# Patient Record
Sex: MALE | Race: WHITE | NOT HISPANIC OR LATINO | Employment: FULL TIME | URBAN - METROPOLITAN AREA
[De-identification: names, ages, dates, MRNs, and addresses within clinical notes are randomized per-mention and may not be internally consistent; named-entity substitution may affect disease eponyms.]

---

## 2017-01-21 DIAGNOSIS — E78.5 HYPERLIPEMIA: Chronic | ICD-10-CM

## 2017-01-22 RX ORDER — ATORVASTATIN CALCIUM 40 MG/1
TABLET, FILM COATED ORAL
Qty: 30 TABLET | Refills: 2 | Status: SHIPPED | OUTPATIENT
Start: 2017-01-22 | End: 2017-04-16 | Stop reason: SDUPTHER

## 2017-02-03 DIAGNOSIS — I25.10 CORONARY ARTERY DISEASE INVOLVING NATIVE CORONARY ARTERY WITHOUT ANGINA PECTORIS, UNSPECIFIED WHETHER NATIVE OR TRANSPLANTED HEART: ICD-10-CM

## 2017-02-03 DIAGNOSIS — I10 ESSENTIAL HYPERTENSION: ICD-10-CM

## 2017-02-03 RX ORDER — METOPROLOL TARTRATE 50 MG/1
TABLET ORAL
Qty: 60 TABLET | Refills: 6 | Status: SHIPPED | OUTPATIENT
Start: 2017-02-03 | End: 2017-08-20 | Stop reason: SDUPTHER

## 2017-03-02 RX ORDER — METFORMIN HYDROCHLORIDE 500 MG/1
TABLET ORAL
Qty: 60 TABLET | Refills: 1 | Status: SHIPPED | OUTPATIENT
Start: 2017-03-02 | End: 2017-04-26 | Stop reason: SDUPTHER

## 2017-03-04 RX ORDER — AMLODIPINE BESYLATE 5 MG/1
TABLET ORAL
Qty: 30 TABLET | Refills: 2 | Status: SHIPPED | OUTPATIENT
Start: 2017-03-04 | End: 2018-02-04 | Stop reason: SDUPTHER

## 2017-03-22 RX ORDER — ISOSORBIDE MONONITRATE 30 MG/1
TABLET, EXTENDED RELEASE ORAL
Qty: 90 TABLET | Refills: 4 | Status: SHIPPED | OUTPATIENT
Start: 2017-03-22 | End: 2017-08-01 | Stop reason: SDUPTHER

## 2017-03-29 RX ORDER — VALSARTAN 160 MG/1
TABLET ORAL
Qty: 30 TABLET | Refills: 4 | Status: SHIPPED | OUTPATIENT
Start: 2017-03-29 | End: 2017-08-20 | Stop reason: SDUPTHER

## 2017-04-16 DIAGNOSIS — E78.5 HYPERLIPEMIA: Chronic | ICD-10-CM

## 2017-04-16 RX ORDER — ATORVASTATIN CALCIUM 40 MG/1
TABLET, FILM COATED ORAL
Qty: 30 TABLET | Refills: 2 | Status: SHIPPED | OUTPATIENT
Start: 2017-04-16 | End: 2017-07-04 | Stop reason: SDUPTHER

## 2017-04-25 RX ORDER — METFORMIN HYDROCHLORIDE 500 MG/1
TABLET ORAL
Qty: 60 TABLET | Refills: 1 | OUTPATIENT
Start: 2017-04-25

## 2017-04-26 ENCOUNTER — TELEPHONE (OUTPATIENT)
Dept: DIABETES | Facility: CLINIC | Age: 62
End: 2017-04-26

## 2017-04-26 RX ORDER — METFORMIN HYDROCHLORIDE 500 MG/1
TABLET ORAL
Qty: 60 TABLET | Refills: 1 | Status: SHIPPED | OUTPATIENT
Start: 2017-04-26 | End: 2017-12-09 | Stop reason: SDUPTHER

## 2017-04-26 NOTE — TELEPHONE ENCOUNTER
Patients wife informed that he needs an appointment next month. Patients wife stated that she will have patient call back to scheduled an appointment to be seen next month.

## 2017-05-04 ENCOUNTER — LAB VISIT (OUTPATIENT)
Dept: LAB | Facility: HOSPITAL | Age: 62
End: 2017-05-04
Attending: INTERNAL MEDICINE
Payer: COMMERCIAL

## 2017-05-04 ENCOUNTER — OFFICE VISIT (OUTPATIENT)
Dept: INTERNAL MEDICINE | Facility: CLINIC | Age: 62
End: 2017-05-04
Payer: COMMERCIAL

## 2017-05-04 VITALS
WEIGHT: 289.69 LBS | TEMPERATURE: 96 F | SYSTOLIC BLOOD PRESSURE: 118 MMHG | DIASTOLIC BLOOD PRESSURE: 84 MMHG | BODY MASS INDEX: 37.18 KG/M2 | HEIGHT: 74 IN | HEART RATE: 69 BPM

## 2017-05-04 DIAGNOSIS — E78.5 HYPERLIPIDEMIA ASSOCIATED WITH TYPE 2 DIABETES MELLITUS: ICD-10-CM

## 2017-05-04 DIAGNOSIS — I25.10 CORONARY ARTERY DISEASE INVOLVING NATIVE CORONARY ARTERY OF NATIVE HEART WITHOUT ANGINA PECTORIS: ICD-10-CM

## 2017-05-04 DIAGNOSIS — E11.9 TYPE 2 DIABETES MELLITUS WITHOUT COMPLICATION, WITHOUT LONG-TERM CURRENT USE OF INSULIN: ICD-10-CM

## 2017-05-04 DIAGNOSIS — I10 ESSENTIAL HYPERTENSION: Primary | ICD-10-CM

## 2017-05-04 DIAGNOSIS — E11.9 CONTROLLED TYPE 2 DIABETES MELLITUS WITHOUT COMPLICATION, WITHOUT LONG-TERM CURRENT USE OF INSULIN: ICD-10-CM

## 2017-05-04 DIAGNOSIS — G47.33 OSA (OBSTRUCTIVE SLEEP APNEA): ICD-10-CM

## 2017-05-04 DIAGNOSIS — E11.69 HYPERLIPIDEMIA ASSOCIATED WITH TYPE 2 DIABETES MELLITUS: ICD-10-CM

## 2017-05-04 DIAGNOSIS — Z11.59 NEED FOR HEPATITIS C SCREENING TEST: ICD-10-CM

## 2017-05-04 LAB
ALBUMIN SERPL BCP-MCNC: 3.6 G/DL
ALP SERPL-CCNC: 69 U/L
ALT SERPL W/O P-5'-P-CCNC: 23 U/L
ANION GAP SERPL CALC-SCNC: 5 MMOL/L
AST SERPL-CCNC: 13 U/L
BILIRUB SERPL-MCNC: 0.6 MG/DL
BUN SERPL-MCNC: 25 MG/DL
CALCIUM SERPL-MCNC: 9.3 MG/DL
CHLORIDE SERPL-SCNC: 107 MMOL/L
CHOLEST/HDLC SERPL: 3.8 {RATIO}
CO2 SERPL-SCNC: 29 MMOL/L
CREAT SERPL-MCNC: 1.1 MG/DL
EST. GFR  (AFRICAN AMERICAN): >60 ML/MIN/1.73 M^2
EST. GFR  (NON AFRICAN AMERICAN): >60 ML/MIN/1.73 M^2
GLUCOSE SERPL-MCNC: 119 MG/DL
HDL/CHOLESTEROL RATIO: 26.4 %
HDLC SERPL-MCNC: 129 MG/DL
HDLC SERPL-MCNC: 34 MG/DL
LDLC SERPL CALC-MCNC: 83.8 MG/DL
NONHDLC SERPL-MCNC: 95 MG/DL
POTASSIUM SERPL-SCNC: 5.1 MMOL/L
PROT SERPL-MCNC: 6.8 G/DL
SODIUM SERPL-SCNC: 141 MMOL/L
TRIGL SERPL-MCNC: 56 MG/DL

## 2017-05-04 PROCEDURE — 99999 PR PBB SHADOW E&M-EST. PATIENT-LVL III: CPT | Mod: PBBFAC,,, | Performed by: FAMILY MEDICINE

## 2017-05-04 PROCEDURE — 86803 HEPATITIS C AB TEST: CPT

## 2017-05-04 PROCEDURE — 3044F HG A1C LEVEL LT 7.0%: CPT | Mod: S$GLB,,, | Performed by: FAMILY MEDICINE

## 2017-05-04 PROCEDURE — 1160F RVW MEDS BY RX/DR IN RCRD: CPT | Mod: S$GLB,,, | Performed by: FAMILY MEDICINE

## 2017-05-04 PROCEDURE — 4010F ACE/ARB THERAPY RXD/TAKEN: CPT | Mod: S$GLB,,, | Performed by: FAMILY MEDICINE

## 2017-05-04 PROCEDURE — 3079F DIAST BP 80-89 MM HG: CPT | Mod: S$GLB,,, | Performed by: FAMILY MEDICINE

## 2017-05-04 PROCEDURE — 99214 OFFICE O/P EST MOD 30 MIN: CPT | Mod: S$GLB,,, | Performed by: FAMILY MEDICINE

## 2017-05-04 PROCEDURE — 3074F SYST BP LT 130 MM HG: CPT | Mod: S$GLB,,, | Performed by: FAMILY MEDICINE

## 2017-05-04 PROCEDURE — 83036 HEMOGLOBIN GLYCOSYLATED A1C: CPT

## 2017-05-04 PROCEDURE — 80053 COMPREHEN METABOLIC PANEL: CPT

## 2017-05-04 PROCEDURE — 80061 LIPID PANEL: CPT

## 2017-05-04 PROCEDURE — 36415 COLL VENOUS BLD VENIPUNCTURE: CPT | Mod: PO

## 2017-05-04 NOTE — PROGRESS NOTES
Subjective:       Patient ID: Isaiah Harrison is a 62 y.o. male.    Chief Complaint: Follow-up    HPI Comments: F/U:       Pt is a 62 year old here for DM, HTN and cholesterol follow-up. Up-to this point his DM has been very well controlled and his BP has been well controlled. Pt is up-to-date on his male wellness    Review of Systems   Constitutional: Negative.    Respiratory: Negative.    Cardiovascular: Negative.    Genitourinary: Negative.    Neurological: Negative.    Hematological: Negative.    Psychiatric/Behavioral: Negative.        Objective:      Physical Exam   Constitutional: He is oriented to person, place, and time. He appears well-developed and well-nourished.   Cardiovascular: Normal rate and regular rhythm.    Pulmonary/Chest: Effort normal and breath sounds normal.   Abdominal: Soft. Bowel sounds are normal.   Feet:   Right Foot:   Protective Sensation: 10 sites tested. 9 sites sensed.   Skin Integrity: Positive for callus and dry skin.   Left Foot:   Protective Sensation: 10 sites tested. 9 sites sensed.   Skin Integrity: Positive for callus and dry skin.   Neurological: He is alert and oriented to person, place, and time.   Skin: Skin is warm and dry.   Psychiatric: He has a normal mood and affect. His behavior is normal.       Assessment:       1. Essential hypertension    2. Controlled type 2 diabetes mellitus without complication, without long-term current use of insulin    3. Coronary artery disease involving native coronary artery of native heart without angina pectoris    4. DANTE (obstructive sleep apnea)    5. Hyperlipidemia associated with type 2 diabetes mellitus        Plan:       Essential hypertension  Comments:  BP is very well controlled    Controlled type 2 diabetes mellitus without complication, without long-term current use of insulin  Comments:  Will get HgA1C and continue on the trulicity and metformin    Coronary artery disease involving native coronary artery of native  heart without angina pectoris  Comments:  Pt will continue to follow-up with cardiology    DANTE (obstructive sleep apnea)  Comments:  Pt uses consistently his CPAP    Hyperlipidemia associated with type 2 diabetes mellitus  Comments:  Will check his lipids today

## 2017-05-04 NOTE — MR AVS SNAPSHOT
[facility]  [address] [facility] [place] [address]  Phone: [phone]  Fax: [phone]                  Isaiah Harrison   2017 8:00 AM   Office Visit    Description:  Male : 1955   Provider:  Pj Del Cid MD   Department:  [facility] - Internal Medicine           Reason for Visit     Follow-up           Diagnoses this Visit        Comments    Essential hypertension    -  Primary BP is very well controlled    Controlled type 2 diabetes mellitus without complication, without long-term current use of insulin     Will get HgA1C and continue on the trulicity and metformin    Coronary artery disease involving native coronary artery of native heart without angina pectoris     Pt will continue to follow-up with cardiology    DANTE (obstructive sleep apnea)     Pt uses consistently his CPAP    Hyperlipidemia associated with type 2 diabetes mellitus     Will check his lipids today           To Do List           Future Appointments        Provider Department Dept Phone    2017 8:25 AM LABORATORY, [facility] [phone]    2017 4:15 PM Kirsty Lyles MD [facility] [phone]      Goals (5 Years of Data)     None      [facility] On Call     [facility] On Call Nurse Care Line -  Assistance  Unless otherwise directed by your provider, please contact [facility], our nurse care line that is available for  assistance.     Registered nurses in the [facility] provide: appointment scheduling, clinical advisement, health education, and other advisory services.  Call: [phone] (toll free)               Medications           Message regarding Medications     Verify the changes and/or additions to your medication regime listed below are the same as discussed with your clinician today.  If any of these changes or additions are incorrect, please notify your healthcare provider.        STOP taking these medications     pantoprazole  "(PROTONIX) 20 MG tablet Take 1 tablet (20 mg total) by mouth once daily.           Verify that the below list of medications is an accurate representation of the medications you are currently taking.  If none reported, the list may be blank. If incorrect, please contact your healthcare provider. Carry this list with you in case of emergency.           Current Medications     amlodipine (NORVASC) 5 MG tablet take 1/2 tablet by mouth every evening    aspirin (ECOTRIN) 81 MG EC tablet Take 81 mg by mouth once daily.    atorvastatin (LIPITOR) 40 MG tablet take 1 tablet by mouth once daily    blood sugar diagnostic Strp 1 strip by Misc.(Non-Drug; Combo Route) route 2 (two) times daily.    blood sugar diagnostic Strp 1 strip by Misc.(Non-Drug; Combo Route) route 2 (two) times daily.    dulaglutide (TRULICITY) 1.5 mg/0.5 mL PnIj Inject 1.5 mg into the skin every 7 days.    isosorbide mononitrate (IMDUR) 30 MG 24 hr tablet take 3 tablets by mouth once daily    metformin (GLUCOPHAGE) 500 MG tablet take 1 tablet by mouth twice a day with meals    metoprolol tartrate (LOPRESSOR) 50 MG tablet take 1 tablet by mouth twice a day    MICROLET LANCET Misc CHECK BLOOD SUGAR TWICE DAILY    niacin (NIASPAN EXTENDED-RELEASE) 500 mg tablet Take 1 tablet (500 mg total) by mouth nightly.    nitroGLYCERIN (NITROSTAT) 0.4 MG SL tablet Place 1 tablet (0.4 mg total) under the tongue every 5 (five) minutes as needed.    ranolazine (RANEXA) 1,000 mg Tb12 Take 1 tablet (1,000 mg total) by mouth 2 (two) times daily.    valsartan (DIOVAN) 160 MG tablet take 1 tablet by mouth once daily           Clinical Reference Information           Your Vitals Were     BP Pulse Temp    118/84 (BP Location: Right arm, Patient Position: Sitting, BP Method: Manual) 69 96.4 °F (35.8 °C) (Tympanic)    Height Weight BMI    6' 2" (1.88 m) 131.4 kg (289 lb 11 oz) 37.19 kg/m2      Blood Pressure          Most Recent Value    BP  118/84      Allergies as of 5/4/2017     " Pcn [Penicillins]      Immunizations Administered on Date of Encounter - 5/4/2017     None      Language Assistance Services     ATTENTION: Language assistance services are available, free of charge. Please call [phone].      ATENCIÓN: Si gabi nation, tiene a boucher disposición servicios gratuitos de asistencia lingüística. Llame al [phone].     [facility] Ý: N?u b?n nói Ti?ng Vi?t, có các d?ch v? h? tr? ngôn ng? mi?n phí dành cho b?n. G?i s? [phone].         [facility] complies with applicable Federal civil rights laws and does not discriminate on the basis of race, color, national origin, age, disability, or sex.

## 2017-05-05 LAB
ESTIMATED AVG GLUCOSE: 131 MG/DL
HBA1C MFR BLD HPLC: 6.2 %
HCV AB SERPL QL IA: NEGATIVE

## 2017-05-17 ENCOUNTER — PATIENT MESSAGE (OUTPATIENT)
Dept: INTERNAL MEDICINE | Facility: CLINIC | Age: 62
End: 2017-05-17

## 2017-05-18 ENCOUNTER — CLINICAL SUPPORT (OUTPATIENT)
Dept: CARDIOLOGY | Facility: CLINIC | Age: 62
End: 2017-05-18
Payer: COMMERCIAL

## 2017-05-18 ENCOUNTER — OFFICE VISIT (OUTPATIENT)
Dept: CARDIOLOGY | Facility: CLINIC | Age: 62
End: 2017-05-18
Payer: COMMERCIAL

## 2017-05-18 VITALS
HEART RATE: 79 BPM | SYSTOLIC BLOOD PRESSURE: 136 MMHG | WEIGHT: 288 LBS | DIASTOLIC BLOOD PRESSURE: 76 MMHG | HEIGHT: 74 IN | BODY MASS INDEX: 36.96 KG/M2

## 2017-05-18 DIAGNOSIS — I87.2 VENOUS INSUFFICIENCY: ICD-10-CM

## 2017-05-18 DIAGNOSIS — G47.33 OSA (OBSTRUCTIVE SLEEP APNEA): ICD-10-CM

## 2017-05-18 DIAGNOSIS — E11.9 CONTROLLED TYPE 2 DIABETES MELLITUS WITHOUT COMPLICATION, WITHOUT LONG-TERM CURRENT USE OF INSULIN: ICD-10-CM

## 2017-05-18 DIAGNOSIS — I10 ESSENTIAL HYPERTENSION: ICD-10-CM

## 2017-05-18 DIAGNOSIS — I25.10 CORONARY ARTERY DISEASE, ANGINA PRESENCE UNSPECIFIED, UNSPECIFIED VESSEL OR LESION TYPE, UNSPECIFIED WHETHER NATIVE OR TRANSPLANTED HEART: ICD-10-CM

## 2017-05-18 DIAGNOSIS — E11.69 HYPERLIPIDEMIA ASSOCIATED WITH TYPE 2 DIABETES MELLITUS: ICD-10-CM

## 2017-05-18 DIAGNOSIS — Z98.61 S/P PTCA (PERCUTANEOUS TRANSLUMINAL CORONARY ANGIOPLASTY): ICD-10-CM

## 2017-05-18 DIAGNOSIS — E11.59 HYPERTENSION ASSOCIATED WITH DIABETES: ICD-10-CM

## 2017-05-18 DIAGNOSIS — I15.2 HYPERTENSION ASSOCIATED WITH DIABETES: ICD-10-CM

## 2017-05-18 DIAGNOSIS — I25.10 CORONARY ARTERY DISEASE, ANGINA PRESENCE UNSPECIFIED, UNSPECIFIED VESSEL OR LESION TYPE, UNSPECIFIED WHETHER NATIVE OR TRANSPLANTED HEART: Primary | ICD-10-CM

## 2017-05-18 DIAGNOSIS — E88.819 INSULIN RESISTANCE: ICD-10-CM

## 2017-05-18 DIAGNOSIS — E78.5 HYPERLIPIDEMIA ASSOCIATED WITH TYPE 2 DIABETES MELLITUS: ICD-10-CM

## 2017-05-18 PROCEDURE — 4010F ACE/ARB THERAPY RXD/TAKEN: CPT | Mod: S$GLB,,, | Performed by: PHYSICIAN ASSISTANT

## 2017-05-18 PROCEDURE — 3044F HG A1C LEVEL LT 7.0%: CPT | Mod: S$GLB,,, | Performed by: PHYSICIAN ASSISTANT

## 2017-05-18 PROCEDURE — 3078F DIAST BP <80 MM HG: CPT | Mod: S$GLB,,, | Performed by: PHYSICIAN ASSISTANT

## 2017-05-18 PROCEDURE — 99999 PR PBB SHADOW E&M-EST. PATIENT-LVL III: CPT | Mod: PBBFAC,,, | Performed by: INTERNAL MEDICINE

## 2017-05-18 PROCEDURE — 3075F SYST BP GE 130 - 139MM HG: CPT | Mod: S$GLB,,, | Performed by: PHYSICIAN ASSISTANT

## 2017-05-18 PROCEDURE — 99214 OFFICE O/P EST MOD 30 MIN: CPT | Mod: S$GLB,,, | Performed by: PHYSICIAN ASSISTANT

## 2017-05-18 PROCEDURE — 1160F RVW MEDS BY RX/DR IN RCRD: CPT | Mod: S$GLB,,, | Performed by: PHYSICIAN ASSISTANT

## 2017-05-18 PROCEDURE — 93000 ELECTROCARDIOGRAM COMPLETE: CPT | Mod: S$GLB,,, | Performed by: INTERNAL MEDICINE

## 2017-05-18 NOTE — MR AVS SNAPSHOT
[facility]  [address] [place] [address]  Phone: [phone]  Fax: [phone]                  Isaiah Harrison   2017 4:15 PM   Office Visit    Description:  Male : 1955   Provider:  TRAMAINE Lisa   Department:  [facility]           Reason for Visit     Coronary Artery Disease     Hypertension           Diagnoses this Visit        Comments    Coronary artery disease, angina presence unspecified, unspecified vessel or lesion type, unspecified whether native or transplanted heart    -  Primary     DANTE (obstructive sleep apnea)         Essential hypertension         Hypertension associated with diabetes         Venous insufficiency         Controlled type 2 diabetes mellitus without complication, without long-term current use of insulin         Hyperlipidemia associated with type 2 diabetes mellitus         Obesity, Class II, BMI 35-39.9, with comorbidity         S/P PTCA (percutaneous transluminal coronary angioplasty)         Insulin resistance                To Do List           Goals (5 Years of Data)     None      [facility]     [facility] Nurse Care Line -  Assistance  Unless otherwise directed by your provider, please contact [facility], our nurse care line that is available for  assistance.     Registered nurses in the [facility] provide: appointment scheduling, clinical advisement, health education, and other advisory services.  Call: [phone] (toll free)               Medications           Message regarding Medications     Verify the changes and/or additions to your medication regime listed below are the same as discussed with your clinician today.  If any of these changes or additions are incorrect, please notify your healthcare provider.             Verify that the below list of medications is an accurate representation of the medications you are currently taking.  If none reported, the list  "may be blank. If incorrect, please contact your healthcare provider. Carry this list with you in case of emergency.           Current Medications     amlodipine (NORVASC) 5 MG tablet take 1/2 tablet by mouth every evening    aspirin (ECOTRIN) 81 MG EC tablet Take 81 mg by mouth once daily.    atorvastatin (LIPITOR) 40 MG tablet take 1 tablet by mouth once daily    dulaglutide (TRULICITY) 1.5 mg/0.5 mL PnIj Inject 1.5 mg into the skin every 7 days.    isosorbide mononitrate (IMDUR) 30 MG 24 hr tablet take 3 tablets by mouth once daily    metformin (GLUCOPHAGE) 500 MG tablet take 1 tablet by mouth twice a day with meals    metoprolol tartrate (LOPRESSOR) 50 MG tablet take 1 tablet by mouth twice a day    niacin (NIASPAN EXTENDED-RELEASE) 500 mg tablet Take 1 tablet (500 mg total) by mouth nightly.    ranolazine (RANEXA) 1,000 mg Tb12 Take 1 tablet (1,000 mg total) by mouth 2 (two) times daily.    valsartan (DIOVAN) 160 MG tablet take 1 tablet by mouth once daily    blood sugar diagnostic Strp 1 strip by Misc.(Non-Drug; Combo Route) route 2 (two) times daily.    blood sugar diagnostic Strp 1 strip by Misc.(Non-Drug; Combo Route) route 2 (two) times daily.    MICROLET LANCET Misc CHECK BLOOD SUGAR TWICE DAILY    nitroGLYCERIN (NITROSTAT) 0.4 MG SL tablet Place 1 tablet (0.4 mg total) under the tongue every 5 (five) minutes as needed.           Clinical Reference Information           Your Vitals Were     BP Pulse Height Weight BMI    136/76 (BP Location: Left arm, Patient Position: Sitting, BP Method: Manual) 79 6' 2" (1.88 m) 130.6 kg (288 lb) 36.98 kg/m2      Blood Pressure          Most Recent Value    Right Arm BP - Sitting  (P) 140/76    Left Arm BP - Sitting  (P) 136/76    BP  136/76      Allergies as of 5/18/2017     Pcn [Penicillins]      Immunizations Administered on Date of Encounter - 5/18/2017     None      Orders Placed During Today's Visit     Future Labs/Procedures Expected by Expires    Hemoglobin A1c  " 11/14/2017 7/17/2018    Comprehensive metabolic panel  11/17/2017 (Approximate) 5/18/2018    Lipid panel  11/17/2017 (Approximate) 5/18/2018    2D echo with color flow doppler  As directed 5/18/2018    SCHEDULED EKG 12-LEAD (to Muse)  As directed 5/16/2018      Language Assistance Services     ATTENTION: Language assistance services are available, free of charge. Please call [phone].      ATENCIÓN: Si habla español, tiene a boucher disposición servicios gratuitos de asistencia lingüística. Llame al [phone].     CHÚ Ý: N?u b?n nói Ti?ng Vi?t, có các d?ch v? h? tr? ngôn ng? mi?n phí dành cho b?n. G?i s? [phone].         [facility] complies with applicable Federal civil rights laws and does not discriminate on the basis of race, color, national origin, age, disability, or sex.

## 2017-05-18 NOTE — PROGRESS NOTES
Subjective:    Patient ID:  Isaiah Harrison is a 62 y.o. male who presents for follow-up of Coronary Artery Disease (followup) and Hypertension      HPI   Mr. Harrison is a 62 year old male patient with a PMHx of CAD s/p LAD stent, obesity, HTN, hyperlipidemia, and DANTE who presents today for his routine follow-up. He returns today and states he is doing well. CAD clinically stable. Patient denies any chest pain, SOB, or other anginal signs or symptoms. He also denies any lightheadedness, palpitations, dizziness, near syncope, or syncope. No signs/symptoms of TIA/CVA. He has some mild lower extremity edema which is chronic. No other signs/sympotms to suggest CHF. Patient reports compliance with his medications. Last labs reviewed. A1C ok. Lipid panel-LDL slightly elevated. Patient admits he needs to be more compliant with exercise and portion control. EKG today shows NSR, normal EKG.     Review of Systems   Constitution: Negative for chills, decreased appetite, fever, weakness and malaise/fatigue.   HENT: Negative for congestion, headaches, hoarse voice and sore throat.    Eyes: Negative for blurred vision and discharge.   Cardiovascular: Positive for leg swelling. Negative for chest pain, claudication, cyanosis, dyspnea on exertion, irregular heartbeat, near-syncope, orthopnea, palpitations and paroxysmal nocturnal dyspnea.   Respiratory: Negative for cough, hemoptysis, shortness of breath, snoring, sputum production and wheezing.    Endocrine: Negative for cold intolerance and heat intolerance.   Hematologic/Lymphatic: Negative for bleeding problem. Does not bruise/bleed easily.   Skin: Negative for rash.   Musculoskeletal: Negative for arthritis, back pain, joint pain, joint swelling, muscle cramps, muscle weakness and myalgias.   Gastrointestinal: Negative for abdominal pain, constipation, diarrhea, heartburn, melena and nausea.   Genitourinary: Negative for hematuria.   Neurological: Negative for dizziness,  "focal weakness, light-headedness, loss of balance, numbness, paresthesias and seizures.   Psychiatric/Behavioral: Negative for memory loss. The patient does not have insomnia.    Allergic/Immunologic: Negative for hives.       /76 (BP Location: Left arm, Patient Position: Sitting, BP Method: Manual)  Pulse 79  Ht 6' 2" (1.88 m)  Wt 130.6 kg (288 lb)  BMI 36.98 kg/m2    Objective:    Physical Exam   Constitutional: He is oriented to person, place, and time. He appears well-developed and well-nourished. No distress.   HENT:   Head: Normocephalic and atraumatic.   Eyes: Pupils are equal, round, and reactive to light. Right eye exhibits no discharge. Left eye exhibits no discharge.   Neck: Neck supple. No JVD present. No tracheal deviation present. No thyromegaly present.   Cardiovascular: Normal rate, regular rhythm, S1 normal, S2 normal, normal heart sounds and intact distal pulses.  PMI is not displaced.  Exam reveals no gallop, no S3, no S4 and no friction rub.    No murmur heard.  Pulses:       Radial pulses are 2+ on the right side, and 2+ on the left side.   Pulmonary/Chest: Effort normal and breath sounds normal. No respiratory distress. He has no wheezes. He has no rales.   Abdominal: Soft. He exhibits no distension. There is no tenderness. There is no rebound.   obese   Musculoskeletal: He exhibits no edema.   Neurological: He is alert and oriented to person, place, and time.   Skin: Skin is warm and dry. He is not diaphoretic. No erythema.   Varicose veins     Psychiatric: He has a normal mood and affect. His behavior is normal. Thought content normal.   Nursing note and vitals reviewed.        Lab Results   Component Value Date    CHOL 129 05/04/2017    CHOL 130 12/19/2016    CHOL 118 (L) 08/18/2016     Lab Results   Component Value Date    HDL 34 (L) 05/04/2017    HDL 37 (L) 12/19/2016    HDL 34 (L) 08/18/2016     Lab Results   Component Value Date    LDLCALC 83.8 05/04/2017    LDLCALC 80.0 " 12/19/2016    LDLCALC 73.0 08/18/2016     Lab Results   Component Value Date    TRIG 56 05/04/2017    TRIG 65 12/19/2016    TRIG 55 08/18/2016     Lab Results   Component Value Date    CHOLHDL 26.4 05/04/2017    CHOLHDL 28.5 12/19/2016    CHOLHDL 28.8 08/18/2016     Lab Results   Component Value Date    HGBA1C 6.2 05/04/2017       Chemistry        Component Value Date/Time     05/04/2017 0824    K 5.1 05/04/2017 0824     05/04/2017 0824    CO2 29 05/04/2017 0824    BUN 25 (H) 05/04/2017 0824    CREATININE 1.1 05/04/2017 0824     (H) 05/04/2017 0824        Component Value Date/Time    CALCIUM 9.3 05/04/2017 0824    ALKPHOS 69 05/04/2017 0824    AST 13 05/04/2017 0824    ALT 23 05/04/2017 0824    BILITOT 0.6 05/04/2017 0824          Assessment:       1. Coronary artery disease, angina presence unspecified, unspecified vessel or lesion type, unspecified whether native or transplanted heart    2. DANTE (obstructive sleep apnea)    3. Essential hypertension    4. Hypertension associated with diabetes    5. Venous insufficiency    6. Controlled type 2 diabetes mellitus without complication, without long-term current use of insulin    7. Hyperlipidemia associated with type 2 diabetes mellitus    8. Obesity, Class II, BMI 35-39.9, with comorbidity    9. S/P PTCA (percutaneous transluminal coronary angioplasty)       Doing clinically well. No angina or equivalent. No CHF symptoms. Counseled about dietary compliance and increased aerobic exercise.   Plan:     -Continue current medical management and risk factor modification  -Cardiac, low salt diet  -Increase activity level, weight loss  -RTC 6 months with lipid, cmp, A1C, 2D echo    Chart reviewed. Dr. Lyles agrees with plan as outlined above.

## 2017-06-30 DIAGNOSIS — E11.9 TYPE 2 DIABETES MELLITUS WITHOUT COMPLICATION: ICD-10-CM

## 2017-07-01 RX ORDER — DULAGLUTIDE 1.5 MG/.5ML
INJECTION, SOLUTION SUBCUTANEOUS
Qty: 2 ML | Refills: 4 | Status: SHIPPED | OUTPATIENT
Start: 2017-07-01 | End: 2017-12-09 | Stop reason: SDUPTHER

## 2017-07-04 DIAGNOSIS — E78.5 HYPERLIPEMIA: Chronic | ICD-10-CM

## 2017-07-04 RX ORDER — ATORVASTATIN CALCIUM 40 MG/1
TABLET, FILM COATED ORAL
Qty: 30 TABLET | Refills: 2 | Status: SHIPPED | OUTPATIENT
Start: 2017-07-04 | End: 2017-09-24 | Stop reason: SDUPTHER

## 2017-08-01 ENCOUNTER — NUTRITION (OUTPATIENT)
Dept: DIABETES | Facility: CLINIC | Age: 62
End: 2017-08-01
Payer: COMMERCIAL

## 2017-08-01 VITALS — HEIGHT: 74 IN | WEIGHT: 292.31 LBS | BODY MASS INDEX: 37.51 KG/M2

## 2017-08-01 DIAGNOSIS — E11.9 DIABETES MELLITUS WITHOUT COMPLICATION: Primary | ICD-10-CM

## 2017-08-01 PROCEDURE — 99999 PR PBB SHADOW E&M-EST. PATIENT-LVL III: CPT | Mod: PBBFAC,,, | Performed by: DIETITIAN, REGISTERED

## 2017-08-01 PROCEDURE — G0108 DIAB MANAGE TRN  PER INDIV: HCPCS | Mod: S$GLB,,, | Performed by: DIETITIAN, REGISTERED

## 2017-08-01 RX ORDER — ISOSORBIDE MONONITRATE 30 MG/1
TABLET, EXTENDED RELEASE ORAL
Qty: 90 TABLET | Refills: 4 | Status: SHIPPED | OUTPATIENT
Start: 2017-08-01 | End: 2017-12-21 | Stop reason: SDUPTHER

## 2017-08-01 NOTE — LETTER
August 1, 2017        Pj Del Cid MD  [address] [facility] [place] [address]             [facility] Diabetes Management  [address] [facility] [place] [address]  Phone: [phone]  Fax: [phone]   Patient: Isaiah Harrison   MR Number: [id number]   YOB: 1955   Date of Visit: 8/1/2017       Dear Dr. Del Cid:    Thank you for referring Isaiah Harrison to me for evaluation. Below are the relevant portions of my assessment and plan of care.     If you have questions, please do not hesitate to call me. I look forward to following Isaiah along with you.    Sincerely,      Angélica Patel, BISHNU, CDE           CC  No Recipients

## 2017-08-01 NOTE — PROGRESS NOTES
Diabetes Education  Author: Angélica Patel RD, CDE  Date: 8/1/2017    Diabetes Education Visit  Diabetes Education Record Assessment/Progress: Comprehensive/Group    Diabetes Type  Diabetes Type : Type II     Nutrition  Meal Planning:  (Intake ~3300 cals/d. Excess carb, fat and sodium from portions and cooking preparation. Inadequate volume non-starchy vegetables.)    Monitoring   Monitoring: Other  Self Monitoring : Needs meter  Blood Glucose Logs: No    Exercise   Exercise Type:  (Intermittent walking )    Current Diabetes Treatment   Current Treatment: Oral Medication, Diet, Injectable, Exercise (metformin 500mg twice daily, trulicity 1.5 mg weekly.)    Social History  Preferred Learning Method: Face to Face  Primary Support: Self, Spouse  Smoking Status: Never a Smoker  Alcohol Use: Rarely     Barriers to Change  Barriers to Change: None  Learning Challenges : None    Readiness to Learn   Readiness to Learn : Eager    Cultural Influences  Cultural Influences: No    Diabetes Education Assessment/Progress  Acute Complications (preventing, detecting, and treating acute complications): Discussion, Individual Session, Competent (verbalizes/demonstrates), Competent Family/SO  Chronic Complications (preventing, detecting, and treating chronic complications): Discussion, Individual Session, Competent (verbalizes/demonstrates), Competent Family/SO  Diabetes Disease Process (diabetes disease process and treatment options): Discussion, Individual Session, Competent (verbalizes/demonstrates), Competent Family/SO  Nutrition (Incorporating nutritional management into one's lifestyle): Discussion, Individual Session, Competent (verbalizes/demonstrates), Competent Family/SO, Written Materials Provided (Dicussed Ideal Protein program per pt quest regarding weight loss options, literature provided for him to consider further. )  Physical Activity (incorporating physical activity into one's lifestyle): Discussion,  Individual Session, Competent (verbalizes/demonstrates), Competent Family/SO  Medications (states correct name, dose, onset, peak, duration, side effects & timing of meds): Discussion, Individual Session, Competent (verbalizes/demonstrates), Competent Family/SO, Written Materials Provided  Monitoring (monitoring blood glucose/other parameters & using results): Discussion, Individual Session, Competent (verbalizes/demonstrates), Competent Family/SO, Written Materials Provided  Goal Setting and Problem Solving (verbalizes behavior change strategies & sets realistic goals): Discussion, Individual Session, Competent (verbalizes/demonstrates), Competent Family/SO  Behavior Change (developing personal strategies to health & behavior change): Discussion, Individual Session, Comnpetent (verbalizes/demonstrates), Competent Family/SO  Psychosocial Issues (developing personal srategies to address psychosocial concerns): Discussion, Individual Session, Competent (verbalizes/demonstrates), Competent Family/SO    Goals  Healthy Eating: Set (Use meal plan - add pm snack (list provided), carb targets)  Start Date: 08/01/17  Target Date: 11/01/17  Physical Activity: Set (150 min/wk)  Start Date: 08/01/17  Target Date: 11/01/17  Monitoring: Set (test bg once daily (fst, 2 hr pp))  Start Date: 08/01/17  Target Date: 11/01/17    Diabetes Self-Management Support Plan  Diabetes Learning: other  Other learning: RD/CDE via [employer] and [employer]. Pt will consider enrolling in ideal protein.   Review Status: Patient has selected and agrees to support plan.    Diabetes Care Plan/Intervention  Education Plan/Intervention: Individual Follow-Up DSMT    Diabetes Meal Plan  Restrictions: Low Fat, Low Sodium  Calories: 1600  Carbohydrate Per Meal: 30-45g  Carbohydrate Per Snack : 7-15g    Education Units of Time   Time Spent: 30 min      Health Maintenance Topics with due status: Not Due       Topic Last Completion Date    TETANUS VACCINE 08/18/2016     Foot Exam 05/04/2017    Lipid Panel 05/04/2017    Hemoglobin A1c 05/04/2017     Health Maintenance Due   Topic Date Due    Pneumococcal PPSV23 (Medium Risk) (1) 03/22/1975    Urine Microalbumin  10/14/2016    Colonoscopy  12/11/2016    Eye Exam  02/22/2017    Zoster Vaccine  03/22/2017    Influenza Vaccine  08/01/2017

## 2017-08-20 DIAGNOSIS — I10 ESSENTIAL HYPERTENSION: ICD-10-CM

## 2017-08-20 DIAGNOSIS — I25.10 CORONARY ARTERY DISEASE INVOLVING NATIVE CORONARY ARTERY WITHOUT ANGINA PECTORIS, UNSPECIFIED WHETHER NATIVE OR TRANSPLANTED HEART: ICD-10-CM

## 2017-08-20 RX ORDER — METOPROLOL TARTRATE 50 MG/1
TABLET ORAL
Qty: 60 TABLET | Refills: 6 | Status: SHIPPED | OUTPATIENT
Start: 2017-08-20 | End: 2018-03-04 | Stop reason: SDUPTHER

## 2017-08-21 RX ORDER — VALSARTAN 160 MG/1
TABLET ORAL
Qty: 30 TABLET | Refills: 4 | Status: SHIPPED | OUTPATIENT
Start: 2017-08-21 | End: 2018-01-07 | Stop reason: SDUPTHER

## 2017-09-24 DIAGNOSIS — E78.5 HYPERLIPEMIA: Chronic | ICD-10-CM

## 2017-09-25 RX ORDER — ATORVASTATIN CALCIUM 40 MG/1
TABLET, FILM COATED ORAL
Qty: 30 TABLET | Refills: 2 | Status: SHIPPED | OUTPATIENT
Start: 2017-09-25 | End: 2017-12-21 | Stop reason: SDUPTHER

## 2017-10-13 ENCOUNTER — TELEPHONE (OUTPATIENT)
Dept: OPHTHALMOLOGY | Facility: CLINIC | Age: 62
End: 2017-10-13

## 2017-10-13 NOTE — TELEPHONE ENCOUNTER
----- Message from Kendal Daniel sent at 10/13/2017  2:10 PM CDT -----  Would like to speak to nurse about scheduling an appt. Please call back at [phone]. thanks

## 2017-11-06 ENCOUNTER — OFFICE VISIT (OUTPATIENT)
Dept: OPHTHALMOLOGY | Facility: CLINIC | Age: 62
End: 2017-11-06
Payer: COMMERCIAL

## 2017-11-06 DIAGNOSIS — E11.9 TYPE 2 DIABETES MELLITUS WITHOUT RETINOPATHY: Primary | ICD-10-CM

## 2017-11-06 DIAGNOSIS — Z13.5 GLAUCOMA SCREENING: ICD-10-CM

## 2017-11-06 DIAGNOSIS — H52.4 MYOPIA WITH PRESBYOPIA OF BOTH EYES: ICD-10-CM

## 2017-11-06 DIAGNOSIS — H52.13 MYOPIA WITH PRESBYOPIA OF BOTH EYES: ICD-10-CM

## 2017-11-06 PROCEDURE — 92014 COMPRE OPH EXAM EST PT 1/>: CPT | Mod: S$GLB,,, | Performed by: OPTOMETRIST

## 2017-11-06 PROCEDURE — 99999 PR PBB SHADOW E&M-EST. PATIENT-LVL II: CPT | Mod: PBBFAC,,, | Performed by: OPTOMETRIST

## 2017-11-06 PROCEDURE — 92015 DETERMINE REFRACTIVE STATE: CPT | Mod: S$GLB,,, | Performed by: OPTOMETRIST

## 2017-11-06 NOTE — PROGRESS NOTES
HPI     Diabetic Eye Exam    Additional comments: Does not check BS. Pt says it's staying normal           Comments   Pt's last eye appt with [facility] was 8/17/15 for conjunctivitis. Pt was seeing   Dr. Smith at [facility]. Pt wants to go to all [facility] doctors so   that his records will be accessible for all his doctors. Pt states no pain   or discomfort since last appt. No VA change, either.     Does not wear glasses for distance or near.       Last edited by Diann Guthrie, OD on 11/6/2017  9:59 AM. (History)            Assessment /Plan     For exam results, see Encounter Report.    Type 2 diabetes mellitus without retinopathy    Glaucoma screening    Myopia with presbyopia of both eyes      No diabetic retinopathy both eyes.  Glaucoma screening negative both eyes.  Over the counter readers as needed..  Return to clinic 1 yr.

## 2017-11-15 ENCOUNTER — LAB VISIT (OUTPATIENT)
Dept: LAB | Facility: HOSPITAL | Age: 62
End: 2017-11-15
Attending: INTERNAL MEDICINE
Payer: COMMERCIAL

## 2017-11-15 ENCOUNTER — OFFICE VISIT (OUTPATIENT)
Dept: CARDIOLOGY | Facility: CLINIC | Age: 62
End: 2017-11-15
Payer: COMMERCIAL

## 2017-11-15 VITALS
DIASTOLIC BLOOD PRESSURE: 62 MMHG | BODY MASS INDEX: 36.7 KG/M2 | HEIGHT: 74 IN | WEIGHT: 286 LBS | SYSTOLIC BLOOD PRESSURE: 122 MMHG | HEART RATE: 80 BPM

## 2017-11-15 DIAGNOSIS — I25.10 CORONARY ARTERY DISEASE, ANGINA PRESENCE UNSPECIFIED, UNSPECIFIED VESSEL OR LESION TYPE, UNSPECIFIED WHETHER NATIVE OR TRANSPLANTED HEART: ICD-10-CM

## 2017-11-15 DIAGNOSIS — Z98.61 S/P PTCA (PERCUTANEOUS TRANSLUMINAL CORONARY ANGIOPLASTY): ICD-10-CM

## 2017-11-15 DIAGNOSIS — I10 ESSENTIAL HYPERTENSION: ICD-10-CM

## 2017-11-15 DIAGNOSIS — I21.4 NSTEMI (NON-ST ELEVATED MYOCARDIAL INFARCTION): ICD-10-CM

## 2017-11-15 DIAGNOSIS — I25.10 CORONARY ARTERY DISEASE INVOLVING NATIVE CORONARY ARTERY OF NATIVE HEART WITHOUT ANGINA PECTORIS: Primary | ICD-10-CM

## 2017-11-15 DIAGNOSIS — E11.59 HYPERTENSION ASSOCIATED WITH DIABETES: ICD-10-CM

## 2017-11-15 DIAGNOSIS — E11.9 CONTROLLED TYPE 2 DIABETES MELLITUS WITHOUT COMPLICATION, WITHOUT LONG-TERM CURRENT USE OF INSULIN: ICD-10-CM

## 2017-11-15 DIAGNOSIS — E88.819 INSULIN RESISTANCE: ICD-10-CM

## 2017-11-15 DIAGNOSIS — I87.2 VENOUS INSUFFICIENCY: ICD-10-CM

## 2017-11-15 DIAGNOSIS — E78.5 HYPERLIPIDEMIA ASSOCIATED WITH TYPE 2 DIABETES MELLITUS: ICD-10-CM

## 2017-11-15 DIAGNOSIS — E11.69 HYPERLIPIDEMIA ASSOCIATED WITH TYPE 2 DIABETES MELLITUS: ICD-10-CM

## 2017-11-15 DIAGNOSIS — G47.33 OSA (OBSTRUCTIVE SLEEP APNEA): ICD-10-CM

## 2017-11-15 DIAGNOSIS — I15.2 HYPERTENSION ASSOCIATED WITH DIABETES: ICD-10-CM

## 2017-11-15 LAB
ALBUMIN SERPL BCP-MCNC: 3.4 G/DL
ALP SERPL-CCNC: 68 U/L
ALT SERPL W/O P-5'-P-CCNC: 26 U/L
ANION GAP SERPL CALC-SCNC: 6 MMOL/L
AST SERPL-CCNC: 15 U/L
BILIRUB SERPL-MCNC: 0.9 MG/DL
BUN SERPL-MCNC: 24 MG/DL
CALCIUM SERPL-MCNC: 9.1 MG/DL
CHLORIDE SERPL-SCNC: 106 MMOL/L
CHOLEST SERPL-MCNC: 135 MG/DL
CHOLEST/HDLC SERPL: 3.9 {RATIO}
CO2 SERPL-SCNC: 27 MMOL/L
CREAT SERPL-MCNC: 1.1 MG/DL
EST. GFR  (AFRICAN AMERICAN): >60 ML/MIN/1.73 M^2
EST. GFR  (NON AFRICAN AMERICAN): >60 ML/MIN/1.73 M^2
ESTIMATED AVG GLUCOSE: 108 MG/DL
GLUCOSE SERPL-MCNC: 118 MG/DL
HBA1C MFR BLD HPLC: 5.4 %
HDLC SERPL-MCNC: 35 MG/DL
HDLC SERPL: 25.9 %
LDLC SERPL CALC-MCNC: 80.4 MG/DL
NONHDLC SERPL-MCNC: 100 MG/DL
POTASSIUM SERPL-SCNC: 4.5 MMOL/L
PROT SERPL-MCNC: 6.7 G/DL
SODIUM SERPL-SCNC: 139 MMOL/L
TRIGL SERPL-MCNC: 98 MG/DL

## 2017-11-15 PROCEDURE — 99214 OFFICE O/P EST MOD 30 MIN: CPT | Mod: S$GLB,,, | Performed by: NURSE PRACTITIONER

## 2017-11-15 PROCEDURE — 80061 LIPID PANEL: CPT

## 2017-11-15 PROCEDURE — 83036 HEMOGLOBIN GLYCOSYLATED A1C: CPT

## 2017-11-15 PROCEDURE — 99999 PR PBB SHADOW E&M-EST. PATIENT-LVL III: CPT | Mod: PBBFAC,,, | Performed by: NURSE PRACTITIONER

## 2017-11-15 PROCEDURE — 36415 COLL VENOUS BLD VENIPUNCTURE: CPT

## 2017-11-15 PROCEDURE — 80053 COMPREHEN METABOLIC PANEL: CPT

## 2017-11-15 NOTE — PROGRESS NOTES
Subjective:   Patient ID:  Isaiah Harrison is a 62 y.o. male who presents for follow up of Coronary Artery Disease      HPI  Patient presents to clinic for follow up and management of CAD. Patient has PMH of CAD , NSTEMI in Dec 2015 where he had patent LAD stent, HTN, HLP, obesity and DANTE. Patient states that he has been doing well since last visit. Denies any chest pain, sob, orthopnea, pnd, angina or equivalent. No dizziness, near syncope or syncope. Fasting labs pending. BP well controlled on current regimen. Is using CPAP, but not nightly.   Doing well with heart healthy diet. Has no abnormal bleeding.  Patient is active, but no dedicated exercise routine.       Past Medical History:   Diagnosis Date    Acute coronary syndrome     Anticoagulant long-term use     Back pain     CAD (coronary artery disease) 10/17/2013    Coronary artery disease     Diabetes mellitus, type 2     Gastric polyps     Hyperlipemia     Hypertension     Obesity 10/21/2014    Sleep apnea 1/7/2015       Past Surgical History:   Procedure Laterality Date    APPENDECTOMY      COLONOSCOPY      CORONARY ANGIOPLASTY WITH STENT PLACEMENT      ESOPHAGOGASTRODUODENOSCOPY      FRACTURE SURGERY      HERNIA REPAIR      SPINE SURGERY         Social History   Substance Use Topics    Smoking status: Never Smoker    Smokeless tobacco: Never Used    Alcohol use No       Family History   Problem Relation Age of Onset    Hypertension Mother     Hypertension Father     Heart disease Maternal Grandfather        Current Outpatient Prescriptions   Medication Sig    amlodipine (NORVASC) 5 MG tablet take 1/2 tablet by mouth every evening    aspirin (ECOTRIN) 81 MG EC tablet Take 81 mg by mouth once daily.    atorvastatin (LIPITOR) 40 MG tablet take 1 tablet by mouth once daily    blood sugar diagnostic Strp 1 strip by Misc.(Non-Drug; Combo Route) route 2 (two) times daily.    blood sugar diagnostic Strp 1 strip by Misc.(Non-Drug;  Combo Route) route 2 (two) times daily.    isosorbide mononitrate (IMDUR) 30 MG 24 hr tablet take 3 tablets by mouth once daily    metformin (GLUCOPHAGE) 500 MG tablet take 1 tablet by mouth twice a day with meals (Patient taking differently: take 1 tablet by mouth once a day with meals)    metoprolol tartrate (LOPRESSOR) 50 MG tablet take 1 tablet by mouth twice a day    MICROLET LANCET Mis CHECK BLOOD SUGAR TWICE DAILY    niacin (NIASPAN EXTENDED-RELEASE) 500 mg tablet Take 1 tablet (500 mg total) by mouth nightly.    nitroGLYCERIN (NITROSTAT) 0.4 MG SL tablet Place 1 tablet (0.4 mg total) under the tongue every 5 (five) minutes as needed.    ranolazine (RANEXA) 1,000 mg Tb12 Take 1 tablet (1,000 mg total) by mouth 2 (two) times daily.    TRULICITY 1.5 mg/0.5 mL PnIj INJECT 1.5 MILLIGRAMS INTO THE SKIN EVERY 7 DAYS    valsartan (DIOVAN) 160 MG tablet take 1 tablet by mouth once daily     No current facility-administered medications for this visit.      Current Outpatient Prescriptions on File Prior to Visit   Medication Sig    amlodipine (NORVASC) 5 MG tablet take 1/2 tablet by mouth every evening    aspirin (ECOTRIN) 81 MG EC tablet Take 81 mg by mouth once daily.    atorvastatin (LIPITOR) 40 MG tablet take 1 tablet by mouth once daily    blood sugar diagnostic Strp 1 strip by Misc.(Non-Drug; Combo Route) route 2 (two) times daily.    blood sugar diagnostic Strp 1 strip by Misc.(Non-Drug; Combo Route) route 2 (two) times daily.    isosorbide mononitrate (IMDUR) 30 MG 24 hr tablet take 3 tablets by mouth once daily    metformin (GLUCOPHAGE) 500 MG tablet take 1 tablet by mouth twice a day with meals (Patient taking differently: take 1 tablet by mouth once a day with meals)    metoprolol tartrate (LOPRESSOR) 50 MG tablet take 1 tablet by mouth twice a day    MICROLET LANCET Mis CHECK BLOOD SUGAR TWICE DAILY    niacin (NIASPAN EXTENDED-RELEASE) 500 mg tablet Take 1 tablet (500 mg total) by mouth  nightly.    nitroGLYCERIN (NITROSTAT) 0.4 MG SL tablet Place 1 tablet (0.4 mg total) under the tongue every 5 (five) minutes as needed.    ranolazine (RANEXA) 1,000 mg Tb12 Take 1 tablet (1,000 mg total) by mouth 2 (two) times daily.    TRULICITY 1.5 mg/0.5 mL PnIj INJECT 1.5 MILLIGRAMS INTO THE SKIN EVERY 7 DAYS    valsartan (DIOVAN) 160 MG tablet take 1 tablet by mouth once daily     No current facility-administered medications on file prior to visit.        Review of Systems   Constitution: Negative for decreased appetite, fever, weakness, malaise/fatigue, weight gain and weight loss.   HENT: Negative for congestion and nosebleeds.    Cardiovascular: Negative for chest pain, claudication, dyspnea on exertion, irregular heartbeat, leg swelling, near-syncope, orthopnea, palpitations, paroxysmal nocturnal dyspnea and syncope.   Respiratory: Negative for cough, shortness of breath, sleep disturbances due to breathing, snoring and wheezing.    Hematologic/Lymphatic: Negative for bleeding problem. Does not bruise/bleed easily.   Skin: Negative for rash.   Musculoskeletal: Negative for arthritis, back pain, falls, joint pain, joint swelling, muscle cramps and muscle weakness.   Gastrointestinal: Negative for bloating, abdominal pain, constipation, diarrhea, heartburn, nausea and vomiting.   Genitourinary: Negative for dysuria, frequency, hematuria and nocturia.   Neurological: Negative for excessive daytime sleepiness, dizziness, headaches, light-headedness, loss of balance, numbness and paresthesias.       Objective:   Physical Exam   Constitutional: He is oriented to person, place, and time. He appears well-developed and well-nourished.   Neck: Neck supple. No JVD present.   Cardiovascular: Normal rate, regular rhythm, normal heart sounds and normal pulses.  Exam reveals no friction rub.    No murmur heard.  Pulmonary/Chest: Effort normal and breath sounds normal. No respiratory distress. He has no wheezes. He has  no rales.   Abdominal: Soft. Bowel sounds are normal. He exhibits no distension.   Musculoskeletal: He exhibits no edema or tenderness.   Neurological: He is alert and oriented to person, place, and time.   Skin: Skin is warm and dry. No rash noted.   Psychiatric: He has a normal mood and affect. His behavior is normal.   Nursing note and vitals reviewed.    There were no vitals filed for this visit.  Lab Results   Component Value Date    CHOL 129 05/04/2017    CHOL 130 12/19/2016    CHOL 118 (L) 08/18/2016     Lab Results   Component Value Date    HDL 34 (L) 05/04/2017    HDL 37 (L) 12/19/2016    HDL 34 (L) 08/18/2016     Lab Results   Component Value Date    LDLCALC 83.8 05/04/2017    LDLCALC 80.0 12/19/2016    LDLCALC 73.0 08/18/2016     Lab Results   Component Value Date    TRIG 56 05/04/2017    TRIG 65 12/19/2016    TRIG 55 08/18/2016     Lab Results   Component Value Date    CHOLHDL 26.4 05/04/2017    CHOLHDL 28.5 12/19/2016    CHOLHDL 28.8 08/18/2016       Chemistry        Component Value Date/Time     05/04/2017 0824    K 5.1 05/04/2017 0824     05/04/2017 0824    CO2 29 05/04/2017 0824    BUN 25 (H) 05/04/2017 0824    CREATININE 1.1 05/04/2017 0824     (H) 05/04/2017 0824        Component Value Date/Time    CALCIUM 9.3 05/04/2017 0824    ALKPHOS 69 05/04/2017 0824    AST 13 05/04/2017 0824    ALT 23 05/04/2017 0824    BILITOT 0.6 05/04/2017 0824    ESTGFRAFRICA >60.0 05/04/2017 0824    EGFRNONAA >60.0 05/04/2017 0824          Lab Results   Component Value Date    TSH 1.188 06/30/2015     Lab Results   Component Value Date    INR 1.0 12/16/2015    INR 1.1 10/29/2014    INR 1.0 10/25/2014     Lab Results   Component Value Date    WBC 5.47 08/18/2016    HGB 13.5 (L) 08/18/2016    HCT 40.0 08/18/2016    MCV 95 08/18/2016     08/18/2016     BMP  Sodium   Date Value Ref Range Status   05/04/2017 141 136 - 145 mmol/L Final     Potassium   Date Value Ref Range Status   05/04/2017 5.1 3.5 -  5.1 mmol/L Final     Chloride   Date Value Ref Range Status   05/04/2017 107 95 - 110 mmol/L Final     CO2   Date Value Ref Range Status   05/04/2017 29 23 - 29 mmol/L Final     BUN, Bld   Date Value Ref Range Status   05/04/2017 25 (H) 6 - 20 mg/dL Final     Creatinine   Date Value Ref Range Status   05/04/2017 1.1 0.5 - 1.4 mg/dL Final     Calcium   Date Value Ref Range Status   05/04/2017 9.3 8.7 - 10.5 mg/dL Final     Anion Gap   Date Value Ref Range Status   05/04/2017 5 (L) 8 - 16 mmol/L Final     eGFR if    Date Value Ref Range Status   05/04/2017 >60.0 >60 mL/min/1.73 m^2 Final     eGFR if non    Date Value Ref Range Status   05/04/2017 >60.0 >60 mL/min/1.73 m^2 Final     Comment:     Calculation used to obtain the estimated glomerular filtration  rate (eGFR) is the CKD-EPI equation. Since race is unknown   in our information system, the eGFR values for   -American and Non--American patients are given   for each creatinine result.       CrCl cannot be calculated (Patient's most recent lab result is older than the maximum 7 days allowed.).    Assessment:     1. Coronary artery disease involving native coronary artery of native heart without angina pectoris    2. S/P PTCA (percutaneous transluminal coronary angioplasty)    3. Venous insufficiency    4. Essential hypertension    5. Controlled type 2 diabetes mellitus without complication, without long-term current use of insulin    6. DANTE (obstructive sleep apnea)    7. Hypertension associated with diabetes    8. Hyperlipidemia associated with type 2 diabetes mellitus    9. NSTEMI (non-ST elevated myocardial infarction)    Stable CAD-no angina or equivalent  BP stable  Tolerating meds well  Good activity tolerance   No abnormal bleeding  Not using CPAP nightly    Plan:   Continue current medical management for now  Heart healthy   Exercise program  Phone review of fasting labs   Scheduled for 2D echo   RTC in 6  months with lipids, CMP and A1C

## 2017-11-16 ENCOUNTER — TELEPHONE (OUTPATIENT)
Dept: PULMONOLOGY | Facility: CLINIC | Age: 62
End: 2017-11-16

## 2017-11-29 ENCOUNTER — CLINICAL SUPPORT (OUTPATIENT)
Dept: CARDIOLOGY | Facility: CLINIC | Age: 62
End: 2017-11-29
Attending: INTERNAL MEDICINE
Payer: COMMERCIAL

## 2017-11-29 ENCOUNTER — OFFICE VISIT (OUTPATIENT)
Dept: PULMONOLOGY | Facility: CLINIC | Age: 62
End: 2017-11-29
Payer: COMMERCIAL

## 2017-11-29 VITALS
DIASTOLIC BLOOD PRESSURE: 64 MMHG | BODY MASS INDEX: 37.68 KG/M2 | SYSTOLIC BLOOD PRESSURE: 110 MMHG | RESPIRATION RATE: 20 BRPM | HEIGHT: 74 IN | HEART RATE: 78 BPM | WEIGHT: 293.63 LBS | OXYGEN SATURATION: 98 %

## 2017-11-29 DIAGNOSIS — G47.33 OSA ON CPAP: Primary | ICD-10-CM

## 2017-11-29 DIAGNOSIS — I25.10 CORONARY ARTERY DISEASE, ANGINA PRESENCE UNSPECIFIED, UNSPECIFIED VESSEL OR LESION TYPE, UNSPECIFIED WHETHER NATIVE OR TRANSPLANTED HEART: ICD-10-CM

## 2017-11-29 LAB
DIASTOLIC DYSFUNCTION: NO
ESTIMATED PA SYSTOLIC PRESSURE: 15.96
RETIRED EF AND QEF - SEE NOTES: 55 (ref 55–65)

## 2017-11-29 PROCEDURE — 99999 PR PBB SHADOW E&M-EST. PATIENT-LVL IV: CPT | Mod: PBBFAC,,, | Performed by: NURSE PRACTITIONER

## 2017-11-29 PROCEDURE — 93306 TTE W/DOPPLER COMPLETE: CPT | Mod: S$GLB,,, | Performed by: INTERNAL MEDICINE

## 2017-11-29 PROCEDURE — 99213 OFFICE O/P EST LOW 20 MIN: CPT | Mod: S$GLB,,, | Performed by: NURSE PRACTITIONER

## 2017-11-29 RX ORDER — RANOLAZINE 500 MG/1
500 TABLET, EXTENDED RELEASE ORAL 2 TIMES DAILY
COMMUNITY
End: 2018-07-25 | Stop reason: ALTCHOICE
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
## 2017-11-29 NOTE — ASSESSMENT & PLAN NOTE
Not compliant with CPAP use.  Resume use, changed setting to auto CPAP 6-12 cm  Follow up in 6 weeks for compliance download, will need new supply order

## 2017-11-30 ENCOUNTER — TELEPHONE (OUTPATIENT)
Dept: CARDIOLOGY | Facility: CLINIC | Age: 62
End: 2017-11-30

## 2017-11-30 NOTE — TELEPHONE ENCOUNTER
----- Message from Colette Hastings PA-C sent at 11/30/2017  7:58 AM CST -----  Please phone patient. 2D echo showed normal EF    Thanks

## 2017-12-07 DIAGNOSIS — I25.10 CORONARY ARTERY DISEASE INVOLVING NATIVE CORONARY ARTERY WITHOUT ANGINA PECTORIS, UNSPECIFIED WHETHER NATIVE OR TRANSPLANTED HEART: ICD-10-CM

## 2017-12-07 RX ORDER — NIACIN 500 MG/1
TABLET, EXTENDED RELEASE ORAL
Qty: 30 TABLET | Refills: 11 | Status: SHIPPED | OUTPATIENT
Start: 2017-12-07 | End: 2018-10-31 | Stop reason: SDUPTHER

## 2017-12-09 DIAGNOSIS — I20.0 UNSTABLE ANGINA: ICD-10-CM

## 2017-12-09 DIAGNOSIS — E11.9 TYPE 2 DIABETES MELLITUS WITHOUT COMPLICATION: ICD-10-CM

## 2017-12-10 RX ORDER — RANOLAZINE 1000 MG/1
TABLET, FILM COATED, EXTENDED RELEASE ORAL
Qty: 60 TABLET | Refills: 11 | Status: SHIPPED | OUTPATIENT
Start: 2017-12-10 | End: 2018-12-23 | Stop reason: SDUPTHER

## 2017-12-11 RX ORDER — METFORMIN HYDROCHLORIDE 500 MG/1
TABLET ORAL
Qty: 180 TABLET | Refills: 1 | Status: SHIPPED | OUTPATIENT
Start: 2017-12-11 | End: 2018-05-25 | Stop reason: SDUPTHER

## 2017-12-11 RX ORDER — DULAGLUTIDE 1.5 MG/.5ML
INJECTION, SOLUTION SUBCUTANEOUS
Qty: 4 SYRINGE | Refills: 4 | Status: SHIPPED | OUTPATIENT
Start: 2017-12-11 | End: 2018-12-30 | Stop reason: SDUPTHER

## 2017-12-21 DIAGNOSIS — E78.5 HYPERLIPEMIA: Chronic | ICD-10-CM

## 2017-12-21 RX ORDER — ATORVASTATIN CALCIUM 40 MG/1
TABLET, FILM COATED ORAL
Qty: 30 TABLET | Refills: 2 | Status: SHIPPED | OUTPATIENT
Start: 2017-12-21 | End: 2018-03-14 | Stop reason: SDUPTHER

## 2017-12-22 RX ORDER — ISOSORBIDE MONONITRATE 30 MG/1
TABLET, EXTENDED RELEASE ORAL
Qty: 90 TABLET | Refills: 4 | Status: SHIPPED | OUTPATIENT
Start: 2017-12-22 | End: 2018-05-05 | Stop reason: SDUPTHER

## 2018-01-07 RX ORDER — VALSARTAN 160 MG/1
TABLET ORAL
Qty: 30 TABLET | Refills: 4 | Status: SHIPPED | OUTPATIENT
Start: 2018-01-07 | End: 2018-06-25 | Stop reason: SDUPTHER

## 2018-02-05 RX ORDER — AMLODIPINE BESYLATE 5 MG/1
TABLET ORAL
Qty: 30 TABLET | Refills: 2 | Status: SHIPPED | OUTPATIENT
Start: 2018-02-05 | End: 2018-11-20 | Stop reason: SDUPTHER

## 2018-02-07 ENCOUNTER — OFFICE VISIT (OUTPATIENT)
Dept: PULMONOLOGY | Facility: CLINIC | Age: 63
End: 2018-02-07
Payer: COMMERCIAL

## 2018-02-07 VITALS
HEART RATE: 80 BPM | RESPIRATION RATE: 20 BRPM | OXYGEN SATURATION: 96 % | WEIGHT: 292.13 LBS | HEIGHT: 74 IN | SYSTOLIC BLOOD PRESSURE: 132 MMHG | BODY MASS INDEX: 37.49 KG/M2 | DIASTOLIC BLOOD PRESSURE: 78 MMHG

## 2018-02-07 DIAGNOSIS — G47.33 OSA ON CPAP: Primary | ICD-10-CM

## 2018-02-07 DIAGNOSIS — G47.61 PLMD (PERIODIC LIMB MOVEMENT DISORDER): ICD-10-CM

## 2018-02-07 PROCEDURE — 99999 PR PBB SHADOW E&M-EST. PATIENT-LVL IV: CPT | Mod: PBBFAC,,, | Performed by: NURSE PRACTITIONER

## 2018-02-07 PROCEDURE — 99213 OFFICE O/P EST LOW 20 MIN: CPT | Mod: S$GLB,,, | Performed by: NURSE PRACTITIONER

## 2018-02-07 PROCEDURE — 3008F BODY MASS INDEX DOCD: CPT | Mod: S$GLB,,, | Performed by: NURSE PRACTITIONER

## 2018-02-07 NOTE — PROGRESS NOTES
Subjective:      Patient ID: Isaiah Harrison is a 62 y.o. male.    Chief Complaint: Sleep Apnea    HPI: Isaiah Harrison is here for follow up for DANTE and CPAP complaince assessment.   He is on Auto CPAP of 4-15 cmH2O pressure.  He is compliant with CPAP use. Complaince download today reveals 80.0% of days with greater than 4 hours of device use.   Patient reports benefit from CPAP use and denies snoring and excessive daytime sleepiness.  Patient reports complaint of pressure surges at times when trying to exhale. over all tolerating cpap well and pleased with feeling more refreshed when awakening  change of pressure today to 9.5 cm what machine provides 90% of time. Needs new supply order, provided.       Previous Report Reviewed: lab reports and office notes     Past Medical History: The following portions of the patient's history were reviewed and updated as appropriate:   He  has a past surgical history that includes Appendectomy; Hernia repair; Coronary angioplasty with stent; Fracture surgery; Colonoscopy; Esophagogastroduodenoscopy; and Spine surgery.  His family history includes Heart disease in his maternal grandfather; Hypertension in his father and mother.  He  reports that he has never smoked. He has never used smokeless tobacco. He reports that he drinks alcohol. He reports that he does not use drugs.  He has a current medication list which includes the following prescription(s): amlodipine, aspirin, atorvastatin, blood sugar diagnostic, blood sugar diagnostic, isosorbide mononitrate, metformin, metoprolol tartrate, microlet lancet, niacin, nitroglycerin, ranexa, trulicity, valsartan, flucelvax quad [id number] (pf), and ranolazine.  He is allergic to pcn [penicillins].    The following portions of the patient's history were reviewed and updated as appropriate: allergies, current medications, past family history, past medical history, past social history, past surgical history and problem  "list.    Review of Systems   Constitutional: Negative for fever, chills, weight loss, weight gain, activity change, appetite change, fatigue and night sweats.   HENT: Negative for postnasal drip, rhinorrhea, sinus pressure, voice change and congestion.    Eyes: Negative for redness and itching.   Respiratory: Negative for snoring, cough, sputum production, chest tightness, shortness of breath, wheezing, orthopnea, asthma nighttime symptoms, dyspnea on extertion, use of rescue inhaler and somnolence.    Cardiovascular: Negative.  Negative for chest pain, palpitations and leg swelling.   Genitourinary: Negative for difficulty urinating and hematuria.   Endocrine: Negative for cold intolerance and heat intolerance.    Musculoskeletal: Negative for arthralgias, gait problem, joint swelling and myalgias.   Skin: Negative.    Gastrointestinal: Negative for nausea, vomiting, abdominal pain and acid reflux.   Neurological: Negative for dizziness, weakness, light-headedness and headaches.   Hematological: Negative for adenopathy. No excessive bruising.   All other systems reviewed and are negative.     Objective:   /78 (BP Location: Right arm, Patient Position: Sitting)   Pulse 80   Resp 20   Ht 6' 2.4" (1.89 m)   Wt 132.5 kg (292 lb 1.8 oz)   SpO2 96%   BMI 37.10 kg/m²   Physical Exam   Constitutional: He is oriented to person, place, and time. He appears well-developed and well-nourished. He is active and cooperative.  Non-toxic appearance. He does not appear ill. No distress.   HENT:   Head: Normocephalic and atraumatic.   Right Ear: External ear normal.   Left Ear: External ear normal.   Nose: Nose normal.   Mouth/Throat: Oropharynx is clear and moist. No oropharyngeal exudate.   Eyes: Conjunctivae are normal.   Neck: Normal range of motion. Neck supple.   Cardiovascular: Normal rate, regular rhythm, normal heart sounds and intact distal pulses.    Pulmonary/Chest: Effort normal and breath sounds normal. "   Abdominal: Soft.   Musculoskeletal: He exhibits no edema.   Neurological: He is alert and oriented to person, place, and time. He has normal reflexes.   Skin: Skin is warm and dry.   Psychiatric: He has a normal mood and affect. His behavior is normal. Judgment and thought content normal.   Vitals reviewed.    Personal Diagnostic Review    CPAP download  APAP 4-15 cm  Compliance Summary  1/8/2018 - 2/6/2018 (30 days)  Days with Device Usage 29 days  Days without Device Usage 1 day  Percent Days with Device Usage 96.7%  Cumulative Usage 6 days 21 hrs. 16 mins. 9 secs.  Maximum Usage (1 Day) 11 hrs. 30 mins. 43 secs.  Average Usage (All Days) 5 hrs. 30 mins. 32 secs.  Average Usage (Days Used) 5 hrs. 41 mins. 56 secs.  Minimum Usage (1 Day) 41 mins. 39 secs.  Percent of Days with Usage >= 4 Hours 80.0%  Percent of Days with Usage < 4 Hours 20.0%  Date Range  Total Blower Time 6 days 21 hrs. 16 mins. 9 secs.  Average AHI 3.9  Auto CPAP Summary ([facility])  Auto CPAP Mean Pressure 6.6 cmH2O  Auto CPAP Peak Average Pressure 9.5 cmH2O  Average Device Pressure <= 90% of Time 9.4 cmH2O  Average Time in Large Leak Per Day 2 mins. 17 secs.    Assessment:     1. DANTE on CPAP    2. Obesity, Class II, BMI 35-39.9, with comorbidity    3. PLMD (periodic limb movement disorder)      Orders Placed This Encounter   Procedures    CPAP/BIPAP SUPPLIES     Benefits and compliant. Needs new supplies.   Yearly supply order. 90 day supply. 4 refills.    HME: [facility]     Order Specific Question:   Type of mask:     Answer:   FFM     Order Specific Question:   Headgear?     Answer:   Yes     Order Specific Question:   Tubing?     Answer:   Yes     Order Specific Question:   Humidifier chamber?     Answer:   Yes     Order Specific Question:   Chin strap?     Answer:   Yes     Order Specific Question:   Filters?     Answer:   Yes     Order Specific Question:   Length of need (1-99 months):     Answer:   99    HME - OTHER     Change  "to CPAP 9.5 cm    HME [facility]  Resp: niranjan. Will see niranjan today 2/7 to make change.     Order Specific Question:   Type of Equipment:     Answer:   CPAP     Order Specific Question:   Height:     Answer:   6' 2.4" (1.89 m)     Order Specific Question:   Weight:     Answer:   132.5 kg (292 lb 1.8 oz)     Order Specific Question:   Does patient have medical equipment at home?     Answer:   CPAP     Plan:     Problem List Items Addressed This Visit     Obesity, Class II, BMI 35-39.9, with comorbidity (Chronic)     Encouraged calorie reduction and 30 minutes of exercise daily. Discussed impact of obesity on general health.             DANTE on CPAP - Primary     Benefits and compliant with Auto CPAP 4-15 cm  Average AHI 3.9  Auto CPAP Summary ([facility])  Auto CPAP Mean Pressure 6.6 cmH2O  Auto CPAP Peak Average Pressure 9.5 cmH2O  Average Device Pressure <= 90% of Time 9.4 cmH2O    Changed pressure to CPAP 9.5 cm, r/t patient complaint of at times feels surges pressure.   Yearly supply order provided  HME: [facility]           Relevant Orders    CPAP/BIPAP SUPPLIES    HME - OTHER    PLMD (periodic limb movement disorder)     Mild PLMD seen on PSG 2015. Not bothersome to patient.                 (DME - [facility]  Reviewed therapeutic goals for positive airway pressure therapy Auto CPAP  Ideal is usage 100% of nights for 6 - 8 hours per night. Minimum usage is 70% of night for at least 4 hours per night used. Pateint expressed understanding.     Follow-up in about 1 year (around 2/7/2019) for CPAP yearly compliance follow up.    "

## 2018-02-07 NOTE — ASSESSMENT & PLAN NOTE
Benefits and compliant with Auto CPAP 4-15 cm  Average AHI 3.9  Auto CPAP Summary ([facility])  Auto CPAP Mean Pressure 6.6 cmH2O  Auto CPAP Peak Average Pressure 9.5 cmH2O  Average Device Pressure <= 90% of Time 9.4 cmH2O    Changed pressure to CPAP 9.5 cm, r/t patient complaint of at times feels surges pressure.   Yearly supply order provided  HME: [facility]

## 2018-02-23 DIAGNOSIS — E11.9 TYPE 2 DIABETES MELLITUS WITHOUT COMPLICATION: ICD-10-CM

## 2018-03-04 DIAGNOSIS — I10 ESSENTIAL HYPERTENSION: ICD-10-CM

## 2018-03-04 DIAGNOSIS — I25.10 CORONARY ARTERY DISEASE INVOLVING NATIVE CORONARY ARTERY WITHOUT ANGINA PECTORIS, UNSPECIFIED WHETHER NATIVE OR TRANSPLANTED HEART: ICD-10-CM

## 2018-03-05 RX ORDER — METOPROLOL TARTRATE 50 MG/1
TABLET ORAL
Qty: 60 TABLET | Refills: 6 | Status: SHIPPED | OUTPATIENT
Start: 2018-03-05 | End: 2018-10-23 | Stop reason: SDUPTHER

## 2018-03-14 DIAGNOSIS — E78.5 HYPERLIPEMIA: Chronic | ICD-10-CM

## 2018-03-14 RX ORDER — ATORVASTATIN CALCIUM 40 MG/1
TABLET, FILM COATED ORAL
Qty: 30 TABLET | Refills: 2 | Status: SHIPPED | OUTPATIENT
Start: 2018-03-14 | End: 2018-06-01 | Stop reason: SDUPTHER

## 2018-04-23 ENCOUNTER — OFFICE VISIT (OUTPATIENT)
Dept: INTERNAL MEDICINE | Facility: CLINIC | Age: 63
End: 2018-04-23
Payer: COMMERCIAL

## 2018-04-23 VITALS
BODY MASS INDEX: 37.63 KG/M2 | HEIGHT: 74 IN | HEART RATE: 71 BPM | SYSTOLIC BLOOD PRESSURE: 120 MMHG | TEMPERATURE: 98 F | DIASTOLIC BLOOD PRESSURE: 64 MMHG | WEIGHT: 293.19 LBS

## 2018-04-23 DIAGNOSIS — S29.012A RHOMBOID MUSCLE STRAIN, INITIAL ENCOUNTER: Primary | ICD-10-CM

## 2018-04-23 PROCEDURE — 99214 OFFICE O/P EST MOD 30 MIN: CPT | Mod: S$GLB,,, | Performed by: FAMILY MEDICINE

## 2018-04-23 PROCEDURE — 3074F SYST BP LT 130 MM HG: CPT | Mod: CPTII,S$GLB,, | Performed by: FAMILY MEDICINE

## 2018-04-23 PROCEDURE — 99999 PR PBB SHADOW E&M-EST. PATIENT-LVL III: CPT | Mod: PBBFAC,,, | Performed by: FAMILY MEDICINE

## 2018-04-23 PROCEDURE — 3078F DIAST BP <80 MM HG: CPT | Mod: CPTII,S$GLB,, | Performed by: FAMILY MEDICINE

## 2018-04-23 RX ORDER — CYCLOBENZAPRINE HCL 10 MG
10 TABLET ORAL NIGHTLY
Qty: 15 TABLET | Refills: 0 | Status: SHIPPED | OUTPATIENT
Start: 2018-04-23 | End: 2018-05-03

## 2018-04-23 RX ORDER — DIAZEPAM 2 MG/1
2 TABLET ORAL NIGHTLY
Qty: 5 TABLET | Refills: 0 | Status: SHIPPED | OUTPATIENT
Start: 2018-04-23 | End: 2018-07-25 | Stop reason: ALTCHOICE

## 2018-04-23 NOTE — PROGRESS NOTES
Subjective:       Patient ID: Isaiah Harrison is a 63 y.o. male.    Chief Complaint: Back Pain    Back Pain   This is a new problem. The current episode started in the past 7 days. The problem occurs constantly. The pain is present in the thoracic spine. The quality of the pain is described as cramping. The pain does not radiate. The pain is at a severity of 6/10. The symptoms are aggravated by position and lying down. Stiffness is present all day. Pertinent negatives include no abdominal pain, leg pain or paresis. He has tried nothing for the symptoms. The treatment provided mild relief.     Review of Systems   Gastrointestinal: Negative for abdominal pain.   Musculoskeletal: Positive for back pain.       Objective:      Physical Exam   Constitutional: He appears well-developed and well-nourished.   Cardiovascular: Normal rate and regular rhythm.    Pulmonary/Chest: Effort normal and breath sounds normal.   Abdominal: Soft. Bowel sounds are normal. He exhibits no mass. There is no guarding.   Musculoskeletal:        Thoracic back: He exhibits decreased range of motion and spasm.   Skin: Skin is warm and dry.       Assessment:       1. Rhomboid muscle strain, initial encounter        Plan:       Rhomboid muscle strain, initial encounter  Comments:  1) flexeril 10 mg at night  2) Valium 2 mg if flexeril does not work

## 2018-04-24 ENCOUNTER — PATIENT MESSAGE (OUTPATIENT)
Dept: INTERNAL MEDICINE | Facility: CLINIC | Age: 63
End: 2018-04-24

## 2018-04-24 RX ORDER — METHOCARBAMOL 750 MG/1
750 TABLET, FILM COATED ORAL 3 TIMES DAILY
Qty: 45 TABLET | Refills: 0 | Status: SHIPPED | OUTPATIENT
Start: 2018-04-24 | End: 2018-05-04

## 2018-05-06 RX ORDER — ISOSORBIDE MONONITRATE 30 MG/1
TABLET, EXTENDED RELEASE ORAL
Qty: 90 TABLET | Refills: 4 | Status: SHIPPED | OUTPATIENT
Start: 2018-05-06 | End: 2018-10-31 | Stop reason: SDUPTHER

## 2018-05-17 ENCOUNTER — OFFICE VISIT (OUTPATIENT)
Dept: CARDIOLOGY | Facility: CLINIC | Age: 63
End: 2018-05-17
Payer: COMMERCIAL

## 2018-05-17 VITALS
OXYGEN SATURATION: 96 % | DIASTOLIC BLOOD PRESSURE: 68 MMHG | BODY MASS INDEX: 38.18 KG/M2 | HEART RATE: 88 BPM | WEIGHT: 297.38 LBS | SYSTOLIC BLOOD PRESSURE: 126 MMHG

## 2018-05-17 DIAGNOSIS — I21.4 NSTEMI (NON-ST ELEVATED MYOCARDIAL INFARCTION): ICD-10-CM

## 2018-05-17 DIAGNOSIS — E11.9 CONTROLLED TYPE 2 DIABETES MELLITUS WITHOUT COMPLICATION, WITHOUT LONG-TERM CURRENT USE OF INSULIN: ICD-10-CM

## 2018-05-17 DIAGNOSIS — E78.5 HYPERLIPIDEMIA ASSOCIATED WITH TYPE 2 DIABETES MELLITUS: ICD-10-CM

## 2018-05-17 DIAGNOSIS — E88.819 INSULIN RESISTANCE: ICD-10-CM

## 2018-05-17 DIAGNOSIS — I25.10 CORONARY ARTERY DISEASE INVOLVING NATIVE CORONARY ARTERY OF NATIVE HEART WITHOUT ANGINA PECTORIS: Primary | ICD-10-CM

## 2018-05-17 DIAGNOSIS — G47.33 OSA ON CPAP: ICD-10-CM

## 2018-05-17 DIAGNOSIS — Z98.61 S/P PTCA (PERCUTANEOUS TRANSLUMINAL CORONARY ANGIOPLASTY): ICD-10-CM

## 2018-05-17 DIAGNOSIS — I87.2 VENOUS INSUFFICIENCY: ICD-10-CM

## 2018-05-17 DIAGNOSIS — I10 ESSENTIAL HYPERTENSION: ICD-10-CM

## 2018-05-17 DIAGNOSIS — E11.69 HYPERLIPIDEMIA ASSOCIATED WITH TYPE 2 DIABETES MELLITUS: ICD-10-CM

## 2018-05-17 DIAGNOSIS — E11.9 TYPE 2 DIABETES MELLITUS WITHOUT COMPLICATION, WITHOUT LONG-TERM CURRENT USE OF INSULIN: ICD-10-CM

## 2018-05-17 PROCEDURE — 3008F BODY MASS INDEX DOCD: CPT | Mod: CPTII,S$GLB,, | Performed by: NURSE PRACTITIONER

## 2018-05-17 PROCEDURE — 93000 ELECTROCARDIOGRAM COMPLETE: CPT | Mod: S$GLB,,, | Performed by: NUCLEAR MEDICINE

## 2018-05-17 PROCEDURE — 3078F DIAST BP <80 MM HG: CPT | Mod: CPTII,S$GLB,, | Performed by: NURSE PRACTITIONER

## 2018-05-17 PROCEDURE — 3074F SYST BP LT 130 MM HG: CPT | Mod: CPTII,S$GLB,, | Performed by: NURSE PRACTITIONER

## 2018-05-17 PROCEDURE — 99214 OFFICE O/P EST MOD 30 MIN: CPT | Mod: S$GLB,,, | Performed by: NURSE PRACTITIONER

## 2018-05-17 PROCEDURE — 99999 PR PBB SHADOW E&M-EST. PATIENT-LVL IV: CPT | Mod: PBBFAC,,, | Performed by: NURSE PRACTITIONER

## 2018-05-17 PROCEDURE — 3044F HG A1C LEVEL LT 7.0%: CPT | Mod: CPTII,S$GLB,, | Performed by: NURSE PRACTITIONER

## 2018-05-17 NOTE — PROGRESS NOTES
Subjective:   Patient ID:  Isaiah Harrison is a 63 y.o. male who presents for follow up of Coronary Artery Disease      HPI    Patient presents to clinic for follow up and management of CAD. Patient has PMH of CAD , NSTEMI in Dec 2015 where he had patent LAD stent, HTN, HLP, obesity and DANTE. Patient states that he has been doing well since last visit. Denies any chest pain, sob, orthopnea, pnd, angina or equivalent. No dizziness, near syncope or syncope.  BP well controlled on current regimen. Doing well with heart healthy diet. Trying to lose weight. Not exercising.  Has no abnormal bleeding on dual antiplatelets Needs fasting labs. Using CPAP nightly     Past Medical History:   Diagnosis Date    Acute coronary syndrome     Anticoagulant long-term use     Back pain     CAD (coronary artery disease) 10/17/2013    Coronary artery disease     Diabetes mellitus, type 2     Gastric polyps     Hyperlipemia     Hypertension     Obesity 10/21/2014    Sleep apnea 1/7/2015       Past Surgical History:   Procedure Laterality Date    APPENDECTOMY      COLONOSCOPY      CORONARY ANGIOPLASTY WITH STENT PLACEMENT      ESOPHAGOGASTRODUODENOSCOPY      FRACTURE SURGERY      HERNIA REPAIR      SPINE SURGERY         Social History   Substance Use Topics    Smoking status: Never Smoker    Smokeless tobacco: Never Used    Alcohol use 0.0 oz/week      Comment: socially       Family History   Problem Relation Age of Onset    Hypertension Mother     Hypertension Father     Heart disease Maternal Grandfather        Current Outpatient Prescriptions   Medication Sig    amLODIPine (NORVASC) 5 MG tablet take 1/2 tablet by mouth every evening    aspirin (ECOTRIN) 81 MG EC tablet Take 81 mg by mouth once daily.    atorvastatin (LIPITOR) 40 MG tablet take 1 tablet by mouth once daily    blood sugar diagnostic Strp 1 strip by Misc.(Non-Drug; Combo Route) route 2 (two) times daily.    blood sugar diagnostic Strp 1  strip by Misc.(Non-Drug; Combo Route) route 2 (two) times daily.    isosorbide mononitrate (IMDUR) 30 MG 24 hr tablet take 3 tablets by mouth once daily    metFORMIN (GLUCOPHAGE) 500 MG tablet take 1 tablet by mouth twice a day with meals    metoprolol tartrate (LOPRESSOR) 50 MG tablet take 1 tablet by mouth twice a day    MICROLET LANCET [facility] CHECK BLOOD SUGAR TWICE DAILY    niacin (SLO-NIACIN) 500 mg tablet take 1 tablet by mouth NIGHTLY    nitroGLYCERIN (NITROSTAT) 0.4 MG SL tablet Place 1 tablet (0.4 mg total) under the tongue every 5 (five) minutes as needed.    RANEXA 1,000 mg Tb12 take 1 tablet by mouth twice a day    TRULICITY 1.5 mg/0.5 mL PnIj INJECT 1.5 MILLIGRAMS INTO THE SKIN EVERY 7 DAYS    valsartan (DIOVAN) 160 MG tablet take 1 tablet by mouth once daily    diazePAM (VALIUM) 2 MG tablet Take 1 tablet (2 mg total) by mouth every evening.    FLUCELVAX QUAD 1384-3479, PF, 60 mcg (15 mcg x 4)/0.5 mL Syrg vaccine inject 0.5 milliliter intramuscularly    ranolazine (RANEXA) 500 MG Tb12 Take 500 mg by mouth 2 (two) times daily.     No current facility-administered medications for this visit.      Current Outpatient Prescriptions on File Prior to Visit   Medication Sig    amLODIPine (NORVASC) 5 MG tablet take 1/2 tablet by mouth every evening    aspirin (ECOTRIN) 81 MG EC tablet Take 81 mg by mouth once daily.    atorvastatin (LIPITOR) 40 MG tablet take 1 tablet by mouth once daily    blood sugar diagnostic Strp 1 strip by Misc.(Non-Drug; Combo Route) route 2 (two) times daily.    blood sugar diagnostic Strp 1 strip by Misc.(Non-Drug; Combo Route) route 2 (two) times daily.    isosorbide mononitrate (IMDUR) 30 MG 24 hr tablet take 3 tablets by mouth once daily    metFORMIN (GLUCOPHAGE) 500 MG tablet take 1 tablet by mouth twice a day with meals    metoprolol tartrate (LOPRESSOR) 50 MG tablet take 1 tablet by mouth twice a day    MICROLET LANCET [facility] CHECK BLOOD SUGAR TWICE DAILY     niacin (SLO-NIACIN) 500 mg tablet take 1 tablet by mouth NIGHTLY    nitroGLYCERIN (NITROSTAT) 0.4 MG SL tablet Place 1 tablet (0.4 mg total) under the tongue every 5 (five) minutes as needed.    RANEXA 1,000 mg Tb12 take 1 tablet by mouth twice a day    TRULICITY 1.5 mg/0.5 mL PnIj INJECT 1.5 MILLIGRAMS INTO THE SKIN EVERY 7 DAYS    valsartan (DIOVAN) 160 MG tablet take 1 tablet by mouth once daily    diazePAM (VALIUM) 2 MG tablet Take 1 tablet (2 mg total) by mouth every evening.    FLUCELVAX QUAD 9660-5584, PF, 60 mcg (15 mcg x 4)/0.5 mL Syrg vaccine inject 0.5 milliliter intramuscularly    ranolazine (RANEXA) 500 MG Tb12 Take 500 mg by mouth 2 (two) times daily.     No current facility-administered medications on file prior to visit.        Review of Systems   Constitution: Negative for diaphoresis, weakness, malaise/fatigue, weight gain and weight loss.   HENT: Negative for congestion and nosebleeds.    Cardiovascular: Negative for chest pain, claudication, cyanosis, dyspnea on exertion, irregular heartbeat, leg swelling, near-syncope, orthopnea, palpitations, paroxysmal nocturnal dyspnea and syncope.   Respiratory: Negative for cough, hemoptysis, shortness of breath, sleep disturbances due to breathing, snoring, sputum production and wheezing.    Hematologic/Lymphatic: Negative for bleeding problem. Does not bruise/bleed easily.   Skin: Negative for rash.   Musculoskeletal: Negative for arthritis, back pain, falls, joint pain, muscle cramps and muscle weakness.   Gastrointestinal: Negative for abdominal pain, constipation, diarrhea, heartburn, hematemesis, hematochezia, melena and nausea.   Genitourinary: Negative for dysuria, hematuria and nocturia.   Neurological: Negative for excessive daytime sleepiness, dizziness, headaches, light-headedness, loss of balance, numbness and vertigo.       Objective:   Physical Exam   Constitutional: He is oriented to person, place, and time. He appears well-developed  and well-nourished.   Neck: Neck supple. No JVD present.   Cardiovascular: Normal rate, regular rhythm, normal heart sounds and normal pulses.  Exam reveals no friction rub.    No murmur heard.  Pulmonary/Chest: Effort normal and breath sounds normal. No respiratory distress. He has no wheezes. He has no rales.   Abdominal: Soft. Bowel sounds are normal. He exhibits no distension.   Musculoskeletal: He exhibits no edema or tenderness.   Neurological: He is alert and oriented to person, place, and time.   Skin: Skin is warm and dry. No rash noted.   Psychiatric: He has a normal mood and affect. His behavior is normal.   Nursing note and vitals reviewed.    Vitals:    05/17/18 1325 05/17/18 1333   BP: 132/74 126/68   BP Location: Left arm Right arm   Pulse: 88    SpO2: 96%    Weight: 134.9 kg (297 lb 6.4 oz)      Lab Results   Component Value Date    CHOL 135 11/15/2017    CHOL 129 05/04/2017    CHOL 130 12/19/2016     Lab Results   Component Value Date    HDL 35 (L) 11/15/2017    HDL 34 (L) 05/04/2017    HDL 37 (L) 12/19/2016     Lab Results   Component Value Date    LDLCALC 80.4 11/15/2017    LDLCALC 83.8 05/04/2017    LDLCALC 80.0 12/19/2016     Lab Results   Component Value Date    TRIG 98 11/15/2017    TRIG 56 05/04/2017    TRIG 65 12/19/2016     Lab Results   Component Value Date    CHOLHDL 25.9 11/15/2017    CHOLHDL 26.4 05/04/2017    CHOLHDL 28.5 12/19/2016       Chemistry        Component Value Date/Time     11/15/2017 0736    K 4.5 11/15/2017 0736     11/15/2017 0736    CO2 27 11/15/2017 0736    BUN 24 (H) 11/15/2017 0736    CREATININE 1.1 11/15/2017 0736     (H) 11/15/2017 0736        Component Value Date/Time    CALCIUM 9.1 11/15/2017 0736    ALKPHOS 68 11/15/2017 0736    AST 15 11/15/2017 0736    ALT 26 11/15/2017 0736    BILITOT 0.9 11/15/2017 0736    ESTGFRAFRICA >60.0 11/15/2017 0736    EGFRNONAA >60.0 11/15/2017 0736          Lab Results   Component Value Date    TSH 1.188  06/30/2015     Lab Results   Component Value Date    INR 1.0 12/16/2015    INR 1.1 10/29/2014    INR 1.0 10/25/2014     Lab Results   Component Value Date    WBC 5.47 08/18/2016    HGB 13.5 (L) 08/18/2016    HCT 40.0 08/18/2016    MCV 95 08/18/2016     08/18/2016     BMP  Sodium   Date Value Ref Range Status   11/15/2017 139 136 - 145 mmol/L Final     Potassium   Date Value Ref Range Status   11/15/2017 4.5 3.5 - 5.1 mmol/L Final     Chloride   Date Value Ref Range Status   11/15/2017 106 95 - 110 mmol/L Final     CO2   Date Value Ref Range Status   11/15/2017 27 23 - 29 mmol/L Final     BUN, Bld   Date Value Ref Range Status   11/15/2017 24 (H) 6 - 20 mg/dL Final     Creatinine   Date Value Ref Range Status   11/15/2017 1.1 0.5 - 1.4 mg/dL Final     Calcium   Date Value Ref Range Status   11/15/2017 9.1 8.7 - 10.5 mg/dL Final     Anion Gap   Date Value Ref Range Status   11/15/2017 6 (L) 8 - 16 mmol/L Final     eGFR if    Date Value Ref Range Status   11/15/2017 >60.0 >60 mL/min/1.73 m^2 Final     eGFR if non    Date Value Ref Range Status   11/15/2017 >60.0 >60 mL/min/1.73 m^2 Final     Comment:     Calculation used to obtain the estimated glomerular filtration  rate (eGFR) is the CKD-EPI equation.        CrCl cannot be calculated (Patient's most recent lab result is older than the maximum 7 days allowed.).    Assessment:     1. Coronary artery disease involving native coronary artery of native heart without angina pectoris    2. Essential hypertension    3. Hyperlipidemia associated with type 2 diabetes mellitus    4. NSTEMI (non-ST elevated myocardial infarction)    5. S/P PTCA (percutaneous transluminal coronary angioplasty)    6. Venous insufficiency    7. Controlled type 2 diabetes mellitus without complication, without long-term current use of insulin    8. DANTE on CPAP    9. Insulin resistance    10. Type 2 diabetes mellitus without complication, without long-term  current use of insulin      Fasting labs need to be collected   Stable CAD-no angina or equivalent  BP stable  Tolerating meds well  Good activity tolerance   No abnormal bleeding on dual antiplatelet  Using CPAP nightly    Plan:   Continue current medical management for now  Fasting labs with phone review  Heart healthy   Exercise program  Phone review of fasting labs   RTC in 6 months with lipids, CMP and A1C

## 2018-05-21 ENCOUNTER — LAB VISIT (OUTPATIENT)
Dept: LAB | Facility: HOSPITAL | Age: 63
End: 2018-05-21
Payer: COMMERCIAL

## 2018-05-21 DIAGNOSIS — E11.9 CONTROLLED TYPE 2 DIABETES MELLITUS WITHOUT COMPLICATION, WITHOUT LONG-TERM CURRENT USE OF INSULIN: ICD-10-CM

## 2018-05-21 DIAGNOSIS — E11.69 HYPERLIPIDEMIA ASSOCIATED WITH TYPE 2 DIABETES MELLITUS: ICD-10-CM

## 2018-05-21 DIAGNOSIS — E78.5 HYPERLIPIDEMIA ASSOCIATED WITH TYPE 2 DIABETES MELLITUS: ICD-10-CM

## 2018-05-21 DIAGNOSIS — I25.10 CORONARY ARTERY DISEASE INVOLVING NATIVE CORONARY ARTERY OF NATIVE HEART WITHOUT ANGINA PECTORIS: ICD-10-CM

## 2018-05-21 DIAGNOSIS — Z98.61 S/P PTCA (PERCUTANEOUS TRANSLUMINAL CORONARY ANGIOPLASTY): ICD-10-CM

## 2018-05-21 DIAGNOSIS — I10 ESSENTIAL HYPERTENSION: ICD-10-CM

## 2018-05-21 DIAGNOSIS — I21.4 NSTEMI (NON-ST ELEVATED MYOCARDIAL INFARCTION): ICD-10-CM

## 2018-05-21 LAB
ALBUMIN SERPL BCP-MCNC: 3.5 G/DL
ALP SERPL-CCNC: 84 U/L
ALT SERPL W/O P-5'-P-CCNC: 24 U/L
ANION GAP SERPL CALC-SCNC: 10 MMOL/L
AST SERPL-CCNC: 11 U/L
BILIRUB SERPL-MCNC: 0.9 MG/DL
BUN SERPL-MCNC: 21 MG/DL
CALCIUM SERPL-MCNC: 9.2 MG/DL
CHLORIDE SERPL-SCNC: 106 MMOL/L
CHOLEST SERPL-MCNC: 130 MG/DL
CHOLEST/HDLC SERPL: 3.8 {RATIO}
CO2 SERPL-SCNC: 23 MMOL/L
CREAT SERPL-MCNC: 1.1 MG/DL
EST. GFR  (AFRICAN AMERICAN): >60 ML/MIN/1.73 M^2
EST. GFR  (NON AFRICAN AMERICAN): >60 ML/MIN/1.73 M^2
ESTIMATED AVG GLUCOSE: 120 MG/DL
GLUCOSE SERPL-MCNC: 161 MG/DL
HBA1C MFR BLD HPLC: 5.8 %
HDLC SERPL-MCNC: 34 MG/DL
HDLC SERPL: 26.2 %
LDLC SERPL CALC-MCNC: 82.4 MG/DL
NONHDLC SERPL-MCNC: 96 MG/DL
POTASSIUM SERPL-SCNC: 4.7 MMOL/L
PROT SERPL-MCNC: 6.9 G/DL
SODIUM SERPL-SCNC: 139 MMOL/L
TRIGL SERPL-MCNC: 68 MG/DL

## 2018-05-21 PROCEDURE — 80061 LIPID PANEL: CPT

## 2018-05-21 PROCEDURE — 80053 COMPREHEN METABOLIC PANEL: CPT

## 2018-05-21 PROCEDURE — 36415 COLL VENOUS BLD VENIPUNCTURE: CPT

## 2018-05-21 PROCEDURE — 83036 HEMOGLOBIN GLYCOSYLATED A1C: CPT

## 2018-05-25 RX ORDER — METFORMIN HYDROCHLORIDE 500 MG/1
TABLET ORAL
Qty: 180 TABLET | Refills: 1 | Status: SHIPPED | OUTPATIENT
Start: 2018-05-25 | End: 2019-08-01 | Stop reason: SDUPTHER

## 2018-06-01 DIAGNOSIS — E78.5 HYPERLIPEMIA: Chronic | ICD-10-CM

## 2018-06-01 RX ORDER — ATORVASTATIN CALCIUM 40 MG/1
TABLET, FILM COATED ORAL
Qty: 30 TABLET | Refills: 2 | Status: SHIPPED | OUTPATIENT
Start: 2018-06-01 | End: 2018-11-20 | Stop reason: SDUPTHER

## 2018-06-25 RX ORDER — VALSARTAN 160 MG/1
160 TABLET ORAL DAILY
Qty: 90 TABLET | Refills: 1 | Status: SHIPPED | OUTPATIENT
Start: 2018-06-25 | End: 2018-12-13 | Stop reason: SDUPTHER

## 2018-07-10 ENCOUNTER — PATIENT OUTREACH (OUTPATIENT)
Dept: ADMINISTRATIVE | Facility: HOSPITAL | Age: 63
End: 2018-07-10

## 2018-07-12 DIAGNOSIS — I21.4 NSTEMI (NON-ST ELEVATED MYOCARDIAL INFARCTION): ICD-10-CM

## 2018-07-12 DIAGNOSIS — I25.10 CORONARY ARTERY DISEASE INVOLVING NATIVE CORONARY ARTERY OF NATIVE HEART, ANGINA PRESENCE UNSPECIFIED: Primary | ICD-10-CM

## 2018-07-12 RX ORDER — NITROGLYCERIN 0.4 MG/1
0.4 TABLET SUBLINGUAL EVERY 5 MIN PRN
Qty: 25 TABLET | Refills: 4 | Status: SHIPPED | OUTPATIENT
Start: 2018-07-12 | End: 2020-03-13 | Stop reason: SDUPTHER

## 2018-07-12 NOTE — TELEPHONE ENCOUNTER
----- Message from Tesfaye Molina sent at 7/12/2018  1:58 PM CDT -----  Contact: pt  1. What is the name of the medication you are requesting? Nitrostat  2. What is the dose? 0.4 mg  3. How do you take the medication? Orally, topically, etc? Orally  4. How often do you take this medication? PRN  5. Do you need a 30 day or 90 day supply? 90  6. How many refills are you requesting? 4  7. What is your preferred pharmacy and location of the pharmacy? [facility] on [address]  8. Who can we contact with further questions? [phone] ([phone])

## 2018-07-25 ENCOUNTER — OFFICE VISIT (OUTPATIENT)
Dept: INTERNAL MEDICINE | Facility: CLINIC | Age: 63
End: 2018-07-25
Payer: COMMERCIAL

## 2018-07-25 VITALS
HEIGHT: 74 IN | DIASTOLIC BLOOD PRESSURE: 78 MMHG | HEART RATE: 85 BPM | SYSTOLIC BLOOD PRESSURE: 118 MMHG | TEMPERATURE: 98 F | WEIGHT: 296.94 LBS | BODY MASS INDEX: 38.11 KG/M2

## 2018-07-25 DIAGNOSIS — E11.9 TYPE 2 DIABETES MELLITUS WITHOUT COMPLICATION, WITHOUT LONG-TERM CURRENT USE OF INSULIN: Primary | ICD-10-CM

## 2018-07-25 DIAGNOSIS — E11.59 HYPERTENSION ASSOCIATED WITH DIABETES: ICD-10-CM

## 2018-07-25 DIAGNOSIS — I15.2 HYPERTENSION ASSOCIATED WITH DIABETES: ICD-10-CM

## 2018-07-25 DIAGNOSIS — I25.10 CORONARY ARTERY DISEASE INVOLVING NATIVE CORONARY ARTERY OF NATIVE HEART WITHOUT ANGINA PECTORIS: ICD-10-CM

## 2018-07-25 PROCEDURE — 99213 OFFICE O/P EST LOW 20 MIN: CPT | Mod: S$GLB,,, | Performed by: FAMILY MEDICINE

## 2018-07-25 PROCEDURE — 3044F HG A1C LEVEL LT 7.0%: CPT | Mod: CPTII,S$GLB,, | Performed by: FAMILY MEDICINE

## 2018-07-25 PROCEDURE — 99999 PR PBB SHADOW E&M-EST. PATIENT-LVL III: CPT | Mod: PBBFAC,,, | Performed by: FAMILY MEDICINE

## 2018-07-25 PROCEDURE — 3008F BODY MASS INDEX DOCD: CPT | Mod: CPTII,S$GLB,, | Performed by: FAMILY MEDICINE

## 2018-07-25 PROCEDURE — 3074F SYST BP LT 130 MM HG: CPT | Mod: CPTII,S$GLB,, | Performed by: FAMILY MEDICINE

## 2018-07-25 PROCEDURE — 3078F DIAST BP <80 MM HG: CPT | Mod: CPTII,S$GLB,, | Performed by: FAMILY MEDICINE

## 2018-07-25 NOTE — PROGRESS NOTES
Subjective:       Patient ID: Isaiah Harrison is a 63 y.o. male.    Chief Complaint: Follow-up    F/U:       Pt is a 63       Review of Systems   Constitutional: Negative.    Respiratory: Negative.    Cardiovascular: Negative.    Genitourinary: Negative.    Musculoskeletal: Negative.    Neurological: Negative.    Hematological: Negative.    Psychiatric/Behavioral: Negative.        Objective:      Physical Exam   Constitutional: He is oriented to person, place, and time. He appears well-developed and well-nourished.   Cardiovascular: Normal rate and regular rhythm.    Pulmonary/Chest: Effort normal. No respiratory distress. He has no wheezes.   Abdominal: Soft. Bowel sounds are normal. He exhibits no mass. There is no guarding.   Feet:   Right Foot:   Protective Sensation: 10 sites tested. 10 sites sensed.   Skin Integrity: Positive for callus and dry skin.   Left Foot:   Protective Sensation: 10 sites tested. 10 sites sensed.   Skin Integrity: Positive for callus and dry skin.   Neurological: He is alert and oriented to person, place, and time.   Skin: Skin is warm and dry.   Psychiatric: He has a normal mood and affect. His behavior is normal.       Assessment:       1. Type 2 diabetes mellitus without complication, without long-term current use of insulin    2. Coronary artery disease involving native coronary artery of native heart without angina pectoris    3. Hypertension associated with diabetes        Plan:       Type 2 diabetes mellitus without complication, without long-term current use of insulin  Comments:  Will do microalbumin    Coronary artery disease involving native coronary artery of native heart without angina pectoris  Comments:  Pt controlled    Hypertension associated with diabetes

## 2018-07-29 ENCOUNTER — HOSPITAL ENCOUNTER (EMERGENCY)
Facility: HOSPITAL | Age: 63
Discharge: HOME | End: 2018-07-29
Attending: EMERGENCY MEDICINE
Payer: COMMERCIAL

## 2018-07-29 VITALS
BODY MASS INDEX: 37.39 KG/M2 | TEMPERATURE: 98 F | DIASTOLIC BLOOD PRESSURE: 95 MMHG | WEIGHT: 291.31 LBS | OXYGEN SATURATION: 98 % | HEART RATE: 97 BPM | SYSTOLIC BLOOD PRESSURE: 158 MMHG | HEIGHT: 74 IN | RESPIRATION RATE: 20 BRPM

## 2018-07-29 DIAGNOSIS — I10 HTN (HYPERTENSION): ICD-10-CM
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
## 2018-07-29 PROCEDURE — 93010 ELECTROCARDIOGRAM REPORT: CPT | Mod: ,,, | Performed by: INTERNAL MEDICINE

## 2018-07-29 PROCEDURE — 99283 EMERGENCY DEPT VISIT LOW MDM: CPT
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
## 2018-09-26 ENCOUNTER — TELEPHONE (OUTPATIENT)
Dept: PULMONOLOGY | Facility: CLINIC | Age: 63
End: 2018-09-26

## 2018-09-26 NOTE — TELEPHONE ENCOUNTER
----- Message from Sandrine Santillan sent at 9/26/2018  9:10 AM CDT -----  Contact: pt  please call [phone] regarding an order for new CPak mask.

## 2018-10-23 DIAGNOSIS — I10 ESSENTIAL HYPERTENSION: ICD-10-CM

## 2018-10-23 DIAGNOSIS — I25.10 CORONARY ARTERY DISEASE INVOLVING NATIVE CORONARY ARTERY WITHOUT ANGINA PECTORIS, UNSPECIFIED WHETHER NATIVE OR TRANSPLANTED HEART: ICD-10-CM

## 2018-10-23 RX ORDER — METOPROLOL TARTRATE 50 MG/1
TABLET ORAL
Qty: 60 TABLET | Refills: 6 | Status: SHIPPED | OUTPATIENT
Start: 2018-10-23 | End: 2019-08-01 | Stop reason: SDUPTHER

## 2018-10-31 DIAGNOSIS — I25.10 CORONARY ARTERY DISEASE INVOLVING NATIVE CORONARY ARTERY WITHOUT ANGINA PECTORIS, UNSPECIFIED WHETHER NATIVE OR TRANSPLANTED HEART: ICD-10-CM

## 2018-10-31 RX ORDER — NIACIN 500 MG/1
TABLET, EXTENDED RELEASE ORAL
Qty: 30 TABLET | Refills: 6 | Status: SHIPPED | OUTPATIENT
Start: 2018-10-31 | End: 2020-02-19 | Stop reason: SDUPTHER

## 2018-11-01 RX ORDER — ISOSORBIDE MONONITRATE 30 MG/1
TABLET, EXTENDED RELEASE ORAL
Qty: 90 TABLET | Refills: 0 | Status: SHIPPED | OUTPATIENT
Start: 2018-11-01 | End: 2018-11-26 | Stop reason: SDUPTHER

## 2018-11-06 ENCOUNTER — OFFICE VISIT (OUTPATIENT)
Dept: OPHTHALMOLOGY | Facility: CLINIC | Age: 63
End: 2018-11-06
Payer: COMMERCIAL

## 2018-11-06 DIAGNOSIS — H52.13 MYOPIA WITH PRESBYOPIA OF BOTH EYES: ICD-10-CM

## 2018-11-06 DIAGNOSIS — E11.9 TYPE 2 DIABETES MELLITUS WITHOUT RETINOPATHY: Primary | ICD-10-CM

## 2018-11-06 DIAGNOSIS — H52.4 MYOPIA WITH PRESBYOPIA OF BOTH EYES: ICD-10-CM

## 2018-11-06 DIAGNOSIS — Z13.5 GLAUCOMA SCREENING: ICD-10-CM

## 2018-11-06 PROCEDURE — 99999 PR PBB SHADOW E&M-EST. PATIENT-LVL II: CPT | Mod: PBBFAC,,, | Performed by: OPTOMETRIST

## 2018-11-06 PROCEDURE — 92014 COMPRE OPH EXAM EST PT 1/>: CPT | Mod: S$GLB,,, | Performed by: OPTOMETRIST

## 2018-11-16 ENCOUNTER — LAB VISIT (OUTPATIENT)
Dept: LAB | Facility: HOSPITAL | Age: 63
End: 2018-11-16
Attending: NURSE PRACTITIONER
Payer: COMMERCIAL

## 2018-11-16 DIAGNOSIS — I25.10 CORONARY ARTERY DISEASE INVOLVING NATIVE CORONARY ARTERY OF NATIVE HEART WITHOUT ANGINA PECTORIS: ICD-10-CM

## 2018-11-16 LAB
ALBUMIN SERPL BCP-MCNC: 3.4 G/DL
ALP SERPL-CCNC: 64 U/L
ALT SERPL W/O P-5'-P-CCNC: 24 U/L
ANION GAP SERPL CALC-SCNC: 7 MMOL/L
AST SERPL-CCNC: 16 U/L
BILIRUB SERPL-MCNC: 0.8 MG/DL
BUN SERPL-MCNC: 22 MG/DL
CALCIUM SERPL-MCNC: 9.1 MG/DL
CHLORIDE SERPL-SCNC: 106 MMOL/L
CHOLEST SERPL-MCNC: 214 MG/DL
CHOLEST/HDLC SERPL: 5.6 {RATIO}
CO2 SERPL-SCNC: 27 MMOL/L
CREAT SERPL-MCNC: 1.1 MG/DL
EST. GFR  (AFRICAN AMERICAN): >60 ML/MIN/1.73 M^2
EST. GFR  (NON AFRICAN AMERICAN): >60 ML/MIN/1.73 M^2
ESTIMATED AVG GLUCOSE: 117 MG/DL
GLUCOSE SERPL-MCNC: 146 MG/DL
HBA1C MFR BLD HPLC: 5.7 %
HDLC SERPL-MCNC: 38 MG/DL
HDLC SERPL: 17.8 %
LDLC SERPL CALC-MCNC: 154 MG/DL
NONHDLC SERPL-MCNC: 176 MG/DL
POTASSIUM SERPL-SCNC: 4.9 MMOL/L
PROT SERPL-MCNC: 6.7 G/DL
SODIUM SERPL-SCNC: 140 MMOL/L
TRIGL SERPL-MCNC: 110 MG/DL

## 2018-11-16 PROCEDURE — 36415 COLL VENOUS BLD VENIPUNCTURE: CPT

## 2018-11-16 PROCEDURE — 80061 LIPID PANEL: CPT

## 2018-11-16 PROCEDURE — 83036 HEMOGLOBIN GLYCOSYLATED A1C: CPT

## 2018-11-16 PROCEDURE — 80053 COMPREHEN METABOLIC PANEL: CPT

## 2018-11-20 ENCOUNTER — OFFICE VISIT (OUTPATIENT)
Dept: CARDIOLOGY | Facility: CLINIC | Age: 63
End: 2018-11-20
Payer: COMMERCIAL

## 2018-11-20 VITALS
WEIGHT: 300.25 LBS | BODY MASS INDEX: 38.53 KG/M2 | HEIGHT: 74 IN | SYSTOLIC BLOOD PRESSURE: 122 MMHG | HEART RATE: 70 BPM | DIASTOLIC BLOOD PRESSURE: 80 MMHG

## 2018-11-20 DIAGNOSIS — E11.9 CONTROLLED TYPE 2 DIABETES MELLITUS WITHOUT COMPLICATION, WITHOUT LONG-TERM CURRENT USE OF INSULIN: ICD-10-CM

## 2018-11-20 DIAGNOSIS — I15.2 HYPERTENSION ASSOCIATED WITH DIABETES: ICD-10-CM

## 2018-11-20 DIAGNOSIS — I10 ESSENTIAL HYPERTENSION: ICD-10-CM

## 2018-11-20 DIAGNOSIS — G47.33 OSA ON CPAP: ICD-10-CM

## 2018-11-20 DIAGNOSIS — E78.5 HYPERLIPEMIA: Chronic | ICD-10-CM

## 2018-11-20 DIAGNOSIS — E11.9 TYPE 2 DIABETES MELLITUS WITHOUT COMPLICATION, WITHOUT LONG-TERM CURRENT USE OF INSULIN: ICD-10-CM

## 2018-11-20 DIAGNOSIS — E11.69 HYPERLIPIDEMIA ASSOCIATED WITH TYPE 2 DIABETES MELLITUS: ICD-10-CM

## 2018-11-20 DIAGNOSIS — E11.59 HYPERTENSION ASSOCIATED WITH DIABETES: ICD-10-CM

## 2018-11-20 DIAGNOSIS — E78.5 HYPERLIPIDEMIA ASSOCIATED WITH TYPE 2 DIABETES MELLITUS: ICD-10-CM

## 2018-11-20 DIAGNOSIS — Z98.61 S/P PTCA (PERCUTANEOUS TRANSLUMINAL CORONARY ANGIOPLASTY): ICD-10-CM

## 2018-11-20 DIAGNOSIS — I25.10 CORONARY ARTERY DISEASE INVOLVING NATIVE CORONARY ARTERY OF NATIVE HEART WITHOUT ANGINA PECTORIS: Primary | ICD-10-CM

## 2018-11-20 PROCEDURE — 3079F DIAST BP 80-89 MM HG: CPT | Mod: CPTII,S$GLB,, | Performed by: NURSE PRACTITIONER

## 2018-11-20 PROCEDURE — 99999 PR PBB SHADOW E&M-EST. PATIENT-LVL III: CPT | Mod: PBBFAC,,, | Performed by: NURSE PRACTITIONER

## 2018-11-20 PROCEDURE — 3044F HG A1C LEVEL LT 7.0%: CPT | Mod: CPTII,S$GLB,, | Performed by: NURSE PRACTITIONER

## 2018-11-20 PROCEDURE — 3074F SYST BP LT 130 MM HG: CPT | Mod: CPTII,S$GLB,, | Performed by: NURSE PRACTITIONER

## 2018-11-20 PROCEDURE — 3008F BODY MASS INDEX DOCD: CPT | Mod: CPTII,S$GLB,, | Performed by: NURSE PRACTITIONER

## 2018-11-20 PROCEDURE — 99214 OFFICE O/P EST MOD 30 MIN: CPT | Mod: S$GLB,,, | Performed by: NURSE PRACTITIONER

## 2018-11-20 RX ORDER — AMLODIPINE BESYLATE 2.5 MG/1
2.5 TABLET ORAL NIGHTLY
Qty: 30 TABLET | Refills: 6 | Status: SHIPPED | OUTPATIENT
Start: 2018-11-20 | End: 2019-08-01 | Stop reason: SDUPTHER

## 2018-11-20 RX ORDER — ATORVASTATIN CALCIUM 40 MG/1
40 TABLET, FILM COATED ORAL DAILY
Qty: 30 TABLET | Refills: 6 | Status: SHIPPED | OUTPATIENT
Start: 2018-11-20 | End: 2019-08-01 | Stop reason: SDUPTHER

## 2018-11-20 NOTE — PROGRESS NOTES
Subjective:   Patient ID:  Isaiah Harrison is a 63 y.o. male who presents for follow up of Coronary Artery Disease      HPI    Patient has PMH of CAD , NSTEMI in Dec 2015 where he had patent LAD stent, HTN, HLP, obesity and DANTE, Using CPAP nightly .. Patient states that he has been doing well since last visit. Denies any chest pain, sob, orthopnea, pnd, angina or equivalent. No dizziness, near syncope or syncope.  BP well controlled on current regimen. Doing well with heart healthy diet. Has no abnormal bleeding on dual antiplatelets. Has been having issues with lower back pain. Is seeing pain management.   Last A1C 5.7, Lipids not at goal, but hasn't been taking his statin. He switched to a new pharmacy and prescription lost in transfer.     Past Medical History:   Diagnosis Date    Acute coronary syndrome     Anticoagulant long-term use     Back pain     CAD (coronary artery disease) 10/17/2013    Coronary artery disease     Diabetes mellitus, type 2     Gastric polyps     Hyperlipemia     Hypertension     NSTEMI (non-ST elevated myocardial infarction) 10/23/2014    Obesity 10/21/2014    DANTE on CPAP 2/19/2015    S/P PTCA (percutaneous transluminal coronary angioplasty) 10/17/2013    Sleep apnea 1/7/2015       Past Surgical History:   Procedure Laterality Date    APPENDECTOMY      COLONOSCOPY      CORONARY ANGIOPLASTY WITH STENT PLACEMENT      EGD (ESOPHAGOGASTRODUODENOSCOPY) N/A 6/19/2013    Performed by Bakari Richards III, MD at [facility] ENDO    ESOPHAGOGASTRODUODENOSCOPY      ESOPHAGOGASTRODUODENOSCOPY (EGD) N/A 11/18/2014    Performed by Bakari Richards III, MD at [facility] ENDO    FRACTURE SURGERY      HEART CATH-LEFT Left 10/21/2014    Performed by Kirsty Lyles MD at [facility] CATH LAB    HERNIA REPAIR      PH MONITORING, ESOPHAGUS, WIRELESS, (OFF REFLUX MEDS) N/A 6/19/2013    Performed by Bakari Richards III, MD at [facility] ENDO    SPINE SURGERY         Social History     Tobacco Use     Smoking status: Never Smoker    Smokeless tobacco: Never Used   Substance Use Topics    Alcohol use: Yes     Alcohol/week: 0.0 oz     Comment: socially    Drug use: No       Family History   Problem Relation Age of Onset    Hypertension Mother     Hypertension Father     Heart disease Maternal Grandfather        Current Outpatient Medications   Medication Sig    aspirin (ECOTRIN) 81 MG EC tablet Take 81 mg by mouth once daily.    blood sugar diagnostic Strp 1 strip by Misc.(Non-Drug; Combo Route) route 2 (two) times daily.    blood sugar diagnostic Strp 1 strip by Misc.(Non-Drug; Combo Route) route 2 (two) times daily.    isosorbide mononitrate (IMDUR) 30 MG 24 hr tablet TAKE 3 TABLETS BY MOUTH ONCE DAILY    metFORMIN (GLUCOPHAGE) 500 MG tablet take 1 tablet by mouth twice a day with food    metoprolol tartrate (LOPRESSOR) 50 MG tablet TAKE 1 TABLET BY MOUTH TWICE DAILY    MICROLET LANCET Misc CHECK BLOOD SUGAR TWICE DAILY    niacin (SLO-NIACIN) 500 mg tablet TAKE 1 TABLET BY MOUTH AT BEDTIME    RANEXA 1,000 mg Tb12 take 1 tablet by mouth twice a day    TRULICITY 1.5 mg/0.5 mL PnIj INJECT 1.5 MILLIGRAMS INTO THE SKIN EVERY 7 DAYS    valsartan (DIOVAN) 160 MG tablet Take 1 tablet (160 mg total) by mouth once daily.    amLODIPine (NORVASC) 5 MG tablet take 1/2 tablet by mouth every evening    atorvastatin (LIPITOR) 40 MG tablet take 1 tablet by mouth once daily    nitroGLYCERIN (NITROSTAT) 0.4 MG SL tablet Place 1 tablet (0.4 mg total) under the tongue every 5 (five) minutes as needed.     No current facility-administered medications for this visit.      Current Outpatient Medications on File Prior to Visit   Medication Sig    aspirin (ECOTRIN) 81 MG EC tablet Take 81 mg by mouth once daily.    blood sugar diagnostic Strp 1 strip by Misc.(Non-Drug; Combo Route) route 2 (two) times daily.    blood sugar diagnostic Strp 1 strip by Misc.(Non-Drug; Combo Route) route 2 (two) times daily.     isosorbide mononitrate (IMDUR) 30 MG 24 hr tablet TAKE 3 TABLETS BY MOUTH ONCE DAILY    metFORMIN (GLUCOPHAGE) 500 MG tablet take 1 tablet by mouth twice a day with food    metoprolol tartrate (LOPRESSOR) 50 MG tablet TAKE 1 TABLET BY MOUTH TWICE DAILY    MICROLET LANCET [facility] CHECK BLOOD SUGAR TWICE DAILY    niacin (SLO-NIACIN) 500 mg tablet TAKE 1 TABLET BY MOUTH AT BEDTIME    RANEXA 1,000 mg Tb12 take 1 tablet by mouth twice a day    TRULICITY 1.5 mg/0.5 mL PnIj INJECT 1.5 MILLIGRAMS INTO THE SKIN EVERY 7 DAYS    valsartan (DIOVAN) 160 MG tablet Take 1 tablet (160 mg total) by mouth once daily.    amLODIPine (NORVASC) 5 MG tablet take 1/2 tablet by mouth every evening    atorvastatin (LIPITOR) 40 MG tablet take 1 tablet by mouth once daily    nitroGLYCERIN (NITROSTAT) 0.4 MG SL tablet Place 1 tablet (0.4 mg total) under the tongue every 5 (five) minutes as needed.     No current facility-administered medications on file prior to visit.        Review of Systems   Constitution: Negative for diaphoresis, weakness, malaise/fatigue, weight gain and weight loss.   HENT: Negative for congestion and nosebleeds.    Cardiovascular: Negative for chest pain, claudication, cyanosis, dyspnea on exertion, irregular heartbeat, leg swelling, near-syncope, orthopnea, palpitations, paroxysmal nocturnal dyspnea and syncope.   Respiratory: Negative for cough, hemoptysis, shortness of breath, sleep disturbances due to breathing, snoring, sputum production and wheezing.    Hematologic/Lymphatic: Negative for bleeding problem. Does not bruise/bleed easily.   Skin: Negative for rash.   Musculoskeletal: Positive for back pain (lower back pain ). Negative for arthritis, falls, joint pain, muscle cramps and muscle weakness.   Gastrointestinal: Negative for abdominal pain, constipation, diarrhea, heartburn, hematemesis, hematochezia, melena and nausea.   Genitourinary: Negative for dysuria, hematuria and nocturia.   Neurological:  "Negative for excessive daytime sleepiness, dizziness, headaches, light-headedness, loss of balance, numbness and vertigo.       Objective:   Physical Exam   Constitutional: He is oriented to person, place, and time. He appears well-developed and well-nourished.   Neck: Neck supple. No JVD present.   Cardiovascular: Normal rate, regular rhythm, normal heart sounds and normal pulses. Exam reveals no friction rub.   No murmur heard.  Pulmonary/Chest: Effort normal and breath sounds normal. No respiratory distress. He has no wheezes. He has no rales.   Abdominal: Soft. Bowel sounds are normal. He exhibits no distension.   Musculoskeletal: He exhibits no edema or tenderness.   Neurological: He is alert and oriented to person, place, and time.   Skin: Skin is warm and dry. No rash noted.   Psychiatric: He has a normal mood and affect. His behavior is normal.   Nursing note and vitals reviewed.    Vitals:    11/20/18 1314 11/20/18 1318   BP: 136/74 122/80   BP Location: Right arm Left arm   Patient Position: Sitting Sitting   Pulse: 70    Weight: (!) 136.2 kg (300 lb 4.3 oz)    Height: 6' 2" (1.88 m)      Lab Results   Component Value Date    CHOL 214 (H) 11/16/2018    CHOL 130 05/21/2018    CHOL 135 11/15/2017     Lab Results   Component Value Date    HDL 38 (L) 11/16/2018    HDL 34 (L) 05/21/2018    HDL 35 (L) 11/15/2017     Lab Results   Component Value Date    LDLCALC 154.0 11/16/2018    LDLCALC 82.4 05/21/2018    LDLCALC 80.4 11/15/2017     Lab Results   Component Value Date    TRIG 110 11/16/2018    TRIG 68 05/21/2018    TRIG 98 11/15/2017     Lab Results   Component Value Date    CHOLHDL 17.8 (L) 11/16/2018    CHOLHDL 26.2 05/21/2018    CHOLHDL 25.9 11/15/2017       Chemistry        Component Value Date/Time     11/16/2018 0755    K 4.9 11/16/2018 0755     11/16/2018 0755    CO2 27 11/16/2018 0755    BUN 22 11/16/2018 0755    CREATININE 1.1 11/16/2018 0755     (H) 11/16/2018 0755        Component " Value Date/Time    CALCIUM 9.1 11/16/2018 0755    ALKPHOS 64 11/16/2018 0755    AST 16 11/16/2018 0755    ALT 24 11/16/2018 0755    BILITOT 0.8 11/16/2018 0755    ESTGFRAFRICA >60.0 11/16/2018 0755    EGFRNONAA >60.0 11/16/2018 0755          Lab Results   Component Value Date    TSH 1.188 06/30/2015     Lab Results   Component Value Date    INR 1.0 12/16/2015    INR 1.1 10/29/2014    INR 1.0 10/25/2014     Lab Results   Component Value Date    WBC 5.47 08/18/2016    HGB 13.5 (L) 08/18/2016    HCT 40.0 08/18/2016    MCV 95 08/18/2016     08/18/2016     BMP  Sodium   Date Value Ref Range Status   11/16/2018 140 136 - 145 mmol/L Final     Potassium   Date Value Ref Range Status   11/16/2018 4.9 3.5 - 5.1 mmol/L Final     Chloride   Date Value Ref Range Status   11/16/2018 106 95 - 110 mmol/L Final     CO2   Date Value Ref Range Status   11/16/2018 27 23 - 29 mmol/L Final     BUN, Bld   Date Value Ref Range Status   11/16/2018 22 8 - 23 mg/dL Final     Creatinine   Date Value Ref Range Status   11/16/2018 1.1 0.5 - 1.4 mg/dL Final     Calcium   Date Value Ref Range Status   11/16/2018 9.1 8.7 - 10.5 mg/dL Final     Anion Gap   Date Value Ref Range Status   11/16/2018 7 (L) 8 - 16 mmol/L Final     eGFR if    Date Value Ref Range Status   11/16/2018 >60.0 >60 mL/min/1.73 m^2 Final     eGFR if non    Date Value Ref Range Status   11/16/2018 >60.0 >60 mL/min/1.73 m^2 Final     Comment:     Calculation used to obtain the estimated glomerular filtration  rate (eGFR) is the CKD-EPI equation.        Estimated Creatinine Clearance: 103.5 mL/min (based on SCr of 1.1 mg/dL).    Assessment:     1. Coronary artery disease involving native coronary artery of native heart without angina pectoris    2. DANTE on CPAP    3. Type 2 diabetes mellitus without complication, without long-term current use of insulin    4. Controlled type 2 diabetes mellitus without complication, without long-term current  use of insulin    5. S/P PTCA (percutaneous transluminal coronary angioplasty)    6. Essential hypertension    7. Hyperlipidemia associated with type 2 diabetes mellitus    8. Hypertension associated with diabetes    Stable CAD-no angina or equivalent  BP stable  Tolerating meds well  Good activity tolerance   No abnormal bleeding on dual antiplatelet  Using CPAP nightly  .  Plan:   Restart Statin. Repeat Lipid panel in 3 months   Continue current medical management for now  Fasting labs with phone review  Heart healthy   Exercise program  Phone review of fasting labs   RTC in 6 months with lipids, CMP and A1C

## 2018-11-27 RX ORDER — ISOSORBIDE MONONITRATE 30 MG/1
TABLET, EXTENDED RELEASE ORAL
Qty: 270 TABLET | Refills: 1 | Status: SHIPPED | OUTPATIENT
Start: 2018-11-27 | End: 2019-02-25 | Stop reason: SDUPTHER

## 2018-12-14 RX ORDER — VALSARTAN 160 MG/1
TABLET ORAL
Qty: 90 TABLET | Refills: 0 | Status: SHIPPED | OUTPATIENT
Start: 2018-12-14 | End: 2019-02-25 | Stop reason: SDUPTHER

## 2018-12-23 DIAGNOSIS — I20.0 UNSTABLE ANGINA: ICD-10-CM

## 2018-12-23 RX ORDER — RANOLAZINE 1000 MG/1
TABLET, FILM COATED, EXTENDED RELEASE ORAL
Qty: 60 TABLET | Refills: 6 | Status: SHIPPED | OUTPATIENT
Start: 2018-12-23 | End: 2019-08-01 | Stop reason: SDUPTHER

## 2018-12-30 DIAGNOSIS — E11.9 TYPE 2 DIABETES MELLITUS WITHOUT COMPLICATION: ICD-10-CM

## 2018-12-31 RX ORDER — DULAGLUTIDE 1.5 MG/.5ML
INJECTION, SOLUTION SUBCUTANEOUS
Qty: 2 ML | Refills: 0 | Status: SHIPPED | OUTPATIENT
Start: 2018-12-31 | End: 2019-01-23 | Stop reason: SDUPTHER

## 2019-01-23 DIAGNOSIS — E11.9 TYPE 2 DIABETES MELLITUS WITHOUT COMPLICATION: ICD-10-CM

## 2019-01-23 RX ORDER — DULAGLUTIDE 1.5 MG/.5ML
INJECTION, SOLUTION SUBCUTANEOUS
Qty: 2 ML | Refills: 0 | Status: SHIPPED | OUTPATIENT
Start: 2019-01-23 | End: 2019-02-14 | Stop reason: SDUPTHER

## 2019-02-14 DIAGNOSIS — E11.9 TYPE 2 DIABETES MELLITUS WITHOUT COMPLICATION: ICD-10-CM

## 2019-02-15 RX ORDER — DULAGLUTIDE 1.5 MG/.5ML
INJECTION, SOLUTION SUBCUTANEOUS
Qty: 4 SYRINGE | Refills: 6 | Status: SHIPPED | OUTPATIENT
Start: 2019-02-15 | End: 2020-09-30 | Stop reason: SDUPTHER

## 2019-02-22 ENCOUNTER — OFFICE VISIT (OUTPATIENT)
Dept: PULMONOLOGY | Facility: CLINIC | Age: 64
End: 2019-02-22
Payer: COMMERCIAL

## 2019-02-22 VITALS
SYSTOLIC BLOOD PRESSURE: 123 MMHG | WEIGHT: 296.63 LBS | RESPIRATION RATE: 18 BRPM | BODY MASS INDEX: 38.07 KG/M2 | DIASTOLIC BLOOD PRESSURE: 60 MMHG | OXYGEN SATURATION: 98 % | HEART RATE: 96 BPM | HEIGHT: 74 IN

## 2019-02-22 DIAGNOSIS — G47.33 OSA ON CPAP: Primary | ICD-10-CM

## 2019-02-22 DIAGNOSIS — E66.9 OBESITY, CLASS II, BMI 35-39.9, ISOLATED (SEE ACTUAL BMI): Chronic | ICD-10-CM

## 2019-02-22 PROCEDURE — 3078F DIAST BP <80 MM HG: CPT | Mod: CPTII,S$GLB,, | Performed by: NURSE PRACTITIONER

## 2019-02-22 PROCEDURE — 99999 PR PBB SHADOW E&M-EST. PATIENT-LVL IV: CPT | Mod: PBBFAC,,, | Performed by: NURSE PRACTITIONER

## 2019-02-22 PROCEDURE — 3078F PR MOST RECENT DIASTOLIC BLOOD PRESSURE < 80 MM HG: ICD-10-PCS | Mod: CPTII,S$GLB,, | Performed by: NURSE PRACTITIONER

## 2019-02-22 PROCEDURE — 99213 OFFICE O/P EST LOW 20 MIN: CPT | Mod: S$GLB,,, | Performed by: NURSE PRACTITIONER

## 2019-02-22 PROCEDURE — 99999 PR PBB SHADOW E&M-EST. PATIENT-LVL IV: ICD-10-PCS | Mod: PBBFAC,,, | Performed by: NURSE PRACTITIONER

## 2019-02-22 PROCEDURE — 3074F SYST BP LT 130 MM HG: CPT | Mod: CPTII,S$GLB,, | Performed by: NURSE PRACTITIONER

## 2019-02-22 PROCEDURE — 3008F BODY MASS INDEX DOCD: CPT | Mod: CPTII,S$GLB,, | Performed by: NURSE PRACTITIONER

## 2019-02-22 PROCEDURE — 99213 PR OFFICE/OUTPT VISIT, EST, LEVL III, 20-29 MIN: ICD-10-PCS | Mod: S$GLB,,, | Performed by: NURSE PRACTITIONER

## 2019-02-22 PROCEDURE — 3008F PR BODY MASS INDEX (BMI) DOCUMENTED: ICD-10-PCS | Mod: CPTII,S$GLB,, | Performed by: NURSE PRACTITIONER

## 2019-02-22 PROCEDURE — 3074F PR MOST RECENT SYSTOLIC BLOOD PRESSURE < 130 MM HG: ICD-10-PCS | Mod: CPTII,S$GLB,, | Performed by: NURSE PRACTITIONER

## 2019-02-22 RX ORDER — GABAPENTIN 300 MG/1
CAPSULE ORAL
Refills: 0 | Status: ON HOLD | COMMUNITY
Start: 2019-02-12 | End: 2023-03-09 | Stop reason: HOSPADM

## 2019-02-22 RX ORDER — METHOCARBAMOL 750 MG/1
TABLET, FILM COATED ORAL
Refills: 4 | COMMUNITY
Start: 2018-12-19 | End: 2020-12-07

## 2019-02-22 RX ORDER — HYDROCHLOROTHIAZIDE 50 MG/1
TABLET ORAL
Refills: 0 | COMMUNITY
Start: 2018-11-16 | End: 2020-12-21 | Stop reason: DRUGHIGH

## 2019-02-22 NOTE — PROGRESS NOTES
Subjective:      Patient ID: Isaiah Harrison is a 63 y.o. male.    Chief Complaint: Sleep Apnea    HPI: Isaiah Harrison is here for follow up for DANTE and CPAP yearly complaince assessment.  He is on CPAP of 9.5 cmH2O pressure which is optimally controlling sleep apnea with apneic index (AHI) 0.5 events an hour.   He is compliant with CPAP use. Complaince download today reveals 96.7% of days with greater than 4 hours of device use.   Patient reports benefit from CPAP use and denies snoring and excessive daytime sleepiness. Remains pleased with current settings.   Patient reports no complaints. Full face mask  is used.   [place] 6    Previous Report Reviewed: lab reports and office notes     Past Medical History: The following portions of the patient's history were reviewed and updated as appropriate:   He  has a past surgical history that includes Appendectomy; Hernia repair; Coronary angioplasty with stent; Fracture surgery; Colonoscopy; Esophagogastroduodenoscopy; and Spine surgery.  His family history includes Heart disease in his maternal grandfather; Hypertension in his father and mother.  He  reports that  has never smoked. he has never used smokeless tobacco. He reports that he drinks alcohol. He reports that he does not use drugs.  He has a current medication list which includes the following prescription(s): amlodipine, aspirin, atorvastatin, blood sugar diagnostic, blood sugar diagnostic, gabapentin, hydrochlorothiazide, isosorbide mononitrate, metformin, methocarbamol, metoprolol tartrate, microlet lancet, niacin, nitroglycerin, ranexa, trulicity, and valsartan.  He is allergic to pcn [penicillins].    The following portions of the patient's history were reviewed and updated as appropriate: allergies, current medications, past family history, past medical history, past social history, past surgical history and problem list.    Review of Systems   Constitutional: Negative for fever, chills, weight  "loss, weight gain, activity change, appetite change, fatigue and night sweats.   HENT: Negative for postnasal drip, rhinorrhea, sinus pressure, voice change and congestion.    Eyes: Negative for redness and itching.   Respiratory: Negative for snoring, cough, sputum production, chest tightness, shortness of breath, wheezing, orthopnea, asthma nighttime symptoms, dyspnea on extertion, use of rescue inhaler and somnolence.    Cardiovascular: Negative.  Negative for chest pain, palpitations and leg swelling.   Genitourinary: Negative for difficulty urinating and hematuria.   Endocrine: Negative for cold intolerance and heat intolerance.    Musculoskeletal: Negative for arthralgias, gait problem, joint swelling and myalgias.   Skin: Negative.    Gastrointestinal: Negative for nausea, vomiting, abdominal pain and acid reflux.   Neurological: Negative for dizziness, weakness, light-headedness and headaches.   Hematological: Negative for adenopathy. No excessive bruising.   All other systems reviewed and are negative.     Objective:   /60   Pulse 96   Resp 18   Ht 6' 2" (1.88 m)   Wt 134.5 kg (296 lb 10.1 oz)   SpO2 98%   BMI 38.08 kg/m²   Physical Exam   Constitutional: He is oriented to person, place, and time. He appears well-developed and well-nourished. He is active and cooperative.  Non-toxic appearance. He does not appear ill. No distress.   HENT:   Head: Normocephalic and atraumatic.   Right Ear: External ear normal.   Left Ear: External ear normal.   Nose: Nose normal.   Mouth/Throat: Oropharynx is clear and moist. No oropharyngeal exudate.   Eyes: Conjunctivae are normal.   Neck: Normal range of motion. Neck supple.   Cardiovascular: Normal rate, regular rhythm, normal heart sounds and intact distal pulses.   Pulmonary/Chest: Effort normal and breath sounds normal.   Abdominal: Soft.   Musculoskeletal: He exhibits no edema.   Neurological: He is alert and oriented to person, place, and time.   Skin: " Skin is warm and dry.   Psychiatric: He has a normal mood and affect. His behavior is normal. Judgment and thought content normal.   Vitals reviewed.    Personal Diagnostic Review  CPAP download  CPAP 9.5 cm  Compliance Summary  12/31/2018 - 1/29/2019 (30 days)  Days with Device Usage 29 days  Days without Device Usage 1 day  Percent Days with Device Usage 96.7%  Cumulative Usage 8 days 11 hrs. 14 mins. 22 secs.  Maximum Usage (1 Day) 9 hrs. 45 mins. 52 secs.  Average Usage (All Days) 6 hrs. 46 mins. 28 secs.  Average Usage (Days Used) 7 hrs. 29 secs.  Minimum Usage (1 Day) 4 hrs. 26 mins. 5 secs.  Percent of Days with Usage >= 4 Hours 96.7%  Percent of Days with Usage < 4 Hours 3.3%  Date Range  Total Blower Time 8 days 11 hrs. 16 mins. 22 secs.  CPAP Summary ([facility])  Average Time in Large Leak Per Day 56 secs.  Average AHI 0.5  CPAP 9.5 cmH2O    Assessment:     1. DANTE on CPAP    2. Obesity, Class II, BMI 35-39.9, isolated (see actual BMI)      Orders Placed This Encounter   Procedures    CPAP/BIPAP SUPPLIES     Benefits and compliant   Yearly supply order   90 days. 4 refills.  Full face mask   HME:[facility]     Order Specific Question:   Type of mask:     Answer:   FFM     Order Specific Question:   Headgear?     Answer:   Yes     Order Specific Question:   Tubing?     Answer:   Yes     Order Specific Question:   Humidifier chamber?     Answer:   Yes     Order Specific Question:   Chin strap?     Answer:   Yes     Order Specific Question:   Filters?     Answer:   Yes     Order Specific Question:   Length of need (1-99 months):     Answer:   99     Plan:     Problem List Items Addressed This Visit     Obesity, Class II, BMI 35-39.9, isolated (see actual BMI) (Chronic)     Encouraged calorie reduction and 30 minutes of exercise daily. Discussed impact of obesity on general health.             DANTE on CPAP - Primary     Benefits and compliant with CPAP 9.5 cm  [place] 6  AHI 0.5   Full face mask   Yearly  supply order   HME: [facility]   Continue CPAP 9.5 cm  Follow up Feb 2020 yearly compliance assessment           Relevant Orders    CPAP/BIPAP SUPPLIES         (DME) - [facility]  Reviewed therapeutic goals for positive airway pressure therapy CPAP  Ideal is usage 100% of nights for 6 - 8 hours per night. Minimum usage is 70% of night for at least 4 hours per night used.     Follow-up in about 1 year (around 2/22/2020) for CPAP 1 year compliance download.

## 2019-02-22 NOTE — ASSESSMENT & PLAN NOTE
Benefits and compliant with CPAP 9.5 cm  [place] 6  AHI 0.5   Full face mask   Yearly supply order   HME: [facility]   Continue CPAP 9.5 cm  Follow up Feb 2020 yearly compliance assessment

## 2019-02-26 RX ORDER — VALSARTAN 160 MG/1
TABLET ORAL
Qty: 90 TABLET | Refills: 1 | Status: SHIPPED | OUTPATIENT
Start: 2019-02-26 | End: 2019-08-14 | Stop reason: SDUPTHER

## 2019-02-26 RX ORDER — ISOSORBIDE MONONITRATE 30 MG/1
TABLET, EXTENDED RELEASE ORAL
Qty: 270 TABLET | Refills: 1 | Status: SHIPPED | OUTPATIENT
Start: 2019-02-26 | End: 2019-09-13 | Stop reason: SDUPTHER

## 2019-03-18 ENCOUNTER — LAB VISIT (OUTPATIENT)
Dept: LAB | Facility: HOSPITAL | Age: 64
End: 2019-03-18
Attending: ORTHOPAEDIC SURGERY
Payer: COMMERCIAL

## 2019-03-18 DIAGNOSIS — M54.50 LOW BACK PAIN: ICD-10-CM

## 2019-03-18 DIAGNOSIS — M54.50 LOW BACK PAIN: Primary | ICD-10-CM

## 2019-03-18 LAB
BUN SERPL-MCNC: 20 MG/DL
CREAT SERPL-MCNC: 1.1 MG/DL
EST. GFR  (AFRICAN AMERICAN): >60 ML/MIN/1.73 M^2
EST. GFR  (NON AFRICAN AMERICAN): >60 ML/MIN/1.73 M^2

## 2019-03-18 PROCEDURE — 36415 COLL VENOUS BLD VENIPUNCTURE: CPT

## 2019-03-18 PROCEDURE — 84520 ASSAY OF UREA NITROGEN: CPT

## 2019-03-18 PROCEDURE — 82565 ASSAY OF CREATININE: CPT

## 2019-04-01 ENCOUNTER — OFFICE VISIT (OUTPATIENT)
Dept: INTERNAL MEDICINE | Facility: CLINIC | Age: 64
End: 2019-04-01
Payer: COMMERCIAL

## 2019-04-01 ENCOUNTER — TELEPHONE (OUTPATIENT)
Dept: INTERNAL MEDICINE | Facility: CLINIC | Age: 64
End: 2019-04-01

## 2019-04-01 ENCOUNTER — HOSPITAL ENCOUNTER (OUTPATIENT)
Dept: RADIOLOGY | Facility: HOSPITAL | Age: 64
Discharge: HOME | End: 2019-04-01
Attending: FAMILY MEDICINE
Payer: COMMERCIAL

## 2019-04-01 ENCOUNTER — CLINICAL SUPPORT (OUTPATIENT)
Dept: CARDIOLOGY | Facility: CLINIC | Age: 64
End: 2019-04-01
Payer: COMMERCIAL

## 2019-04-01 ENCOUNTER — PATIENT OUTREACH (OUTPATIENT)
Dept: ADMINISTRATIVE | Facility: HOSPITAL | Age: 64
End: 2019-04-01

## 2019-04-01 VITALS
HEIGHT: 74 IN | DIASTOLIC BLOOD PRESSURE: 84 MMHG | WEIGHT: 306 LBS | SYSTOLIC BLOOD PRESSURE: 140 MMHG | BODY MASS INDEX: 39.27 KG/M2 | TEMPERATURE: 98 F | OXYGEN SATURATION: 95 % | HEART RATE: 94 BPM

## 2019-04-01 DIAGNOSIS — Z01.818 PREOP EXAMINATION: ICD-10-CM

## 2019-04-01 DIAGNOSIS — I21.4 NSTEMI (NON-ST ELEVATED MYOCARDIAL INFARCTION): ICD-10-CM

## 2019-04-01 DIAGNOSIS — E11.69 HYPERLIPIDEMIA ASSOCIATED WITH TYPE 2 DIABETES MELLITUS: ICD-10-CM

## 2019-04-01 DIAGNOSIS — E11.9 TYPE 2 DIABETES MELLITUS WITHOUT RETINOPATHY: ICD-10-CM

## 2019-04-01 DIAGNOSIS — E11.9 TYPE 2 DIABETES MELLITUS WITHOUT COMPLICATION, WITHOUT LONG-TERM CURRENT USE OF INSULIN: ICD-10-CM

## 2019-04-01 DIAGNOSIS — E78.5 HYPERLIPIDEMIA ASSOCIATED WITH TYPE 2 DIABETES MELLITUS: ICD-10-CM

## 2019-04-01 DIAGNOSIS — Z01.818 PREOP EXAMINATION: Primary | ICD-10-CM

## 2019-04-01 PROCEDURE — 3008F PR BODY MASS INDEX (BMI) DOCUMENTED: ICD-10-PCS | Mod: CPTII,S$GLB,, | Performed by: FAMILY MEDICINE

## 2019-04-01 PROCEDURE — 71046 XR CHEST PA AND LATERAL: ICD-10-PCS | Mod: 26,,, | Performed by: RADIOLOGY

## 2019-04-01 PROCEDURE — 3008F BODY MASS INDEX DOCD: CPT | Mod: CPTII,S$GLB,, | Performed by: FAMILY MEDICINE

## 2019-04-01 PROCEDURE — 99214 OFFICE O/P EST MOD 30 MIN: CPT | Mod: S$GLB,,, | Performed by: FAMILY MEDICINE

## 2019-04-01 PROCEDURE — 3077F SYST BP >= 140 MM HG: CPT | Mod: CPTII,S$GLB,, | Performed by: FAMILY MEDICINE

## 2019-04-01 PROCEDURE — 71046 X-RAY EXAM CHEST 2 VIEWS: CPT | Mod: TC

## 2019-04-01 PROCEDURE — 3079F DIAST BP 80-89 MM HG: CPT | Mod: CPTII,S$GLB,, | Performed by: FAMILY MEDICINE

## 2019-04-01 PROCEDURE — 99999 PR PBB SHADOW E&M-EST. PATIENT-LVL III: ICD-10-PCS | Mod: PBBFAC,,, | Performed by: FAMILY MEDICINE

## 2019-04-01 PROCEDURE — 93000 ELECTROCARDIOGRAM COMPLETE: CPT | Mod: S$GLB,,, | Performed by: NUCLEAR MEDICINE

## 2019-04-01 PROCEDURE — 99214 PR OFFICE/OUTPT VISIT, EST, LEVL IV, 30-39 MIN: ICD-10-PCS | Mod: S$GLB,,, | Performed by: FAMILY MEDICINE

## 2019-04-01 PROCEDURE — 99999 PR PBB SHADOW E&M-EST. PATIENT-LVL III: CPT | Mod: PBBFAC,,, | Performed by: FAMILY MEDICINE

## 2019-04-01 PROCEDURE — 87081 CULTURE SCREEN ONLY: CPT

## 2019-04-01 PROCEDURE — 71046 X-RAY EXAM CHEST 2 VIEWS: CPT | Mod: 26,,, | Performed by: RADIOLOGY

## 2019-04-01 PROCEDURE — 3077F PR MOST RECENT SYSTOLIC BLOOD PRESSURE >= 140 MM HG: ICD-10-PCS | Mod: CPTII,S$GLB,, | Performed by: FAMILY MEDICINE

## 2019-04-01 PROCEDURE — 93000 EKG 12-LEAD: ICD-10-PCS | Mod: S$GLB,,, | Performed by: NUCLEAR MEDICINE

## 2019-04-01 PROCEDURE — 3044F HG A1C LEVEL LT 7.0%: CPT | Mod: CPTII,S$GLB,, | Performed by: FAMILY MEDICINE

## 2019-04-01 PROCEDURE — 3079F PR MOST RECENT DIASTOLIC BLOOD PRESSURE 80-89 MM HG: ICD-10-PCS | Mod: CPTII,S$GLB,, | Performed by: FAMILY MEDICINE

## 2019-04-01 PROCEDURE — 3044F PR MOST RECENT HEMOGLOBIN A1C LEVEL <7.0%: ICD-10-PCS | Mod: CPTII,S$GLB,, | Performed by: FAMILY MEDICINE

## 2019-04-01 NOTE — PROGRESS NOTES
Subjective:       Patient ID: Isaiah Harrison is a 64 y.o. male.    Chief Complaint: Pre-op Exam    Preop Exam:     Pt is a 64 year old who is here for preop. Pt has had general anesthesia in the past and did well. Pt is to have lower back proceedure    Review of Systems   Constitutional: Negative.    HENT: Negative.    Respiratory: Negative.    Cardiovascular: Negative.    Gastrointestinal: Negative.    Skin: Negative.    Neurological: Negative.    Psychiatric/Behavioral: Negative.        Objective:      Physical Exam   Constitutional: He is oriented to person, place, and time. He appears well-developed and well-nourished.   HENT:   Head: Normocephalic.   Eyes: Pupils are equal, round, and reactive to light. EOM are normal.   Neck: Normal range of motion. Neck supple. No JVD present. No thyromegaly present.   Cardiovascular: Normal rate and regular rhythm.   Pulmonary/Chest: Effort normal and breath sounds normal.   Abdominal: Soft. Bowel sounds are normal.   Musculoskeletal: Normal range of motion.   Lymphadenopathy:     He has no cervical adenopathy.   Neurological: He is alert and oriented to person, place, and time. He has normal reflexes.   Skin: Skin is warm and dry.   Psychiatric: He has a normal mood and affect. His behavior is normal.       Assessment:       1. Preop examination    2. NSTEMI (non-ST elevated myocardial infarction)    3. Hyperlipidemia associated with type 2 diabetes mellitus    4. Type 2 diabetes mellitus without complication, without long-term current use of insulin    5. Type 2 diabetes mellitus without retinopathy        Plan:       Preop examination  -     CBC auto differential; Future; Expected date: 04/01/2019  -     Comprehensive metabolic panel; Future; Expected date: 04/01/2019  -     Urinalysis; Future; Expected date: 04/01/2019  -     Hemoglobin A1c; Future; Expected date: 04/01/2019  -     POCT INR  -     APTT; Future; Expected date: 04/01/2019  -     Culture, MRSA; Future;  Expected date: 04/01/2019  -     SCHEDULED EKG 12-LEAD (to Muse); Future  -     X-Ray Chest PA And Lateral; Future; Expected date: 04/01/2019    NSTEMI (non-ST elevated myocardial infarction)  Comments:  Will see if cardiology can approve    Hyperlipidemia associated with type 2 diabetes mellitus  Comments:  Pt is stable    Type 2 diabetes mellitus without complication, without long-term current use of insulin  Comments:  Last hgA1C was 5.8    Type 2 diabetes mellitus without retinopathy  Comments:  Stable

## 2019-04-03 LAB — MRSA SPEC QL CULT: NORMAL

## 2019-04-12 ENCOUNTER — OFFICE VISIT (OUTPATIENT)
Dept: CARDIOLOGY | Facility: CLINIC | Age: 64
End: 2019-04-12
Payer: COMMERCIAL

## 2019-04-12 ENCOUNTER — TELEPHONE (OUTPATIENT)
Dept: CARDIOLOGY | Facility: CLINIC | Age: 64
End: 2019-04-12

## 2019-04-12 VITALS
DIASTOLIC BLOOD PRESSURE: 90 MMHG | BODY MASS INDEX: 38.93 KG/M2 | SYSTOLIC BLOOD PRESSURE: 138 MMHG | HEIGHT: 74 IN | WEIGHT: 303.38 LBS | HEART RATE: 72 BPM

## 2019-04-12 DIAGNOSIS — E11.9 TYPE 2 DIABETES MELLITUS WITHOUT COMPLICATION, WITHOUT LONG-TERM CURRENT USE OF INSULIN: ICD-10-CM

## 2019-04-12 DIAGNOSIS — I25.10 CORONARY ARTERY DISEASE INVOLVING NATIVE CORONARY ARTERY OF NATIVE HEART WITHOUT ANGINA PECTORIS: ICD-10-CM

## 2019-04-12 DIAGNOSIS — Z01.818 PREOP EXAMINATION: Primary | ICD-10-CM

## 2019-04-12 DIAGNOSIS — I15.2 HYPERTENSION ASSOCIATED WITH DIABETES: ICD-10-CM

## 2019-04-12 DIAGNOSIS — G47.33 OSA ON CPAP: ICD-10-CM

## 2019-04-12 DIAGNOSIS — E78.5 HYPERLIPIDEMIA ASSOCIATED WITH TYPE 2 DIABETES MELLITUS: ICD-10-CM

## 2019-04-12 DIAGNOSIS — E11.9 TYPE 2 DIABETES MELLITUS WITHOUT RETINOPATHY: ICD-10-CM

## 2019-04-12 DIAGNOSIS — I87.2 VENOUS INSUFFICIENCY: ICD-10-CM

## 2019-04-12 DIAGNOSIS — E11.59 HYPERTENSION ASSOCIATED WITH DIABETES: ICD-10-CM

## 2019-04-12 DIAGNOSIS — Z98.61 S/P PTCA (PERCUTANEOUS TRANSLUMINAL CORONARY ANGIOPLASTY): ICD-10-CM

## 2019-04-12 DIAGNOSIS — E11.69 HYPERLIPIDEMIA ASSOCIATED WITH TYPE 2 DIABETES MELLITUS: ICD-10-CM

## 2019-04-12 DIAGNOSIS — E66.9 OBESITY, CLASS II, BMI 35-39.9, ISOLATED (SEE ACTUAL BMI): Chronic | ICD-10-CM

## 2019-04-12 PROCEDURE — 3044F HG A1C LEVEL LT 7.0%: CPT | Mod: CPTII,S$GLB,, | Performed by: INTERNAL MEDICINE

## 2019-04-12 PROCEDURE — 3044F PR MOST RECENT HEMOGLOBIN A1C LEVEL <7.0%: ICD-10-PCS | Mod: CPTII,S$GLB,, | Performed by: INTERNAL MEDICINE

## 2019-04-12 PROCEDURE — 99999 PR PBB SHADOW E&M-EST. PATIENT-LVL III: ICD-10-PCS | Mod: PBBFAC,,, | Performed by: INTERNAL MEDICINE

## 2019-04-12 PROCEDURE — 99214 PR OFFICE/OUTPT VISIT, EST, LEVL IV, 30-39 MIN: ICD-10-PCS | Mod: S$GLB,,, | Performed by: INTERNAL MEDICINE

## 2019-04-12 PROCEDURE — 3080F PR MOST RECENT DIASTOLIC BLOOD PRESSURE >= 90 MM HG: ICD-10-PCS | Mod: CPTII,S$GLB,, | Performed by: INTERNAL MEDICINE

## 2019-04-12 PROCEDURE — 99999 PR PBB SHADOW E&M-EST. PATIENT-LVL III: CPT | Mod: PBBFAC,,, | Performed by: INTERNAL MEDICINE

## 2019-04-12 PROCEDURE — 3080F DIAST BP >= 90 MM HG: CPT | Mod: CPTII,S$GLB,, | Performed by: INTERNAL MEDICINE

## 2019-04-12 PROCEDURE — 3075F SYST BP GE 130 - 139MM HG: CPT | Mod: CPTII,S$GLB,, | Performed by: INTERNAL MEDICINE

## 2019-04-12 PROCEDURE — 99214 OFFICE O/P EST MOD 30 MIN: CPT | Mod: S$GLB,,, | Performed by: INTERNAL MEDICINE

## 2019-04-12 PROCEDURE — 3008F PR BODY MASS INDEX (BMI) DOCUMENTED: ICD-10-PCS | Mod: CPTII,S$GLB,, | Performed by: INTERNAL MEDICINE

## 2019-04-12 PROCEDURE — 3075F PR MOST RECENT SYSTOLIC BLOOD PRESS GE 130-139MM HG: ICD-10-PCS | Mod: CPTII,S$GLB,, | Performed by: INTERNAL MEDICINE

## 2019-04-12 PROCEDURE — 3008F BODY MASS INDEX DOCD: CPT | Mod: CPTII,S$GLB,, | Performed by: INTERNAL MEDICINE

## 2019-04-12 NOTE — TELEPHONE ENCOUNTER
Faxed to Marcia with Dr. Guidry's office at [phone] patient's last office note with Dr. Lyles and last EKG for pre-op clearance. [phone] phone

## 2019-04-12 NOTE — PROGRESS NOTES
Subjective:   Patient ID:  Isaiah Harrison is a 64 y.o. male who presents for follow up of Hypertension (pre-op clr); Hyperlipidemia; and Coronary Artery Disease      HPI  A 65 yo male with cad s/p lad stent old mi sleep apnea obesity htn hlp diabetes is here for preop eval. He needs back surgery . He has been limited with his mobility he can walk 0.25 mile has back pain that is limiting and leg weakness. Has no shortness of breath has gained weight he is taking his meds his ekg is normal . He had surgery anesthesia w/o difficulty he has no angina. No chf tia claudication syncope near syncope.   Past Medical History:   Diagnosis Date    Acute coronary syndrome     Anticoagulant long-term use     Back pain     CAD (coronary artery disease) 10/17/2013    Coronary artery disease     Diabetes mellitus, type 2     Gastric polyps     Hyperlipemia     Hypertension     NSTEMI (non-ST elevated myocardial infarction) 10/23/2014    Obesity 10/21/2014    DANTE on CPAP 2/19/2015    S/P PTCA (percutaneous transluminal coronary angioplasty) 10/17/2013    Sleep apnea 1/7/2015       Past Surgical History:   Procedure Laterality Date    APPENDECTOMY      COLONOSCOPY      CORONARY ANGIOPLASTY WITH STENT PLACEMENT      EGD (ESOPHAGOGASTRODUODENOSCOPY) N/A 6/19/2013    Performed by Bakari Richards III, [facility] at [facility] ENDO    ESOPHAGOGASTRODUODENOSCOPY      ESOPHAGOGASTRODUODENOSCOPY (EGD) N/A 11/18/2014    Performed by Bakari Richards III, [facility] at [facility] ENDO    FRACTURE SURGERY      HEART CATH-LEFT Left 10/21/2014    Performed by Kirsty Lyles MD at [facility] CATH LAB    HERNIA REPAIR      PH MONITORING, ESOPHAGUS, WIRELESS, (OFF REFLUX MEDS) N/A 6/19/2013    Performed by Bakari Richards III, [facility] ENDO    SPINE SURGERY         Social History     Tobacco Use    Smoking status: Never Smoker    Smokeless tobacco: Never Used   Substance Use Topics    Alcohol use: Yes     Alcohol/week: 0.0 oz     Comment:  socially    Drug use: No       Family History   Problem Relation Age of Onset    Hypertension Mother     Hypertension Father     Heart disease Maternal Grandfather        Current Outpatient Medications   Medication Sig    amLODIPine (NORVASC) 2.5 MG tablet Take 1 tablet (2.5 mg total) by mouth every evening.    aspirin (ECOTRIN) 81 MG EC tablet Take 81 mg by mouth once daily.    atorvastatin (LIPITOR) 40 MG tablet Take 1 tablet (40 mg total) by mouth once daily.    gabapentin (NEURONTIN) 300 MG capsule TK 1 C PO TID    hydroCHLOROthiazide (HYDRODIURIL) 50 MG tablet TK 1 T PO QD    isosorbide mononitrate (IMDUR) 30 MG 24 hr tablet TAKE 3 TABLETS BY MOUTH EVERY DAY    metFORMIN (GLUCOPHAGE) 500 MG tablet take 1 tablet by mouth twice a day with food (Patient taking differently: take 1 tablet by mouth once a day with food)    methocarbamol (ROBAXIN) 750 MG Tab TK 1 T PO BID PRN    metoprolol tartrate (LOPRESSOR) 50 MG tablet TAKE 1 TABLET BY MOUTH TWICE DAILY    niacin (SLO-NIACIN) 500 mg tablet TAKE 1 TABLET BY MOUTH AT BEDTIME    RANEXA 1,000 mg Tb12 TAKE 1 TABLET BY MOUTH TWICE DAILY    TRULICITY 1.5 mg/0.5 mL PnIj INJECT 1 SYRINGE INTO THE SKIN EVERY 7 DAYS    valsartan (DIOVAN) 160 MG tablet TAKE 1 TABLET(160 MG) BY MOUTH EVERY DAY    blood sugar diagnostic Strp 1 strip by Misc.(Non-Drug; Combo Route) route 2 (two) times daily.    blood sugar diagnostic Strp 1 strip by Misc.(Non-Drug; Combo Route) route 2 (two) times daily.    MICROLET LANCET Misc CHECK BLOOD SUGAR TWICE DAILY    nitroGLYCERIN (NITROSTAT) 0.4 MG SL tablet Place 1 tablet (0.4 mg total) under the tongue every 5 (five) minutes as needed.     No current facility-administered medications for this visit.      Current Outpatient Medications on File Prior to Visit   Medication Sig    amLODIPine (NORVASC) 2.5 MG tablet Take 1 tablet (2.5 mg total) by mouth every evening.    aspirin (ECOTRIN) 81 MG EC tablet Take 81 mg by mouth once  daily.    atorvastatin (LIPITOR) 40 MG tablet Take 1 tablet (40 mg total) by mouth once daily.    gabapentin (NEURONTIN) 300 MG capsule TK 1 C PO TID    hydroCHLOROthiazide (HYDRODIURIL) 50 MG tablet TK 1 T PO QD    isosorbide mononitrate (IMDUR) 30 MG 24 hr tablet TAKE 3 TABLETS BY MOUTH EVERY DAY    metFORMIN (GLUCOPHAGE) 500 MG tablet take 1 tablet by mouth twice a day with food (Patient taking differently: take 1 tablet by mouth once a day with food)    methocarbamol (ROBAXIN) 750 MG Tab TK 1 T PO BID PRN    metoprolol tartrate (LOPRESSOR) 50 MG tablet TAKE 1 TABLET BY MOUTH TWICE DAILY    niacin (SLO-NIACIN) 500 mg tablet TAKE 1 TABLET BY MOUTH AT BEDTIME    RANEXA 1,000 mg Tb12 TAKE 1 TABLET BY MOUTH TWICE DAILY    TRULICITY 1.5 mg/0.5 mL PnIj INJECT 1 SYRINGE INTO THE SKIN EVERY 7 DAYS    valsartan (DIOVAN) 160 MG tablet TAKE 1 TABLET(160 MG) BY MOUTH EVERY DAY    blood sugar diagnostic Strp 1 strip by Misc.(Non-Drug; Combo Route) route 2 (two) times daily.    blood sugar diagnostic Strp 1 strip by Misc.(Non-Drug; Combo Route) route 2 (two) times daily.    MICROLET LANCET Misc CHECK BLOOD SUGAR TWICE DAILY    nitroGLYCERIN (NITROSTAT) 0.4 MG SL tablet Place 1 tablet (0.4 mg total) under the tongue every 5 (five) minutes as needed.     No current facility-administered medications on file prior to visit.        Review of Systems   Constitution: Positive for weight gain. Negative for malaise/fatigue.   Eyes: Negative for blurred vision.   Cardiovascular: Negative for chest pain, claudication, cyanosis, dyspnea on exertion, irregular heartbeat, leg swelling, near-syncope, orthopnea, palpitations and paroxysmal nocturnal dyspnea.   Respiratory: Negative for cough, hemoptysis and shortness of breath.    Hematologic/Lymphatic: Negative for bleeding problem. Does not bruise/bleed easily.   Skin: Negative for dry skin and itching.   Musculoskeletal: Positive for arthritis, joint pain and muscle  weakness. Negative for falls and myalgias.   Gastrointestinal: Negative for abdominal pain, diarrhea, heartburn, hematemesis, hematochezia and melena.   Genitourinary: Negative for flank pain and hematuria.   Neurological: Negative for dizziness, focal weakness, headaches, light-headedness, numbness, paresthesias, seizures and weakness.   Psychiatric/Behavioral: Negative for altered mental status and memory loss. The patient is not nervous/anxious.    Allergic/Immunologic: Negative for hives.       Objective:   Physical Exam   Constitutional: He is oriented to person, place, and time. He appears well-developed and well-nourished. No distress.   HENT:   Head: Normocephalic and atraumatic.   Eyes: Pupils are equal, round, and reactive to light. EOM are normal. Right eye exhibits no discharge. Left eye exhibits no discharge.   Neck: Neck supple. No JVD present. No thyromegaly present.   Cardiovascular: Normal rate, regular rhythm, normal heart sounds and intact distal pulses. Exam reveals no gallop and no friction rub.   No murmur heard.  Pulses:       Carotid pulses are 2+ on the right side, and 2+ on the left side.       Radial pulses are 2+ on the right side, and 2+ on the left side.        Femoral pulses are 2+ on the right side, and 2+ on the left side.       Popliteal pulses are 2+ on the right side, and 2+ on the left side.        Dorsalis pedis pulses are 2+ on the right side, and 2+ on the left side.        Posterior tibial pulses are 2+ on the right side, and 2+ on the left side.   Pulmonary/Chest: Effort normal and breath sounds normal. No respiratory distress. He has no wheezes. He has no rales. He exhibits no tenderness.   Abdominal: Soft. Bowel sounds are normal. He exhibits no distension. There is no tenderness.   Obese    Musculoskeletal: Normal range of motion. He exhibits no edema.   Neurological: He is alert and oriented to person, place, and time. No cranial nerve deficit.   Skin: Skin is warm and  "dry. No rash noted. He is not diaphoretic. No erythema.   Spider veins noted    Psychiatric: He has a normal mood and affect. His behavior is normal.   Nursing note and vitals reviewed.    Vitals:    04/12/19 1356   BP: (!) 138/90   BP Method: Large (Manual)   Pulse: 72   Weight: (!) 137.6 kg (303 lb 5.7 oz)   Height: 6' 2" (1.88 m)     Lab Results   Component Value Date    CHOL 214 (H) 11/16/2018    CHOL 130 05/21/2018    CHOL 135 11/15/2017     Lab Results   Component Value Date    HDL 38 (L) 11/16/2018    HDL 34 (L) 05/21/2018    HDL 35 (L) 11/15/2017     Lab Results   Component Value Date    LDLCALC 154.0 11/16/2018    LDLCALC 82.4 05/21/2018    LDLCALC 80.4 11/15/2017     Lab Results   Component Value Date    TRIG 110 11/16/2018    TRIG 68 05/21/2018    TRIG 98 11/15/2017     Lab Results   Component Value Date    CHOLHDL 17.8 (L) 11/16/2018    CHOLHDL 26.2 05/21/2018    CHOLHDL 25.9 11/15/2017       Chemistry        Component Value Date/Time     04/01/2019 1427    K 4.6 04/01/2019 1427     04/01/2019 1427    CO2 31 (H) 04/01/2019 1427    BUN 23 04/01/2019 1427    CREATININE 1.2 04/01/2019 1427     04/01/2019 1427        Component Value Date/Time    CALCIUM 10.0 04/01/2019 1427    ALKPHOS 78 04/01/2019 1427    AST 13 04/01/2019 1427    ALT 26 04/01/2019 1427    BILITOT 0.6 04/01/2019 1427    ESTGFRAFRICA >60.0 04/01/2019 1427    EGFRNONAA >60.0 04/01/2019 1427          Lab Results   Component Value Date    TSH 1.188 06/30/2015     Lab Results   Component Value Date    INR 1.0 12/16/2015    INR 1.1 10/29/2014    INR 1.0 10/25/2014     Lab Results   Component Value Date    WBC 8.41 04/01/2019    HGB 14.1 04/01/2019    HCT 43.1 04/01/2019    MCV 96 04/01/2019     04/01/2019     BMP  Sodium   Date Value Ref Range Status   04/01/2019 140 136 - 145 mmol/L Final     Potassium   Date Value Ref Range Status   04/01/2019 4.6 3.5 - 5.1 mmol/L Final     Chloride   Date Value Ref Range Status "   04/01/2019 104 95 - 110 mmol/L Final     CO2   Date Value Ref Range Status   04/01/2019 31 (H) 23 - 29 mmol/L Final     BUN, Bld   Date Value Ref Range Status   04/01/2019 23 8 - 23 mg/dL Final     Creatinine   Date Value Ref Range Status   04/01/2019 1.2 0.5 - 1.4 mg/dL Final     Calcium   Date Value Ref Range Status   04/01/2019 10.0 8.7 - 10.5 mg/dL Final     Anion Gap   Date Value Ref Range Status   04/01/2019 5 (L) 8 - 16 mmol/L Final     eGFR if    Date Value Ref Range Status   04/01/2019 >60.0 >60 mL/min/1.73 m^2 Final     eGFR if non    Date Value Ref Range Status   04/01/2019 >60.0 >60 mL/min/1.73 m^2 Final     Comment:     Calculation used to obtain the estimated glomerular filtration  rate (eGFR) is the CKD-EPI equation.        CrCl cannot be calculated (Patient's most recent lab result is older than the maximum 7 days allowed.).    Assessment:     1. Preop examination    2. Obesity, Class II, BMI 35-39.9, isolated (see actual BMI)    3. Hypertension associated with diabetes    4. Hyperlipidemia associated with type 2 diabetes mellitus    5. Coronary artery disease involving native coronary artery of native heart without angina pectoris    6. S/P PTCA (percutaneous transluminal coronary angioplasty)    7. DANTE on CPAP    8. Venous insufficiency    9. Type 2 diabetes mellitus without complication, without long-term current use of insulin    10. Type 2 diabetes mellitus without retinopathy      Stable clinically cardiac wise. No contraindication for surgery . He is moderate risk for complications.  Plan:   Continue current therapy  Cardiac low salt diet.  F/u in 6 months with lipid cmp a1c

## 2019-08-01 DIAGNOSIS — I10 ESSENTIAL HYPERTENSION: ICD-10-CM

## 2019-08-01 DIAGNOSIS — E78.5 HYPERLIPEMIA: Chronic | ICD-10-CM

## 2019-08-01 DIAGNOSIS — I25.10 CORONARY ARTERY DISEASE INVOLVING NATIVE CORONARY ARTERY WITHOUT ANGINA PECTORIS, UNSPECIFIED WHETHER NATIVE OR TRANSPLANTED HEART: ICD-10-CM

## 2019-08-01 DIAGNOSIS — I20.0 UNSTABLE ANGINA: ICD-10-CM

## 2019-08-01 RX ORDER — RANOLAZINE 1000 MG/1
1000 TABLET, EXTENDED RELEASE ORAL 2 TIMES DAILY
Qty: 60 TABLET | Refills: 6 | Status: SHIPPED | OUTPATIENT
Start: 2019-08-01 | End: 2020-03-12 | Stop reason: SDUPTHER

## 2019-08-01 RX ORDER — METOPROLOL TARTRATE 50 MG/1
50 TABLET ORAL 2 TIMES DAILY
Qty: 60 TABLET | Refills: 6 | Status: SHIPPED | OUTPATIENT
Start: 2019-08-01 | End: 2020-03-12 | Stop reason: SDUPTHER

## 2019-08-01 RX ORDER — AMLODIPINE BESYLATE 2.5 MG/1
2.5 TABLET ORAL NIGHTLY
Qty: 30 TABLET | Refills: 6 | Status: SHIPPED | OUTPATIENT
Start: 2019-08-01 | End: 2020-03-12 | Stop reason: SDUPTHER

## 2019-08-01 RX ORDER — GABAPENTIN 300 MG/1
CAPSULE ORAL
Refills: 0 | OUTPATIENT
Start: 2019-08-01

## 2019-08-01 RX ORDER — ATORVASTATIN CALCIUM 40 MG/1
40 TABLET, FILM COATED ORAL DAILY
Qty: 30 TABLET | Refills: 6 | Status: SHIPPED | OUTPATIENT
Start: 2019-08-01 | End: 2020-03-12 | Stop reason: SDUPTHER

## 2019-08-01 RX ORDER — METFORMIN HYDROCHLORIDE 500 MG/1
500 TABLET ORAL 2 TIMES DAILY WITH MEALS
Qty: 180 TABLET | Refills: 1 | Status: SHIPPED | OUTPATIENT
Start: 2019-08-01 | End: 2020-09-14 | Stop reason: SDUPTHER

## 2019-08-01 NOTE — TELEPHONE ENCOUNTER
----- Message from Tiffanie Ashley sent at 8/1/2019 12:55 PM CDT -----  Contact: Pt  Pt called in regards to four medication that have not been filled. Pt can be reached at [phone] (dmoe)

## 2019-08-01 NOTE — TELEPHONE ENCOUNTER
Spoke with patient I reached out to him to find out what mediations needs refills. Refills sent to .

## 2019-08-14 RX ORDER — VALSARTAN 160 MG/1
TABLET ORAL
Qty: 90 TABLET | Refills: 1 | Status: SHIPPED | OUTPATIENT
Start: 2019-08-14 | End: 2020-03-02

## 2019-08-22 ENCOUNTER — TELEPHONE (OUTPATIENT)
Dept: INTERNAL MEDICINE | Facility: CLINIC | Age: 64
End: 2019-08-22

## 2019-08-22 NOTE — TELEPHONE ENCOUNTER
----- Message from Lisette Mixon sent at 8/22/2019 11:50 AM CDT -----  Type:  Patient Requesting Referral    Who Called: Juan Colon  Does the patient already have the specialty appointment scheduled?: no  If yes, what is the date of that appointment?:   Referral to What Specialty:  Audio///ENT  Reason for Referral:   Ringing in his ears//Hearing test  Does the patient want the referral with a specific physician?: No  [facility]  Patient Requesting a Response?:  yes  Would the patient rather a call back or a response via [facility]?   Call back  Best Call Back Number:   [phone]  Additional Information:   Please call to [email or web address]

## 2019-08-22 NOTE — TELEPHONE ENCOUNTER
Left vm informing pt that we received his message and will forward it over to Dr. Del Cid. //KAVIN

## 2019-08-23 ENCOUNTER — TELEPHONE (OUTPATIENT)
Dept: INTERNAL MEDICINE | Facility: CLINIC | Age: 64
End: 2019-08-23

## 2019-08-23 DIAGNOSIS — H93.13 TINNITUS OF BOTH EARS: Primary | ICD-10-CM

## 2019-08-23 NOTE — TELEPHONE ENCOUNTER
----- Message from Jorge L Morin sent at 8/23/2019  1:43 PM CDT -----  Contact: pt   .Type:  Patient Requesting Referral    Who Called: pt   Does the patient already have the specialty appointment scheduled?: no   If yes, what is the date of that appointment?: na/   Referral to What Specialty: ent   Reason for Referral: ringing in ear for last month   Does the patient want the referral with a specific physician?: no   [facility] the [facility]? [facility]   Patient Requesting a Response?:yes.   Would the patient rather a call back or a response via [facility]?   Callback   Best Call Back Number: ..[phone]  Additional Information:

## 2019-08-23 NOTE — TELEPHONE ENCOUNTER
Left vm informing pt that Dr. Del Cid did place referral for ENT and he can give us a call back to schedule appointment. //BJ

## 2019-08-23 NOTE — TELEPHONE ENCOUNTER
I s/w pt in regards to scheduling appointment with ENT, I was able to assist pt with scheduling appointment, pt thanked me and ended call. //BJ

## 2019-08-27 ENCOUNTER — CLINICAL SUPPORT (OUTPATIENT)
Dept: AUDIOLOGY | Facility: CLINIC | Age: 64
End: 2019-08-27
Payer: COMMERCIAL

## 2019-08-27 DIAGNOSIS — H90.5 HEARING LOSS, SENSORINEURAL, COMBINED TYPES: Primary | ICD-10-CM

## 2019-08-27 PROCEDURE — 92567 PR TYMPA2METRY: ICD-10-PCS | Mod: S$GLB,,, | Performed by: AUDIOLOGIST

## 2019-08-27 PROCEDURE — 92557 COMPREHENSIVE HEARING TEST: CPT | Mod: S$GLB,,, | Performed by: AUDIOLOGIST

## 2019-08-27 PROCEDURE — 92557 PR COMPREHENSIVE HEARING TEST: ICD-10-PCS | Mod: S$GLB,,, | Performed by: AUDIOLOGIST

## 2019-08-27 PROCEDURE — 92567 TYMPANOMETRY: CPT | Mod: S$GLB,,, | Performed by: AUDIOLOGIST

## 2019-08-27 NOTE — PROGRESS NOTES
Isaiah Harrison was seen 08/27/2019 for an audiological evaluation.  Patient complains of constant bilateral tinnitus and hearing loss. His tinnitus was intermittent for years up until about a month ago. He reports extensive noise exposure with his time in the  and also with his current work.     Results reveal a mild-to-moderately severe sensorineural hearing loss 250-8000 Hz for the right ear, and  mild-to-severe sensorineural hearing loss 5456-8464 Hz for the left ear.   Speech Reception Thresholds were  30 dBHL for the right ear and 25 dBHL for the left ear.   Word recognition scores were excellent for the right ear and excellent for the left ear.   Tympanograms were Type A, normal for the right ear and Type A, normal for the left ear.    Patient was counseled on the above findings.    Recommendations include:    1.  ENT followup  2.  Hearing aid consult (information was given to patient at today's visit), [facility]  3.  Wear hearing protective devices around loud noise  4.  Annual audiograms

## 2019-09-13 RX ORDER — ISOSORBIDE MONONITRATE 30 MG/1
TABLET, EXTENDED RELEASE ORAL
Qty: 270 TABLET | Refills: 1 | Status: SHIPPED | OUTPATIENT
Start: 2019-09-13 | End: 2020-03-09

## 2019-09-19 ENCOUNTER — PATIENT OUTREACH (OUTPATIENT)
Dept: ADMINISTRATIVE | Facility: HOSPITAL | Age: 64
End: 2019-09-19

## 2019-09-25 ENCOUNTER — OFFICE VISIT (OUTPATIENT)
Dept: INTERNAL MEDICINE | Facility: CLINIC | Age: 64
End: 2019-09-25
Payer: COMMERCIAL

## 2019-09-25 VITALS
DIASTOLIC BLOOD PRESSURE: 88 MMHG | OXYGEN SATURATION: 98 % | HEART RATE: 73 BPM | HEIGHT: 74 IN | SYSTOLIC BLOOD PRESSURE: 134 MMHG | TEMPERATURE: 98 F | BODY MASS INDEX: 39.55 KG/M2 | WEIGHT: 308.19 LBS

## 2019-09-25 DIAGNOSIS — I21.4 NSTEMI (NON-ST ELEVATED MYOCARDIAL INFARCTION): ICD-10-CM

## 2019-09-25 DIAGNOSIS — E11.9 TYPE 2 DIABETES MELLITUS WITHOUT COMPLICATION, WITHOUT LONG-TERM CURRENT USE OF INSULIN: Primary | ICD-10-CM

## 2019-09-25 DIAGNOSIS — E66.9 OBESITY, CLASS II, BMI 35-39.9, ISOLATED (SEE ACTUAL BMI): Chronic | ICD-10-CM

## 2019-09-25 DIAGNOSIS — I10 ESSENTIAL HYPERTENSION: ICD-10-CM

## 2019-09-25 PROCEDURE — 99214 OFFICE O/P EST MOD 30 MIN: CPT | Mod: 25,S$GLB,, | Performed by: FAMILY MEDICINE

## 2019-09-25 PROCEDURE — 3008F BODY MASS INDEX DOCD: CPT | Mod: CPTII,S$GLB,, | Performed by: FAMILY MEDICINE

## 2019-09-25 PROCEDURE — 3075F SYST BP GE 130 - 139MM HG: CPT | Mod: CPTII,S$GLB,, | Performed by: FAMILY MEDICINE

## 2019-09-25 PROCEDURE — 3044F HG A1C LEVEL LT 7.0%: CPT | Mod: CPTII,S$GLB,, | Performed by: FAMILY MEDICINE

## 2019-09-25 PROCEDURE — 90471 IMMUNIZATION ADMIN: CPT | Mod: S$GLB,,, | Performed by: FAMILY MEDICINE

## 2019-09-25 PROCEDURE — 99999 PR PBB SHADOW E&M-EST. PATIENT-LVL III: CPT | Mod: PBBFAC,,, | Performed by: FAMILY MEDICINE

## 2019-09-25 PROCEDURE — 3075F PR MOST RECENT SYSTOLIC BLOOD PRESS GE 130-139MM HG: ICD-10-PCS | Mod: CPTII,S$GLB,, | Performed by: FAMILY MEDICINE

## 2019-09-25 PROCEDURE — 90686 IIV4 VACC NO PRSV 0.5 ML IM: CPT | Mod: S$GLB,,, | Performed by: FAMILY MEDICINE

## 2019-09-25 PROCEDURE — 3079F DIAST BP 80-89 MM HG: CPT | Mod: CPTII,S$GLB,, | Performed by: FAMILY MEDICINE

## 2019-09-25 PROCEDURE — 3008F PR BODY MASS INDEX (BMI) DOCUMENTED: ICD-10-PCS | Mod: CPTII,S$GLB,, | Performed by: FAMILY MEDICINE

## 2019-09-25 PROCEDURE — 99999 PR PBB SHADOW E&M-EST. PATIENT-LVL III: ICD-10-PCS | Mod: PBBFAC,,, | Performed by: FAMILY MEDICINE

## 2019-09-25 PROCEDURE — 3079F PR MOST RECENT DIASTOLIC BLOOD PRESSURE 80-89 MM HG: ICD-10-PCS | Mod: CPTII,S$GLB,, | Performed by: FAMILY MEDICINE

## 2019-09-25 PROCEDURE — 3044F PR MOST RECENT HEMOGLOBIN A1C LEVEL <7.0%: ICD-10-PCS | Mod: CPTII,S$GLB,, | Performed by: FAMILY MEDICINE

## 2019-09-25 PROCEDURE — 99214 PR OFFICE/OUTPT VISIT, EST, LEVL IV, 30-39 MIN: ICD-10-PCS | Mod: 25,S$GLB,, | Performed by: FAMILY MEDICINE

## 2019-09-25 PROCEDURE — 90686 FLU VACCINE (QUAD) GREATER THAN OR EQUAL TO 3YO PRESERVATIVE FREE IM: ICD-10-PCS | Mod: S$GLB,,, | Performed by: FAMILY MEDICINE

## 2019-09-25 PROCEDURE — 90471 FLU VACCINE (QUAD) GREATER THAN OR EQUAL TO 3YO PRESERVATIVE FREE IM: ICD-10-PCS | Mod: S$GLB,,, | Performed by: FAMILY MEDICINE

## 2019-09-25 NOTE — PROGRESS NOTES
Subjective:       Patient ID: Isaiah Harrison is a 64 y.o. male.    Chief Complaint: Follow-up    F/U:      Pt is a 64 year old who is her for 6 months follow-up. Pt DM is well controlled. His cholesterol is still high at this point but is on Lipitor    Review of Systems   Constitutional: Negative.    HENT: Negative.    Respiratory: Negative.    Cardiovascular: Negative.    Gastrointestinal: Negative.    Skin: Negative.    Neurological: Negative.    Psychiatric/Behavioral: Negative.        Objective:      Physical Exam   Constitutional: He is oriented to person, place, and time. He appears well-developed and well-nourished.   Cardiovascular: Normal rate and regular rhythm. Exam reveals no friction rub.   No murmur heard.  Pulmonary/Chest: Effort normal and breath sounds normal. No stridor. He has no wheezes.   Abdominal: Soft. Bowel sounds are normal.   Feet:   Right Foot:   Protective Sensation: 10 sites tested. 7 sites sensed.   Skin Integrity: Positive for callus and dry skin.   Left Foot:   Protective Sensation: 10 sites tested. 10 sites sensed.   Skin Integrity: Positive for callus and dry skin.   Neurological: He is alert and oriented to person, place, and time.   Skin: Skin is warm and dry.   Psychiatric: He has a normal mood and affect. His behavior is normal.       Assessment:       1. Type 2 diabetes mellitus without complication, without long-term current use of insulin    2. Essential hypertension    3. NSTEMI (non-ST elevated myocardial infarction)    4. Obesity, Class II, BMI 35-39.9, isolated (see actual BMI)        Plan:       Type 2 diabetes mellitus without complication, without long-term current use of insulin  Comments:  Will do HgA1c and microalbumin  Orders:  -     Microalbumin/creatinine urine ratio; Future; Expected date: 09/25/2019  -     Lipid panel; Future; Expected date: 09/25/2019  -     Hemoglobin A1c; Future; Expected date: 09/25/2019    Essential hypertension  Comments:  BP is  well controlled    NSTEMI (non-ST elevated myocardial infarction)  Comments:  Pt continue to see Cardiology    Obesity, Class II, BMI 35-39.9, isolated (see actual BMI)  Comments:  Discussed with pt exericse and diet

## 2019-10-05 ENCOUNTER — LAB VISIT (OUTPATIENT)
Dept: LAB | Facility: HOSPITAL | Age: 64
End: 2019-10-05
Attending: FAMILY MEDICINE
Payer: COMMERCIAL

## 2019-10-05 DIAGNOSIS — E11.9 TYPE 2 DIABETES MELLITUS WITHOUT COMPLICATION, WITHOUT LONG-TERM CURRENT USE OF INSULIN: ICD-10-CM

## 2019-10-05 LAB
ALBUMIN/CREAT UR: 42.4 UG/MG (ref 0–30)
CHOLEST SERPL-MCNC: 131 MG/DL (ref 120–199)
CHOLEST/HDLC SERPL: 3.7 {RATIO} (ref 2–5)
CREAT UR-MCNC: 85 MG/DL (ref 23–375)
ESTIMATED AVG GLUCOSE: 148 MG/DL (ref 68–131)
HBA1C MFR BLD HPLC: 6.8 % (ref 4–5.6)
HDLC SERPL-MCNC: 35 MG/DL (ref 40–75)
HDLC SERPL: 26.7 % (ref 20–50)
LDLC SERPL CALC-MCNC: 81.8 MG/DL (ref 63–159)
MICROALBUMIN UR DL<=1MG/L-MCNC: 36 UG/ML
NONHDLC SERPL-MCNC: 96 MG/DL
TRIGL SERPL-MCNC: 71 MG/DL (ref 30–150)

## 2019-10-05 PROCEDURE — 82043 UR ALBUMIN QUANTITATIVE: CPT

## 2019-10-05 PROCEDURE — 83036 HEMOGLOBIN GLYCOSYLATED A1C: CPT

## 2019-10-05 PROCEDURE — 80061 LIPID PANEL: CPT

## 2019-10-05 PROCEDURE — 36415 COLL VENOUS BLD VENIPUNCTURE: CPT

## 2019-10-22 DIAGNOSIS — I25.10 CORONARY ARTERY DISEASE WITHOUT ANGINA PECTORIS, UNSPECIFIED VESSEL OR LESION TYPE, UNSPECIFIED WHETHER NATIVE OR TRANSPLANTED HEART: Primary | ICD-10-CM

## 2019-10-23 ENCOUNTER — OFFICE VISIT (OUTPATIENT)
Dept: CARDIOLOGY | Facility: CLINIC | Age: 64
End: 2019-10-23
Payer: COMMERCIAL

## 2019-10-23 ENCOUNTER — CLINICAL SUPPORT (OUTPATIENT)
Dept: CARDIOLOGY | Facility: CLINIC | Age: 64
End: 2019-10-23
Payer: COMMERCIAL

## 2019-10-23 VITALS
DIASTOLIC BLOOD PRESSURE: 60 MMHG | BODY MASS INDEX: 39.03 KG/M2 | SYSTOLIC BLOOD PRESSURE: 112 MMHG | WEIGHT: 304 LBS | HEART RATE: 78 BPM

## 2019-10-23 DIAGNOSIS — E11.9 TYPE 2 DIABETES MELLITUS WITHOUT COMPLICATION, WITHOUT LONG-TERM CURRENT USE OF INSULIN: ICD-10-CM

## 2019-10-23 DIAGNOSIS — G47.30 SLEEP APNEA, UNSPECIFIED TYPE: ICD-10-CM

## 2019-10-23 DIAGNOSIS — E88.819 INSULIN RESISTANCE: ICD-10-CM

## 2019-10-23 DIAGNOSIS — E11.9 CONTROLLED TYPE 2 DIABETES MELLITUS WITHOUT COMPLICATION, WITHOUT LONG-TERM CURRENT USE OF INSULIN: ICD-10-CM

## 2019-10-23 DIAGNOSIS — R07.9 CHEST PAIN, UNSPECIFIED TYPE: ICD-10-CM

## 2019-10-23 DIAGNOSIS — I87.2 VENOUS INSUFFICIENCY: ICD-10-CM

## 2019-10-23 DIAGNOSIS — E78.5 HYPERLIPIDEMIA ASSOCIATED WITH TYPE 2 DIABETES MELLITUS: ICD-10-CM

## 2019-10-23 DIAGNOSIS — E11.69 HYPERLIPIDEMIA ASSOCIATED WITH TYPE 2 DIABETES MELLITUS: ICD-10-CM

## 2019-10-23 DIAGNOSIS — I10 ESSENTIAL HYPERTENSION: ICD-10-CM

## 2019-10-23 DIAGNOSIS — Z98.61 S/P PTCA (PERCUTANEOUS TRANSLUMINAL CORONARY ANGIOPLASTY): ICD-10-CM

## 2019-10-23 DIAGNOSIS — I25.10 CORONARY ARTERY DISEASE WITHOUT ANGINA PECTORIS, UNSPECIFIED VESSEL OR LESION TYPE, UNSPECIFIED WHETHER NATIVE OR TRANSPLANTED HEART: ICD-10-CM

## 2019-10-23 DIAGNOSIS — R07.89 CHEST PAIN, ATYPICAL: Chronic | ICD-10-CM

## 2019-10-23 DIAGNOSIS — G47.33 OSA ON CPAP: ICD-10-CM

## 2019-10-23 DIAGNOSIS — E11.59 HYPERTENSION ASSOCIATED WITH DIABETES: ICD-10-CM

## 2019-10-23 DIAGNOSIS — I25.10 CORONARY ARTERY DISEASE INVOLVING NATIVE CORONARY ARTERY OF NATIVE HEART WITHOUT ANGINA PECTORIS: Primary | ICD-10-CM

## 2019-10-23 DIAGNOSIS — E66.9 OBESITY, CLASS II, BMI 35-39.9, ISOLATED (SEE ACTUAL BMI): Chronic | ICD-10-CM

## 2019-10-23 DIAGNOSIS — I15.2 HYPERTENSION ASSOCIATED WITH DIABETES: ICD-10-CM

## 2019-10-23 DIAGNOSIS — E11.9 TYPE 2 DIABETES MELLITUS WITHOUT RETINOPATHY: ICD-10-CM

## 2019-10-23 PROCEDURE — 3074F SYST BP LT 130 MM HG: CPT | Mod: CPTII,S$GLB,, | Performed by: INTERNAL MEDICINE

## 2019-10-23 PROCEDURE — 93000 ELECTROCARDIOGRAM COMPLETE: CPT | Mod: S$GLB,,, | Performed by: INTERNAL MEDICINE

## 2019-10-23 PROCEDURE — 3074F PR MOST RECENT SYSTOLIC BLOOD PRESSURE < 130 MM HG: ICD-10-PCS | Mod: CPTII,S$GLB,, | Performed by: INTERNAL MEDICINE

## 2019-10-23 PROCEDURE — 99999 PR PBB SHADOW E&M-EST. PATIENT-LVL III: CPT | Mod: PBBFAC,,, | Performed by: INTERNAL MEDICINE

## 2019-10-23 PROCEDURE — 99214 PR OFFICE/OUTPT VISIT, EST, LEVL IV, 30-39 MIN: ICD-10-PCS | Mod: 25,S$GLB,, | Performed by: INTERNAL MEDICINE

## 2019-10-23 PROCEDURE — 99214 OFFICE O/P EST MOD 30 MIN: CPT | Mod: 25,S$GLB,, | Performed by: INTERNAL MEDICINE

## 2019-10-23 PROCEDURE — 93000 EKG 12-LEAD: ICD-10-PCS | Mod: S$GLB,,, | Performed by: INTERNAL MEDICINE

## 2019-10-23 PROCEDURE — 99999 PR PBB SHADOW E&M-EST. PATIENT-LVL III: ICD-10-PCS | Mod: PBBFAC,,, | Performed by: INTERNAL MEDICINE

## 2019-10-23 PROCEDURE — 3078F PR MOST RECENT DIASTOLIC BLOOD PRESSURE < 80 MM HG: ICD-10-PCS | Mod: CPTII,S$GLB,, | Performed by: INTERNAL MEDICINE

## 2019-10-23 PROCEDURE — 3044F PR MOST RECENT HEMOGLOBIN A1C LEVEL <7.0%: ICD-10-PCS | Mod: CPTII,S$GLB,, | Performed by: INTERNAL MEDICINE

## 2019-10-23 PROCEDURE — 3008F BODY MASS INDEX DOCD: CPT | Mod: CPTII,S$GLB,, | Performed by: INTERNAL MEDICINE

## 2019-10-23 PROCEDURE — 3044F HG A1C LEVEL LT 7.0%: CPT | Mod: CPTII,S$GLB,, | Performed by: INTERNAL MEDICINE

## 2019-10-23 PROCEDURE — 3008F PR BODY MASS INDEX (BMI) DOCUMENTED: ICD-10-PCS | Mod: CPTII,S$GLB,, | Performed by: INTERNAL MEDICINE

## 2019-10-23 PROCEDURE — 3078F DIAST BP <80 MM HG: CPT | Mod: CPTII,S$GLB,, | Performed by: INTERNAL MEDICINE

## 2019-10-23 NOTE — PROGRESS NOTES
Subjective:   Patient ID:  Isaiah Harrison is a 64 y.o. male who presents for follow up of No chief complaint on file.      HPI  A 65 yo male with obesity htn hlp cad s/p lad stent atypical chest pain is here for f/u had back surgery feels much better has been trying to lose weight. He is using his cpap machine. Has no new issues clinically with chf tia claudication syncope near syncope.lipid a1c improved.   Past Medical History:   Diagnosis Date    Acute coronary syndrome     Anticoagulant long-term use     Back pain     CAD (coronary artery disease) 10/17/2013    Coronary artery disease     Diabetes mellitus, type 2     Gastric polyps     Hyperlipemia     Hypertension     NSTEMI (non-ST elevated myocardial infarction) 10/23/2014    Obesity 10/21/2014    DANTE on CPAP 2/19/2015    S/P PTCA (percutaneous transluminal coronary angioplasty) 10/17/2013    Sleep apnea 1/7/2015       Past Surgical History:   Procedure Laterality Date    APPENDECTOMY      COLONOSCOPY      CORONARY ANGIOPLASTY WITH STENT PLACEMENT      ESOPHAGOGASTRODUODENOSCOPY      FRACTURE SURGERY      HERNIA REPAIR      SPINE SURGERY         Social History     Tobacco Use    Smoking status: Never Smoker    Smokeless tobacco: Never Used   Substance Use Topics    Alcohol use: Yes     Alcohol/week: 0.0 standard drinks     Comment: socially    Drug use: No       Family History   Problem Relation Age of Onset    Hypertension Mother     Hypertension Father     Heart disease Maternal Grandfather        Current Outpatient Medications   Medication Sig    amLODIPine (NORVASC) 2.5 MG tablet Take 1 tablet (2.5 mg total) by mouth every evening.    aspirin (ECOTRIN) 81 MG EC tablet Take 81 mg by mouth once daily.    atorvastatin (LIPITOR) 40 MG tablet Take 1 tablet (40 mg total) by mouth once daily.    gabapentin (NEURONTIN) 300 MG capsule TK 1 C PO TID    hydroCHLOROthiazide (HYDRODIURIL) 50 MG tablet TK 1 T PO QD    isosorbide  mononitrate (IMDUR) 30 MG 24 hr tablet TAKE 3 TABLETS BY MOUTH EVERY DAY    metFORMIN (GLUCOPHAGE) 500 MG tablet Take 1 tablet (500 mg total) by mouth 2 (two) times daily with meals.    methocarbamol (ROBAXIN) 750 MG Tab TK 1 T PO BID PRN    metoprolol tartrate (LOPRESSOR) 50 MG tablet Take 1 tablet (50 mg total) by mouth 2 (two) times daily.    ranolazine (RANEXA) 1,000 mg Tb12 Take 1 tablet (1,000 mg total) by mouth 2 (two) times daily.    TRULICITY 1.5 mg/0.5 mL PnIj INJECT 1 SYRINGE INTO THE SKIN EVERY 7 DAYS    valsartan (DIOVAN) 160 MG tablet TAKE 1 TABLET(160 MG) BY MOUTH EVERY DAY    blood sugar diagnostic Strp 1 strip by Misc.(Non-Drug; Combo Route) route 2 (two) times daily.    blood sugar diagnostic Strp 1 strip by Misc.(Non-Drug; Combo Route) route 2 (two) times daily.    MICROLET LANCET Misc CHECK BLOOD SUGAR TWICE DAILY    niacin (SLO-NIACIN) 500 mg tablet TAKE 1 TABLET BY MOUTH AT BEDTIME    nitroGLYCERIN (NITROSTAT) 0.4 MG SL tablet Place 1 tablet (0.4 mg total) under the tongue every 5 (five) minutes as needed.     No current facility-administered medications for this visit.      Current Outpatient Medications on File Prior to Visit   Medication Sig    amLODIPine (NORVASC) 2.5 MG tablet Take 1 tablet (2.5 mg total) by mouth every evening.    aspirin (ECOTRIN) 81 MG EC tablet Take 81 mg by mouth once daily.    atorvastatin (LIPITOR) 40 MG tablet Take 1 tablet (40 mg total) by mouth once daily.    gabapentin (NEURONTIN) 300 MG capsule TK 1 C PO TID    hydroCHLOROthiazide (HYDRODIURIL) 50 MG tablet TK 1 T PO QD    isosorbide mononitrate (IMDUR) 30 MG 24 hr tablet TAKE 3 TABLETS BY MOUTH EVERY DAY    metFORMIN (GLUCOPHAGE) 500 MG tablet Take 1 tablet (500 mg total) by mouth 2 (two) times daily with meals.    methocarbamol (ROBAXIN) 750 MG Tab TK 1 T PO BID PRN    metoprolol tartrate (LOPRESSOR) 50 MG tablet Take 1 tablet (50 mg total) by mouth 2 (two) times daily.    ranolazine  (RANEXA) 1,000 mg Tb12 Take 1 tablet (1,000 mg total) by mouth 2 (two) times daily.    TRULICITY 1.5 mg/0.5 mL PnIj INJECT 1 SYRINGE INTO THE SKIN EVERY 7 DAYS    valsartan (DIOVAN) 160 MG tablet TAKE 1 TABLET(160 MG) BY MOUTH EVERY DAY    blood sugar diagnostic Strp 1 strip by Misc.(Non-Drug; Combo Route) route 2 (two) times daily.    blood sugar diagnostic Strp 1 strip by Misc.(Non-Drug; Combo Route) route 2 (two) times daily.    [facility]LO[facility] TW[facility] DAILY    niacin (SLO-NIACIN) 500 mg tablet TAKE 1 TABLET BY MOUTH AT BEDTIME    nitroGLYCERIN (NITROSTAT) 0.4 MG SL tablet Place 1 tablet (0.4 mg total) under the tongue every 5 (five) minutes as needed.     No current facility-administered medications on file prior to visit.      Review of patient's allergies indicates:   Allergen Reactions    Pcn [penicillins] Hives     Review of Systems   Constitution: Negative for malaise/fatigue.   Eyes: Negative for blurred vision.   Cardiovascular: Negative for chest pain, claudication, cyanosis, dyspnea on exertion, irregular heartbeat, leg swelling, near-syncope, orthopnea, palpitations and paroxysmal nocturnal dyspnea.   Respiratory: Negative for cough, hemoptysis and shortness of breath.    Hematologic/Lymphatic: Negative for bleeding problem. Does not bruise/bleed easily.   Skin: Negative for dry skin and itching.   Musculoskeletal: Negative for falls, muscle weakness and myalgias.   Gastrointestinal: Negative for abdominal pain, diarrhea, heartburn, hematemesis, hematochezia and melena.   Genitourinary: Negative for flank pain and hematuria.   Neurological: Negative for dizziness, focal weakness, headaches, light-headedness, numbness, paresthesias, seizures and weakness.   Psychiatric/Behavioral: Negative for altered mental status and memory loss. The patient is not nervous/anxious.    Allergic/Immunologic: Negative for hives.       Objective:   Physical Exam   Constitutional: He is  oriented to person, place, and time. He appears well-developed and well-nourished. No distress.   HENT:   Head: Normocephalic and atraumatic.   Eyes: Pupils are equal, round, and reactive to light. EOM are normal. Right eye exhibits no discharge. Left eye exhibits no discharge.   Neck: Neck supple. No JVD present. No thyromegaly present.   Cardiovascular: Normal rate, regular rhythm, normal heart sounds and intact distal pulses. Exam reveals no gallop and no friction rub.   No murmur heard.  Pulses:       Carotid pulses are 2+ on the right side, and 2+ on the left side.       Radial pulses are 2+ on the right side, and 2+ on the left side.        Femoral pulses are 2+ on the right side, and 2+ on the left side.       Popliteal pulses are 2+ on the right side, and 2+ on the left side.        Dorsalis pedis pulses are 2+ on the right side, and 2+ on the left side.        Posterior tibial pulses are 2+ on the right side, and 2+ on the left side.   Pulmonary/Chest: Effort normal and breath sounds normal. No respiratory distress. He has no wheezes. He has no rales. He exhibits no tenderness.   Abdominal: Soft. Bowel sounds are normal. He exhibits no distension. There is no tenderness.   Obesity.   Musculoskeletal: Normal range of motion. He exhibits no edema.   Neurological: He is alert and oriented to person, place, and time. No cranial nerve deficit.   Skin: Skin is warm and dry. No rash noted. He is not diaphoretic. No erythema.   Psychiatric: He has a normal mood and affect. His behavior is normal.   Nursing note and vitals reviewed.    Vitals:    10/23/19 1534 10/23/19 1536   BP: 110/60 112/60   BP Location: Right arm Left arm   Patient Position: Sitting    BP Method: Medium (Manual)    Pulse: 78    Weight: (!) 137.9 kg (304 lb)      Lab Results   Component Value Date    CHOL 131 10/05/2019    CHOL 134 10/05/2019    CHOL 214 (H) 11/16/2018     Lab Results   Component Value Date    HDL 35 (L) 10/05/2019    HDL 34 (L)  10/05/2019    HDL 38 (L) 11/16/2018     Lab Results   Component Value Date    LDLCALC 81.8 10/05/2019    LDLCALC 85.8 10/05/2019    LDLCALC 154.0 11/16/2018     Lab Results   Component Value Date    TRIG 71 10/05/2019    TRIG 71 10/05/2019    TRIG 110 11/16/2018     Lab Results   Component Value Date    CHOLHDL 26.7 10/05/2019    CHOLHDL 25.4 10/05/2019    CHOLHDL 17.8 (L) 11/16/2018       Chemistry        Component Value Date/Time     10/05/2019 0822    K 4.5 10/05/2019 0822     10/05/2019 0822    CO2 26 10/05/2019 0822    BUN 19 10/05/2019 0822    CREATININE 1.0 10/05/2019 0822     (H) 10/05/2019 0822        Component Value Date/Time    CALCIUM 9.7 10/05/2019 0822    ALKPHOS 81 10/05/2019 0822    AST 13 10/05/2019 0822    ALT 19 10/05/2019 0822    BILITOT 0.8 10/05/2019 0822    ESTGFRAFRICA >60.0 10/05/2019 0822    EGFRNONAA >60.0 10/05/2019 0822        Lab Results   Component Value Date    HGBA1C 6.8 (H) 10/05/2019    HGBA1C 6.8 (H) 10/05/2019       Lab Results   Component Value Date    TSH 1.188 06/30/2015     Lab Results   Component Value Date    INR 1.0 12/16/2015    INR 1.1 10/29/2014    INR 1.0 10/25/2014     Lab Results   Component Value Date    WBC 8.41 04/01/2019    HGB 14.1 04/01/2019    HCT 43.1 04/01/2019    MCV 96 04/01/2019     04/01/2019     BMP  Sodium   Date Value Ref Range Status   10/05/2019 138 136 - 145 mmol/L Final     Potassium   Date Value Ref Range Status   10/05/2019 4.5 3.5 - 5.1 mmol/L Final     Chloride   Date Value Ref Range Status   10/05/2019 104 95 - 110 mmol/L Final     CO2   Date Value Ref Range Status   10/05/2019 26 23 - 29 mmol/L Final     BUN, Bld   Date Value Ref Range Status   10/05/2019 19 8 - 23 mg/dL Final     Creatinine   Date Value Ref Range Status   10/05/2019 1.0 0.5 - 1.4 mg/dL Final     Calcium   Date Value Ref Range Status   10/05/2019 9.7 8.7 - 10.5 mg/dL Final     Anion Gap   Date Value Ref Range Status   10/05/2019 8 8 - 16 mmol/L  Final     eGFR if    Date Value Ref Range Status   10/05/2019 >60.0 >60 mL/min/1.73 m^2 Final     eGFR if non    Date Value Ref Range Status   10/05/2019 >60.0 >60 mL/min/1.73 m^2 Final     Comment:     Calculation used to obtain the estimated glomerular filtration  rate (eGFR) is the CKD-EPI equation.        CrCl cannot be calculated (Patient's most recent lab result is older than the maximum 7 days allowed.).    Assessment:     1. Coronary artery disease involving native coronary artery of native heart without angina pectoris    2. Chest pain, atypical    3. Obesity, Class II, BMI 35-39.9, isolated (see actual BMI)    4. Hypertension associated with diabetes    5. Hyperlipidemia associated with type 2 diabetes mellitus    6. S/P PTCA (percutaneous transluminal coronary angioplasty)    7. Chest pain, unspecified type    8. Sleep apnea, unspecified type    9. DANTE on CPAP    10. Venous insufficiency    11. Type 2 diabetes mellitus without complication, without long-term current use of insulin    12. Insulin resistance    13. Controlled type 2 diabetes mellitus without complication, without long-term current use of insulin    14. Essential hypertension    15. Type 2 diabetes mellitus without retinopathy      Asymptomatic recovering well from back surgery.  counseled about weight loss diet exercise compliance.  Plan:   Continue current therapy  Cardiac low salt diet.  Risk factor modification and excercise program.  F/u in 6 months with lipid cmp a1c.

## 2019-11-11 ENCOUNTER — OFFICE VISIT (OUTPATIENT)
Dept: OPHTHALMOLOGY | Facility: CLINIC | Age: 64
End: 2019-11-11
Payer: COMMERCIAL

## 2019-11-11 DIAGNOSIS — E11.9 TYPE 2 DIABETES MELLITUS WITHOUT RETINOPATHY: Primary | ICD-10-CM

## 2019-11-11 DIAGNOSIS — H52.13 MYOPIA WITH PRESBYOPIA OF BOTH EYES: ICD-10-CM

## 2019-11-11 DIAGNOSIS — H52.4 MYOPIA WITH PRESBYOPIA OF BOTH EYES: ICD-10-CM

## 2019-11-11 DIAGNOSIS — Z13.5 GLAUCOMA SCREENING: ICD-10-CM

## 2019-11-11 PROCEDURE — 92250 COLOR FUNDUS PHOTOGRAPHY - OU - BOTH EYES: ICD-10-PCS | Mod: S$GLB,,, | Performed by: OPTOMETRIST

## 2019-11-11 PROCEDURE — 92015 DETERMINE REFRACTIVE STATE: CPT | Mod: S$GLB,,, | Performed by: OPTOMETRIST

## 2019-11-11 PROCEDURE — 92014 PR EYE EXAM, EST PATIENT,COMPREHESV: ICD-10-PCS | Mod: S$GLB,,, | Performed by: OPTOMETRIST

## 2019-11-11 PROCEDURE — 92014 COMPRE OPH EXAM EST PT 1/>: CPT | Mod: S$GLB,,, | Performed by: OPTOMETRIST

## 2019-11-11 PROCEDURE — 92015 PR REFRACTION: ICD-10-PCS | Mod: S$GLB,,, | Performed by: OPTOMETRIST

## 2019-11-11 PROCEDURE — 92250 FUNDUS PHOTOGRAPHY W/I&R: CPT | Mod: S$GLB,,, | Performed by: OPTOMETRIST

## 2019-11-11 PROCEDURE — 99999 PR PBB SHADOW E&M-EST. PATIENT-LVL II: ICD-10-PCS | Mod: PBBFAC,,, | Performed by: OPTOMETRIST

## 2019-11-11 PROCEDURE — 99999 PR PBB SHADOW E&M-EST. PATIENT-LVL II: CPT | Mod: PBBFAC,,, | Performed by: OPTOMETRIST

## 2019-11-11 NOTE — PROGRESS NOTES
HPI     Diabetic Eye Exam      Additional comments: Yearly              Comments     Last seen by [facility] on 11/6/18 for yearly DM eye exam  Patient here today for yearly eye exam  Has noticeable changes in vision at near and distance more in the right   eye since last eye exam  No correction  C/O of floater  Floaters are present in the right eye have been noticing floaters for a   year now  No other complaints  No drops  Lab Results       Component                Value               Date                       HGBA1C                   6.8 (H)             10/05/2019                 HGBA1C                   6.8 (H)             10/05/2019                      Last edited by Sejal Rueda, PCT on 11/11/2019  8:26 AM.   (History)            Assessment /Plan     For exam results, see Encounter Report.    Type 2 diabetes mellitus without retinopathy    Glaucoma screening    Myopia with presbyopia of both eyes      No diabetic retinopathy in either eye.  Glaucoma screening negative in both eyes.  Myopic shift secondary to myopia.  Updated glasses prescription.  Return to clinic 1 yr.

## 2020-01-03 ENCOUNTER — TELEPHONE (OUTPATIENT)
Dept: PULMONOLOGY | Facility: CLINIC | Age: 65
End: 2020-01-03

## 2020-01-03 NOTE — TELEPHONE ENCOUNTER
Spoke with pt. Pt stated that he needs a clip that goes on his cpap. I gave pt [facility] office number in [place] to call and see if they have an extra clip he can . Pt verbalized understanding.

## 2020-01-03 NOTE — TELEPHONE ENCOUNTER
----- Message from Janiya Porras sent at 1/3/2020  3:10 PM CST -----  Contact: Pt  Pt asked that nurse return his call regarding a clip on his head mask.  ([phone])

## 2020-02-03 ENCOUNTER — TELEPHONE (OUTPATIENT)
Dept: PSYCHIATRY | Facility: CLINIC | Age: 65
End: 2020-02-03

## 2020-02-03 ENCOUNTER — OFFICE VISIT (OUTPATIENT)
Dept: INTERNAL MEDICINE | Facility: CLINIC | Age: 65
End: 2020-02-03
Payer: COMMERCIAL

## 2020-02-03 VITALS
OXYGEN SATURATION: 97 % | SYSTOLIC BLOOD PRESSURE: 138 MMHG | HEART RATE: 99 BPM | TEMPERATURE: 96 F | BODY MASS INDEX: 37.83 KG/M2 | DIASTOLIC BLOOD PRESSURE: 84 MMHG | HEIGHT: 74 IN | WEIGHT: 294.75 LBS

## 2020-02-03 DIAGNOSIS — I15.2 HYPERTENSION ASSOCIATED WITH DIABETES: ICD-10-CM

## 2020-02-03 DIAGNOSIS — E11.59 HYPERTENSION ASSOCIATED WITH DIABETES: ICD-10-CM

## 2020-02-03 DIAGNOSIS — I21.4 NSTEMI (NON-ST ELEVATED MYOCARDIAL INFARCTION): Primary | ICD-10-CM

## 2020-02-03 DIAGNOSIS — E11.9 CONTROLLED TYPE 2 DIABETES MELLITUS WITHOUT COMPLICATION, WITHOUT LONG-TERM CURRENT USE OF INSULIN: ICD-10-CM

## 2020-02-03 DIAGNOSIS — F32.A DEPRESSION, UNSPECIFIED DEPRESSION TYPE: ICD-10-CM

## 2020-02-03 PROCEDURE — 99999 PR PBB SHADOW E&M-EST. PATIENT-LVL III: ICD-10-PCS | Mod: PBBFAC,,, | Performed by: FAMILY MEDICINE

## 2020-02-03 PROCEDURE — 3044F PR MOST RECENT HEMOGLOBIN A1C LEVEL <7.0%: ICD-10-PCS | Mod: CPTII,S$GLB,, | Performed by: FAMILY MEDICINE

## 2020-02-03 PROCEDURE — 3079F DIAST BP 80-89 MM HG: CPT | Mod: CPTII,S$GLB,, | Performed by: FAMILY MEDICINE

## 2020-02-03 PROCEDURE — 3077F SYST BP >= 140 MM HG: CPT | Mod: CPTII,S$GLB,, | Performed by: FAMILY MEDICINE

## 2020-02-03 PROCEDURE — 3044F HG A1C LEVEL LT 7.0%: CPT | Mod: CPTII,S$GLB,, | Performed by: FAMILY MEDICINE

## 2020-02-03 PROCEDURE — 3077F PR MOST RECENT SYSTOLIC BLOOD PRESSURE >= 140 MM HG: ICD-10-PCS | Mod: CPTII,S$GLB,, | Performed by: FAMILY MEDICINE

## 2020-02-03 PROCEDURE — 99213 OFFICE O/P EST LOW 20 MIN: CPT | Mod: S$GLB,,, | Performed by: FAMILY MEDICINE

## 2020-02-03 PROCEDURE — 3079F PR MOST RECENT DIASTOLIC BLOOD PRESSURE 80-89 MM HG: ICD-10-PCS | Mod: CPTII,S$GLB,, | Performed by: FAMILY MEDICINE

## 2020-02-03 PROCEDURE — 3008F PR BODY MASS INDEX (BMI) DOCUMENTED: ICD-10-PCS | Mod: CPTII,S$GLB,, | Performed by: FAMILY MEDICINE

## 2020-02-03 PROCEDURE — 3008F BODY MASS INDEX DOCD: CPT | Mod: CPTII,S$GLB,, | Performed by: FAMILY MEDICINE

## 2020-02-03 PROCEDURE — 99999 PR PBB SHADOW E&M-EST. PATIENT-LVL III: CPT | Mod: PBBFAC,,, | Performed by: FAMILY MEDICINE

## 2020-02-03 PROCEDURE — 99213 PR OFFICE/OUTPT VISIT, EST, LEVL III, 20-29 MIN: ICD-10-PCS | Mod: S$GLB,,, | Performed by: FAMILY MEDICINE

## 2020-02-03 NOTE — TELEPHONE ENCOUNTER
----- Message from Demarcus Steward MA sent at 2/3/2020  1:47 PM CST -----  Good afternoon,    Pt is needing an appointment with you guys.

## 2020-02-03 NOTE — PROGRESS NOTES
Subjective:       Patient ID: Isaiah Harrison is a 64 y.o. male.    Chief Complaint: Follow-up    Pt is a 64 year old who is here for feeling guilty due to being on a social prashant which he was involved in online relationship. Pt wanted to look into some counseling with possibility of marriage counseling. Does outright endorse depression.    Review of Systems   Constitutional: Negative.    Respiratory: Negative.    Cardiovascular: Negative.    Gastrointestinal: Negative.    Genitourinary: Negative.    Musculoskeletal: Negative.    Skin: Negative.    Psychiatric/Behavioral: Negative.        Objective:      Physical Exam   Constitutional: He is oriented to person, place, and time. He appears well-developed and well-nourished.   Cardiovascular: Normal rate and regular rhythm. Exam reveals no friction rub.   No murmur heard.  Pulmonary/Chest: Effort normal and breath sounds normal. No stridor. He has no wheezes.   Abdominal: Soft. Bowel sounds are normal. There is no tenderness. There is no guarding.   Neurological: He is alert and oriented to person, place, and time.   Skin: Skin is warm.   Psychiatric: He has a normal mood and affect. His behavior is normal.       Assessment:       1. NSTEMI (non-ST elevated myocardial infarction)    2. Hypertension associated with diabetes    3. Controlled type 2 diabetes mellitus without complication, without long-term current use of insulin    4. Depression, unspecified depression type        Plan:       NSTEMI (non-ST elevated myocardial infarction)  Comments:  Pt will send to Cardiology    Hypertension associated with diabetes  Comments:  Pt BP is elevated but could be stress    Controlled type 2 diabetes mellitus without complication, without long-term current use of insulin  Comments:  Hga1c is controlled    Depression, unspecified depression type  Comments:  Will send to psychology to see if pt is depressed or other resources for counseling  Orders:  -     Ambulatory consult  to Psychology

## 2020-02-17 ENCOUNTER — INITIAL CONSULT (OUTPATIENT)
Dept: PSYCHIATRY | Facility: CLINIC | Age: 65
End: 2020-02-17
Payer: COMMERCIAL

## 2020-02-17 DIAGNOSIS — F52.0 HYPOACTIVE SEXUAL DESIRE DISORDER: ICD-10-CM

## 2020-02-17 DIAGNOSIS — Z63.0 MARITAL RELATIONSHIP PROBLEM: ICD-10-CM

## 2020-02-17 DIAGNOSIS — F43.23 ADJUSTMENT DISORDER WITH MIXED ANXIETY AND DEPRESSED MOOD: Primary | ICD-10-CM

## 2020-02-17 PROCEDURE — 90791 PSYCH DIAGNOSTIC EVALUATION: CPT | Mod: S$GLB,,, | Performed by: SOCIAL WORKER

## 2020-02-17 PROCEDURE — 90791 PR PSYCHIATRIC DIAGNOSTIC EVALUATION: ICD-10-PCS | Mod: S$GLB,,, | Performed by: SOCIAL WORKER

## 2020-02-17 SDOH — SOCIAL DETERMINANTS OF HEALTH (SDOH): PROBLEMS IN RELATIONSHIP WITH SPOUSE OR PARTNER: Z63.0

## 2020-02-19 DIAGNOSIS — I25.10 CORONARY ARTERY DISEASE INVOLVING NATIVE CORONARY ARTERY WITHOUT ANGINA PECTORIS, UNSPECIFIED WHETHER NATIVE OR TRANSPLANTED HEART: ICD-10-CM

## 2020-02-19 RX ORDER — NIACIN 500 MG/1
500 TABLET, EXTENDED RELEASE ORAL NIGHTLY
Qty: 30 TABLET | Refills: 6 | Status: SHIPPED | OUTPATIENT
Start: 2020-02-19 | End: 2021-03-02 | Stop reason: SDUPTHER

## 2020-03-02 RX ORDER — VALSARTAN 160 MG/1
TABLET ORAL
Qty: 90 TABLET | Refills: 1 | Status: SHIPPED | OUTPATIENT
Start: 2020-03-02 | End: 2020-04-22 | Stop reason: ALTCHOICE

## 2020-03-04 ENCOUNTER — TELEPHONE (OUTPATIENT)
Dept: INTERNAL MEDICINE | Facility: CLINIC | Age: 65
End: 2020-03-04

## 2020-03-09 RX ORDER — ISOSORBIDE MONONITRATE 30 MG/1
TABLET, EXTENDED RELEASE ORAL
Qty: 270 TABLET | Refills: 1 | Status: SHIPPED | OUTPATIENT
Start: 2020-03-09 | End: 2020-03-12 | Stop reason: SDUPTHER

## 2020-03-10 ENCOUNTER — OFFICE VISIT (OUTPATIENT)
Dept: PSYCHIATRY | Facility: CLINIC | Age: 65
End: 2020-03-10
Payer: COMMERCIAL

## 2020-03-10 DIAGNOSIS — F43.23 ADJUSTMENT DISORDER WITH MIXED ANXIETY AND DEPRESSED MOOD: Primary | ICD-10-CM

## 2020-03-10 PROCEDURE — 90834 PR PSYCHOTHERAPY W/PATIENT, 45 MIN: ICD-10-PCS | Mod: S$GLB,,, | Performed by: SOCIAL WORKER

## 2020-03-10 PROCEDURE — 90834 PSYTX W PT 45 MINUTES: CPT | Mod: S$GLB,,, | Performed by: SOCIAL WORKER

## 2020-03-11 NOTE — PROGRESS NOTES
"Psychiatry Initial Visit (PhD/LCSW)  Diagnostic Interview - CPT 89329    Date: 2/17/2020    Site: [place]    Referral source:  Pj Del Cid MD, internal medicine    Clinical status of patient: Outpatient    Isaiah Harrison, a 64 y.o. male, for initial evaluation visit.  Met with patient.    Chief complaint/reason for encounter: depression, anxiety, interpersonal and sexual problems    History of present illness:   64 year old  male presented for initial evaluation, chief complaint of some guilt and shame feelings he associated directly with some marital emotional infidelity, that came to the attention of his wife of 21 years, and of sexual insecurities he expressed about low libido and insecurity about his body.  Patient reported some past depression experienced with the end of his first marriage and upheaval of divorce.  Reported also a family history of an older sister with depression.  Denied any history of substance abuse; stated he used to drink modestly, socially, but tapered off some time ago for his heart health.   to current wife, his second, since 1999.  Reported "several short relationship" between the two marriages.  Current wife was also  previously, twice before. Patient described his own history as one in which he "was not very popular" growing up, unable to get dates.  Compared himself negatively to his younger brother, who appeared socially completely comfortable.  Patient described childhood as "not all bad," some happiness but that tempered by father being an abusively harsh disciplinarian.  Patient stated strong desire to save his marriage; stated he and wife are talking, but she has been hurt by his infidelity, which took the form of online flirting and sending nude photos of himself to three women who requested them, then feeling pressure to send them monetary assistance when they asked.  He said that started as a seemingly innocent joining of a social prashant called " ""hangouts" in mid-December 2019.  In hindsight, he stated he does not even know why he ended up doing what he did; stated he has little to no sexual appetite and never had any intention of pursuing sex with anyone.  States he loves his wife and wants to "make it right with her and build a foundation of trust."  Denied any history of suicidal or homicidal ideation, mood swings, psychosis, or cognitive deficits, nor substance abuse.    Pain: described as variable, mild to moderate now, healing well from back surgery last May    Symptoms:   · Mood: depressed mood and worthlessness/guilt  · Anxiety: excessive anxiety/worry  · Substance abuse: denied  · Cognitive functioning: denied  · Health behaviors: noncontributory    Psychiatric history: none    Medical history: obesity, hypertension, hyperlipidemia, sleep apnea, heart stent, s/p back surgery in May of 2019.    Family history of psychiatric illness: not known    Social history (marriage, employment, etc.):  See history above.  Middle of 3 siblings, with an older sister and younger brother.  Harsh father.  High school grad.  Joined the  and had a 28 year career in the [employer], between active and reserves.   first from 1988 to 1992,  in 1991 due to discovering his wife had been unfaithful.   to current wife since 1999.  No biological children of his own but 2 stepsons who were grown when he  their mother.     Substance use:   Alcohol: none currently, history of past modest social use   Drugs: none   Tobacco: none currently, "dabbled" in teens with smoking   Caffeine: 2 to 4 cups, some morning, some evening    Current medications and drug reactions (include OTC, herbal): see medication list      Strengths and liabilities: Strength: Patient accepts guidance/feedback, Strength: Patient is expressive/articulate., Strength: Patient is motivated for change., Strength: Patient has positive support network., Liability: Patient lacks " social skills., Liability: Patient lacks coping skills.    Current Evaluation:     Mental Status Exam:  General Appearance:  age appropriate, casually dressed, obese   Speech: normal tone, normal rate, normal pitch, normal volume      Level of Cooperation: cooperative      Thought Processes: goal-directed   Mood: anxious, depressed, guilt feelings      Thought Content: normal, no suicidality, no homicidality, delusions, or paranoia   Affect: congruent and appropriate   Orientation: Oriented x3   Memory: recent and remote memory intact   Attention Span & Concentration: intact   Fund of General Knowledge: intact and appropriate to age and level of education   Abstract Reasoning: not formally assessed   Judgment & Insight: limited     Language  intact     Diagnostic Impression - Plan:       ICD-10-CM ICD-9-CM   1. Adjustment disorder with mixed anxiety and depressed mood F43.23 309.28   2. Marital relationship problem Z63.0 V61.10   3. Hypoactive sexual desire disorder F52.0 302.71       Plan:individual psychotherapy    Return to Clinic: as scheduled, two weeks    Length of Service (minutes): 45

## 2020-03-12 DIAGNOSIS — I20.0 UNSTABLE ANGINA: ICD-10-CM

## 2020-03-12 DIAGNOSIS — I25.10 CORONARY ARTERY DISEASE INVOLVING NATIVE CORONARY ARTERY WITHOUT ANGINA PECTORIS, UNSPECIFIED WHETHER NATIVE OR TRANSPLANTED HEART: ICD-10-CM

## 2020-03-12 DIAGNOSIS — E78.5 HYPERLIPEMIA: Chronic | ICD-10-CM

## 2020-03-12 DIAGNOSIS — I10 ESSENTIAL HYPERTENSION: ICD-10-CM

## 2020-03-12 RX ORDER — ISOSORBIDE MONONITRATE 30 MG/1
90 TABLET, EXTENDED RELEASE ORAL DAILY
Qty: 270 TABLET | Refills: 6 | Status: SHIPPED | OUTPATIENT
Start: 2020-03-12 | End: 2021-03-12

## 2020-03-12 RX ORDER — RANOLAZINE 1000 MG/1
1000 TABLET, EXTENDED RELEASE ORAL 2 TIMES DAILY
Qty: 60 TABLET | Refills: 6 | Status: SHIPPED | OUTPATIENT
Start: 2020-03-12 | End: 2020-10-07

## 2020-03-12 RX ORDER — AMLODIPINE BESYLATE 2.5 MG/1
2.5 TABLET ORAL NIGHTLY
Qty: 30 TABLET | Refills: 6 | Status: SHIPPED | OUTPATIENT
Start: 2020-03-12 | End: 2020-10-07

## 2020-03-12 RX ORDER — METOPROLOL TARTRATE 50 MG/1
50 TABLET ORAL 2 TIMES DAILY
Qty: 60 TABLET | Refills: 6 | Status: SHIPPED | OUTPATIENT
Start: 2020-03-12 | End: 2020-10-07

## 2020-03-12 RX ORDER — ATORVASTATIN CALCIUM 40 MG/1
40 TABLET, FILM COATED ORAL DAILY
Qty: 30 TABLET | Refills: 6 | Status: SHIPPED | OUTPATIENT
Start: 2020-03-12 | End: 2020-04-22 | Stop reason: SDUPTHER

## 2020-03-13 DIAGNOSIS — I21.4 NSTEMI (NON-ST ELEVATED MYOCARDIAL INFARCTION): ICD-10-CM

## 2020-03-13 DIAGNOSIS — I25.10 CORONARY ARTERY DISEASE INVOLVING NATIVE CORONARY ARTERY OF NATIVE HEART, ANGINA PRESENCE UNSPECIFIED: ICD-10-CM

## 2020-03-14 RX ORDER — NITROGLYCERIN 0.4 MG/1
0.4 TABLET SUBLINGUAL EVERY 5 MIN PRN
Qty: 25 TABLET | Refills: 4 | Status: SHIPPED | OUTPATIENT
Start: 2020-03-14

## 2020-04-02 ENCOUNTER — PATIENT MESSAGE (OUTPATIENT)
Dept: PSYCHIATRY | Facility: CLINIC | Age: 65
End: 2020-04-02

## 2020-04-06 ENCOUNTER — OFFICE VISIT (OUTPATIENT)
Dept: PSYCHIATRY | Facility: CLINIC | Age: 65
End: 2020-04-06
Payer: COMMERCIAL

## 2020-04-06 DIAGNOSIS — F43.23 ADJUSTMENT DISORDER WITH MIXED ANXIETY AND DEPRESSED MOOD: Primary | ICD-10-CM

## 2020-04-06 DIAGNOSIS — Z63.0 MARITAL RELATIONSHIP PROBLEM: ICD-10-CM

## 2020-04-06 DIAGNOSIS — N53.9 MALE SEXUAL DYSFUNCTION: ICD-10-CM

## 2020-04-06 PROCEDURE — 90834 PR PSYCHOTHERAPY W/PATIENT, 45 MIN: ICD-10-PCS | Mod: 95,,, | Performed by: SOCIAL WORKER

## 2020-04-06 PROCEDURE — 90834 PSYTX W PT 45 MINUTES: CPT | Mod: 95,,, | Performed by: SOCIAL WORKER

## 2020-04-06 SDOH — SOCIAL DETERMINANTS OF HEALTH (SDOH): PROBLEMS IN RELATIONSHIP WITH SPOUSE OR PARTNER: Z63.0

## 2020-04-06 NOTE — PROGRESS NOTES
Individual Psychotherapy (PhD/LCSW)    3/10/2020    Site:  [facility]         Therapeutic Intervention: Met with patient.  Outpatient - Insight oriented psychotherapy 45 min - CPT code 60053 and Outpatient - Supportive psychotherapy 45 min - CPT Code 94936    Chief complaint/reason for encounter: depression, anxiety, interpersonal and sexual problems     Interval history and content of current session:  64 year old male returned for follow up psychotherapy to address adjustment mood symptoms in context of marital tensions around emotional infidelity and patient libido and self-confidence deficits.  Patient reporting he wants to be a better , to build a foundation of truth with his wife.  Said he has been talking with his wife a lot in the interim and feels they are communicating.  Showing affection with each other.  Discussed sexual insecurities; he revealed he hasn't spelled out to his wife just how insecure he is about his sexual functioning limitations.  Denied any si/hi, psychosis, cognitive deficit, mood swings, rages, or substance abuse.  Supportive therapy provided.  Plan is for follow up session online.  Appt scheduled for 4/6/20.     Treatment plan:  · Target symptoms: depression, anxiety , adjustment, sexual and marital issues  · Why chosen therapy is appropriate versus another modality: relevant to diagnosis, patient responds to this modality  · Outcome monitoring methods: self-report, observation  · Therapeutic intervention type: insight oriented psychotherapy, supportive psychotherapy    Risk parameters:  Patient reports no suicidal ideation  Patient reports no homicidal ideation  Patient reports no self-injurious behavior  Patient reports no violent behavior    Verbal deficits: None    Patient's response to intervention:  The patient's response to intervention is accepting.    Progress toward goals and other mental status changes:  The patient's progress toward goals is fair .    Diagnosis:      ICD-10-CM ICD-9-CM   1. Adjustment disorder with mixed anxiety and depressed mood F43.23 309.28       Plan:  individual psychotherapy    Return to clinic: as scheduled    Length of Service (minutes): 45

## 2020-04-15 ENCOUNTER — LAB VISIT (OUTPATIENT)
Dept: LAB | Facility: HOSPITAL | Age: 65
End: 2020-04-15
Attending: INTERNAL MEDICINE
Payer: COMMERCIAL

## 2020-04-15 DIAGNOSIS — E11.9 CONTROLLED TYPE 2 DIABETES MELLITUS WITHOUT COMPLICATION, WITHOUT LONG-TERM CURRENT USE OF INSULIN: ICD-10-CM

## 2020-04-15 DIAGNOSIS — E11.9 TYPE 2 DIABETES MELLITUS WITHOUT RETINOPATHY: ICD-10-CM

## 2020-04-15 DIAGNOSIS — I25.10 CORONARY ARTERY DISEASE INVOLVING NATIVE CORONARY ARTERY OF NATIVE HEART WITHOUT ANGINA PECTORIS: ICD-10-CM

## 2020-04-15 LAB
ALBUMIN SERPL BCP-MCNC: 3.5 G/DL (ref 3.5–5.2)
ALP SERPL-CCNC: 85 U/L (ref 55–135)
ALT SERPL W/O P-5'-P-CCNC: 22 U/L (ref 10–44)
ANION GAP SERPL CALC-SCNC: 8 MMOL/L (ref 8–16)
AST SERPL-CCNC: 14 U/L (ref 10–40)
BILIRUB SERPL-MCNC: 0.6 MG/DL (ref 0.1–1)
BUN SERPL-MCNC: 19 MG/DL (ref 8–23)
CALCIUM SERPL-MCNC: 9 MG/DL (ref 8.7–10.5)
CHLORIDE SERPL-SCNC: 103 MMOL/L (ref 95–110)
CHOLEST SERPL-MCNC: 155 MG/DL (ref 120–199)
CHOLEST/HDLC SERPL: 3.6 {RATIO} (ref 2–5)
CO2 SERPL-SCNC: 27 MMOL/L (ref 23–29)
CREAT SERPL-MCNC: 1.1 MG/DL (ref 0.5–1.4)
EST. GFR  (AFRICAN AMERICAN): >60 ML/MIN/1.73 M^2
EST. GFR  (NON AFRICAN AMERICAN): >60 ML/MIN/1.73 M^2
ESTIMATED AVG GLUCOSE: 151 MG/DL (ref 68–131)
GLUCOSE SERPL-MCNC: 155 MG/DL (ref 70–110)
HBA1C MFR BLD HPLC: 6.9 % (ref 4–5.6)
HDLC SERPL-MCNC: 43 MG/DL (ref 40–75)
HDLC SERPL: 27.7 % (ref 20–50)
LDLC SERPL CALC-MCNC: 100 MG/DL (ref 63–159)
NONHDLC SERPL-MCNC: 112 MG/DL
POTASSIUM SERPL-SCNC: 4.4 MMOL/L (ref 3.5–5.1)
PROT SERPL-MCNC: 6.8 G/DL (ref 6–8.4)
SODIUM SERPL-SCNC: 138 MMOL/L (ref 136–145)
TRIGL SERPL-MCNC: 60 MG/DL (ref 30–150)

## 2020-04-15 PROCEDURE — 83036 HEMOGLOBIN GLYCOSYLATED A1C: CPT

## 2020-04-15 PROCEDURE — 36415 COLL VENOUS BLD VENIPUNCTURE: CPT

## 2020-04-15 PROCEDURE — 80053 COMPREHEN METABOLIC PANEL: CPT

## 2020-04-15 PROCEDURE — 80061 LIPID PANEL: CPT

## 2020-04-21 ENCOUNTER — PATIENT MESSAGE (OUTPATIENT)
Dept: ADMINISTRATIVE | Facility: OTHER | Age: 65
End: 2020-04-21

## 2020-04-22 ENCOUNTER — OFFICE VISIT (OUTPATIENT)
Dept: CARDIOLOGY | Facility: CLINIC | Age: 65
End: 2020-04-22
Payer: COMMERCIAL

## 2020-04-22 VITALS — DIASTOLIC BLOOD PRESSURE: 104 MMHG | HEART RATE: 71 BPM | SYSTOLIC BLOOD PRESSURE: 168 MMHG

## 2020-04-22 DIAGNOSIS — Z98.61 S/P PTCA (PERCUTANEOUS TRANSLUMINAL CORONARY ANGIOPLASTY): ICD-10-CM

## 2020-04-22 DIAGNOSIS — Z01.818 PREOP EXAMINATION: ICD-10-CM

## 2020-04-22 DIAGNOSIS — E11.9 TYPE 2 DIABETES MELLITUS WITHOUT COMPLICATION, WITHOUT LONG-TERM CURRENT USE OF INSULIN: ICD-10-CM

## 2020-04-22 DIAGNOSIS — E78.5 HYPERLIPEMIA: Chronic | ICD-10-CM

## 2020-04-22 DIAGNOSIS — I25.10 CORONARY ARTERY DISEASE INVOLVING NATIVE CORONARY ARTERY OF NATIVE HEART WITHOUT ANGINA PECTORIS: Primary | ICD-10-CM

## 2020-04-22 DIAGNOSIS — E11.9 CONTROLLED TYPE 2 DIABETES MELLITUS WITHOUT COMPLICATION, WITHOUT LONG-TERM CURRENT USE OF INSULIN: ICD-10-CM

## 2020-04-22 DIAGNOSIS — R07.89 CHEST PAIN, ATYPICAL: Chronic | ICD-10-CM

## 2020-04-22 DIAGNOSIS — E11.69 HYPERLIPIDEMIA ASSOCIATED WITH TYPE 2 DIABETES MELLITUS: ICD-10-CM

## 2020-04-22 DIAGNOSIS — I87.2 VENOUS INSUFFICIENCY: ICD-10-CM

## 2020-04-22 DIAGNOSIS — E66.9 OBESITY, CLASS II, BMI 35-39.9, ISOLATED (SEE ACTUAL BMI): Chronic | ICD-10-CM

## 2020-04-22 DIAGNOSIS — E78.5 HYPERLIPIDEMIA ASSOCIATED WITH TYPE 2 DIABETES MELLITUS: ICD-10-CM

## 2020-04-22 DIAGNOSIS — E11.59 HYPERTENSION ASSOCIATED WITH DIABETES: ICD-10-CM

## 2020-04-22 DIAGNOSIS — G47.33 OSA ON CPAP: ICD-10-CM

## 2020-04-22 DIAGNOSIS — R07.9 CHEST PAIN, UNSPECIFIED TYPE: ICD-10-CM

## 2020-04-22 DIAGNOSIS — E88.819 INSULIN RESISTANCE: ICD-10-CM

## 2020-04-22 DIAGNOSIS — I15.2 HYPERTENSION ASSOCIATED WITH DIABETES: ICD-10-CM

## 2020-04-22 DIAGNOSIS — E11.9 TYPE 2 DIABETES MELLITUS WITHOUT RETINOPATHY: ICD-10-CM

## 2020-04-22 PROCEDURE — 3080F DIAST BP >= 90 MM HG: CPT | Mod: CPTII,,, | Performed by: INTERNAL MEDICINE

## 2020-04-22 PROCEDURE — 3080F PR MOST RECENT DIASTOLIC BLOOD PRESSURE >= 90 MM HG: ICD-10-PCS | Mod: CPTII,,, | Performed by: INTERNAL MEDICINE

## 2020-04-22 PROCEDURE — 99214 PR OFFICE/OUTPT VISIT, EST, LEVL IV, 30-39 MIN: ICD-10-PCS | Mod: 95,,, | Performed by: INTERNAL MEDICINE

## 2020-04-22 PROCEDURE — 3044F HG A1C LEVEL LT 7.0%: CPT | Mod: CPTII,,, | Performed by: INTERNAL MEDICINE

## 2020-04-22 PROCEDURE — 3044F PR MOST RECENT HEMOGLOBIN A1C LEVEL <7.0%: ICD-10-PCS | Mod: CPTII,,, | Performed by: INTERNAL MEDICINE

## 2020-04-22 PROCEDURE — 99214 OFFICE O/P EST MOD 30 MIN: CPT | Mod: 95,,, | Performed by: INTERNAL MEDICINE

## 2020-04-22 PROCEDURE — 3077F SYST BP >= 140 MM HG: CPT | Mod: CPTII,,, | Performed by: INTERNAL MEDICINE

## 2020-04-22 PROCEDURE — 3077F PR MOST RECENT SYSTOLIC BLOOD PRESSURE >= 140 MM HG: ICD-10-PCS | Mod: CPTII,,, | Performed by: INTERNAL MEDICINE

## 2020-04-22 RX ORDER — LOSARTAN POTASSIUM 100 MG/1
100 TABLET ORAL DAILY
Qty: 90 TABLET | Refills: 3 | Status: SHIPPED | OUTPATIENT
Start: 2020-04-22 | End: 2021-03-10 | Stop reason: ALTCHOICE

## 2020-04-22 RX ORDER — ATORVASTATIN CALCIUM 80 MG/1
80 TABLET, FILM COATED ORAL DAILY
Qty: 30 TABLET | Refills: 6 | Status: SHIPPED | OUTPATIENT
Start: 2020-04-22 | End: 2020-10-08 | Stop reason: SDUPTHER

## 2020-04-22 NOTE — PROGRESS NOTES
Subjective:   Patient ID:  Isaiah Harrison is a 65 y.o. male who presents for follow up of No chief complaint on file.    The patient location is:home due to covid 19   The chief complaint leading to consultation is:cad htn f/u  Visit type: audiovisual  Total time spent with patient: 19 minutes  Each patient to whom he or she provides medical services by telemedicine is:  (1) informed of the relationship between the physician and patient and the respective role of any other health care provider with respect to management of the patient; and (2) notified that he or she may decline to receive medical services by telemedicine and may withdraw from such care at any time.    Notes:   HPI  A 66 yo male with cad s/p lad stent htn hlp obesity diabetes s/p back surgery venous insufficency is following up today. He is using cpap regularily compliant with dite he was doing physical therapy till the curfew. He has no chest pain no shortness of breath headcahes tia or claudication. He ahs been out of valsartan due to recall for 2 months. His htn is not well controlled. He is taking all other meds. He is compliant with diet and salt.has been working intermittently no issues clinically.his a1c i9s 6.9% and his ldl has increased to 100 he is probably not compliant despite what he mentions. He is eatingt probably lot of snacks on his work shift he will adjsut that.  Past Medical History:   Diagnosis Date    Acute coronary syndrome     Anticoagulant long-term use     Back pain     CAD (coronary artery disease) 10/17/2013    Coronary artery disease     Diabetes mellitus, type 2     Gastric polyps     Hyperlipemia     Hypertension     NSTEMI (non-ST elevated myocardial infarction) 10/23/2014    Obesity 10/21/2014    DANTE on CPAP 2/19/2015    S/P PTCA (percutaneous transluminal coronary angioplasty) 10/17/2013    Sleep apnea 1/7/2015       Past Surgical History:   Procedure Laterality Date    APPENDECTOMY       COLONOSCOPY      CORONARY ANGIOPLASTY WITH STENT PLACEMENT      ESOPHAGOGASTRODUODENOSCOPY      FRACTURE SURGERY      HERNIA REPAIR      SPINE SURGERY         Social History     Tobacco Use    Smoking status: Never Smoker    Smokeless tobacco: Never Used   Substance Use Topics    Alcohol use: Yes     Alcohol/week: 0.0 standard drinks     Comment: socially    Drug use: No       Family History   Problem Relation Age of Onset    Hypertension Mother     Hypertension Father     Heart disease Maternal Grandfather        Current Outpatient Medications   Medication Sig    amLODIPine (NORVASC) 2.5 MG tablet Take 1 tablet (2.5 mg total) by mouth every evening.    aspirin (ECOTRIN) 81 MG EC tablet Take 81 mg by mouth once daily.    atorvastatin (LIPITOR) 40 MG tablet Take 1 tablet (40 mg total) by mouth once daily.    blood sugar diagnostic Strp 1 strip by Misc.(Non-Drug; Combo Route) route 2 (two) times daily.    blood sugar diagnostic Strp 1 strip by Misc.(Non-Drug; Combo Route) route 2 (two) times daily.    gabapentin (NEURONTIN) 300 MG capsule TK 1 C PO TID    hydroCHLOROthiazide (HYDRODIURIL) 50 MG tablet TK 1 T PO QD    isosorbide mononitrate (IMDUR) 30 MG 24 hr tablet Take 3 tablets (90 mg total) by mouth once daily.    metFORMIN (GLUCOPHAGE) 500 MG tablet Take 1 tablet (500 mg total) by mouth 2 (two) times daily with meals.    methocarbamol (ROBAXIN) 750 MG Tab TK 1 T PO BID PRN    metoprolol tartrate (LOPRESSOR) 50 MG tablet Take 1 tablet (50 mg total) by mouth 2 (two) times daily.    nitroGLYCERIN (NITROSTAT) 0.4 MG SL tablet Place 1 tablet (0.4 mg total) under the tongue every 5 (five) minutes as needed.    ranolazine (RANEXA) 1,000 mg Tb12 Take 1 tablet (1,000 mg total) by mouth 2 (two) times daily.    TRULICITY 1.5 mg/0.5 mL PnIj INJECT 1 SYRINGE INTO THE SKIN EVERY 7 DAYS    MICROLET LANCET Misc CHECK BLOOD SUGAR TWICE DAILY (Patient not taking: Reported on 2/3/2020)    niacin  (SLO-NIACIN) 500 mg tablet Take 1 tablet (500 mg total) by mouth every evening.    valsartan (DIOVAN) 160 MG tablet TAKE 1 TABLET(160 MG) BY MOUTH EVERY DAY (Patient not taking: Reported on 4/22/2020)     No current facility-administered medications for this visit.      Current Outpatient Medications on File Prior to Visit   Medication Sig    amLODIPine (NORVASC) 2.5 MG tablet Take 1 tablet (2.5 mg total) by mouth every evening.    aspirin (ECOTRIN) 81 MG EC tablet Take 81 mg by mouth once daily.    atorvastatin (LIPITOR) 40 MG tablet Take 1 tablet (40 mg total) by mouth once daily.    blood sugar diagnostic Strp 1 strip by Misc.(Non-Drug; Combo Route) route 2 (two) times daily.    blood sugar diagnostic Strp 1 strip by Misc.(Non-Drug; Combo Route) route 2 (two) times daily.    gabapentin (NEURONTIN) 300 MG capsule TK 1 C PO TID    hydroCHLOROthiazide (HYDRODIURIL) 50 MG tablet TK 1 T PO QD    isosorbide mononitrate (IMDUR) 30 MG 24 hr tablet Take 3 tablets (90 mg total) by mouth once daily.    metFORMIN (GLUCOPHAGE) 500 MG tablet Take 1 tablet (500 mg total) by mouth 2 (two) times daily with meals.    methocarbamol (ROBAXIN) 750 MG Tab TK 1 T PO BID PRN    metoprolol tartrate (LOPRESSOR) 50 MG tablet Take 1 tablet (50 mg total) by mouth 2 (two) times daily.    nitroGLYCERIN (NITROSTAT) 0.4 MG SL tablet Place 1 tablet (0.4 mg total) under the tongue every 5 (five) minutes as needed.    ranolazine (RANEXA) 1,000 mg Tb12 Take 1 tablet (1,000 mg total) by mouth 2 (two) times daily.    TRULICITY 1.5 mg/0.5 mL PnIj INJECT 1 SYRINGE INTO THE SKIN EVERY 7 DAYS    MICROLET LANCET Misc CHECK BLOOD SUGAR TWICE DAILY (Patient not taking: Reported on 2/3/2020)    niacin (SLO-NIACIN) 500 mg tablet Take 1 tablet (500 mg total) by mouth every evening.    valsartan (DIOVAN) 160 MG tablet TAKE 1 TABLET(160 MG) BY MOUTH EVERY DAY (Patient not taking: Reported on 4/22/2020)     No current facility-administered  medications on file prior to visit.      Review of patient's allergies indicates:   Allergen Reactions    Pcn [penicillins] Hives     Review of Systems   Constitution: Negative for malaise/fatigue.   Eyes: Negative for blurred vision.   Cardiovascular: Negative for chest pain, claudication, cyanosis, dyspnea on exertion, irregular heartbeat, leg swelling, near-syncope, orthopnea, palpitations and paroxysmal nocturnal dyspnea.   Respiratory: Negative for cough, hemoptysis and shortness of breath.    Hematologic/Lymphatic: Negative for bleeding problem. Does not bruise/bleed easily.   Skin: Negative for dry skin and itching.   Musculoskeletal: Negative for falls, muscle weakness and myalgias.   Gastrointestinal: Negative for abdominal pain, diarrhea, heartburn, hematemesis, hematochezia and melena.   Genitourinary: Negative for flank pain and hematuria.   Neurological: Negative for dizziness, focal weakness, headaches, light-headedness, numbness, paresthesias, seizures and weakness.   Psychiatric/Behavioral: Negative for altered mental status and memory loss. The patient is not nervous/anxious.    Allergic/Immunologic: Negative for hives.       Objective:   Physical Exam   Constitutional: He is oriented to person, place, and time. He appears well-developed and well-nourished. No distress.   HENT:   Head: Normocephalic and atraumatic.   Pulmonary/Chest: Effort normal. No respiratory distress.   Neurological: He is alert and oriented to person, place, and time.   Skin: He is not diaphoretic.   Vitals reviewed.    Vitals:    04/22/20 0831   BP: (!) 168/104   Pulse: 71     Lab Results   Component Value Date    CHOL 155 04/15/2020    CHOL 131 10/05/2019    CHOL 134 10/05/2019     Lab Results   Component Value Date    HDL 43 04/15/2020    HDL 35 (L) 10/05/2019    HDL 34 (L) 10/05/2019     Lab Results   Component Value Date    LDLCALC 100.0 04/15/2020    LDLCALC 81.8 10/05/2019    LDLCALC 85.8 10/05/2019     Lab Results    Component Value Date    TRIG 60 04/15/2020    TRIG 71 10/05/2019    TRIG 71 10/05/2019     Lab Results   Component Value Date    CHOLHDL 27.7 04/15/2020    CHOLHDL 26.7 10/05/2019    CHOLHDL 25.4 10/05/2019       Chemistry        Component Value Date/Time     04/15/2020 0830    K 4.4 04/15/2020 0830     04/15/2020 0830    CO2 27 04/15/2020 0830    BUN 19 04/15/2020 0830    CREATININE 1.1 04/15/2020 0830     (H) 04/15/2020 0830        Component Value Date/Time    CALCIUM 9.0 04/15/2020 0830    ALKPHOS 85 04/15/2020 0830    AST 14 04/15/2020 0830    ALT 22 04/15/2020 0830    BILITOT 0.6 04/15/2020 0830    ESTGFRAFRICA >60.0 04/15/2020 0830    EGFRNONAA >60.0 04/15/2020 0830        Lab Results   Component Value Date    HGBA1C 6.9 (H) 04/15/2020       Lab Results   Component Value Date    TSH 1.188 06/30/2015     Lab Results   Component Value Date    INR 1.0 12/16/2015    INR 1.1 10/29/2014    INR 1.0 10/25/2014     Lab Results   Component Value Date    WBC 8.41 04/01/2019    HGB 14.1 04/01/2019    HCT 43.1 04/01/2019    MCV 96 04/01/2019     04/01/2019     BMP  Sodium   Date Value Ref Range Status   04/15/2020 138 136 - 145 mmol/L Final     Potassium   Date Value Ref Range Status   04/15/2020 4.4 3.5 - 5.1 mmol/L Final     Chloride   Date Value Ref Range Status   04/15/2020 103 95 - 110 mmol/L Final     CO2   Date Value Ref Range Status   04/15/2020 27 23 - 29 mmol/L Final     BUN, Bld   Date Value Ref Range Status   04/15/2020 19 8 - 23 mg/dL Final     Creatinine   Date Value Ref Range Status   04/15/2020 1.1 0.5 - 1.4 mg/dL Final     Calcium   Date Value Ref Range Status   04/15/2020 9.0 8.7 - 10.5 mg/dL Final     Anion Gap   Date Value Ref Range Status   04/15/2020 8 8 - 16 mmol/L Final     eGFR if    Date Value Ref Range Status   04/15/2020 >60.0 >60 mL/min/1.73 m^2 Final     eGFR if non    Date Value Ref Range Status   04/15/2020 >60.0 >60 mL/min/1.73 m^2  Final     Comment:     Calculation used to obtain the estimated glomerular filtration  rate (eGFR) is the CKD-EPI equation.        CrCl cannot be calculated (Patient's most recent lab result is older than the maximum 7 days allowed.).    Assessment:     1. Coronary artery disease involving native coronary artery of native heart without angina pectoris    2. Chest pain, atypical    3. Obesity, Class II, BMI 35-39.9, isolated (see actual BMI)    4. Hypertension associated with diabetes    5. Hyperlipidemia associated with type 2 diabetes mellitus    6. S/P PTCA (percutaneous transluminal coronary angioplasty)    7. Chest pain, unspecified type    8. DANTE on CPAP    9. Venous insufficiency    10. Insulin resistance    11. Type 2 diabetes mellitus without complication, without long-term current use of insulin    12. Controlled type 2 diabetes mellitus without complication, without long-term current use of insulin    13. Type 2 diabetes mellitus without retinopathy    14. Preop examination      htn uncontrolled due to lack of valsartan will switch to loisartan 100 mg po daily  counseled about walking exercise compliance  To control lipids htn and diabetes since he is not on target.  Will increase atorvastatin to 80 mg po daily.  Compliant with cpap.   Plan:     As per above   Virtual visit in 2 weeks   Continue current therapy  Cardiac low salt diet.  Risk factor modification and excercise program./weight loss  F/u in 6 months with lipid cmp a1c

## 2020-04-23 NOTE — PROGRESS NOTES
Individual Psychotherapy (PhD/LCSW)    4/6/2020    Site:  [place]    Virtual visit with synchronous audio and video--patient presented at home.       Therapeutic Intervention: Met with patient.  Outpatient - Insight oriented psychotherapy 45 min - CPT code 96449 and Outpatient - Supportive psychotherapy 45 min - CPT Code 98333    Chief complaint/reason for encounter: depression, anxiety, interpersonal and sexual problems     Interval history and content of current session:  64 year old male returned for follow up psychotherapy to address adjustment mood symptoms in context of fallout from recent emotional infidelity on the part of the patient.  See diagnostic interview note.  Patient reporting he and wife continue to spend quality time together and feel close.  However, he said she still expresses to him some expectation that he show more sexual interest in her and that she appears to believe if he is attracted to her enough, he could perform sexually.  Patient continues to be somewhat vague in session about his conversations with his wife but upon questioning did finally endorse that he still has not made it plain to her that he has some sexual function deficits.  He has yet to see the doctor specifically to start exploring the issue and possible treatment options.  In the midst of the stay-at-home pandemic measures, he is also not expecting to obtain a physical exam any time soon for that issue.  But he did agree he can message his primary care doctor about his issue.  The patient continues to express some level of embarrassment, in that he is reluctant to speak plainly about his sexual struggles, even though they appear simply physical in nature.  Denied any si/hi, psychosis, cognitive deficit, mood swings, rages, or substance abuse.  Supportive therapy provided.  Plan is for follow up session online.  Appt scheduled for 4/23/20.     Treatment plan:  · Target symptoms: depression, anxiety , adjustment, sexual  and marital issues  · Why chosen therapy is appropriate versus another modality: relevant to diagnosis, patient responds to this modality  · Outcome monitoring methods: self-report, observation  · Therapeutic intervention type: insight oriented psychotherapy, supportive psychotherapy    Risk parameters:  Patient reports no suicidal ideation  Patient reports no homicidal ideation  Patient reports no self-injurious behavior  Patient reports no violent behavior    Verbal deficits: None    Patient's response to intervention:  The patient's response to intervention is accepting.    Progress toward goals and other mental status changes:  The patient's progress toward goals is fair .    Diagnosis:     ICD-10-CM ICD-9-CM   1. Adjustment disorder with mixed anxiety and depressed mood F43.23 309.28   2. Marital relationship problem Z63.0 V61.10   3. Male sexual dysfunction N53.9 302.70       Plan:  individual psychotherapy    Return to clinic: as scheduled    Length of Service (minutes): 45

## 2020-06-01 ENCOUNTER — OFFICE VISIT (OUTPATIENT)
Dept: INTERNAL MEDICINE | Facility: CLINIC | Age: 65
End: 2020-06-01
Payer: COMMERCIAL

## 2020-06-01 VITALS
HEIGHT: 74 IN | BODY MASS INDEX: 39.01 KG/M2 | SYSTOLIC BLOOD PRESSURE: 122 MMHG | DIASTOLIC BLOOD PRESSURE: 89 MMHG | TEMPERATURE: 98 F | HEART RATE: 71 BPM | WEIGHT: 304 LBS

## 2020-06-01 DIAGNOSIS — I10 ESSENTIAL HYPERTENSION: ICD-10-CM

## 2020-06-01 DIAGNOSIS — E11.9 TYPE 2 DIABETES MELLITUS WITHOUT COMPLICATION, WITHOUT LONG-TERM CURRENT USE OF INSULIN: Primary | ICD-10-CM

## 2020-06-01 PROCEDURE — 99213 PR OFFICE/OUTPT VISIT, EST, LEVL III, 20-29 MIN: ICD-10-PCS | Mod: S$GLB,,, | Performed by: FAMILY MEDICINE

## 2020-06-01 PROCEDURE — 3008F PR BODY MASS INDEX (BMI) DOCUMENTED: ICD-10-PCS | Mod: CPTII,S$GLB,, | Performed by: FAMILY MEDICINE

## 2020-06-01 PROCEDURE — 3079F PR MOST RECENT DIASTOLIC BLOOD PRESSURE 80-89 MM HG: ICD-10-PCS | Mod: CPTII,S$GLB,, | Performed by: FAMILY MEDICINE

## 2020-06-01 PROCEDURE — 99999 PR PBB SHADOW E&M-EST. PATIENT-LVL III: CPT | Mod: PBBFAC,,, | Performed by: FAMILY MEDICINE

## 2020-06-01 PROCEDURE — 99999 PR PBB SHADOW E&M-EST. PATIENT-LVL III: ICD-10-PCS | Mod: PBBFAC,,, | Performed by: FAMILY MEDICINE

## 2020-06-01 PROCEDURE — 3044F HG A1C LEVEL LT 7.0%: CPT | Mod: CPTII,S$GLB,, | Performed by: FAMILY MEDICINE

## 2020-06-01 PROCEDURE — 99213 OFFICE O/P EST LOW 20 MIN: CPT | Mod: S$GLB,,, | Performed by: FAMILY MEDICINE

## 2020-06-01 PROCEDURE — 3074F PR MOST RECENT SYSTOLIC BLOOD PRESSURE < 130 MM HG: ICD-10-PCS | Mod: CPTII,S$GLB,, | Performed by: FAMILY MEDICINE

## 2020-06-01 PROCEDURE — 3079F DIAST BP 80-89 MM HG: CPT | Mod: CPTII,S$GLB,, | Performed by: FAMILY MEDICINE

## 2020-06-01 PROCEDURE — 3044F PR MOST RECENT HEMOGLOBIN A1C LEVEL <7.0%: ICD-10-PCS | Mod: CPTII,S$GLB,, | Performed by: FAMILY MEDICINE

## 2020-06-01 PROCEDURE — 3008F BODY MASS INDEX DOCD: CPT | Mod: CPTII,S$GLB,, | Performed by: FAMILY MEDICINE

## 2020-06-01 PROCEDURE — 3074F SYST BP LT 130 MM HG: CPT | Mod: CPTII,S$GLB,, | Performed by: FAMILY MEDICINE

## 2020-06-01 NOTE — PROGRESS NOTES
Subjective:       Patient ID: Isaiah Harrison is a 65 y.o. male.    Chief Complaint: Follow-up    F/U:      Pt is a 65 year old who is here for follow-up. DM is stable. HTN is stable. Little increase in the LDL    Review of Systems   Constitutional: Negative for activity change and unexpected weight change.   HENT: Positive for hearing loss. Negative for rhinorrhea and trouble swallowing.    Eyes: Negative for discharge and visual disturbance.   Respiratory: Negative for chest tightness and wheezing.    Cardiovascular: Negative for chest pain and palpitations.   Gastrointestinal: Negative for blood in stool, constipation, diarrhea and vomiting.   Endocrine: Negative for polyuria.   Genitourinary: Negative for difficulty urinating, hematuria and urgency.   Musculoskeletal: Positive for joint swelling. Negative for arthralgias and neck pain.   Neurological: Negative for weakness and headaches.   Psychiatric/Behavioral: Negative for confusion and dysphoric mood.       Objective:      Physical Exam   Constitutional: He is oriented to person, place, and time. He appears well-developed and well-nourished.   Cardiovascular: Normal rate and regular rhythm. Exam reveals no friction rub.   No murmur heard.  Pulmonary/Chest: Effort normal and breath sounds normal. No stridor. He has no wheezes.   Abdominal: Soft. Bowel sounds are normal.   Neurological: He is alert and oriented to person, place, and time.       Assessment:       1. Type 2 diabetes mellitus without complication, without long-term current use of insulin    2. Essential hypertension        Plan:       Type 2 diabetes mellitus without complication, without long-term current use of insulin  Comments:  [place]    Essential hypertension  Comments:  BP is stable

## 2020-09-14 RX ORDER — METFORMIN HYDROCHLORIDE 500 MG/1
500 TABLET ORAL 2 TIMES DAILY WITH MEALS
Qty: 180 TABLET | Refills: 1 | Status: SHIPPED | OUTPATIENT
Start: 2020-09-14 | End: 2020-12-21

## 2020-09-28 ENCOUNTER — TELEPHONE (OUTPATIENT)
Dept: CARDIOLOGY | Facility: CLINIC | Age: 65
End: 2020-09-28

## 2020-09-28 NOTE — TELEPHONE ENCOUNTER
Patient having left eye surgery needs clearance on Monday. Scheduled on 9/30/20 with mid level at 1pm    ----- Message from Doris Weir sent at 9/28/2020  7:52 AM CDT -----  Contact: pt  Type:  Sooner Apoointment Request    Caller is requesting a sooner appointment.  Caller declined first available appointment listed below.  Caller will not accept being placed on the waitlist and is requesting a message be sent to doctor.  Name of Caller: pt  When is the first available appointment? 10/06/2020  Symptoms: pre op  Would the patient rather a call back or a response via [facility]? Call back  Best Call Back Number: [phone]  Additional Information: n/a

## 2020-09-30 ENCOUNTER — OFFICE VISIT (OUTPATIENT)
Dept: CARDIOLOGY | Facility: CLINIC | Age: 65
End: 2020-09-30
Payer: COMMERCIAL

## 2020-09-30 ENCOUNTER — HOSPITAL ENCOUNTER (OUTPATIENT)
Dept: CARDIOLOGY | Facility: HOSPITAL | Age: 65
Discharge: HOME | End: 2020-09-30
Payer: COMMERCIAL

## 2020-09-30 ENCOUNTER — TELEPHONE (OUTPATIENT)
Dept: CARDIOLOGY | Facility: CLINIC | Age: 65
End: 2020-09-30

## 2020-09-30 VITALS
HEIGHT: 74 IN | DIASTOLIC BLOOD PRESSURE: 80 MMHG | WEIGHT: 304 LBS | HEART RATE: 78 BPM | BODY MASS INDEX: 39.01 KG/M2 | SYSTOLIC BLOOD PRESSURE: 110 MMHG

## 2020-09-30 DIAGNOSIS — E11.59 HYPERTENSION ASSOCIATED WITH DIABETES: ICD-10-CM

## 2020-09-30 DIAGNOSIS — E11.69 HYPERLIPIDEMIA ASSOCIATED WITH TYPE 2 DIABETES MELLITUS: ICD-10-CM

## 2020-09-30 DIAGNOSIS — Z01.818 PREOP EXAMINATION: Primary | ICD-10-CM

## 2020-09-30 DIAGNOSIS — I25.10 CORONARY ARTERY DISEASE INVOLVING NATIVE CORONARY ARTERY OF NATIVE HEART WITHOUT ANGINA PECTORIS: Primary | ICD-10-CM

## 2020-09-30 DIAGNOSIS — E11.9 TYPE 2 DIABETES MELLITUS WITHOUT COMPLICATION: ICD-10-CM

## 2020-09-30 DIAGNOSIS — Z98.61 S/P PTCA (PERCUTANEOUS TRANSLUMINAL CORONARY ANGIOPLASTY): ICD-10-CM

## 2020-09-30 DIAGNOSIS — I10 ESSENTIAL HYPERTENSION: ICD-10-CM

## 2020-09-30 DIAGNOSIS — I25.10 CORONARY ARTERY DISEASE INVOLVING NATIVE CORONARY ARTERY OF NATIVE HEART WITHOUT ANGINA PECTORIS: ICD-10-CM

## 2020-09-30 DIAGNOSIS — I15.2 HYPERTENSION ASSOCIATED WITH DIABETES: ICD-10-CM

## 2020-09-30 DIAGNOSIS — E78.5 HYPERLIPIDEMIA ASSOCIATED WITH TYPE 2 DIABETES MELLITUS: ICD-10-CM

## 2020-09-30 PROCEDURE — 99999 PR PBB SHADOW E&M-EST. PATIENT-LVL III: CPT | Mod: PBBFAC,,, | Performed by: NURSE PRACTITIONER

## 2020-09-30 PROCEDURE — 3079F PR MOST RECENT DIASTOLIC BLOOD PRESSURE 80-89 MM HG: ICD-10-PCS | Mod: CPTII,S$GLB,, | Performed by: NURSE PRACTITIONER

## 2020-09-30 PROCEDURE — 93010 ELECTROCARDIOGRAM REPORT: CPT | Mod: ,,, | Performed by: INTERNAL MEDICINE

## 2020-09-30 PROCEDURE — 3074F PR MOST RECENT SYSTOLIC BLOOD PRESSURE < 130 MM HG: ICD-10-PCS | Mod: CPTII,S$GLB,, | Performed by: NURSE PRACTITIONER

## 2020-09-30 PROCEDURE — 99214 PR OFFICE/OUTPT VISIT, EST, LEVL IV, 30-39 MIN: ICD-10-PCS | Mod: S$GLB,,, | Performed by: NURSE PRACTITIONER

## 2020-09-30 PROCEDURE — 3008F PR BODY MASS INDEX (BMI) DOCUMENTED: ICD-10-PCS | Mod: CPTII,S$GLB,, | Performed by: NURSE PRACTITIONER

## 2020-09-30 PROCEDURE — 99214 OFFICE O/P EST MOD 30 MIN: CPT | Mod: S$GLB,,, | Performed by: NURSE PRACTITIONER

## 2020-09-30 PROCEDURE — 93010 EKG 12-LEAD: ICD-10-PCS | Mod: ,,, | Performed by: INTERNAL MEDICINE

## 2020-09-30 PROCEDURE — 3044F PR MOST RECENT HEMOGLOBIN A1C LEVEL <7.0%: ICD-10-PCS | Mod: CPTII,S$GLB,, | Performed by: NURSE PRACTITIONER

## 2020-09-30 PROCEDURE — 3074F SYST BP LT 130 MM HG: CPT | Mod: CPTII,S$GLB,, | Performed by: NURSE PRACTITIONER

## 2020-09-30 PROCEDURE — 93005 ELECTROCARDIOGRAM TRACING: CPT

## 2020-09-30 PROCEDURE — 3079F DIAST BP 80-89 MM HG: CPT | Mod: CPTII,S$GLB,, | Performed by: NURSE PRACTITIONER

## 2020-09-30 PROCEDURE — 3008F BODY MASS INDEX DOCD: CPT | Mod: CPTII,S$GLB,, | Performed by: NURSE PRACTITIONER

## 2020-09-30 PROCEDURE — 3044F HG A1C LEVEL LT 7.0%: CPT | Mod: CPTII,S$GLB,, | Performed by: NURSE PRACTITIONER

## 2020-09-30 PROCEDURE — 99999 PR PBB SHADOW E&M-EST. PATIENT-LVL III: ICD-10-PCS | Mod: PBBFAC,,, | Performed by: NURSE PRACTITIONER

## 2020-09-30 RX ORDER — DULAGLUTIDE 1.5 MG/.5ML
INJECTION, SOLUTION SUBCUTANEOUS
Qty: 4 PEN | Refills: 0 | Status: SHIPPED | OUTPATIENT
Start: 2020-09-30 | End: 2020-10-26

## 2020-09-30 NOTE — PROGRESS NOTES
Subjective:   Patient ID:  Isaiah Harrison is a 65 y.o. male who presents for follow up of Pre-op Exam      HPI  Mr. Mcclain' PMH of CAD, NSTEMI in Dec 2015 where he had patent LAD stent, HTN, HLP, obesity and DANTE, venous insufficiency.  Using CPAP nightly. Had back surgery approx 2 years ago and did well.   Echo in Nov 2017 showed normal LVF  LHC in 2015 showed patent stent to LAD. Noted to have small diagonal with 90% stenosis that was unchanged from previous . Vessel treated medically.   BP elevated in April when seen in clinic by Dr. Lyles. States that he had run out of his Valsartan due to recall. Switched to Losartan.   Patient seeking risk stratification for upcoming left eye surgery under general     EKG today shows NSR with no ischemic changes.     Denies any chest pain, SOB, ADAMS,  orthopnea, PND, dizziness, palpitations,  near syncope, syncope or edema . Has no symptoms concerning for angina or equivalent. No CNS Complaints to suggest TIA or CVA.  Has not had any cardiac related hospital admissions for cardiac related events.     Past Medical History:   Diagnosis Date    Acute coronary syndrome     Anticoagulant long-term use     Back pain     CAD (coronary artery disease) 10/17/2013    Coronary artery disease     Diabetes mellitus, type 2     Gastric polyps     Hyperlipemia     Hypertension     NSTEMI (non-ST elevated myocardial infarction) 10/23/2014    Obesity 10/21/2014    DANTE on CPAP 2/19/2015    S/P PTCA (percutaneous transluminal coronary angioplasty) 10/17/2013    Sleep apnea 1/7/2015       Past Surgical History:   Procedure Laterality Date    APPENDECTOMY      BACK SURGERY      COLONOSCOPY      CORONARY ANGIOPLASTY WITH STENT PLACEMENT      ESOPHAGOGASTRODUODENOSCOPY      FRACTURE SURGERY      HERNIA REPAIR      SPINE SURGERY         Social History     Tobacco Use    Smoking status: Never Smoker    Smokeless tobacco: Never Used   Substance Use Topics    Alcohol use:  Yes     Alcohol/week: 0.0 standard drinks     Frequency: Never     Drinks per session: 1 or 2     Binge frequency: Never     Comment: socially    Drug use: No       Family History   Problem Relation Age of Onset    Hypertension Mother     Hypertension Father     Heart disease Maternal Grandfather        Current Outpatient Medications   Medication Sig    amLODIPine (NORVASC) 2.5 MG tablet Take 1 tablet (2.5 mg total) by mouth every evening.    aspirin (ECOTRIN) 81 MG EC tablet Take 81 mg by mouth once daily.    atorvastatin (LIPITOR) 80 MG tablet Take 1 tablet (80 mg total) by mouth once daily.    blood sugar diagnostic Strp 1 strip by Misc.(Non-Drug; Combo Route) route 2 (two) times daily.    blood sugar diagnostic Strp 1 strip by Misc.(Non-Drug; Combo Route) route 2 (two) times daily.    gabapentin (NEURONTIN) 300 MG capsule TK 1 C PO TID    hydroCHLOROthiazide (HYDRODIURIL) 50 MG tablet TK 1 T PO QD    isosorbide mononitrate (IMDUR) 30 MG 24 hr tablet Take 3 tablets (90 mg total) by mouth once daily.    losartan (COZAAR) 100 MG tablet Take 1 tablet (100 mg total) by mouth once daily.    metFORMIN (GLUCOPHAGE) 500 MG tablet Take 1 tablet (500 mg total) by mouth 2 (two) times daily with meals.    methocarbamol (ROBAXIN) 750 MG Tab TK 1 T PO BID PRN    metoprolol tartrate (LOPRESSOR) 50 MG tablet Take 1 tablet (50 mg total) by mouth 2 (two) times daily.    MICROLET LANCET Misc CHECK BLOOD SUGAR TWICE DAILY    niacin (SLO-NIACIN) 500 mg tablet Take 1 tablet (500 mg total) by mouth every evening.    nitroGLYCERIN (NITROSTAT) 0.4 MG SL tablet Place 1 tablet (0.4 mg total) under the tongue every 5 (five) minutes as needed.    ranolazine (RANEXA) 1,000 mg Tb12 Take 1 tablet (1,000 mg total) by mouth 2 (two) times daily.    TRULICITY 1.5 mg/0.5 mL PnIj INJECT 1 SYRINGE INTO THE SKIN EVERY 7 DAYS     No current facility-administered medications for this visit.      Current Outpatient Medications on File  Prior to Visit   Medication Sig    amLODIPine (NORVASC) 2.5 MG tablet Take 1 tablet (2.5 mg total) by mouth every evening.    aspirin (ECOTRIN) 81 MG EC tablet Take 81 mg by mouth once daily.    atorvastatin (LIPITOR) 80 MG tablet Take 1 tablet (80 mg total) by mouth once daily.    blood sugar diagnostic Strp 1 strip by Misc.(Non-Drug; Combo Route) route 2 (two) times daily.    blood sugar diagnostic Strp 1 strip by Misc.(Non-Drug; Combo Route) route 2 (two) times daily.    gabapentin (NEURONTIN) 300 MG capsule TK 1 C PO TID    hydroCHLOROthiazide (HYDRODIURIL) 50 MG tablet TK 1 T PO QD    isosorbide mononitrate (IMDUR) 30 MG 24 hr tablet Take 3 tablets (90 mg total) by mouth once daily.    losartan (COZAAR) 100 MG tablet Take 1 tablet (100 mg total) by mouth once daily.    metFORMIN (GLUCOPHAGE) 500 MG tablet Take 1 tablet (500 mg total) by mouth 2 (two) times daily with meals.    methocarbamol (ROBAXIN) 750 MG Tab TK 1 T PO BID PRN    metoprolol tartrate (LOPRESSOR) 50 MG tablet Take 1 tablet (50 mg total) by mouth 2 (two) times daily.    MICROLET LANCET [facility] CHECK BLOOD SUGAR TWICE DAILY    niacin (SLO-NIACIN) 500 mg tablet Take 1 tablet (500 mg total) by mouth every evening.    nitroGLYCERIN (NITROSTAT) 0.4 MG SL tablet Place 1 tablet (0.4 mg total) under the tongue every 5 (five) minutes as needed.    ranolazine (RANEXA) 1,000 mg Tb12 Take 1 tablet (1,000 mg total) by mouth 2 (two) times daily.    TRULICITY 1.5 mg/0.5 mL PnIj INJECT 1 SYRINGE INTO THE SKIN EVERY 7 DAYS     No current facility-administered medications on file prior to visit.        Review of Systems   Constitution: Negative for diaphoresis, malaise/fatigue, weight gain and weight loss.   HENT: Negative for congestion and nosebleeds.    Cardiovascular: Negative for chest pain, claudication, cyanosis, dyspnea on exertion, irregular heartbeat, leg swelling, near-syncope, orthopnea, palpitations, paroxysmal nocturnal dyspnea and  "syncope.   Respiratory: Negative for cough, hemoptysis, shortness of breath, sleep disturbances due to breathing, snoring, sputum production and wheezing.    Hematologic/Lymphatic: Negative for bleeding problem. Does not bruise/bleed easily.   Skin: Negative for rash.   Musculoskeletal: Negative for arthritis, back pain, falls, joint pain, muscle cramps and muscle weakness.   Gastrointestinal: Negative for abdominal pain, constipation, diarrhea, heartburn, hematemesis, hematochezia, melena, nausea and vomiting.   Genitourinary: Negative for dysuria, hematuria and nocturia.   Neurological: Negative for excessive daytime sleepiness, dizziness, headaches, light-headedness, loss of balance, numbness, vertigo and weakness.       Objective:   Physical Exam   Constitutional: He is oriented to person, place, and time. He appears well-developed and well-nourished.   Neck: Neck supple. No JVD present.   Cardiovascular: Normal rate, regular rhythm, normal heart sounds and normal pulses. Exam reveals no friction rub.   No murmur heard.  Pulmonary/Chest: Effort normal and breath sounds normal. No respiratory distress. He has no wheezes. He has no rales.   Abdominal: Soft. Bowel sounds are normal. He exhibits no distension.   Musculoskeletal:         General: No tenderness or edema.   Neurological: He is alert and oriented to person, place, and time.   Skin: Skin is warm and dry. No rash noted.   Psychiatric: He has a normal mood and affect. His behavior is normal.   Nursing note and vitals reviewed.    Vitals:    09/30/20 1311   BP: 110/80   Pulse: 78   Weight: (!) 137.9 kg (304 lb)   Height: 6' 2" (1.88 m)     Lab Results   Component Value Date    CHOL 155 04/15/2020    CHOL 131 10/05/2019    CHOL 134 10/05/2019     Lab Results   Component Value Date    HDL 43 04/15/2020    HDL 35 (L) 10/05/2019    HDL 34 (L) 10/05/2019     Lab Results   Component Value Date    LDLCALC 100.0 04/15/2020    LDLCALC 81.8 10/05/2019    LDLCALC 85.8 " 10/05/2019     Lab Results   Component Value Date    TRIG 60 04/15/2020    TRIG 71 10/05/2019    TRIG 71 10/05/2019     Lab Results   Component Value Date    CHOLHDL 27.7 04/15/2020    CHOLHDL 26.7 10/05/2019    CHOLHDL 25.4 10/05/2019       Chemistry        Component Value Date/Time     04/15/2020 0830    K 4.4 04/15/2020 0830     04/15/2020 0830    CO2 27 04/15/2020 0830    BUN 19 04/15/2020 0830    CREATININE 1.1 04/15/2020 0830     (H) 04/15/2020 0830        Component Value Date/Time    CALCIUM 9.0 04/15/2020 0830    ALKPHOS 85 04/15/2020 0830    AST 14 04/15/2020 0830    ALT 22 04/15/2020 0830    BILITOT 0.6 04/15/2020 0830    ESTGFRAFRICA >60.0 04/15/2020 0830    EGFRNONAA >60.0 04/15/2020 0830          Lab Results   Component Value Date    TSH 1.188 06/30/2015     Lab Results   Component Value Date    INR 1.0 12/16/2015    INR 1.1 10/29/2014    INR 1.0 10/25/2014     Lab Results   Component Value Date    WBC 8.41 04/01/2019    HGB 14.1 04/01/2019    HCT 43.1 04/01/2019    MCV 96 04/01/2019     04/01/2019     BMP  Sodium   Date Value Ref Range Status   04/15/2020 138 136 - 145 mmol/L Final     Potassium   Date Value Ref Range Status   04/15/2020 4.4 3.5 - 5.1 mmol/L Final     Chloride   Date Value Ref Range Status   04/15/2020 103 95 - 110 mmol/L Final     CO2   Date Value Ref Range Status   04/15/2020 27 23 - 29 mmol/L Final     BUN, Bld   Date Value Ref Range Status   04/15/2020 19 8 - 23 mg/dL Final     Creatinine   Date Value Ref Range Status   04/15/2020 1.1 0.5 - 1.4 mg/dL Final     Calcium   Date Value Ref Range Status   04/15/2020 9.0 8.7 - 10.5 mg/dL Final     Anion Gap   Date Value Ref Range Status   04/15/2020 8 8 - 16 mmol/L Final     eGFR if    Date Value Ref Range Status   04/15/2020 >60.0 >60 mL/min/1.73 m^2 Final     eGFR if non    Date Value Ref Range Status   04/15/2020 >60.0 >60 mL/min/1.73 m^2 Final     Comment:     Calculation used  to obtain the estimated glomerular filtration  rate (eGFR) is the CKD-EPI equation.        CrCl cannot be calculated (Patient's most recent lab result is older than the maximum 7 days allowed.).    Assessment:     1. Preop examination    2. Hypertension associated with diabetes    3. Coronary artery disease involving native coronary artery of native heart without angina pectoris    4. S/P PTCA (percutaneous transluminal coronary angioplasty)    5. Essential hypertension    6. Hyperlipidemia associated with type 2 diabetes mellitus        Plan:   Stable CAD   No angina or equivalent  Continue current medical management with   Continue Ranexa, metoprolol, Imdur, atorvastatin and amlodipine for CAD  HTN control   DM control   Patient clear to proceed with eye surgery at moderate risk. Will fax note, labs and EKG to provider.

## 2020-10-01 ENCOUNTER — TELEPHONE (OUTPATIENT)
Dept: CARDIOLOGY | Facility: CLINIC | Age: 65
End: 2020-10-01

## 2020-10-01 NOTE — TELEPHONE ENCOUNTER
----- Message from Tucker Pavon sent at 10/1/2020  8:40 AM CDT -----  Contact: [facility]  [facility], would like for staff to fax over pt's EKG and lab work to [phone]. Once faxed, please call back at [phone] ext [phone].         Thanks  Zs

## 2020-10-01 NOTE — TELEPHONE ENCOUNTER
----- Message from Daisy Grimaldo sent at 10/1/2020  8:48 AM CDT -----  Contact: JC  .Type:  Test Results    Who Called: jc  Name of Test (Lab/Mammo/Etc): lab  Date of Test: 09/30/20  Ordering Provider: david  Where the test was performed[facility]  Would the patient rather a call back or a response via [facility]? [facility]  Best Call Back Number: [phone]  Additional Information:

## 2020-10-01 NOTE — TELEPHONE ENCOUNTER
The patient has been notified of this information and all questions answered.    Please inform patient that labs reviewed and shows critical glucose of 511. Per Dr. SULMA Cancino recommendations, given his anion gap of 11, can treat at home. Advise patient to report to the ED if glucose remains elevated. I suspect that he has been out of his trulicity .      Mr Colon stated that he will call Dr Del Cid's office to get an  appt today to check his glucose and evaluate going to the ED if still elevated

## 2020-10-01 NOTE — TELEPHONE ENCOUNTER
I spoke with [facility] at Dr Reyes's office and let her know that I did fax over  Isaiah's clearance note to their office

## 2020-10-01 NOTE — TELEPHONE ENCOUNTER
Left vm for patient to call back regarding labs ,       Marilu Pelletier, KRISTA Michel Staff             Please inform patient that labs reviewed and shows critical glucose of 511. Per Dr. SULMA Cancino recommendations, given his anion gap of 11, can treat at home. Advise patient to report to the ED if glucose remains elevated. I suspect that he has been out of his trulicity .

## 2020-10-01 NOTE — TELEPHONE ENCOUNTER
----- Message from Fahad Gold sent at 10/1/2020  2:50 PM CDT -----  Regarding:  office  .Type:  Patient Returning Call    Who Called:[facility]   Who Left Message for Patient:Cari   Does the patient know what this is regarding?:yes   Would the patient rather a call back or a response via [facility]? Call back   Best Call Back Number:[phone]  Additional Information:       Thank you,   Fahad Gold

## 2020-10-02 ENCOUNTER — PATIENT MESSAGE (OUTPATIENT)
Dept: INTERNAL MEDICINE | Facility: CLINIC | Age: 65
End: 2020-10-02

## 2020-10-08 ENCOUNTER — PATIENT MESSAGE (OUTPATIENT)
Dept: CARDIOLOGY | Facility: CLINIC | Age: 65
End: 2020-10-08

## 2020-10-08 DIAGNOSIS — E78.5 HYPERLIPEMIA: Chronic | ICD-10-CM

## 2020-10-08 RX ORDER — ATORVASTATIN CALCIUM 80 MG/1
80 TABLET, FILM COATED ORAL DAILY
Qty: 30 TABLET | Refills: 6 | Status: SHIPPED | OUTPATIENT
Start: 2020-10-08 | End: 2021-03-02

## 2020-10-16 ENCOUNTER — LAB VISIT (OUTPATIENT)
Dept: LAB | Facility: HOSPITAL | Age: 65
End: 2020-10-16
Attending: INTERNAL MEDICINE
Payer: COMMERCIAL

## 2020-10-16 DIAGNOSIS — E78.5 HYPERLIPIDEMIA ASSOCIATED WITH TYPE 2 DIABETES MELLITUS: ICD-10-CM

## 2020-10-16 DIAGNOSIS — I25.10 CORONARY ARTERY DISEASE INVOLVING NATIVE CORONARY ARTERY OF NATIVE HEART WITHOUT ANGINA PECTORIS: ICD-10-CM

## 2020-10-16 DIAGNOSIS — E11.69 HYPERLIPIDEMIA ASSOCIATED WITH TYPE 2 DIABETES MELLITUS: ICD-10-CM

## 2020-10-16 LAB
ALBUMIN SERPL BCP-MCNC: 3.8 G/DL (ref 3.5–5.2)
ALP SERPL-CCNC: 85 U/L (ref 55–135)
ALT SERPL W/O P-5'-P-CCNC: 30 U/L (ref 10–44)
ANION GAP SERPL CALC-SCNC: 10 MMOL/L (ref 8–16)
AST SERPL-CCNC: 16 U/L (ref 10–40)
BILIRUB SERPL-MCNC: 1.1 MG/DL (ref 0.1–1)
BUN SERPL-MCNC: 13 MG/DL (ref 8–23)
CALCIUM SERPL-MCNC: 9.2 MG/DL (ref 8.7–10.5)
CHLORIDE SERPL-SCNC: 100 MMOL/L (ref 95–110)
CHOLEST SERPL-MCNC: 131 MG/DL (ref 120–199)
CHOLEST/HDLC SERPL: 3.9 {RATIO} (ref 2–5)
CO2 SERPL-SCNC: 27 MMOL/L (ref 23–29)
CREAT SERPL-MCNC: 1.1 MG/DL (ref 0.5–1.4)
EST. GFR  (AFRICAN AMERICAN): >60 ML/MIN/1.73 M^2
EST. GFR  (NON AFRICAN AMERICAN): >60 ML/MIN/1.73 M^2
ESTIMATED AVG GLUCOSE: 321 MG/DL (ref 68–131)
GLUCOSE SERPL-MCNC: 267 MG/DL (ref 70–110)
HBA1C MFR BLD HPLC: 12.8 % (ref 4–5.6)
HDLC SERPL-MCNC: 34 MG/DL (ref 40–75)
HDLC SERPL: 26 % (ref 20–50)
LDLC SERPL CALC-MCNC: 75.2 MG/DL (ref 63–159)
NONHDLC SERPL-MCNC: 97 MG/DL
POTASSIUM SERPL-SCNC: 4.4 MMOL/L (ref 3.5–5.1)
PROT SERPL-MCNC: 7.1 G/DL (ref 6–8.4)
SODIUM SERPL-SCNC: 137 MMOL/L (ref 136–145)
TRIGL SERPL-MCNC: 109 MG/DL (ref 30–150)

## 2020-10-16 PROCEDURE — 83036 HEMOGLOBIN GLYCOSYLATED A1C: CPT

## 2020-10-16 PROCEDURE — 80053 COMPREHEN METABOLIC PANEL: CPT

## 2020-10-16 PROCEDURE — 36415 COLL VENOUS BLD VENIPUNCTURE: CPT

## 2020-10-16 PROCEDURE — 80061 LIPID PANEL: CPT

## 2020-10-19 ENCOUNTER — TELEPHONE (OUTPATIENT)
Dept: CARDIOLOGY | Facility: CLINIC | Age: 65
End: 2020-10-19

## 2020-10-19 ENCOUNTER — TELEPHONE (OUTPATIENT)
Dept: INTERNAL MEDICINE | Facility: CLINIC | Age: 65
End: 2020-10-19

## 2020-10-19 NOTE — TELEPHONE ENCOUNTER
Patient was notified of results. All questions were answered. Pt verbalized understanding. Pt will call back with any other questions or concerns.    Patient states he will contact his PCP  regarding his blood sugar    ----- Message from Kirsty Lyles MD sent at 10/17/2020  9:44 AM CDT -----  His sugar is very elevated he needs to address with his pcp or go see endocrinology.

## 2020-10-19 NOTE — TELEPHONE ENCOUNTER
I s/w pt in regards to coming in to discuss A1C with PCP. Pt scheduled to come in on October 20th at 11:20. Pt thanked me and ended call. //BJ

## 2020-10-19 NOTE — TELEPHONE ENCOUNTER
----- Message from Rebecca Kramer MA sent at 10/19/2020  2:44 PM CDT -----  Contact: Kirsty Jones MD   10/17/2020  9:44 AM    His sugar is very elevated he needs to address with his pcp or go see endocrinology.        Please call the patient to address.   -Thank you   ----- Message -----  From: Demarcus Steward MA  Sent: 10/19/2020   2:01 PM CDT  To: Trupti GARCÍA Staff      ----- Message -----  From: Kerry Garza  Sent: 10/19/2020  12:42 PM CDT  To: Nehmeias GEE Staff    Type:  Needs Medical Advice    Who Called: Isaiah  Symptoms (please be specific): Blood Sugar test results   How long has patient had these symptoms:    Pharmacy name and phone #:      [id number] [place] - [address] AT [facility]  [address]  [place] [address]  Phone: [phone] Fax: [phone]    Would the patient rather a call back or a response via [facility]? Call  Best Call Back Number: [phone]  Additional Information: Willing to come in tomorrow to discuss

## 2020-10-20 ENCOUNTER — LAB VISIT (OUTPATIENT)
Dept: LAB | Facility: HOSPITAL | Age: 65
End: 2020-10-20
Attending: FAMILY MEDICINE
Payer: COMMERCIAL

## 2020-10-20 ENCOUNTER — OFFICE VISIT (OUTPATIENT)
Dept: INTERNAL MEDICINE | Facility: CLINIC | Age: 65
End: 2020-10-20
Payer: COMMERCIAL

## 2020-10-20 VITALS
HEART RATE: 91 BPM | HEIGHT: 74 IN | OXYGEN SATURATION: 96 % | BODY MASS INDEX: 36.95 KG/M2 | TEMPERATURE: 97 F | SYSTOLIC BLOOD PRESSURE: 130 MMHG | DIASTOLIC BLOOD PRESSURE: 80 MMHG | WEIGHT: 287.94 LBS

## 2020-10-20 DIAGNOSIS — R39.81 FUNCTIONAL URINARY INCONTINENCE: ICD-10-CM

## 2020-10-20 DIAGNOSIS — E11.9 TYPE 2 DIABETES MELLITUS WITHOUT COMPLICATION: ICD-10-CM

## 2020-10-20 DIAGNOSIS — E11.9 TYPE 2 DIABETES MELLITUS WITHOUT COMPLICATION, WITHOUT LONG-TERM CURRENT USE OF INSULIN: ICD-10-CM

## 2020-10-20 DIAGNOSIS — R39.81 FUNCTIONAL URINARY INCONTINENCE: Primary | ICD-10-CM

## 2020-10-20 LAB
BACTERIA #/AREA URNS HPF: NORMAL /HPF
BILIRUB UR QL STRIP: NEGATIVE
CLARITY UR: CLEAR
COLOR UR: YELLOW
COMPLEXED PSA SERPL-MCNC: 1.3 NG/ML (ref 0–4)
GLUCOSE UR QL STRIP: ABNORMAL
HGB UR QL STRIP: NEGATIVE
KETONES UR QL STRIP: NEGATIVE
LEUKOCYTE ESTERASE UR QL STRIP: NEGATIVE
MICROSCOPIC COMMENT: NORMAL
NITRITE UR QL STRIP: NEGATIVE
PH UR STRIP: 6 [PH] (ref 5–8)
PROT UR QL STRIP: NEGATIVE
RBC #/AREA URNS HPF: 1 /HPF (ref 0–4)
SP GR UR STRIP: 1.01 (ref 1–1.03)
URN SPEC COLLECT METH UR: ABNORMAL
WBC #/AREA URNS HPF: 1 /HPF (ref 0–5)
YEAST URNS QL MICRO: NORMAL

## 2020-10-20 PROCEDURE — 3079F PR MOST RECENT DIASTOLIC BLOOD PRESSURE 80-89 MM HG: ICD-10-PCS | Mod: CPTII,S$GLB,, | Performed by: FAMILY MEDICINE

## 2020-10-20 PROCEDURE — 3075F SYST BP GE 130 - 139MM HG: CPT | Mod: CPTII,S$GLB,, | Performed by: FAMILY MEDICINE

## 2020-10-20 PROCEDURE — 99999 PR PBB SHADOW E&M-EST. PATIENT-LVL V: CPT | Mod: PBBFAC,,, | Performed by: FAMILY MEDICINE

## 2020-10-20 PROCEDURE — 99214 OFFICE O/P EST MOD 30 MIN: CPT | Mod: S$GLB,,, | Performed by: FAMILY MEDICINE

## 2020-10-20 PROCEDURE — 84153 ASSAY OF PSA TOTAL: CPT

## 2020-10-20 PROCEDURE — 36415 COLL VENOUS BLD VENIPUNCTURE: CPT

## 2020-10-20 PROCEDURE — 3008F PR BODY MASS INDEX (BMI) DOCUMENTED: ICD-10-PCS | Mod: CPTII,S$GLB,, | Performed by: FAMILY MEDICINE

## 2020-10-20 PROCEDURE — 3046F HEMOGLOBIN A1C LEVEL >9.0%: CPT | Mod: CPTII,S$GLB,, | Performed by: FAMILY MEDICINE

## 2020-10-20 PROCEDURE — 82043 UR ALBUMIN QUANTITATIVE: CPT

## 2020-10-20 PROCEDURE — 3079F DIAST BP 80-89 MM HG: CPT | Mod: CPTII,S$GLB,, | Performed by: FAMILY MEDICINE

## 2020-10-20 PROCEDURE — 99214 PR OFFICE/OUTPT VISIT, EST, LEVL IV, 30-39 MIN: ICD-10-PCS | Mod: S$GLB,,, | Performed by: FAMILY MEDICINE

## 2020-10-20 PROCEDURE — 3075F PR MOST RECENT SYSTOLIC BLOOD PRESS GE 130-139MM HG: ICD-10-PCS | Mod: CPTII,S$GLB,, | Performed by: FAMILY MEDICINE

## 2020-10-20 PROCEDURE — 81000 URINALYSIS NONAUTO W/SCOPE: CPT

## 2020-10-20 PROCEDURE — 3046F PR MOST RECENT HEMOGLOBIN A1C LEVEL > 9.0%: ICD-10-PCS | Mod: CPTII,S$GLB,, | Performed by: FAMILY MEDICINE

## 2020-10-20 PROCEDURE — 99999 PR PBB SHADOW E&M-EST. PATIENT-LVL V: ICD-10-PCS | Mod: PBBFAC,,, | Performed by: FAMILY MEDICINE

## 2020-10-20 PROCEDURE — 3008F BODY MASS INDEX DOCD: CPT | Mod: CPTII,S$GLB,, | Performed by: FAMILY MEDICINE

## 2020-10-20 NOTE — PROGRESS NOTES
Subjective:       Patient ID: Isaiah Harrison is a 65 y.o. male.    Chief Complaint: discuss A1c    Pt is a 65 year old with increase in a1c to 12. He admits to not taking on regular basis his trulicity. Unclear why. A1c up to this point has been well controlled. Pt cholesterol is well controlled. He is complaining of urinary control. Not on diuretic. No fever or dysuria.    Review of Systems   Constitutional: Negative.    Respiratory: Negative.    Cardiovascular: Negative.    Gastrointestinal: Negative.    Endocrine: Positive for polyuria.   Musculoskeletal: Negative.    Hematological: Negative.    Psychiatric/Behavioral: Negative.          Objective:      Physical Exam  Constitutional:       Appearance: Normal appearance.   Cardiovascular:      Rate and Rhythm: Normal rate and regular rhythm.      Heart sounds: No murmur. No friction rub.   Pulmonary:      Effort: Pulmonary effort is normal.      Breath sounds: Normal breath sounds.   Neurological:      General: No focal deficit present.      Mental Status: He is alert and oriented to person, place, and time.         Assessment:       1. Functional urinary incontinence    2. Type 2 diabetes mellitus without complication, without long-term current use of insulin        Plan:       Functional urinary incontinence  Comments:  Could be sugar dumping vs BPH. Will get PSA  Orders:  -     Urinalysis; Future; Expected date: 10/20/2020  -     PSA, Screening; Future; Expected date: 10/20/2020    Type 2 diabetes mellitus without complication, without long-term current use of insulin  Comments:  Pt will go back on Trulicity and watch his sugars  Orders:  -     Microalbumin/Creatinine Ratio, Urine; Future; Expected date: 10/20/2020

## 2020-10-21 LAB
ALBUMIN/CREAT UR: 56.7 UG/MG (ref 0–30)
CREAT UR-MCNC: 60 MG/DL (ref 23–375)
MICROALBUMIN UR DL<=1MG/L-MCNC: 34 UG/ML

## 2020-10-21 RX ORDER — DULAGLUTIDE 1.5 MG/.5ML
INJECTION, SOLUTION SUBCUTANEOUS
Qty: 2 ML | OUTPATIENT
Start: 2020-10-21

## 2020-10-23 DIAGNOSIS — E11.9 TYPE 2 DIABETES MELLITUS WITHOUT COMPLICATION: ICD-10-CM

## 2020-10-26 RX ORDER — DULAGLUTIDE 1.5 MG/.5ML
INJECTION, SOLUTION SUBCUTANEOUS
Qty: 4 ML | Refills: 2 | Status: SHIPPED | OUTPATIENT
Start: 2020-10-26 | End: 2020-12-21 | Stop reason: DRUGHIGH

## 2020-10-27 ENCOUNTER — PATIENT MESSAGE (OUTPATIENT)
Dept: INTERNAL MEDICINE | Facility: CLINIC | Age: 65
End: 2020-10-27

## 2020-12-01 ENCOUNTER — PATIENT MESSAGE (OUTPATIENT)
Dept: ADMINISTRATIVE | Facility: OTHER | Age: 65
End: 2020-12-01

## 2020-12-01 ENCOUNTER — LAB VISIT (OUTPATIENT)
Dept: LAB | Facility: HOSPITAL | Age: 65
End: 2020-12-01
Attending: FAMILY MEDICINE
Payer: COMMERCIAL

## 2020-12-01 ENCOUNTER — OFFICE VISIT (OUTPATIENT)
Dept: INTERNAL MEDICINE | Facility: CLINIC | Age: 65
End: 2020-12-01
Payer: COMMERCIAL

## 2020-12-01 VITALS
WEIGHT: 291.44 LBS | BODY MASS INDEX: 37.4 KG/M2 | SYSTOLIC BLOOD PRESSURE: 132 MMHG | HEART RATE: 78 BPM | TEMPERATURE: 97 F | DIASTOLIC BLOOD PRESSURE: 92 MMHG | OXYGEN SATURATION: 97 % | HEIGHT: 74 IN

## 2020-12-01 DIAGNOSIS — Z23 NEED FOR SHINGLES VACCINE: ICD-10-CM

## 2020-12-01 DIAGNOSIS — E78.5 HYPERLIPIDEMIA ASSOCIATED WITH TYPE 2 DIABETES MELLITUS: ICD-10-CM

## 2020-12-01 DIAGNOSIS — L98.9 SKIN LESION: ICD-10-CM

## 2020-12-01 DIAGNOSIS — E11.69 HYPERLIPIDEMIA ASSOCIATED WITH TYPE 2 DIABETES MELLITUS: ICD-10-CM

## 2020-12-01 DIAGNOSIS — E66.01 CLASS 2 SEVERE OBESITY DUE TO EXCESS CALORIES WITH SERIOUS COMORBIDITY AND BODY MASS INDEX (BMI) OF 37.0 TO 37.9 IN ADULT: ICD-10-CM

## 2020-12-01 DIAGNOSIS — Z00.00 PREVENTATIVE HEALTH CARE: Primary | ICD-10-CM

## 2020-12-01 DIAGNOSIS — Z11.4 SCREENING FOR HIV WITHOUT PRESENCE OF RISK FACTORS: ICD-10-CM

## 2020-12-01 DIAGNOSIS — R21 RASH: ICD-10-CM

## 2020-12-01 DIAGNOSIS — E11.59 TYPE 2 DIABETES MELLITUS WITH OTHER CIRCULATORY COMPLICATION, WITHOUT LONG-TERM CURRENT USE OF INSULIN: Chronic | ICD-10-CM

## 2020-12-01 DIAGNOSIS — G47.33 OSA ON CPAP: ICD-10-CM

## 2020-12-01 DIAGNOSIS — E11.59 HYPERTENSION ASSOCIATED WITH DIABETES: ICD-10-CM

## 2020-12-01 DIAGNOSIS — I15.2 HYPERTENSION ASSOCIATED WITH DIABETES: ICD-10-CM

## 2020-12-01 LAB
ESTIMATED AVG GLUCOSE: 269 MG/DL (ref 68–131)
HBA1C MFR BLD HPLC: 11 % (ref 4–5.6)

## 2020-12-01 PROCEDURE — 3075F SYST BP GE 130 - 139MM HG: CPT | Mod: CPTII,S$GLB,, | Performed by: FAMILY MEDICINE

## 2020-12-01 PROCEDURE — 86703 HIV-1/HIV-2 1 RESULT ANTBDY: CPT

## 2020-12-01 PROCEDURE — 99999 PR PBB SHADOW E&M-EST. PATIENT-LVL V: CPT | Mod: PBBFAC,,, | Performed by: FAMILY MEDICINE

## 2020-12-01 PROCEDURE — 3072F LOW RISK FOR RETINOPATHY: CPT | Mod: S$GLB,,, | Performed by: FAMILY MEDICINE

## 2020-12-01 PROCEDURE — 1126F PR PAIN SEVERITY QUANTIFIED, NO PAIN PRESENT: ICD-10-PCS | Mod: S$GLB,,, | Performed by: FAMILY MEDICINE

## 2020-12-01 PROCEDURE — 3046F PR MOST RECENT HEMOGLOBIN A1C LEVEL > 9.0%: ICD-10-PCS | Mod: CPTII,S$GLB,, | Performed by: FAMILY MEDICINE

## 2020-12-01 PROCEDURE — 3080F PR MOST RECENT DIASTOLIC BLOOD PRESSURE >= 90 MM HG: ICD-10-PCS | Mod: CPTII,S$GLB,, | Performed by: FAMILY MEDICINE

## 2020-12-01 PROCEDURE — 3008F PR BODY MASS INDEX (BMI) DOCUMENTED: ICD-10-PCS | Mod: CPTII,S$GLB,, | Performed by: FAMILY MEDICINE

## 2020-12-01 PROCEDURE — 99396 PR PREVENTIVE VISIT,EST,40-64: ICD-10-PCS | Mod: S$GLB,,, | Performed by: FAMILY MEDICINE

## 2020-12-01 PROCEDURE — 83036 HEMOGLOBIN GLYCOSYLATED A1C: CPT

## 2020-12-01 PROCEDURE — 3072F PR LOW RISK FOR RETINOPATHY: ICD-10-PCS | Mod: S$GLB,,, | Performed by: FAMILY MEDICINE

## 2020-12-01 PROCEDURE — 3080F DIAST BP >= 90 MM HG: CPT | Mod: CPTII,S$GLB,, | Performed by: FAMILY MEDICINE

## 2020-12-01 PROCEDURE — 1126F AMNT PAIN NOTED NONE PRSNT: CPT | Mod: S$GLB,,, | Performed by: FAMILY MEDICINE

## 2020-12-01 PROCEDURE — 99999 PR PBB SHADOW E&M-EST. PATIENT-LVL V: ICD-10-PCS | Mod: PBBFAC,,, | Performed by: FAMILY MEDICINE

## 2020-12-01 PROCEDURE — 99396 PREV VISIT EST AGE 40-64: CPT | Mod: S$GLB,,, | Performed by: FAMILY MEDICINE

## 2020-12-01 PROCEDURE — 3008F BODY MASS INDEX DOCD: CPT | Mod: CPTII,S$GLB,, | Performed by: FAMILY MEDICINE

## 2020-12-01 PROCEDURE — 3046F HEMOGLOBIN A1C LEVEL >9.0%: CPT | Mod: CPTII,S$GLB,, | Performed by: FAMILY MEDICINE

## 2020-12-01 PROCEDURE — 36415 COLL VENOUS BLD VENIPUNCTURE: CPT

## 2020-12-01 PROCEDURE — 3075F PR MOST RECENT SYSTOLIC BLOOD PRESS GE 130-139MM HG: ICD-10-PCS | Mod: CPTII,S$GLB,, | Performed by: FAMILY MEDICINE

## 2020-12-01 RX ORDER — DUREZOL 0.5 MG/ML
EMULSION OPHTHALMIC
COMMUNITY
Start: 2020-10-07 | End: 2021-07-23

## 2020-12-01 RX ORDER — DICLOFENAC SODIUM 1 MG/ML
SOLUTION/ DROPS OPHTHALMIC
COMMUNITY
Start: 2020-11-05 | End: 2021-09-29

## 2020-12-01 NOTE — PROGRESS NOTES
WELLNESS VISIT (PREVENTIVE SERVICES)    CHIEF COMPLAINT  Annual Wellness Exam    This is my first time treating him here. All problems addressed today are NEW TO ME.  Isaiah is requesting that I take over as his primary care provider.     HEALTH MAINTENANCE INTERVENTIONS - UP TO DATE  Health Maintenance Topics with due status: Not Due       Topic Last Completion Date    TETANUS VACCINE 08/18/2016    Colorectal Cancer Screening 07/25/2018    Lipid Panel 10/16/2020    PROSTATE-SPECIFIC ANTIGEN 10/20/2020    Urine Microalbumin 10/20/2020    High Dose Statin 12/01/2020    Aspirin/Antiplatelet Therapy 12/01/2020    Foot Exam 12/01/2020    Hemoglobin A1c 12/01/2020       HEALTH MAINTENANCE INTERVENTIONS - DUE OR DUE SOON  Health Maintenance Due   Topic Date Due    Shingles Vaccine (3 of 3) 10/05/2018    Eye Exam  11/11/2020       SUMMARY OF DIAGNOSES AND ORDERS  Assessment   Preventative health care    Type 2 diabetes mellitus with other circulatory complication, without long-term current use of insulin  -     Diabetes Digital Medicine (DDMP) Enrollment Order  -     Diabetes Digital Medicine (DDMP): Assign Onboarding Questionnaires  -     Ambulatory referral/consult to Optometry; Future; Expected date: 12/08/2020  -     Hemoglobin A1C; Future; Expected date: 12/01/2020    DANTE on CPAP    Hyperlipidemia associated with type 2 diabetes mellitus    Hypertension associated with diabetes  -     Hypertension Digital Medicine (HDMP) Enrollment Order  -     Hypertension Digital Medicine (HDMP): Assign Onboarding Questionnaires    Need for shingles vaccine  -     varicella-zoster gE-AS01B, PF, (SHINGRIX) 50 mcg/0.5 mL injection; Inject 0.5 mL (one dose) into muscle now; give second dose at least 2 months later  Dispense: 1 each; Refill: 1    Screening for HIV without presence of risk factors  -     HIV 1/2 Ag/Ab (4th Gen); Future; Expected date: 12/01/2020    Rash  -     Ambulatory referral/consult to Dermatology; Future; Expected  date: 12/08/2020    Skin lesion  -     Ambulatory referral/consult to Dermatology; Future; Expected date: 12/08/2020    Class 2 severe obesity due to excess calories with serious comorbidity and body mass index (BMI) of 37.0 to 37.9 in adult         Except as noted herein, any chronic conditions are compensated/controlled and stable, and no other significant new complaints or concerns reported.     Plan   Problem List Items Addressed This Visit     Type 2 diabetes mellitus with circulatory disorder (coronary artery disease), without long-term current use of insulin (Chronic)    Current Assessment & Plan     Diabetes Management Status    Statin: Taking  ACE/ARB: Taking    Screening or Prevention Patient's value Goal Complete/Controlled?   HgA1C Testing and Control   Lab Results   Component Value Date    HGBA1C 12.8 (H) 10/16/2020      Annually/Less than 8% No   Lipid profile : 10/16/2020 Annually Yes   LDL control Lab Results   Component Value Date    LDLCALC 75.2 10/16/2020    Annually/Less than 100 mg/dl  Yes   Nephropathy screening Lab Results   Component Value Date    LABMICR 34.0 10/20/2020     Lab Results   Component Value Date    PROTEINUA Negative 10/20/2020    Annually Yes   Blood pressure BP Readings from Last 1 Encounters:   12/01/20 (!) 132/92    Less than 140/90 No   Dilated retinal exam : 11/11/2019 Annually No   Foot exam   : 12/01/2020 Annually Yes     Lab Results   Component Value Date    HGBA1C 12.8 (H) 10/16/2020    HGBA1C 6.9 (H) 04/15/2020    HGBA1C 6.8 (H) 10/05/2019    HGBA1C 6.8 (H) 10/05/2019    ESTGFRAFRICA >60.0 10/16/2020    EGFRNONAA >60.0 10/16/2020    MICALBCREAT 56.7 (H) 10/20/2020    LDLCALC 75.2 10/16/2020     Last 6 Patient Entered Readings                                          Most Recent A1c:      There is no flowsheet data to display.         Suspect latest A1c spurious.    HEALTH MAINTENANCE: Diabetic health maintenance interventions reviewed and are up to date except for:       Topic    Eye Exam      DIABETIC FOOT EXAM: Inspection of feet reveals no open wounds, ulcerations, significant calluses, or evidence of arterial insufficiency. 10g monofilament sensation is intact in all 5 locations tested.           Relevant Orders    Diabetes Digital Medicine (DDMP) Enrollment Order (Completed)    Diabetes Digital Medicine (DDMP): Assign Onboarding Questionnaires (Completed)    Ambulatory referral/consult to Optometry    Hemoglobin A1C (Completed)    Hypertension associated with diabetes    Relevant Orders    Hypertension Digital Medicine (HDMP) Enrollment Order (Completed)    Hypertension Digital Medicine (HDMP): Assign Onboarding Questionnaires (Completed)    Hyperlipidemia associated with type 2 diabetes mellitus    Class 2 severe obesity due to excess calories with serious comorbidity and body mass index (BMI) of 37.0 to 37.9 in adult    Current Assessment & Plan     Wt Readings from Last 6 Encounters:   12/01/20 132.2 kg (291 lb 7.2 oz)   10/20/20 130.6 kg (287 lb 14.7 oz)   09/30/20 (!) 137.9 kg (304 lb)   06/01/20 (!) 137.9 kg (304 lb 0.2 oz)   02/03/20 133.7 kg (294 lb 12.1 oz)   10/23/19 (!) 137.9 kg (304 lb)     BMI Readings from Last 2 Encounters:   12/01/20 37.42 kg/m²   10/20/20 36.97 kg/m²     Worse. Therapeutic lifestyle changes encouraged.         DANTE on CPAP    Overview     PSG 2015 mild obstructive sleep apnea AHI 10.0.  On CPAP 9.5 cm with Full face mask.   AHI 0.5   HME: [facility]            Other Visit Diagnoses     Preventative health care    -  Primary    Need for shingles vaccine        Relevant Medications    varicella-zoster gE-AS01B, PF, (SHINGRIX) 50 mcg/0.5 mL injection    Screening for HIV without presence of risk factors        Relevant Orders    HIV 1/2 Ag/Ab (4th Gen) (Completed)    Rash        Relevant Orders    Ambulatory referral/consult to Dermatology    Skin lesion        Relevant Orders    Ambulatory referral/consult to Dermatology          Outpatient  Medications Prior to Visit   Medication Sig Dispense Refill    amLODIPine (NORVASC) 2.5 MG tablet TAKE 1 TABLET(2.5 MG) BY MOUTH EVERY EVENING 210 tablet 3    aspirin (ECOTRIN) 81 MG EC tablet Take 81 mg by mouth once daily.      atorvastatin (LIPITOR) 80 MG tablet Take 1 tablet (80 mg total) by mouth once daily. 30 tablet 6    blood sugar diagnostic Strp 1 strip by Misc.(Non-Drug; Combo Route) route 2 (two) times daily. 60 strip 11    blood sugar diagnostic Strp 1 strip by Misc.(Non-Drug; Combo Route) route 2 (two) times daily. 100 each 6    diclofenac (VOLTAREN) 0.1 % ophthalmic solution PLACE 1 DROP TID INTO THE OS      dulaglutide (TRULICITY) 1.5 mg/0.5 mL pen injector ADMINISTER 1 SYRINGE UNDER THE SKIN EVERY 7 DAYS 4 mL 2    DUREZOL 0.05 % Drop ophthalmic solution PLACE 1 DROP  QID IN  OS      gabapentin (NEURONTIN) 300 MG capsule TK 1 C PO TID  0    hydroCHLOROthiazide (HYDRODIURIL) 50 MG tablet TK 1 T PO QD  0    isosorbide mononitrate (IMDUR) 30 MG 24 hr tablet Take 3 tablets (90 mg total) by mouth once daily. 270 tablet 6    losartan (COZAAR) 100 MG tablet Take 1 tablet (100 mg total) by mouth once daily. 90 tablet 3    metFORMIN (GLUCOPHAGE) 500 MG tablet Take 1 tablet (500 mg total) by mouth 2 (two) times daily with meals. (Patient taking differently: Take 500 mg by mouth daily with breakfast. ) 180 tablet 1    methocarbamol (ROBAXIN) 750 MG Tab TK 1 T PO BID PRN  4    metoprolol tartrate (LOPRESSOR) 50 MG tablet TAKE 1 TABLET(50 MG) BY MOUTH TWICE DAILY 420 tablet 3    MICROLET LANCET [facility] CHECK BLOOD SUGAR TWICE DAILY 100 each 3    niacin (SLO-NIACIN) 500 mg tablet Take 1 tablet (500 mg total) by mouth every evening. (Patient taking differently: Take 500 mg by mouth nightly. ) 30 tablet 6    nitroGLYCERIN (NITROSTAT) 0.4 MG SL tablet Place 1 tablet (0.4 mg total) under the tongue every 5 (five) minutes as needed. 25 tablet 4    ranolazine (RANEXA) 1,000 mg Tb12 TAKE 1 TABLET(1000 MG)  "BY MOUTH TWICE DAILY 60 tablet 6     No facility-administered medications prior to visit.         There are no discontinued medications.     Medications Ordered This Encounter   Medications    varicella-zoster gE-AS01B, PF, (SHINGRIX) 50 mcg/0.5 mL injection     Sig: Inject 0.5 mL (one dose) into muscle now; give second dose at least 2 months later     Dispense:  1 each     Refill:  1       Follow up in about 6 months (around 6/1/2021) for periodic management of chronic medical conditions.     Subjective   Review of Systems   Constitutional: Negative for chills and fever.   Respiratory: Negative for chest tightness and shortness of breath.    Endocrine: Negative for polydipsia and polyuria.   Integumentary:  Positive for rash.        Objective   Vitals:    12/01/20 0918   BP: (!) 132/92   BP Location: Right arm   Patient Position: Sitting   BP Method: Large (Manual)   Pulse: 78   Temp: 96.9 °F (36.1 °C)   TempSrc: Temporal   SpO2: 97%   Weight: 132.2 kg (291 lb 7.2 oz)   Height: 6' 2" (1.88 m)     Physical Exam  Vitals signs reviewed.   Constitutional:       General: He is not in acute distress.     Appearance: Normal appearance.   Eyes:      General: No scleral icterus.     Conjunctiva/sclera: Conjunctivae normal.   Neck:      Musculoskeletal: No muscular tenderness.      Vascular: No carotid bruit.   Cardiovascular:      Rate and Rhythm: Normal rate and regular rhythm.      Heart sounds: Normal heart sounds.   Pulmonary:      Effort: Pulmonary effort is normal. No respiratory distress.      Breath sounds: Normal breath sounds. No wheezing or rhonchi.   Abdominal:      General: Bowel sounds are normal. There is no distension.      Palpations: Abdomen is soft. There is no mass.      Tenderness: There is no abdominal tenderness.   Lymphadenopathy:      Cervical: No cervical adenopathy.   Skin:     General: Skin is warm and dry.      Coloration: Skin is not jaundiced.      Findings: Lesion ( see photo) and rash ( See " "photo) present.   Neurological:      General: No focal deficit present.      Mental Status: He is alert and oriented to person, place, and time.   Psychiatric:         Mood and Affect: Mood normal.         Behavior: Behavior normal.         Judgment: Judgment normal.               PAST MEDICAL HISTORY  He has a past medical history of Acute coronary syndrome, Anticoagulant long-term use, Back pain, CAD (coronary artery disease), Coronary artery disease, Diabetes mellitus, type 2, Gastric polyps, Hyperlipemia, Hypertension, NSTEMI (non-ST elevated myocardial infarction), Obesity, DANTE on CPAP, S/P PTCA (percutaneous transluminal coronary angioplasty), Sleep apnea, and Type 2 diabetes mellitus with circulatory disorder (coronary artery disease), without long-term current use of insulin.    SURGICAL HISTORY  He has a past surgical history that includes Appendectomy; Hernia repair; Coronary angioplasty with stent; Fracture surgery; Colonoscopy; Esophagogastroduodenoscopy; Spine surgery; Back surgery; Cataract extraction, bilateral; and Eye surgery.    FAMILY HISTORY  His family history includes Diabetes in his mother; Heart disease in his maternal grandfather; Hypertension in his father and mother.     ALLERGIES  Review of patient's allergies indicates:   Allergen Reactions    Pcn [penicillins] Hives       SOCIAL HISTORY   TOBACCO USE HISTORY: He reports that he has never smoked. He has never used smokeless tobacco.   ALCOHOL USE HISTORY:  He reports current alcohol use.   RECREATIONAL DRUG USE HISTORY:  He reports no history of drug use.    Documentation entered by me for this encounter may have been done in part using speech-recognition technology. Although I have made an effort to ensure accuracy, "sound like" errors may exist and should be interpreted in context. -GLORIA Dow MD.    "

## 2020-12-01 NOTE — LETTER
December 6, 2020      Pj Del Cid MD  [address]  [place] [address]           [facility]  [address]  [place] [address]  Phone: [phone]  Fax: [phone]          Patient: Isaiah Harrison   MR Number: [id number]   YOB: 1955   Date of Visit: 12/1/2020       Dear Dr. Pj Del Cid:    Thank you for referring Isaiah Harrison to me for evaluation. Attached you will find relevant portions of my assessment and plan of care.    If you have questions, please do not hesitate to call me. I look forward to following Isaiah Harrison along with you.    Sincerely,    GLORIA Dow MD    Enclosure  CC:  No Recipients    If you would like to receive this communication electronically, please contact [email or web address] or [phone] to request more information on [employer] access.    For providers and/or their staff who would like to refer a patient to [facility], please contact us through our one-stop-shop provider referral line, [facility], at [phone].    If you feel you have received this communication in error or would no longer like to receive these types of communications, please e-mail [email or web address]

## 2020-12-01 NOTE — ASSESSMENT & PLAN NOTE
Diabetes Management Status    Statin: Taking  ACE/ARB: Taking    Screening or Prevention Patient's value Goal Complete/Controlled?   HgA1C Testing and Control   Lab Results   Component Value Date    HGBA1C 12.8 (H) 10/16/2020      Annually/Less than 8% No   Lipid profile : 10/16/2020 Annually Yes   LDL control Lab Results   Component Value Date    LDLCALC 75.2 10/16/2020    Annually/Less than 100 mg/dl  Yes   Nephropathy screening Lab Results   Component Value Date    LABMICR 34.0 10/20/2020     Lab Results   Component Value Date    PROTEINUA Negative 10/20/2020    Annually Yes   Blood pressure BP Readings from Last 1 Encounters:   12/01/20 (!) 132/92    Less than 140/90 No   Dilated retinal exam : 11/11/2019 Annually No   Foot exam   : 12/01/2020 Annually Yes     Lab Results   Component Value Date    HGBA1C 12.8 (H) 10/16/2020    HGBA1C 6.9 (H) 04/15/2020    HGBA1C 6.8 (H) 10/05/2019    HGBA1C 6.8 (H) 10/05/2019    ESTGFRAFRICA >60.0 10/16/2020    EGFRNONAA >60.0 10/16/2020    MICALBCREAT 56.7 (H) 10/20/2020    LDLCALC 75.2 10/16/2020     Last 6 Patient Entered Readings                                          Most Recent A1c:      There is no flowsheet data to display.         Suspect latest A1c spurious.    HEALTH MAINTENANCE: Diabetic health maintenance interventions reviewed and are up to date except for:      Topic    Eye Exam      DIABETIC FOOT EXAM: Inspection of feet reveals no open wounds, ulcerations, significant calluses, or evidence of arterial insufficiency. 10g monofilament sensation is intact in all 5 locations tested.

## 2020-12-01 NOTE — ASSESSMENT & PLAN NOTE
Wt Readings from Last 6 Encounters:   12/01/20 132.2 kg (291 lb 7.2 oz)   10/20/20 130.6 kg (287 lb 14.7 oz)   09/30/20 (!) 137.9 kg (304 lb)   06/01/20 (!) 137.9 kg (304 lb 0.2 oz)   02/03/20 133.7 kg (294 lb 12.1 oz)   10/23/19 (!) 137.9 kg (304 lb)     BMI Readings from Last 2 Encounters:   12/01/20 37.42 kg/m²   10/20/20 36.97 kg/m²     Worse. Therapeutic lifestyle changes encouraged.

## 2020-12-02 ENCOUNTER — PATIENT OUTREACH (OUTPATIENT)
Dept: OTHER | Facility: OTHER | Age: 65
End: 2020-12-02

## 2020-12-02 DIAGNOSIS — E11.9 TYPE 2 DIABETES MELLITUS: ICD-10-CM

## 2020-12-02 LAB — HIV 1+2 AB+HIV1 P24 AG SERPL QL IA: NEGATIVE

## 2020-12-02 NOTE — LETTER
December 2, 2020     Isaiah Harrison  [address] [place] [address]       Dear Isaiah,    Welcome to [facility]! Our goal is to make care effective, proactive and convenient by using data you send us from home to better treat your chronic conditions.                My name is Juan Mahoney, and I am your dedicated Digital Medicine clinician. As an expert in medication management, I will help ensure that the medications you are taking continue to provide the intended benefits and help you reach your goals. You can reach me directly at [phone] or by sending me a message directly through your [facility] account.      I am Keyla Zuniga and I will be your health . My job is to help you identify lifestyle changes to improve your disease control. We will talk about nutrition, exercise, and other ways you may be able to adjust your current habits to better your health. Additionally, we will help ensure you are completing the tests and screenings that are necessary to help manage your conditions. You can reach me directly at [phone] or by sending me a message directly through your [facility] account.    Most importantly, YOU are at the center of this team. Together, we will work to improve your overall health and encourage you to meet your goals for a healthier lifestyle.     What we expect from YOU:  · Please take frequent home blood pressure measurements. We ask that you take at least 1 blood pressure reading per week, but more information will better help us get you know you. Be sure you rest for a few minutes before taking the reading in a quiet, comfortable place.    · Please take frequent home blood sugar measurements according to the frequency your physician and Digital Medicine care team specify. It is important that your team see both fasting and after meal readings.      Be available to receive phone calls or [employer] messages, when appropriate, from your care  team. Please let us know if there are any specific days or times that work best for us to reach you via phone.     Complete routine tests and screenings. Dont worry, we will help keep you on track!           What you should expect from your Digital Medicine Care Team:   We will work with you to create a personalized plan of care and provide you with encouragement and education, including regarding lifestyle changes, that could help you manage your disease states.     We will adjust your current medications, if needed, and continue to monitor your long-term progress.     We will provide you and your physician with monthly progress reports after you have been in the program for more than 30 days.     We will send you reminders through [employer] and text messages to help ensure you do not miss any testing deadlines to help manage your disease states.    You will be able to reach us by phone or through your [employer] account by clicking our names under Care Team on the right side of the home screen.    We look forward to working with you to help manage your health,    Sincerely,    Your Digital Medicine Team    Please visit our websites to learn more:   · Hypertension: [email or web address]  · Diabetes: [email or web address]      Remember, we are not available for emergencies. If you have an emergency, please contact your doctors office directly or call [facility] on-call ([phone] or [phone]) or [phone].    Diabetes: We want help you get important tests and screenings done regularly to assure that your health needs are met. We have put a new system in place, called CareTouch that will help us improve how we monitor and reach out to you about the following lab tests that you will need to help manage your diabetes.  · Hemoglobin A1c testing (Frequency: Every 3 to 6 months, dependent on A1c goal)  · Nephropathy Assessment, generally urine micro albumin testing (Frequency:  Yearly)  · Eye exam through a quick 30-minute Eye Photo Exam (Frequency: 1-2 Years, depending on result)    When necessary you can come in to one of the lab locations between 10:30 am and 4:00 pm to have your tests done prior to their due date. Tell the  you received a CareTouch letter, or just look for the CareTouch sign.    For CareTouch lab locations, click here.

## 2020-12-02 NOTE — PROGRESS NOTES
Digital Medicine: Health  Introduction    Introduced Isaiah Steenur to Digital Medicine. Discussed health  role and recommended lifestyle modifications.    The history is provided by the patient.           Additional Enrollment Details: Patient acknowledged and understood proper testing technique. Patient had limited time to speak so lifestyle assessment was not completed. Explained importance of following alerts requesting retest. Patient reports he actively uses MyChart. Encouraged patient to reach out with questions or concerns.     HYPERTENSION  Explained hypertension digital medicine goals including BP goal less than or equal to 130/80mmHg, improved convenience of BP management and reduced risk of heart attack, kidney failure, stroke, eye disease, dementia, and death.      Explained non-pharmacologic therapies like low salt diet and physical activity can reduce blood pressure. .      Explained that we expect patient to submit several blood pressure readings per week at random times of the day, but at least 30 minutes after taking blood pressure medications. Instructed patient not to allow anyone else to use their blood pressure monitor and phone as data submitted is directly entered into medical record. Reviewed and confirmed appropriate blood pressure monitoring technique.         Patient's BP goal is less than or equal to 130/80.Patient's BP average is 122/86 mmHg, which is above goal, per 2017 ACC/AHA Hypertension Guidelines.    Explained to the patient that the Digital Medicine team is not available for emergencies. Advised patient call [facility] ([phone] or [phone]) or [phone] if needed.           DIABETES  Explained the goal of the diabetes digital medicine program is to decrease A1c within patient-specific target levels. Reviewed benefits of A1c reduction including reducing risk for kidney, eye, and nerve disease.      Explained that we expect patient to submit blood sugar readings  as prescribed. Instructed patient not to allow anyone else to use their glucometer and phone as data submitted is directly entered into their medical record. Reviewed and confirmed appropriate blood sugar testing technique.       Reviewed general Self-Monitoring of Blood Glucose (SMBG) goals:  · FP-130 mg/dL  · 2h PPG: < 180 mg/dL  · Bedtime: < 150 mg/dL    Explained to the patient that the Digital Medicine team is not available for emergencies. Advised patient call [facility] ([phone] or [phone]) or [phone] if needed.     Patient reported SMBG schedule: Two times daily  Patient's A1C goal is less than or equal to 8.  Patient's most recent A1C result is above goal.  @RESUFAST(LABA1C,HGBA).                Diet-Not assessed          Physical Activity-Not assessed    Medication Adherence-Medication Adherence not addressed.      Substance, Sleep, Stress-Not assessed      Provided patient education.  Reviewed Device Techniques.     Addressed patient questions and patient has my contact information if needed prior to next outreach. Patient verbalizes understanding.      Explained the importance of self-monitoring and medication adherence. Encouraged the patient to communicate with their health  for lifestyle modifications to help improve or maintain a healthy lifestyle.        Explained to the patient that the Digital Medicine team is not available for emergencies. Advised patient call [facility] ([phone] or [phone]) or [phone] if needed.                Topic    Eye Exam          Last 5 Patient Entered Readings                                      Current 30 Day Average: 122/86     Recent Readings 2020    SBP (mmHg) 139 166 103 125    DBP (mmHg) 94 105 74 69    Pulse 87 83 87 85        Last 6 Patient Entered Readings                                          Most Recent A1c:      Recent Readings 2020     Blood Glucose (mg/dL) 246 401 401 401 401

## 2020-12-07 ENCOUNTER — PATIENT OUTREACH (OUTPATIENT)
Dept: ADMINISTRATIVE | Facility: HOSPITAL | Age: 65
End: 2020-12-07

## 2020-12-07 ENCOUNTER — TELEPHONE (OUTPATIENT)
Dept: INTERNAL MEDICINE | Facility: CLINIC | Age: 65
End: 2020-12-07

## 2020-12-07 ENCOUNTER — PATIENT OUTREACH (OUTPATIENT)
Dept: OTHER | Facility: OTHER | Age: 65
End: 2020-12-07

## 2020-12-07 VITALS — SYSTOLIC BLOOD PRESSURE: 127 MMHG | DIASTOLIC BLOOD PRESSURE: 80 MMHG

## 2020-12-07 DIAGNOSIS — E11.59 TYPE 2 DIABETES MELLITUS WITH OTHER CIRCULATORY COMPLICATION, WITHOUT LONG-TERM CURRENT USE OF INSULIN: Primary | Chronic | ICD-10-CM

## 2020-12-07 NOTE — TELEPHONE ENCOUNTER
Remote BP entered.    Kristine GRUBER LPN  Care Coordination Department  [facility]  Phone number [phone]

## 2020-12-07 NOTE — PROGRESS NOTES
Digital Medicine: Clinician Introduction    Isaiah Harrison is a 65 y.o. male who is newly enrolled in the Digital Medicine Clinic.    Called patient to complete enrollment in HDMP and DDMP.      The history is provided by the patient.      Review of patient's allergies indicates:   -- Pcn (penicillins) -- Hives  Completed Medication Reconciliation      HYPERTENSION  Explained hypertension digital medicine goals including BP goal less than or equal to 130/80mmHg, improved convenience of BP management and reduced risk of heart attack, kidney failure, stroke, eye disease, dementia, and death.     Explained non-pharmacologic therapies like low salt diet and physical activity can reduce blood pressure.       Explained that we expect patient to submit several blood pressure readings per week at random times of the day, but at least 30 minutes after taking blood pressure medications. Instructed patient not to allow anyone else to use their blood pressure monitor and phone as data submitted is directly entered into medical record. Reviewed and confirmed appropriate blood pressure monitoring technique.         Reviewed signs/symptoms of hypertension (headache, changes in vision, chest pain, shortness of breath)   Reviewed signs/symptoms of hypotension (lightheaded, dizziness, weakness)     Patient's BP goal is less than or equal to 140/90. Patients BP average is 142/94 mmHg, which is above goal, per 2017 ACC/AHA Hypertension Guidelines. Patient attributes current blood pressure to: checking before taking medications.          Last 5 Patient Entered Readings                                      Current 30 Day Average: 142/94     Recent Readings 12/7/2020 12/7/2020 12/6/2020 12/6/2020 12/6/2020    SBP (mmHg) 127 149 168 163 163    DBP (mmHg) 80 94 97 104 101    Pulse 75 78 75 79 80                Depression Screening  Isaiah Harrison did not answer the depression screening. He is not in treatment for depression and is not  interested in a referral. Patient feels that depression symptoms are not currently controlled.      Sleep Apnea Screening  Patient previously diagnosed with DANTE and is not interested in a referral at this time.     He reports he is currently using CPAP for 6 hour(s) per night. DANTE is managed effectively with CPAP use.  Supplies needed: none      Medication Affordability Screening  Patient did not answer the medication affordability questionnaires. Patient is currently not having problems affording medications    Medication Adherence-Medication adherence was assessed.  Patient continue taking medication as prescribed.          Patient went through his medication bottles over the phone with me. Seems to be a little confused about medications, missing HCTZ bottle and had a little trouble finding the amlodipine bottle, but did find it. Has a weekly pill dispenser, dose not report missing any doses.      ASSESSMENT(S)  Patients BP average is 142/94 mmHg, which is above goal. Patient's BP goal is less than or equal to 140/90.   Patient's A1C goal is less than or equal to 8. Patient's most recent A1C result is above goal. Lab Results    Component                Value               Date                     HGBA1C                   11.0 (H)            12/01/2020          .        BP readings are variable, checks before and after taking medications.  Also reports checking BP while lying down sometimes.  Patient does not have a HCTZ bottle and refill records indicate he has not had it filled recently.  Patient is checking his BGs appropriately, fasting and 2 hours after eating.  BGs are reflective of A1c of 11.  A1c has decreased from last check, so that's good.  Patient is unable to take metformin twice a day due to loose bowels, only takes 500mg in the mornings.      Hypertension Plan  Additional monitoring needed. Encouraged patient to check BP at least one hour after taking medications, while sitting up and when  relaxed.  Continue current therapy. Continue all BP meds for now, without HCTZ.  Continue current diet/physical activity routine.  Provided patient education.  Will assess BP readings at next visit to assess need to add HCTZ back.    Diabetes Plan  Medication change. Instructed patient to try metformin 2 tabs once a day and continue if GI side effects are not bothersome.  Continue current diet/physical activity routine.  Provided patient education.  Will assess patient's tolerance of 1g of metformin in the morning and BGs readings.  Will likely increase trulicity to 3mg q weekly at next outreach.     Addressed patient questions and patient has my contact information if needed prior to next outreach. Patient verbalizes understanding.      Explained the importance of self-monitoring and medication adherence. Encouraged the patient to communicate with their health  for lifestyle modifications to help improve or maintain a healthy lifestyle.        Sent link to [facility] [employer] [facility] webpages and my contact information via [email or web address] for future questions.        Explained to the patient that the Digital Medicine team is not available for emergencies. Advised patient call [facility] ([phone] or [phone]) or [phone] if needed.                Topic    Eye Exam           Current Medication Regimen:  Hypertension Medications             amLODIPine (NORVASC) 2.5 MG tablet TAKE 1 TABLET(2.5 MG) BY MOUTH EVERY EVENING    hydroCHLOROthiazide (HYDRODIURIL) 50 MG tablet TK 1 T PO QD    isosorbide mononitrate (IMDUR) 30 MG 24 hr tablet Take 3 tablets (90 mg total) by mouth once daily.    losartan (COZAAR) 100 MG tablet Take 1 tablet (100 mg total) by mouth once daily.    metoprolol tartrate (LOPRESSOR) 50 MG tablet TAKE 1 TABLET(50 MG) BY MOUTH TWICE DAILY    nitroGLYCERIN (NITROSTAT) 0.4 MG SL tablet Place 1 tablet (0.4 mg total) under the tongue every 5 (five) minutes as needed.        Diabetes Medications              dulaglutide (TRULICITY) 1.5 mg/0.5 mL pen injector ADMINISTER 1 SYRINGE UNDER THE SKIN EVERY 7 DAYS    metFORMIN (GLUCOPHAGE) 500 MG tablet Take 1 tablet (500 mg total) by mouth 2 (two) times daily with meals.

## 2020-12-08 ENCOUNTER — PATIENT OUTREACH (OUTPATIENT)
Dept: OTHER | Facility: OTHER | Age: 65
End: 2020-12-08

## 2020-12-08 NOTE — PROGRESS NOTES
Digital Medicine: Clinician Follow-Up    Calling to follow up with patient after BP alerts in Epic, SBP was 177 and 185.    The history is provided by the patient.   Follow-up reason(s): Alert received.   Care Team received high BP alert.      Hypertension    Readings are trending up due to Patient checked BP 10 minutes after taking medications.  Patient is not experiencing signs/symptoms of hypertension.            Last 5 Patient Entered Readings                                      Current 30 Day Average: 146/96     Recent Readings 12/8/2020 12/8/2020 12/7/2020 12/7/2020 12/6/2020    SBP (mmHg) 177 185 127 149 168    DBP (mmHg) 116 120 80 94 97    Pulse 86 86 75 78 75        Last 6 Patient Entered Readings                                          Most Recent A1c: 11% on 12/1/2020  (Goal: 8%)     Recent Readings 12/7/2020 12/6/2020 12/6/2020 12/5/2020 12/5/2020    Blood Glucose (mg/dL) 275 246 349 269 245                  ASSESSMENT(S)  Patients BP average is 146/96 mmHg, which is above goal. Patient's BP goal is less than or equal to 140/90.     Patient accidentally didn't wait at least 30 minutes like we talked about yesterday to check blood pressure. Not experiencing symptoms.      Hypertension Plan  Additional monitoring needed. Reiterated patient to wait at least 30 minutes to an hour after taking meds before checking BP.  Will follow as previous encounter noted in 2 weeks.     Addressed patient questions and patient has my contact information if needed prior to next outreach. Patient verbalizes understanding.                 Topic    Eye Exam      There are no preventive care reminders to display for this patient.      Hypertension Medications             amLODIPine (NORVASC) 2.5 MG tablet TAKE 1 TABLET(2.5 MG) BY MOUTH EVERY EVENING    hydroCHLOROthiazide (HYDRODIURIL) 50 MG tablet TK 1 T PO QD    isosorbide mononitrate (IMDUR) 30 MG 24 hr tablet Take 3 tablets (90 mg total) by mouth once daily.     losartan (COZAAR) 100 MG tablet Take 1 tablet (100 mg total) by mouth once daily.    metoprolol tartrate (LOPRESSOR) 50 MG tablet TAKE 1 TABLET(50 MG) BY MOUTH TWICE DAILY    nitroGLYCERIN (NITROSTAT) 0.4 MG SL tablet Place 1 tablet (0.4 mg total) under the tongue every 5 (five) minutes as needed.        Diabetes Medications             dulaglutide (TRULICITY) 1.5 mg/0.5 mL pen injector ADMINISTER 1 SYRINGE UNDER THE SKIN EVERY 7 DAYS    metFORMIN (GLUCOPHAGE) 500 MG tablet Take 1 tablet (500 mg total) by mouth 2 (two) times daily with meals.

## 2020-12-09 ENCOUNTER — PATIENT OUTREACH (OUTPATIENT)
Dept: ADMINISTRATIVE | Facility: OTHER | Age: 65
End: 2020-12-09

## 2020-12-09 ENCOUNTER — OFFICE VISIT (OUTPATIENT)
Dept: OPHTHALMOLOGY | Facility: CLINIC | Age: 65
End: 2020-12-09
Payer: COMMERCIAL

## 2020-12-09 ENCOUNTER — OFFICE VISIT (OUTPATIENT)
Dept: DERMATOLOGY | Facility: CLINIC | Age: 65
End: 2020-12-09
Payer: COMMERCIAL

## 2020-12-09 DIAGNOSIS — R21 RASH: ICD-10-CM

## 2020-12-09 DIAGNOSIS — H43.811 PVD (POSTERIOR VITREOUS DETACHMENT), RIGHT: ICD-10-CM

## 2020-12-09 DIAGNOSIS — E11.9 TYPE 2 DIABETES MELLITUS WITHOUT RETINOPATHY: Primary | ICD-10-CM

## 2020-12-09 DIAGNOSIS — D48.5 NEOPLASM OF UNCERTAIN BEHAVIOR OF SKIN: ICD-10-CM

## 2020-12-09 DIAGNOSIS — Z96.1 PSEUDOPHAKIA OF BOTH EYES: ICD-10-CM

## 2020-12-09 DIAGNOSIS — E11.59 TYPE 2 DIABETES MELLITUS WITH OTHER CIRCULATORY COMPLICATION, WITHOUT LONG-TERM CURRENT USE OF INSULIN: Chronic | ICD-10-CM

## 2020-12-09 DIAGNOSIS — L21.9 SEBORRHEIC DERMATITIS: Primary | ICD-10-CM

## 2020-12-09 DIAGNOSIS — L98.9 SKIN LESION: ICD-10-CM

## 2020-12-09 PROCEDURE — 3072F PR LOW RISK FOR RETINOPATHY: ICD-10-PCS | Mod: S$GLB,,, | Performed by: DERMATOLOGY

## 2020-12-09 PROCEDURE — 11102 PR TANGENTIAL BIOPSY, SKIN, SINGLE LESION: ICD-10-PCS | Mod: S$GLB,,, | Performed by: DERMATOLOGY

## 2020-12-09 PROCEDURE — 92014 COMPRE OPH EXAM EST PT 1/>: CPT | Mod: S$GLB,,, | Performed by: OPTOMETRIST

## 2020-12-09 PROCEDURE — 2023F DILAT RTA XM W/O RTNOPTHY: CPT | Mod: S$GLB,,, | Performed by: OPTOMETRIST

## 2020-12-09 PROCEDURE — 88305 TISSUE EXAM BY PATHOLOGIST: CPT | Mod: 26,,, | Performed by: PATHOLOGY

## 2020-12-09 PROCEDURE — 99999 PR PBB SHADOW E&M-EST. PATIENT-LVL III: CPT | Mod: PBBFAC,,, | Performed by: OPTOMETRIST

## 2020-12-09 PROCEDURE — 99999 PR PBB SHADOW E&M-EST. PATIENT-LVL III: ICD-10-PCS | Mod: PBBFAC,,, | Performed by: OPTOMETRIST

## 2020-12-09 PROCEDURE — 3072F LOW RISK FOR RETINOPATHY: CPT | Mod: S$GLB,,, | Performed by: DERMATOLOGY

## 2020-12-09 PROCEDURE — 99202 OFFICE O/P NEW SF 15 MIN: CPT | Mod: 25,S$GLB,, | Performed by: DERMATOLOGY

## 2020-12-09 PROCEDURE — 11102 TANGNTL BX SKIN SINGLE LES: CPT | Mod: S$GLB,,, | Performed by: DERMATOLOGY

## 2020-12-09 PROCEDURE — 88305 TISSUE EXAM BY PATHOLOGIST: ICD-10-PCS | Mod: 26,,, | Performed by: PATHOLOGY

## 2020-12-09 PROCEDURE — 99999 PR PBB SHADOW E&M-EST. PATIENT-LVL IV: ICD-10-PCS | Mod: PBBFAC,,, | Performed by: DERMATOLOGY

## 2020-12-09 PROCEDURE — 92014 PR EYE EXAM, EST PATIENT,COMPREHESV: ICD-10-PCS | Mod: S$GLB,,, | Performed by: OPTOMETRIST

## 2020-12-09 PROCEDURE — 99999 PR PBB SHADOW E&M-EST. PATIENT-LVL IV: CPT | Mod: PBBFAC,,, | Performed by: DERMATOLOGY

## 2020-12-09 PROCEDURE — 88305 TISSUE EXAM BY PATHOLOGIST: CPT | Performed by: PATHOLOGY

## 2020-12-09 PROCEDURE — 2023F PR DILATED RETINAL EXAM W/O EVID OF RETINOPATHY: ICD-10-PCS | Mod: S$GLB,,, | Performed by: OPTOMETRIST

## 2020-12-09 PROCEDURE — 99202 PR OFFICE/OUTPT VISIT, NEW, LEVL II, 15-29 MIN: ICD-10-PCS | Mod: 25,S$GLB,, | Performed by: DERMATOLOGY

## 2020-12-09 RX ORDER — KETOCONAZOLE 20 MG/G
CREAM TOPICAL
Qty: 60 G | Refills: 3 | Status: SHIPPED | OUTPATIENT
Start: 2020-12-09 | End: 2021-09-29

## 2020-12-09 RX ORDER — KETOCONAZOLE 20 MG/ML
SHAMPOO, SUSPENSION TOPICAL
Qty: 240 ML | Refills: 5 | Status: SHIPPED | OUTPATIENT
Start: 2020-12-09 | End: 2021-09-29

## 2020-12-09 NOTE — LETTER
December 9, 2020      GLORIA Dow MD  [address] [place] [address]           [facility] Ophthal[facility]  [address] [facility] [address]  Phone: [phone]  Fax: [phone]          Patient: Isaiah Harrison   MR Number: [id number]   YOB: 1955   Date of Visit: 12/9/2020       Dear Dr. GLORIA Dow:    Thank you for referring Isaiah Harrison to me for evaluation. Attached you will find relevant portions of my assessment and plan of care.    If you have questions, please do not hesitate to call me. I look forward to following Isaiah Harrison along with you.    Sincerely,    Ankur Skinner, OD    Enclosure  CC:  No Recipients    If you would like to receive this communication electronically, please contact [email or web address] or [phone] to request more information on [employer] Link access.    For providers and/or their staff who would like to refer a patient to [facility], please contact us through our one-stop-shop provider referral line, [facility], at [phone].    If you feel you have received this communication in error or would no longer like to receive these types of communications, please e-mail [email or web address]

## 2020-12-09 NOTE — PATIENT INSTRUCTIONS
Shave Biopsy Wound Care    Your doctor has performed a shave biopsy today.  A band aid and vaseline ointment has been placed over the site.  This should remain in place for 24 hours.  It is recommended that you keep the area dry for the first 24 hours.  After 24 hours, you may remove the band aid and wash the area with warm soap and water and apply Vaseline jelly.  Many patients prefer to use Neosporin or Bacitracin ointment.  This is acceptable; however, know that you can develop an allergy to this medication even if you have used it safely for years.  It is important to keep the area moist.  Letting it dry out and get air slows healing time, and will worsen the scar.  Band aid is optional after first 24 hours.      If you notice increasing redness, tenderness, pain, or yellow drainage at the biopsy site, please notify your doctor.  These are signs of an infection.    If your biopsy site is bleeding, apply firm pressure for 15 minutes straight.  Repeat for another 15 minutes, if it is still bleeding.   If the surgical site continues to bleed, then please contact your doctor.      [facility]   DERMATOLOGY  [address] [address]   Dept: [phone]   Dept Fax: [phone]         Understanding Seborrheic Dermatitis    Seborrheic dermatitis is a common type of rash that causes red, scaly, greasy skin. It occurs on skin that has oil glands, such as the face, scalp, and upper chest. It tends to last a long time, or go away and come back. Seborrheicdermatitis is not spread from person to person.  How to say it  [id number]   What causes seborrheic dermatitis?  The cause is not yet known. It may be partly caused by a type of yeast that grows on skin, along with excess oil production. Experts are still learning more. Its more common in men than women, and it can occur at any age. It happens more often in people with HIV/AIDS, Parkinson  disease, alcoholic pancreatitis, hepatitis, or cancer. It can also get worse during times of stress.  Symptoms of seborrheic dermatitis  Symptoms can include skin that is:  · Bumpy  · Covered with yellow scales or crusts  · Cracked  · Greasy  · Itchy  · Leaking fluid  · Painful  · Red or orange  These symptoms can occur on skin:  · Around the nose  · Behind the ears  · In the beard  · In the eyebrows  · On the scalp, also known as dandruff  · On the upper chest  You may also have acne, inflamed eyelids (blepharitis), or other skin conditions at the same time.  Treatment for seborrheic dermatitis  Treatment can reduce symptoms for a period of time. The types of treatments most often used include:  · Antifungal shampoo, body wash, or cream. These contain medicines such as ketoconazole, fluconazole, selenium sulfide, ciclopirox, or tea tree oil.  · Corticosteroid cream or ointment. These containmedicines such ashydrocortisone or fluocinolone acetonide.  · Calcineurin inhibitorcream or ointment. These contain medicines such as pimecrolimus or tacrolimus.  · Shampoo or cream with other medicines. These contain medicines such as coal tar, salicylic acid, or zinc pyrithione.  · Sodium sulfacetemide creams and washes. These may also help.  Wash your skin gently. You can remove scales with oil and gentle rubbing or a brush.  Living with seborrheic dermatitis  Seborrheic dermatitis is an ongoing (chronic) condition. It can go away and then come back. You will likely need to use shampoo, cream, or ointment with medicine once or twice a week. This can help to keep symptoms from coming back or getting worse.  When to call your healthcare provider  Call your healthcare provider right away if you have any of these:  · Symptoms that dont get better, or get worse  · New symptoms   Date Last Reviewed: 5/1/2016  © 5575-0984 [employer]. [address] [place] [address], [place] [address]. All rights reserved. This information is  not intended as a substitute for professional medical care. Always follow your healthcare professional's instructions.

## 2020-12-09 NOTE — PROGRESS NOTES
Subjective:       Patient ID:  Isaiah Harrison is a 65 y.o. male who presents for   Chief Complaint   Patient presents with    Rash     chest, R shoulder, sporadic redness and itch      History of Present Illness: The patient presents with chief complaint of rash.  Location: chest and back  Duration: several years  Signs/Symptoms: sporadic itch and redness    Prior treatments: OTC skin creams        Review of Systems   Constitutional: Negative for fever and chills.   Gastrointestinal: Negative for nausea and vomiting.   Skin: Positive for itching, rash, dry skin and activity-related sunscreen use. Negative for daily sunscreen use and recent sunburn.   Hematologic/Lymphatic: Does not bruise/bleed easily.        Objective:    Physical Exam   Constitutional: He appears well-developed and well-nourished. No distress.   Neurological: He is alert and oriented to person, place, and time. He is not disoriented.   Psychiatric: He has a normal mood and affect.   Skin:   Areas Examined (abnormalities noted in diagram):   Scalp / Hair Palpated and Inspected  Head / Face Inspection Performed  Neck Inspection Performed  Chest / Axilla Inspection Performed  Abdomen Inspection Performed  Back Inspection Performed  RUE Inspected  LUE Inspection Performed  Nails and Digits Inspection Performed              Diagram Legend     Erythematous scaling macule/papule c/w actinic keratosis       Vascular papule c/w angioma      Pigmented verrucoid papule/plaque c/w seborrheic keratosis      Yellow umbilicated papule c/w sebaceous hyperplasia      Irregularly shaped tan macule c/w lentigo     1-2 mm smooth white papules consistent with Milia      Movable subcutaneous cyst with punctum c/w epidermal inclusion cyst      Subcutaneous movable cyst c/w pilar cyst      Firm pink to brown papule c/w dermatofibroma      Pedunculated fleshy papule(s) c/w skin tag(s)      Evenly pigmented macule c/w junctional nevus     Mildly variegated pigmented,  slightly irregular-bordered macule c/w mildly atypical nevus      Flesh colored to evenly pigmented papule c/w intradermal nevus       Pink pearly papule/plaque c/w basal cell carcinoma      Erythematous hyperkeratotic cursted plaque c/w SCC      Surgical scar with no sign of skin cancer recurrence      Open and closed comedones      Inflammatory papules and pustules      Verrucoid papule consistent consistent with wart     Erythematous eczematous patches and plaques     Dystrophic onycholytic nail with subungual debris c/w onychomycosis     Umbilicated papule    Erythematous-base heme-crusted tan verrucoid plaque consistent with inflamed seborrheic keratosis     Erythematous Silvery Scaling Plaque c/w Psoriasis     See annotation            Assessment / Plan:      Pathology Orders:     Normal Orders This Visit    Specimen to Pathology, Dermatology     Comments:    Number of Specimens:->1  ------------------------->-------------------------  Spec 1 Procedure:->Biopsy  Spec 1 Clinical Impression:->r/o superificial BCC  Spec 1 Source:->right upper back    Questions:    Procedure Type: Dermatology and skin neoplasms    Number of Specimens: 1    ------------------------: -------------------------    Spec 1 Procedure: Biopsy    Spec 1 Clinical Impression: r/o superificial BCC    Spec 1 Source: right upper back    Clinical Information: see above        Seborrheic dermatitis  -     ketoconazole (NIZORAL) 2 % shampoo; Use as body wash once daily, then once/week as maintenance. Let sit 5 minutes prior to rinsing  Dispense: 240 mL; Refill: 5  -     ketoconazole (NIZORAL) 2 % cream; AAA bid  Dispense: 60 g; Refill: 3  -     Discussed dx, AVS given.    Rash  -     Ambulatory referral/consult to Dermatology    Skin lesion  -     Ambulatory referral/consult to Dermatology    Neoplasm of uncertain behavior of skin  -     Specimen to Pathology, Dermatology  -     Shave biopsy(-ies) done of 1 site(s).   Patient informed to call for  results within 2 weeks if have not received notification via telephone call or [facility]           Follow up for call for results.     PROCEDURE NOTE - SHAVE BIOPSY   Location: see above    After risk, benefits, and alternatives were discussed with the patient, the patient agrees to the procedure by verbal informed consent.  The area(s) were cleansed with alcohol. 2 cc of lidocaine 1% with epinephrine was injected for local anesthesia into each lesion(s).  A sharp dermablade was used to remove part or all of the lesion(s).  The specimen(s) will be sent for tissue pathology.  Hemostasis was obtained with aluminum chloride and/or hyfrecation.  The area(s) were dressed with vaseline ointment and bandaged.  The patient tolerated the procedure well without adverse events.  Wound care instructions were given to the patient on the AVS.  The patient will be notified of pathology results once available. Results will also be available in Epic.

## 2020-12-09 NOTE — LETTER
December 9, 2020      GLORIA Dow MD  [address] [facility] [address]           [facility] Dermatology  [address] [facility] [address]  Phone: [phone]  Fax: [phone]          Patient: Isaiah Harrison   MR Number: [id number]   YOB: 1955   Date of Visit: 12/9/2020       Dear Dr. GLORIA Dow:    Thank you for referring Isaiah Harrison to me for evaluation. Attached you will find relevant portions of my assessment and plan of care.    If you have questions, please do not hesitate to call me. I look forward to following Isaiah Harrison along with you.    Sincerely,    Josefa Conrad MD    Enclosure  CC:  No Recipients    If you would like to receive this communication electronically, please contact [email or web address] or [phone] to request more information on [employer] Link access.    For providers and/or their staff who would like to refer a patient to [facility], please contact us through our one-stop-shop provider referral line, [facility], at [phone].    If you feel you have received this communication in error or would no longer like to receive these types of communications, please e-mail [email or web address]

## 2020-12-09 NOTE — PROGRESS NOTES
HPI     Diabetic Eye Exam     Comments: Yearly              Comments     Patient last visit with [facility] on 11/11/2019.  [facility] at [facility]   Medication: Diclofenac (Voltaren) 0.1% OS TID     Diabetic eye exam  Diagnosed with diabetes in 2015  Recent vision fluctuations No  Lab Results       Component                Value               Date                       HGBA1C                   11.0 (H)            12/01/2020              HPI    Any vision changes since last exam: No  Eye pain: No  Other ocular symptoms: No    Do you wear currently wear glasses or contacts? OTC Readers     Interested in contacts today? No    Do you plan on getting new glasses today? If Needed               Last edited by Yanira Lopez on 12/9/2020  9:11 AM. (History)            Assessment /Plan     For exam results, see Encounter Report.    Type 2 diabetes mellitus without retinopathy    No diabetic retinopathy in either eye  Continue close care with PCP   Monitor 12 months    Pseudophakia of both eyes  Doing well OU    PVD (posterior vitreous detachment), right  Pt states he is scheduled for a surgery for floater OD at [place] and would like second opinion prior to sx    RTC for consult with Dr Salazar to discuss

## 2020-12-09 NOTE — PROGRESS NOTES
Health Maintenance Due   Topic Date Due    Shingles Vaccine (3 of 3) 10/05/2018     Updates were requested from care everywhere.  Chart was reviewed for overdue Proactive [facility] Encounters (BRIGIDO) topics (CRS, Breast Cancer Screening, Eye exam)  Health Maintenance has been updated.  LINKS immunization registry triggered.  Immunizations were reconciled.

## 2020-12-11 LAB
FINAL PATHOLOGIC DIAGNOSIS: NORMAL
GROSS: NORMAL
MICROSCOPIC EXAM: NORMAL

## 2020-12-18 ENCOUNTER — PATIENT OUTREACH (OUTPATIENT)
Dept: OTHER | Facility: OTHER | Age: 65
End: 2020-12-18

## 2020-12-18 NOTE — PROGRESS NOTES
Digital Medicine: Health  Follow-Up    The history is provided by the patient and the spouse/significant other. The patient has granted authorization to speak to this person.             Reason for review: Blood glucose not at goal and Blood pressure not at goal        Topics Covered on Call: Diet    Additional Follow-up details: Patient states he is doing well. Patient reports he is trying to take blood sugar readings twice a day and blood pressure readings once a day but sometimes forgets. Patient states he hopes submitting the readings will help determine what needs to be done to lower his numbers.             Diet-Change    Patient reports eating or drinking the following: Patient states his wife knows a lot more about his diet than he does. Patient requested I speak with his wife, Gladys, to review his diet. 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 Gladys explains she packs food for him because he cannot leave work during those 12 hours. Gladys reports the patient frequently eats, a sandwich, soup (canned), tuna salad, small bag of chips (frequently cheetos), and p3 snack packs at work. Gladys explains she knows there is a vending machine at work and that he will frequently get m&m's and a coke zero. Gladys reports they will occasionally go out for a burger heidi special (1-2 times a week).     Gladys states that she does not add salt to food. Gladys reports using salt free seasonings like Mrs. Funes to add flavor if the food is too bland. Gladys explains they use Splenda as a sugar alternative. Gladys mentions that she has started to cook more fish at home.     Discussed reducing processed, repackaged, and fast foods. Discussed choosing low sodium snacks and salt free nuts. Discussed increasing meals at home and choosing things like fish, chicken, turkey, frozen and fresh vegetables.     Intervention(s): help with shopping, carb reduction, Mediterranean style diet recommended, reducing  processed foods and DASH diet/sodium reduction education      Physical Activity-Not assessed    Medication Adherence-Medication Adherence not addressed.      Substance, Sleep, Stress-Not assessed      Provided patient education.       Addressed patient questions and patient has my contact information if needed prior to next outreach. Patient verbalizes understanding.      Explained the importance of self-monitoring and medication adherence. Encouraged the patient to communicate with their health  for lifestyle modifications to help improve or maintain a healthy lifestyle.               There are no preventive care reminders to display for this patient.      Last 5 Patient Entered Readings                                      Current 30 Day Average: 146/98     Recent Readings 12/17/2020 12/16/2020 12/15/2020 12/15/2020 12/14/2020    SBP (mmHg) 159 143 126 143 121    DBP (mmHg) 101 105 95 103 83    Pulse 78 83 81 77 84        Last 6 Patient Entered Readings                                          Most Recent A1c: 11% on 12/1/2020  (Goal: 8%)     Recent Readings 12/17/2020 12/16/2020 12/15/2020 12/11/2020 12/10/2020    Blood Glucose (mg/dL) 333 256 133 194 330

## 2020-12-21 ENCOUNTER — PATIENT MESSAGE (OUTPATIENT)
Dept: OTHER | Facility: OTHER | Age: 65
End: 2020-12-21

## 2020-12-21 ENCOUNTER — PATIENT OUTREACH (OUTPATIENT)
Dept: ADMINISTRATIVE | Facility: HOSPITAL | Age: 65
End: 2020-12-21

## 2020-12-21 ENCOUNTER — PATIENT OUTREACH (OUTPATIENT)
Dept: OTHER | Facility: OTHER | Age: 65
End: 2020-12-21

## 2020-12-21 DIAGNOSIS — I15.2 HYPERTENSION ASSOCIATED WITH DIABETES: ICD-10-CM

## 2020-12-21 DIAGNOSIS — E11.59 TYPE 2 DIABETES MELLITUS WITH OTHER CIRCULATORY COMPLICATION, WITHOUT LONG-TERM CURRENT USE OF INSULIN: Primary | Chronic | ICD-10-CM

## 2020-12-21 DIAGNOSIS — E11.59 HYPERTENSION ASSOCIATED WITH DIABETES: ICD-10-CM

## 2020-12-21 RX ORDER — METFORMIN HYDROCHLORIDE 500 MG/1
500 TABLET ORAL 2 TIMES DAILY WITH MEALS
Qty: 180 TABLET | Refills: 1
Start: 2020-12-21 | End: 2021-01-27 | Stop reason: DRUGHIGH

## 2020-12-21 RX ORDER — HYDROCHLOROTHIAZIDE 12.5 MG/1
12.5 CAPSULE ORAL DAILY
Qty: 30 CAPSULE | Refills: 11 | Status: SHIPPED | OUTPATIENT
Start: 2020-12-21 | End: 2021-01-27 | Stop reason: DRUGHIGH

## 2020-12-21 NOTE — PROGRESS NOTES
Called patient to follow up with blood pressure management.  Was at an appt with his wife, will prabhu back in an hour.  Planned to restart HCTZ and increase trulicity to 3mg and assess tolerance of 1g metformin.  Encourage salt restriction for DBP.    Juan Mahoney, PharmD  Clinical Pharmacist  [facility]  [phone]

## 2020-12-21 NOTE — PROGRESS NOTES
Digital Medicine: Clinician Introduction    Isaiah Harrison is a 65 y.o. male who is newly enrolled in the [facility].    Called patient to follow up with blood pressure and diabetes management.      The history is provided by the patient.      Review of patient's allergies indicates:   -- Pcn (penicillins) -- Hives  Follow-up reason(s): medication change follow-up and routine follow up.     Hypertension    Readings are trending down due to medication adherence.       Diabetes    Readings are trending down due to medication adherence.       Additional Follow-up details: Now is taking metformin 500mg BID.    Patient did make medication change.    Is patient tolerating med change? yes                      Depression Screening  Isaiah Harrison did not answer the depression screening. He is not in treatment for depression and is not interested in a referral. Patient feels that depression symptoms are not currently controlled.      Sleep Apnea Screening  Patient previously diagnosed with DANTE and is not interested in a referral at this time.     He reports he is currently using CPAP for 6 hour(s) per night. DANTE is managed effectively with CPAP use.  Supplies needed: none      Medication Affordability Screening  Patient did not answer the medication affordability questionnaires. Patient is currently having problems affording medications          ASSESSMENT(S)  Patients BP average is 145/97 mmHg, which is above goal. Patient's BP goal is less than or equal to 140/90.   Patient's A1C goal is less than or equal to 8. Patient's most recent A1C result is above goal. Lab Results    Component                Value               Date                     HGBA1C                   11.0 (H)            12/01/2020          .        Still checking blood pressure readings 15 minutes after taking medications, unable to wait an hour because he needs to start his day.  Not on HCTZ, doesn't have the bottle.  Patient is not having  loose bowels with increase to metformin 500mg BID.  Out of trulicity 1.5mg, but since blood sugars are still not at goal, can increase dose.      Hypertension Plan  Hypertension Medication Change. Restart HCTZ at lower dose (12.5mg).  Additional monitoring needed.  Provided patient education. Encouraged patient to limit salt intake to 1500mg daily.  Restart HCTZ at 12.5mg daily, new script sent to pharmacy.  Follow up in 2 weeks.  Diabetes Plan  Medication change. Increase to trulicity 3mg every 7 days.  Provided patient education. Monitor for GI related side effects due to trulicity increase.  Increase trulicity to 3mg every 7 days.  Follow up in 2 weeks.         There are no preventive care reminders to display for this patient.       Current Medication Regimen:  Hypertension Medications             amLODIPine (NORVASC) 2.5 MG tablet TAKE 1 TABLET(2.5 MG) BY MOUTH EVERY EVENING    hydroCHLOROthiazide (HYDRODIURIL) 50 MG tablet TK 1 T PO QD    isosorbide mononitrate (IMDUR) 30 MG 24 hr tablet Take 3 tablets (90 mg total) by mouth once daily.    losartan (COZAAR) 100 MG tablet Take 1 tablet (100 mg total) by mouth once daily.    metoprolol tartrate (LOPRESSOR) 50 MG tablet TAKE 1 TABLET(50 MG) BY MOUTH TWICE DAILY    nitroGLYCERIN (NITROSTAT) 0.4 MG SL tablet Place 1 tablet (0.4 mg total) under the tongue every 5 (five) minutes as needed.        Diabetes Medications             dulaglutide (TRULICITY) 1.5 mg/0.5 mL pen injector ADMINISTER 1 SYRINGE UNDER THE SKIN EVERY 7 DAYS    metFORMIN (GLUCOPHAGE) 500 MG tablet Take 1 tablet (500 mg total) by mouth 2 (two) times daily with meals.

## 2021-01-06 ENCOUNTER — HOSPITAL ENCOUNTER (OUTPATIENT)
Dept: CARDIOLOGY | Facility: HOSPITAL | Age: 66
Discharge: HOME | End: 2021-01-06
Attending: INTERNAL MEDICINE
Payer: COMMERCIAL

## 2021-01-06 ENCOUNTER — TELEPHONE (OUTPATIENT)
Dept: CARDIOLOGY | Facility: CLINIC | Age: 66
End: 2021-01-06

## 2021-01-06 ENCOUNTER — OFFICE VISIT (OUTPATIENT)
Dept: CARDIOLOGY | Facility: CLINIC | Age: 66
End: 2021-01-06
Payer: COMMERCIAL

## 2021-01-06 ENCOUNTER — PATIENT MESSAGE (OUTPATIENT)
Dept: OTHER | Facility: OTHER | Age: 66
End: 2021-01-06

## 2021-01-06 VITALS
RESPIRATION RATE: 16 BRPM | DIASTOLIC BLOOD PRESSURE: 74 MMHG | WEIGHT: 291.88 LBS | HEART RATE: 80 BPM | BODY MASS INDEX: 37.46 KG/M2 | OXYGEN SATURATION: 96 % | SYSTOLIC BLOOD PRESSURE: 106 MMHG | HEIGHT: 74 IN

## 2021-01-06 DIAGNOSIS — R60.9 EDEMA, UNSPECIFIED TYPE: ICD-10-CM

## 2021-01-06 DIAGNOSIS — Z98.61 S/P PTCA (PERCUTANEOUS TRANSLUMINAL CORONARY ANGIOPLASTY): ICD-10-CM

## 2021-01-06 DIAGNOSIS — E11.59 TYPE 2 DIABETES MELLITUS WITH OTHER CIRCULATORY COMPLICATION, WITHOUT LONG-TERM CURRENT USE OF INSULIN: Chronic | ICD-10-CM

## 2021-01-06 DIAGNOSIS — Z01.818 PREOP EXAMINATION: Primary | ICD-10-CM

## 2021-01-06 DIAGNOSIS — E78.5 HYPERLIPIDEMIA ASSOCIATED WITH TYPE 2 DIABETES MELLITUS: ICD-10-CM

## 2021-01-06 DIAGNOSIS — I25.10 CORONARY ARTERY DISEASE INVOLVING NATIVE CORONARY ARTERY OF NATIVE HEART WITHOUT ANGINA PECTORIS: ICD-10-CM

## 2021-01-06 DIAGNOSIS — I10 ESSENTIAL HYPERTENSION: ICD-10-CM

## 2021-01-06 DIAGNOSIS — E66.01 CLASS 2 SEVERE OBESITY DUE TO EXCESS CALORIES WITH SERIOUS COMORBIDITY AND BODY MASS INDEX (BMI) OF 37.0 TO 37.9 IN ADULT: ICD-10-CM

## 2021-01-06 DIAGNOSIS — E11.59 HYPERTENSION ASSOCIATED WITH DIABETES: ICD-10-CM

## 2021-01-06 DIAGNOSIS — Z01.818 PRE-OP EXAM: Primary | ICD-10-CM

## 2021-01-06 DIAGNOSIS — I15.2 HYPERTENSION ASSOCIATED WITH DIABETES: ICD-10-CM

## 2021-01-06 DIAGNOSIS — R07.89 CHEST PAIN, ATYPICAL: Chronic | ICD-10-CM

## 2021-01-06 DIAGNOSIS — Z01.818 PRE-OP EXAM: ICD-10-CM

## 2021-01-06 DIAGNOSIS — E11.9 CONTROLLED TYPE 2 DIABETES MELLITUS WITHOUT COMPLICATION, WITHOUT LONG-TERM CURRENT USE OF INSULIN: ICD-10-CM

## 2021-01-06 DIAGNOSIS — E11.69 HYPERLIPIDEMIA ASSOCIATED WITH TYPE 2 DIABETES MELLITUS: ICD-10-CM

## 2021-01-06 DIAGNOSIS — G47.30 SLEEP APNEA, UNSPECIFIED TYPE: ICD-10-CM

## 2021-01-06 DIAGNOSIS — E11.9 TYPE 2 DIABETES MELLITUS WITHOUT RETINOPATHY: ICD-10-CM

## 2021-01-06 DIAGNOSIS — I87.2 VENOUS INSUFFICIENCY: ICD-10-CM

## 2021-01-06 DIAGNOSIS — G47.33 OSA ON CPAP: ICD-10-CM

## 2021-01-06 PROCEDURE — 3078F DIAST BP <80 MM HG: CPT | Mod: CPTII,S$GLB,, | Performed by: INTERNAL MEDICINE

## 2021-01-06 PROCEDURE — 3074F SYST BP LT 130 MM HG: CPT | Mod: CPTII,S$GLB,, | Performed by: INTERNAL MEDICINE

## 2021-01-06 PROCEDURE — 3074F PR MOST RECENT SYSTOLIC BLOOD PRESSURE < 130 MM HG: ICD-10-PCS | Mod: CPTII,S$GLB,, | Performed by: INTERNAL MEDICINE

## 2021-01-06 PROCEDURE — 99214 PR OFFICE/OUTPT VISIT, EST, LEVL IV, 30-39 MIN: ICD-10-PCS | Mod: 25,S$GLB,, | Performed by: INTERNAL MEDICINE

## 2021-01-06 PROCEDURE — 3072F LOW RISK FOR RETINOPATHY: CPT | Mod: S$GLB,,, | Performed by: INTERNAL MEDICINE

## 2021-01-06 PROCEDURE — 3072F PR LOW RISK FOR RETINOPATHY: ICD-10-PCS | Mod: S$GLB,,, | Performed by: INTERNAL MEDICINE

## 2021-01-06 PROCEDURE — 3078F PR MOST RECENT DIASTOLIC BLOOD PRESSURE < 80 MM HG: ICD-10-PCS | Mod: CPTII,S$GLB,, | Performed by: INTERNAL MEDICINE

## 2021-01-06 PROCEDURE — 93005 ELECTROCARDIOGRAM TRACING: CPT

## 2021-01-06 PROCEDURE — 99999 PR PBB SHADOW E&M-EST. PATIENT-LVL V: CPT | Mod: PBBFAC,,, | Performed by: INTERNAL MEDICINE

## 2021-01-06 PROCEDURE — 99214 OFFICE O/P EST MOD 30 MIN: CPT | Mod: 25,S$GLB,, | Performed by: INTERNAL MEDICINE

## 2021-01-06 PROCEDURE — 99999 PR PBB SHADOW E&M-EST. PATIENT-LVL V: ICD-10-PCS | Mod: PBBFAC,,, | Performed by: INTERNAL MEDICINE

## 2021-01-06 PROCEDURE — 3008F BODY MASS INDEX DOCD: CPT | Mod: CPTII,S$GLB,, | Performed by: INTERNAL MEDICINE

## 2021-01-06 PROCEDURE — 3046F PR MOST RECENT HEMOGLOBIN A1C LEVEL > 9.0%: ICD-10-PCS | Mod: CPTII,S$GLB,, | Performed by: INTERNAL MEDICINE

## 2021-01-06 PROCEDURE — 3008F PR BODY MASS INDEX (BMI) DOCUMENTED: ICD-10-PCS | Mod: CPTII,S$GLB,, | Performed by: INTERNAL MEDICINE

## 2021-01-06 PROCEDURE — 93010 EKG 12-LEAD: ICD-10-PCS | Mod: ,,, | Performed by: INTERNAL MEDICINE

## 2021-01-06 PROCEDURE — 93010 ELECTROCARDIOGRAM REPORT: CPT | Mod: ,,, | Performed by: INTERNAL MEDICINE

## 2021-01-06 PROCEDURE — 3046F HEMOGLOBIN A1C LEVEL >9.0%: CPT | Mod: CPTII,S$GLB,, | Performed by: INTERNAL MEDICINE

## 2021-01-07 ENCOUNTER — PROCEDURE VISIT (OUTPATIENT)
Dept: DERMATOLOGY | Facility: CLINIC | Age: 66
End: 2021-01-07
Payer: COMMERCIAL

## 2021-01-07 DIAGNOSIS — C44.519 BASAL CELL CARCINOMA (BCC) OF SKIN OF TRUNK: Primary | ICD-10-CM

## 2021-01-07 PROCEDURE — 11604 EXC TR-EXT MAL+MARG 3.1-4 CM: CPT | Mod: 51,S$GLB,, | Performed by: DERMATOLOGY

## 2021-01-07 PROCEDURE — 11604 PR EXC SKIN MALIG 3.1-4 CM TRUNK,ARM,LEG: ICD-10-PCS | Mod: 51,S$GLB,, | Performed by: DERMATOLOGY

## 2021-01-07 PROCEDURE — 99499 UNLISTED E&M SERVICE: CPT | Mod: S$GLB,,, | Performed by: DERMATOLOGY

## 2021-01-07 PROCEDURE — 99499 NO LOS: ICD-10-PCS | Mod: S$GLB,,, | Performed by: DERMATOLOGY

## 2021-01-07 PROCEDURE — 88305 TISSUE EXAM BY PATHOLOGIST: ICD-10-PCS | Mod: 26,,, | Performed by: PATHOLOGY

## 2021-01-07 PROCEDURE — 88305 TISSUE EXAM BY PATHOLOGIST: CPT | Mod: 26,,, | Performed by: PATHOLOGY

## 2021-01-07 PROCEDURE — 12032 PR LAYR CLOS WND TRUNK,ARM,LEG 2.6-7.5 CM: ICD-10-PCS | Mod: S$GLB,,, | Performed by: DERMATOLOGY

## 2021-01-07 PROCEDURE — 12032 INTMD RPR S/A/T/EXT 2.6-7.5: CPT | Mod: S$GLB,,, | Performed by: DERMATOLOGY

## 2021-01-07 PROCEDURE — 88305 TISSUE EXAM BY PATHOLOGIST: CPT | Performed by: PATHOLOGY

## 2021-01-08 ENCOUNTER — PATIENT MESSAGE (OUTPATIENT)
Dept: DERMATOLOGY | Facility: CLINIC | Age: 66
End: 2021-01-08

## 2021-01-12 ENCOUNTER — CLINICAL SUPPORT (OUTPATIENT)
Dept: DERMATOLOGY | Facility: CLINIC | Age: 66
End: 2021-01-12
Payer: COMMERCIAL

## 2021-01-12 DIAGNOSIS — L08.9 SKIN INFECTION: Primary | ICD-10-CM

## 2021-01-12 LAB
FINAL PATHOLOGIC DIAGNOSIS: NORMAL
GROSS: NORMAL
MICROSCOPIC EXAM: NORMAL

## 2021-01-12 PROCEDURE — 87186 SC STD MICRODIL/AGAR DIL: CPT

## 2021-01-12 PROCEDURE — 87070 CULTURE OTHR SPECIMN AEROBIC: CPT

## 2021-01-12 PROCEDURE — 87077 CULTURE AEROBIC IDENTIFY: CPT

## 2021-01-12 PROCEDURE — 99499 UNLISTED E&M SERVICE: CPT | Mod: S$GLB,,, | Performed by: DERMATOLOGY

## 2021-01-12 PROCEDURE — 99499 NO LOS: ICD-10-PCS | Mod: S$GLB,,, | Performed by: DERMATOLOGY

## 2021-01-12 RX ORDER — DOXYCYCLINE 100 MG/1
CAPSULE ORAL
Qty: 20 CAPSULE | Refills: 0 | Status: SHIPPED | OUTPATIENT
Start: 2021-01-12 | End: 2021-01-19

## 2021-01-12 RX ORDER — MUPIROCIN 20 MG/G
OINTMENT TOPICAL
Qty: 30 G | Refills: 1 | Status: SHIPPED | OUTPATIENT
Start: 2021-01-12 | End: 2021-07-23

## 2021-01-14 ENCOUNTER — PATIENT MESSAGE (OUTPATIENT)
Dept: OPHTHALMOLOGY | Facility: CLINIC | Age: 66
End: 2021-01-14

## 2021-01-15 ENCOUNTER — PATIENT MESSAGE (OUTPATIENT)
Dept: DERMATOLOGY | Facility: CLINIC | Age: 66
End: 2021-01-15

## 2021-01-15 LAB — BACTERIA SPEC AEROBE CULT: ABNORMAL

## 2021-01-17 ENCOUNTER — HOSPITAL ENCOUNTER (EMERGENCY)
Facility: HOSPITAL | Age: 66
Discharge: HOME | End: 2021-01-17
Attending: EMERGENCY MEDICINE
Payer: COMMERCIAL

## 2021-01-17 VITALS
SYSTOLIC BLOOD PRESSURE: 152 MMHG | OXYGEN SATURATION: 96 % | HEIGHT: 74 IN | HEART RATE: 92 BPM | DIASTOLIC BLOOD PRESSURE: 87 MMHG | TEMPERATURE: 98 F | RESPIRATION RATE: 20 BRPM | WEIGHT: 292.69 LBS | BODY MASS INDEX: 37.56 KG/M2

## 2021-01-17 DIAGNOSIS — Z48.89 ENCOUNTER FOR POST SURGICAL WOUND CHECK: Primary | ICD-10-CM

## 2021-01-17 PROCEDURE — 99282 EMERGENCY DEPT VISIT SF MDM: CPT

## 2021-01-18 ENCOUNTER — PATIENT MESSAGE (OUTPATIENT)
Dept: DERMATOLOGY | Facility: CLINIC | Age: 66
End: 2021-01-18

## 2021-01-19 DIAGNOSIS — L08.9 SKIN INFECTION: Primary | ICD-10-CM

## 2021-01-19 RX ORDER — SULFAMETHOXAZOLE AND TRIMETHOPRIM 800; 160 MG/1; MG/1
1 TABLET ORAL 2 TIMES DAILY
Qty: 20 TABLET | Refills: 0 | Status: SHIPPED | OUTPATIENT
Start: 2021-01-19 | End: 2021-01-29

## 2021-01-20 ENCOUNTER — CLINICAL SUPPORT (OUTPATIENT)
Dept: DERMATOLOGY | Facility: CLINIC | Age: 66
End: 2021-01-20
Payer: COMMERCIAL

## 2021-01-20 ENCOUNTER — PATIENT MESSAGE (OUTPATIENT)
Dept: DERMATOLOGY | Facility: CLINIC | Age: 66
End: 2021-01-20

## 2021-01-20 DIAGNOSIS — L08.9 SKIN INFECTION: Primary | ICD-10-CM

## 2021-01-20 PROCEDURE — 87186 SC STD MICRODIL/AGAR DIL: CPT

## 2021-01-20 PROCEDURE — 99024 POSTOP FOLLOW-UP VISIT: CPT | Mod: S$GLB,,, | Performed by: DERMATOLOGY

## 2021-01-20 PROCEDURE — 99024 PR POST-OP FOLLOW-UP VISIT: ICD-10-PCS | Mod: S$GLB,,, | Performed by: DERMATOLOGY

## 2021-01-20 PROCEDURE — 87077 CULTURE AEROBIC IDENTIFY: CPT

## 2021-01-20 PROCEDURE — 87070 CULTURE OTHR SPECIMN AEROBIC: CPT

## 2021-01-21 DIAGNOSIS — L29.9 PRURITUS: Primary | ICD-10-CM

## 2021-01-21 RX ORDER — HYDROXYZINE HYDROCHLORIDE 10 MG/1
10 TABLET, FILM COATED ORAL 2 TIMES DAILY PRN
Qty: 60 TABLET | Refills: 1 | Status: SHIPPED | OUTPATIENT
Start: 2021-01-21 | End: 2021-04-28

## 2021-01-22 ENCOUNTER — PATIENT MESSAGE (OUTPATIENT)
Dept: DERMATOLOGY | Facility: CLINIC | Age: 66
End: 2021-01-22

## 2021-01-25 LAB — BACTERIA SPEC AEROBE CULT: ABNORMAL

## 2021-01-26 ENCOUNTER — OFFICE VISIT (OUTPATIENT)
Dept: SURGERY | Facility: CLINIC | Age: 66
End: 2021-01-26
Payer: COMMERCIAL

## 2021-01-26 ENCOUNTER — CLINICAL SUPPORT (OUTPATIENT)
Dept: DERMATOLOGY | Facility: CLINIC | Age: 66
End: 2021-01-26
Payer: COMMERCIAL

## 2021-01-26 ENCOUNTER — HOSPITAL ENCOUNTER (EMERGENCY)
Facility: HOSPITAL | Age: 66
Discharge: HOME | End: 2021-01-26
Attending: EMERGENCY MEDICINE
Payer: COMMERCIAL

## 2021-01-26 VITALS
HEART RATE: 87 BPM | OXYGEN SATURATION: 96 % | BODY MASS INDEX: 37.47 KG/M2 | SYSTOLIC BLOOD PRESSURE: 124 MMHG | HEIGHT: 74 IN | DIASTOLIC BLOOD PRESSURE: 54 MMHG | TEMPERATURE: 99 F | RESPIRATION RATE: 20 BRPM | WEIGHT: 292 LBS

## 2021-01-26 VITALS — HEIGHT: 74 IN | BODY MASS INDEX: 37.47 KG/M2 | WEIGHT: 292 LBS

## 2021-01-26 DIAGNOSIS — Z48.89 ENCOUNTER FOR POST SURGICAL WOUND CHECK: Primary | ICD-10-CM

## 2021-01-26 DIAGNOSIS — L72.3 INFECTED SEBACEOUS CYST: Primary | ICD-10-CM

## 2021-01-26 DIAGNOSIS — T14.8XXA BLEEDING FROM WOUND: ICD-10-CM

## 2021-01-26 DIAGNOSIS — L08.9 INFECTED SEBACEOUS CYST: Primary | ICD-10-CM

## 2021-01-26 PROBLEM — Z78.9 ACTIVE BLEEDING: Status: ACTIVE | Noted: 2021-01-26

## 2021-01-26 PROCEDURE — 11403 EXC TR-EXT B9+MARG 2.1-3CM: CPT | Mod: S$GLB,,, | Performed by: SURGERY

## 2021-01-26 PROCEDURE — 3072F PR LOW RISK FOR RETINOPATHY: ICD-10-PCS | Mod: S$GLB,,, | Performed by: SURGERY

## 2021-01-26 PROCEDURE — 11403 PR EXC SKIN BENIG 2.1-3 CM TRUNK,ARM,LEG: ICD-10-PCS | Mod: S$GLB,,, | Performed by: SURGERY

## 2021-01-26 PROCEDURE — 1125F PR PAIN SEVERITY QUANTIFIED, PAIN PRESENT: ICD-10-PCS | Mod: S$GLB,,, | Performed by: SURGERY

## 2021-01-26 PROCEDURE — 87070 CULTURE OTHR SPECIMN AEROBIC: CPT

## 2021-01-26 PROCEDURE — 3008F BODY MASS INDEX DOCD: CPT | Mod: CPTII,S$GLB,, | Performed by: SURGERY

## 2021-01-26 PROCEDURE — 88304 PR  SURG PATH,LEVEL III: ICD-10-PCS | Mod: 26,,, | Performed by: STUDENT IN AN ORGANIZED HEALTH CARE EDUCATION/TRAINING PROGRAM

## 2021-01-26 PROCEDURE — 25000003 PHARM REV CODE 250: Performed by: SURGERY

## 2021-01-26 PROCEDURE — 99499 UNLISTED E&M SERVICE: CPT | Mod: S$GLB,,, | Performed by: DERMATOLOGY

## 2021-01-26 PROCEDURE — 88304 TISSUE EXAM BY PATHOLOGIST: CPT | Mod: 26,,, | Performed by: STUDENT IN AN ORGANIZED HEALTH CARE EDUCATION/TRAINING PROGRAM

## 2021-01-26 PROCEDURE — 99283 EMERGENCY DEPT VISIT LOW MDM: CPT

## 2021-01-26 PROCEDURE — 1125F AMNT PAIN NOTED PAIN PRSNT: CPT | Mod: S$GLB,,, | Performed by: SURGERY

## 2021-01-26 PROCEDURE — 87186 SC STD MICRODIL/AGAR DIL: CPT

## 2021-01-26 PROCEDURE — 99499 NO LOS: ICD-10-PCS | Mod: S$GLB,,, | Performed by: DERMATOLOGY

## 2021-01-26 PROCEDURE — 3072F LOW RISK FOR RETINOPATHY: CPT | Mod: S$GLB,,, | Performed by: SURGERY

## 2021-01-26 PROCEDURE — 99999 PR PBB SHADOW E&M-EST. PATIENT-LVL IV: ICD-10-PCS | Mod: PBBFAC,,, | Performed by: SURGERY

## 2021-01-26 PROCEDURE — 88304 TISSUE EXAM BY PATHOLOGIST: CPT | Performed by: STUDENT IN AN ORGANIZED HEALTH CARE EDUCATION/TRAINING PROGRAM

## 2021-01-26 PROCEDURE — 87077 CULTURE AEROBIC IDENTIFY: CPT

## 2021-01-26 PROCEDURE — 3008F PR BODY MASS INDEX (BMI) DOCUMENTED: ICD-10-PCS | Mod: CPTII,S$GLB,, | Performed by: SURGERY

## 2021-01-26 PROCEDURE — 99999 PR PBB SHADOW E&M-EST. PATIENT-LVL IV: CPT | Mod: PBBFAC,,, | Performed by: SURGERY

## 2021-01-26 PROCEDURE — 99024 PR POST-OP FOLLOW-UP VISIT: ICD-10-PCS | Mod: ,,, | Performed by: SURGERY

## 2021-01-26 PROCEDURE — 99024 POSTOP FOLLOW-UP VISIT: CPT | Mod: ,,, | Performed by: SURGERY

## 2021-01-26 RX ORDER — SILVER NITRATE 38.21; 12.74 MG/1; MG/1
1 STICK TOPICAL
Status: COMPLETED | OUTPATIENT
Start: 2021-01-26 | End: 2021-01-26

## 2021-01-26 RX ORDER — LIDOCAINE HYDROCHLORIDE 10 MG/ML
10 INJECTION, SOLUTION EPIDURAL; INFILTRATION; INTRACAUDAL; PERINEURAL
Status: COMPLETED | OUTPATIENT
Start: 2021-01-26 | End: 2021-01-26

## 2021-01-26 RX ADMIN — SILVER NITRATE 1 APPLICATOR: 38.21; 12.74 STICK TOPICAL at 03:01

## 2021-01-26 RX ADMIN — LIDOCAINE HYDROCHLORIDE 100 MG: 10 INJECTION, SOLUTION EPIDURAL; INFILTRATION; INTRACAUDAL; PERINEURAL at 03:01

## 2021-01-27 ENCOUNTER — PATIENT MESSAGE (OUTPATIENT)
Dept: SURGERY | Facility: CLINIC | Age: 66
End: 2021-01-27

## 2021-01-27 DIAGNOSIS — T81.49XA WOUND INFECTION AFTER SURGERY: Primary | ICD-10-CM

## 2021-01-27 RX ORDER — OXYCODONE AND ACETAMINOPHEN 5; 325 MG/1; MG/1
1 TABLET ORAL EVERY 4 HOURS PRN
Qty: 30 TABLET | Refills: 0 | Status: SHIPPED | OUTPATIENT
Start: 2021-01-27 | End: 2021-07-23

## 2021-01-29 ENCOUNTER — PATIENT MESSAGE (OUTPATIENT)
Dept: ADMINISTRATIVE | Facility: HOSPITAL | Age: 66
End: 2021-01-29

## 2021-01-29 ENCOUNTER — PATIENT MESSAGE (OUTPATIENT)
Dept: SURGERY | Facility: CLINIC | Age: 66
End: 2021-01-29

## 2021-01-30 LAB — BACTERIA SPEC AEROBE CULT: ABNORMAL

## 2021-02-02 RX ORDER — SULFAMETHOXAZOLE AND TRIMETHOPRIM 400; 80 MG/1; MG/1
1 TABLET ORAL
COMMUNITY
End: 2021-07-23

## 2021-02-03 ENCOUNTER — TELEPHONE (OUTPATIENT)
Dept: CARDIOLOGY | Facility: CLINIC | Age: 66
End: 2021-02-03

## 2021-02-03 LAB
FINAL PATHOLOGIC DIAGNOSIS: NORMAL
GROSS: NORMAL
MICROSCOPIC EXAM: NORMAL

## 2021-02-03 RX ORDER — SULFAMETHOXAZOLE AND TRIMETHOPRIM 400; 80 MG/1; MG/1
1 TABLET ORAL EVERY 12 HOURS
OUTPATIENT
Start: 2021-02-03

## 2021-02-04 ENCOUNTER — TELEPHONE (OUTPATIENT)
Dept: SURGERY | Facility: CLINIC | Age: 66
End: 2021-02-04

## 2021-02-04 ENCOUNTER — PATIENT OUTREACH (OUTPATIENT)
Dept: ADMINISTRATIVE | Facility: OTHER | Age: 66
End: 2021-02-04

## 2021-02-05 ENCOUNTER — OFFICE VISIT (OUTPATIENT)
Dept: OPHTHALMOLOGY | Facility: CLINIC | Age: 66
End: 2021-02-05
Payer: COMMERCIAL

## 2021-02-05 DIAGNOSIS — Z79.4 TYPE 2 DIABETES MELLITUS WITH BOTH EYES AFFECTED BY MILD NONPROLIFERATIVE RETINOPATHY WITHOUT MACULAR EDEMA, WITH LONG-TERM CURRENT USE OF INSULIN: ICD-10-CM

## 2021-02-05 DIAGNOSIS — E11.3293 TYPE 2 DIABETES MELLITUS WITH BOTH EYES AFFECTED BY MILD NONPROLIFERATIVE RETINOPATHY WITHOUT MACULAR EDEMA, WITH LONG-TERM CURRENT USE OF INSULIN: ICD-10-CM

## 2021-02-05 DIAGNOSIS — Z96.1 PSEUDOPHAKIA OF BOTH EYES: ICD-10-CM

## 2021-02-05 DIAGNOSIS — H43.811 PVD (POSTERIOR VITREOUS DETACHMENT), RIGHT: Primary | ICD-10-CM

## 2021-02-05 PROCEDURE — 1126F PR PAIN SEVERITY QUANTIFIED, NO PAIN PRESENT: ICD-10-PCS | Mod: S$GLB,,, | Performed by: OPHTHALMOLOGY

## 2021-02-05 PROCEDURE — 92004 COMPRE OPH EXAM NEW PT 1/>: CPT | Mod: S$GLB,,, | Performed by: OPHTHALMOLOGY

## 2021-02-05 PROCEDURE — 99999 PR PBB SHADOW E&M-EST. PATIENT-LVL III: CPT | Mod: PBBFAC,,, | Performed by: OPHTHALMOLOGY

## 2021-02-05 PROCEDURE — 99999 PR PBB SHADOW E&M-EST. PATIENT-LVL III: ICD-10-PCS | Mod: PBBFAC,,, | Performed by: OPHTHALMOLOGY

## 2021-02-05 PROCEDURE — 1126F AMNT PAIN NOTED NONE PRSNT: CPT | Mod: S$GLB,,, | Performed by: OPHTHALMOLOGY

## 2021-02-05 PROCEDURE — 92004 PR EYE EXAM, NEW PATIENT,COMPREHESV: ICD-10-PCS | Mod: S$GLB,,, | Performed by: OPHTHALMOLOGY

## 2021-02-05 RX ORDER — PREDNISOLONE ACETATE 10 MG/ML
SUSPENSION/ DROPS OPHTHALMIC
COMMUNITY
End: 2021-07-23

## 2021-03-02 DIAGNOSIS — E78.5 HYPERLIPEMIA: Chronic | ICD-10-CM

## 2021-03-02 DIAGNOSIS — I25.10 CORONARY ARTERY DISEASE INVOLVING NATIVE CORONARY ARTERY WITHOUT ANGINA PECTORIS, UNSPECIFIED WHETHER NATIVE OR TRANSPLANTED HEART: ICD-10-CM

## 2021-03-02 RX ORDER — ATORVASTATIN CALCIUM 80 MG/1
TABLET, FILM COATED ORAL
Qty: 30 TABLET | Refills: 6 | Status: SHIPPED | OUTPATIENT
Start: 2021-03-02 | End: 2021-08-26

## 2021-03-02 RX ORDER — NIACIN 500 MG/1
500 TABLET, EXTENDED RELEASE ORAL NIGHTLY
Qty: 30 TABLET | Refills: 6 | Status: SHIPPED | OUTPATIENT
Start: 2021-03-02 | End: 2021-09-25

## 2021-03-04 ENCOUNTER — LAB VISIT (OUTPATIENT)
Dept: LAB | Facility: HOSPITAL | Age: 66
End: 2021-03-04
Attending: FAMILY MEDICINE
Payer: COMMERCIAL

## 2021-03-04 ENCOUNTER — PATIENT OUTREACH (OUTPATIENT)
Dept: ADMINISTRATIVE | Facility: HOSPITAL | Age: 66
End: 2021-03-04

## 2021-03-04 DIAGNOSIS — Z12.11 ENCOUNTER FOR FECAL IMMUNOCHEMICAL TEST SCREENING: ICD-10-CM

## 2021-03-04 PROCEDURE — 82274 ASSAY TEST FOR BLOOD FECAL: CPT | Performed by: FAMILY MEDICINE

## 2021-03-17 ENCOUNTER — IMMUNIZATION (OUTPATIENT)
Dept: PHARMACY | Facility: CLINIC | Age: 66
End: 2021-03-17
Payer: COMMERCIAL

## 2021-03-17 DIAGNOSIS — Z23 NEED FOR VACCINATION: Primary | ICD-10-CM

## 2021-03-19 ENCOUNTER — PATIENT OUTREACH (OUTPATIENT)
Dept: ADMINISTRATIVE | Facility: OTHER | Age: 66
End: 2021-03-19

## 2021-03-19 DIAGNOSIS — Z12.11 ENCOUNTER FOR FECAL IMMUNOCHEMICAL TEST SCREENING: Primary | ICD-10-CM

## 2021-03-25 LAB — HEMOCCULT STL QL IA: NEGATIVE

## 2021-03-31 ENCOUNTER — OFFICE VISIT (OUTPATIENT)
Dept: CARDIOLOGY | Facility: CLINIC | Age: 66
End: 2021-03-31
Payer: COMMERCIAL

## 2021-03-31 VITALS
SYSTOLIC BLOOD PRESSURE: 116 MMHG | RESPIRATION RATE: 16 BRPM | BODY MASS INDEX: 38.16 KG/M2 | DIASTOLIC BLOOD PRESSURE: 80 MMHG | HEART RATE: 89 BPM | OXYGEN SATURATION: 96 % | WEIGHT: 297.38 LBS | HEIGHT: 74 IN

## 2021-03-31 DIAGNOSIS — E11.9 TYPE 2 DIABETES MELLITUS WITHOUT RETINOPATHY: ICD-10-CM

## 2021-03-31 DIAGNOSIS — Z98.61 S/P PTCA (PERCUTANEOUS TRANSLUMINAL CORONARY ANGIOPLASTY): ICD-10-CM

## 2021-03-31 DIAGNOSIS — E11.59 HYPERTENSION ASSOCIATED WITH DIABETES: ICD-10-CM

## 2021-03-31 DIAGNOSIS — I15.2 HYPERTENSION ASSOCIATED WITH DIABETES: ICD-10-CM

## 2021-03-31 DIAGNOSIS — E11.59 TYPE 2 DIABETES MELLITUS WITH OTHER CIRCULATORY COMPLICATION, WITHOUT LONG-TERM CURRENT USE OF INSULIN: Chronic | ICD-10-CM

## 2021-03-31 DIAGNOSIS — E66.01 CLASS 2 SEVERE OBESITY DUE TO EXCESS CALORIES WITH SERIOUS COMORBIDITY AND BODY MASS INDEX (BMI) OF 37.0 TO 37.9 IN ADULT: ICD-10-CM

## 2021-03-31 DIAGNOSIS — I87.2 VENOUS INSUFFICIENCY: ICD-10-CM

## 2021-03-31 DIAGNOSIS — E11.69 HYPERLIPIDEMIA ASSOCIATED WITH TYPE 2 DIABETES MELLITUS: ICD-10-CM

## 2021-03-31 DIAGNOSIS — I25.10 CORONARY ARTERY DISEASE INVOLVING NATIVE CORONARY ARTERY OF NATIVE HEART WITHOUT ANGINA PECTORIS: Primary | ICD-10-CM

## 2021-03-31 DIAGNOSIS — E78.5 HYPERLIPIDEMIA ASSOCIATED WITH TYPE 2 DIABETES MELLITUS: ICD-10-CM

## 2021-03-31 DIAGNOSIS — I21.4 NSTEMI (NON-ST ELEVATED MYOCARDIAL INFARCTION): ICD-10-CM

## 2021-03-31 DIAGNOSIS — E11.9 CONTROLLED TYPE 2 DIABETES MELLITUS WITHOUT COMPLICATION, WITHOUT LONG-TERM CURRENT USE OF INSULIN: ICD-10-CM

## 2021-03-31 DIAGNOSIS — Z12.5 SCREENING PSA (PROSTATE SPECIFIC ANTIGEN): ICD-10-CM

## 2021-03-31 DIAGNOSIS — I10 ESSENTIAL HYPERTENSION: ICD-10-CM

## 2021-03-31 DIAGNOSIS — G47.33 OSA ON CPAP: ICD-10-CM

## 2021-03-31 DIAGNOSIS — G47.30 SLEEP APNEA, UNSPECIFIED TYPE: ICD-10-CM

## 2021-03-31 DIAGNOSIS — G47.61 PLMD (PERIODIC LIMB MOVEMENT DISORDER): ICD-10-CM

## 2021-03-31 PROCEDURE — 3074F PR MOST RECENT SYSTOLIC BLOOD PRESSURE < 130 MM HG: ICD-10-PCS | Mod: CPTII,S$GLB,, | Performed by: INTERNAL MEDICINE

## 2021-03-31 PROCEDURE — 3072F PR LOW RISK FOR RETINOPATHY: ICD-10-PCS | Mod: S$GLB,,, | Performed by: INTERNAL MEDICINE

## 2021-03-31 PROCEDURE — 3072F LOW RISK FOR RETINOPATHY: CPT | Mod: S$GLB,,, | Performed by: INTERNAL MEDICINE

## 2021-03-31 PROCEDURE — 99214 PR OFFICE/OUTPT VISIT, EST, LEVL IV, 30-39 MIN: ICD-10-PCS | Mod: S$GLB,,, | Performed by: INTERNAL MEDICINE

## 2021-03-31 PROCEDURE — 99999 PR PBB SHADOW E&M-EST. PATIENT-LVL V: CPT | Mod: PBBFAC,,, | Performed by: INTERNAL MEDICINE

## 2021-03-31 PROCEDURE — 3008F BODY MASS INDEX DOCD: CPT | Mod: CPTII,S$GLB,, | Performed by: INTERNAL MEDICINE

## 2021-03-31 PROCEDURE — 3079F PR MOST RECENT DIASTOLIC BLOOD PRESSURE 80-89 MM HG: ICD-10-PCS | Mod: CPTII,S$GLB,, | Performed by: INTERNAL MEDICINE

## 2021-03-31 PROCEDURE — 3079F DIAST BP 80-89 MM HG: CPT | Mod: CPTII,S$GLB,, | Performed by: INTERNAL MEDICINE

## 2021-03-31 PROCEDURE — 3046F HEMOGLOBIN A1C LEVEL >9.0%: CPT | Mod: CPTII,S$GLB,, | Performed by: INTERNAL MEDICINE

## 2021-03-31 PROCEDURE — 3046F PR MOST RECENT HEMOGLOBIN A1C LEVEL > 9.0%: ICD-10-PCS | Mod: CPTII,S$GLB,, | Performed by: INTERNAL MEDICINE

## 2021-03-31 PROCEDURE — 99214 OFFICE O/P EST MOD 30 MIN: CPT | Mod: S$GLB,,, | Performed by: INTERNAL MEDICINE

## 2021-03-31 PROCEDURE — 3008F PR BODY MASS INDEX (BMI) DOCUMENTED: ICD-10-PCS | Mod: CPTII,S$GLB,, | Performed by: INTERNAL MEDICINE

## 2021-03-31 PROCEDURE — 3074F SYST BP LT 130 MM HG: CPT | Mod: CPTII,S$GLB,, | Performed by: INTERNAL MEDICINE

## 2021-03-31 PROCEDURE — 99999 PR PBB SHADOW E&M-EST. PATIENT-LVL V: ICD-10-PCS | Mod: PBBFAC,,, | Performed by: INTERNAL MEDICINE

## 2021-04-15 ENCOUNTER — LAB VISIT (OUTPATIENT)
Dept: LAB | Facility: HOSPITAL | Age: 66
End: 2021-04-15
Attending: FAMILY MEDICINE
Payer: COMMERCIAL

## 2021-04-15 DIAGNOSIS — I10 ESSENTIAL HYPERTENSION: ICD-10-CM

## 2021-04-15 DIAGNOSIS — E11.59 TYPE 2 DIABETES MELLITUS WITH OTHER CIRCULATORY COMPLICATION, WITHOUT LONG-TERM CURRENT USE OF INSULIN: ICD-10-CM

## 2021-04-15 LAB
ALBUMIN SERPL BCP-MCNC: 3.4 G/DL (ref 3.5–5.2)
ALP SERPL-CCNC: 84 U/L (ref 55–135)
ALT SERPL W/O P-5'-P-CCNC: 30 U/L (ref 10–44)
ANION GAP SERPL CALC-SCNC: 9 MMOL/L (ref 8–16)
AST SERPL-CCNC: 19 U/L (ref 10–40)
BILIRUB SERPL-MCNC: 0.7 MG/DL (ref 0.1–1)
BUN SERPL-MCNC: 15 MG/DL (ref 8–23)
CALCIUM SERPL-MCNC: 8.8 MG/DL (ref 8.7–10.5)
CHLORIDE SERPL-SCNC: 104 MMOL/L (ref 95–110)
CO2 SERPL-SCNC: 27 MMOL/L (ref 23–29)
CREAT SERPL-MCNC: 1 MG/DL (ref 0.5–1.4)
EST. GFR  (AFRICAN AMERICAN): >60 ML/MIN/1.73 M^2
EST. GFR  (NON AFRICAN AMERICAN): >60 ML/MIN/1.73 M^2
ESTIMATED AVG GLUCOSE: 171 MG/DL (ref 68–131)
GLUCOSE SERPL-MCNC: 150 MG/DL (ref 70–110)
HBA1C MFR BLD: 7.6 % (ref 4–5.6)
POTASSIUM SERPL-SCNC: 4.2 MMOL/L (ref 3.5–5.1)
PROT SERPL-MCNC: 7 G/DL (ref 6–8.4)
SODIUM SERPL-SCNC: 140 MMOL/L (ref 136–145)

## 2021-04-15 PROCEDURE — 36415 COLL VENOUS BLD VENIPUNCTURE: CPT | Performed by: FAMILY MEDICINE

## 2021-04-15 PROCEDURE — 80053 COMPREHEN METABOLIC PANEL: CPT | Performed by: FAMILY MEDICINE

## 2021-04-15 PROCEDURE — 83036 HEMOGLOBIN GLYCOSYLATED A1C: CPT | Performed by: FAMILY MEDICINE

## 2021-06-02 NOTE — TELEPHONE ENCOUNTER
Will refill for 1 month and than needs to schedule appointment.   Walks 1 to 2 blocks, climbs 1 flight of stairs, ADLs

## 2021-06-17 ENCOUNTER — OFFICE VISIT (OUTPATIENT)
Dept: INTERNAL MEDICINE | Facility: CLINIC | Age: 66
End: 2021-06-17
Payer: COMMERCIAL

## 2021-06-17 VITALS
HEART RATE: 89 BPM | BODY MASS INDEX: 38.59 KG/M2 | OXYGEN SATURATION: 95 % | WEIGHT: 300.69 LBS | SYSTOLIC BLOOD PRESSURE: 96 MMHG | TEMPERATURE: 98 F | HEIGHT: 74 IN | DIASTOLIC BLOOD PRESSURE: 68 MMHG

## 2021-06-17 DIAGNOSIS — H91.13 PRESBYCUSIS OF BOTH EARS: ICD-10-CM

## 2021-06-17 DIAGNOSIS — I25.10 CORONARY ARTERY DISEASE INVOLVING NATIVE CORONARY ARTERY OF NATIVE HEART WITHOUT ANGINA PECTORIS: Chronic | ICD-10-CM

## 2021-06-17 DIAGNOSIS — H93.11 TINNITUS, RIGHT: ICD-10-CM

## 2021-06-17 DIAGNOSIS — E66.01 CLASS 2 SEVERE OBESITY DUE TO EXCESS CALORIES WITH SERIOUS COMORBIDITY AND BODY MASS INDEX (BMI) OF 38.0 TO 38.9 IN ADULT: ICD-10-CM

## 2021-06-17 DIAGNOSIS — I10 ESSENTIAL HYPERTENSION: ICD-10-CM

## 2021-06-17 DIAGNOSIS — E11.59 TYPE 2 DIABETES MELLITUS WITH OTHER CIRCULATORY COMPLICATION, WITHOUT LONG-TERM CURRENT USE OF INSULIN: Chronic | ICD-10-CM

## 2021-06-17 DIAGNOSIS — G47.33 OSA ON CPAP: ICD-10-CM

## 2021-06-17 DIAGNOSIS — E78.5 HYPERLIPIDEMIA ASSOCIATED WITH TYPE 2 DIABETES MELLITUS: Primary | ICD-10-CM

## 2021-06-17 DIAGNOSIS — R41.3 GRADUAL-ONSET MEMORY IMPAIRMENT: ICD-10-CM

## 2021-06-17 DIAGNOSIS — E11.69 HYPERLIPIDEMIA ASSOCIATED WITH TYPE 2 DIABETES MELLITUS: Primary | ICD-10-CM

## 2021-06-17 PROBLEM — Z78.9 ACTIVE BLEEDING: Status: RESOLVED | Noted: 2021-01-26 | Resolved: 2021-06-17

## 2021-06-17 PROCEDURE — 99999 PR PBB SHADOW E&M-EST. PATIENT-LVL V: CPT | Mod: PBBFAC,,, | Performed by: FAMILY MEDICINE

## 2021-06-17 PROCEDURE — 3008F BODY MASS INDEX DOCD: CPT | Mod: CPTII,S$GLB,, | Performed by: FAMILY MEDICINE

## 2021-06-17 PROCEDURE — 3072F LOW RISK FOR RETINOPATHY: CPT | Mod: CPTII,S$GLB,, | Performed by: FAMILY MEDICINE

## 2021-06-17 PROCEDURE — 1126F AMNT PAIN NOTED NONE PRSNT: CPT | Mod: CPTII,S$GLB,, | Performed by: FAMILY MEDICINE

## 2021-06-17 PROCEDURE — 3051F HG A1C>EQUAL 7.0%<8.0%: CPT | Mod: CPTII,S$GLB,, | Performed by: FAMILY MEDICINE

## 2021-06-17 PROCEDURE — 99214 PR OFFICE/OUTPT VISIT, EST, LEVL IV, 30-39 MIN: ICD-10-PCS | Mod: S$GLB,,, | Performed by: FAMILY MEDICINE

## 2021-06-17 PROCEDURE — 3074F SYST BP LT 130 MM HG: CPT | Mod: CPTII,S$GLB,, | Performed by: FAMILY MEDICINE

## 2021-06-17 PROCEDURE — 3078F PR MOST RECENT DIASTOLIC BLOOD PRESSURE < 80 MM HG: ICD-10-PCS | Mod: CPTII,S$GLB,, | Performed by: FAMILY MEDICINE

## 2021-06-17 PROCEDURE — 3072F PR LOW RISK FOR RETINOPATHY: ICD-10-PCS | Mod: CPTII,S$GLB,, | Performed by: FAMILY MEDICINE

## 2021-06-17 PROCEDURE — 3008F PR BODY MASS INDEX (BMI) DOCUMENTED: ICD-10-PCS | Mod: CPTII,S$GLB,, | Performed by: FAMILY MEDICINE

## 2021-06-17 PROCEDURE — 99999 PR PBB SHADOW E&M-EST. PATIENT-LVL V: ICD-10-PCS | Mod: PBBFAC,,, | Performed by: FAMILY MEDICINE

## 2021-06-17 PROCEDURE — 1126F PR PAIN SEVERITY QUANTIFIED, NO PAIN PRESENT: ICD-10-PCS | Mod: CPTII,S$GLB,, | Performed by: FAMILY MEDICINE

## 2021-06-17 PROCEDURE — 3074F PR MOST RECENT SYSTOLIC BLOOD PRESSURE < 130 MM HG: ICD-10-PCS | Mod: CPTII,S$GLB,, | Performed by: FAMILY MEDICINE

## 2021-06-17 PROCEDURE — 99214 OFFICE O/P EST MOD 30 MIN: CPT | Mod: S$GLB,,, | Performed by: FAMILY MEDICINE

## 2021-06-17 PROCEDURE — 3051F PR MOST RECENT HEMOGLOBIN A1C LEVEL 7.0 - < 8.0%: ICD-10-PCS | Mod: CPTII,S$GLB,, | Performed by: FAMILY MEDICINE

## 2021-06-17 PROCEDURE — 3078F DIAST BP <80 MM HG: CPT | Mod: CPTII,S$GLB,, | Performed by: FAMILY MEDICINE

## 2021-07-13 ENCOUNTER — TELEPHONE (OUTPATIENT)
Dept: INTERNAL MEDICINE | Facility: CLINIC | Age: 66
End: 2021-07-13

## 2021-07-21 ENCOUNTER — CLINICAL SUPPORT (OUTPATIENT)
Dept: AUDIOLOGY | Facility: CLINIC | Age: 66
End: 2021-07-21
Payer: COMMERCIAL

## 2021-07-21 DIAGNOSIS — H90.3 HEARING LOSS, SENSORINEURAL, ASYMMETRICAL: Primary | ICD-10-CM

## 2021-07-21 PROCEDURE — 92557 COMPREHENSIVE HEARING TEST: CPT | Mod: S$GLB,,, | Performed by: AUDIOLOGIST

## 2021-07-21 PROCEDURE — 92567 TYMPANOMETRY: CPT | Mod: S$GLB,,, | Performed by: AUDIOLOGIST

## 2021-07-21 PROCEDURE — 92567 PR TYMPA2METRY: ICD-10-PCS | Mod: S$GLB,,, | Performed by: AUDIOLOGIST

## 2021-07-21 PROCEDURE — 92557 PR COMPREHENSIVE HEARING TEST: ICD-10-PCS | Mod: S$GLB,,, | Performed by: AUDIOLOGIST

## 2021-07-22 ENCOUNTER — OFFICE VISIT (OUTPATIENT)
Dept: OTOLARYNGOLOGY | Facility: CLINIC | Age: 66
End: 2021-07-22
Payer: COMMERCIAL

## 2021-07-22 VITALS
TEMPERATURE: 98 F | DIASTOLIC BLOOD PRESSURE: 72 MMHG | SYSTOLIC BLOOD PRESSURE: 113 MMHG | HEART RATE: 74 BPM | WEIGHT: 300.69 LBS | HEIGHT: 74 IN | BODY MASS INDEX: 38.59 KG/M2

## 2021-07-22 DIAGNOSIS — H93.11 TINNITUS OF RIGHT EAR: ICD-10-CM

## 2021-07-22 DIAGNOSIS — H90.A21 SENSORINEURAL HEARING LOSS (SNHL) OF RIGHT EAR WITH RESTRICTED HEARING OF LEFT EAR: ICD-10-CM

## 2021-07-22 DIAGNOSIS — H90.3 ASYMMETRIC SNHL (SENSORINEURAL HEARING LOSS): Primary | ICD-10-CM

## 2021-07-22 PROCEDURE — 99204 PR OFFICE/OUTPT VISIT, NEW, LEVL IV, 45-59 MIN: ICD-10-PCS | Mod: S$GLB,,, | Performed by: PHYSICIAN ASSISTANT

## 2021-07-22 PROCEDURE — 3074F PR MOST RECENT SYSTOLIC BLOOD PRESSURE < 130 MM HG: ICD-10-PCS | Mod: CPTII,S$GLB,, | Performed by: PHYSICIAN ASSISTANT

## 2021-07-22 PROCEDURE — 99204 OFFICE O/P NEW MOD 45 MIN: CPT | Mod: S$GLB,,, | Performed by: PHYSICIAN ASSISTANT

## 2021-07-22 PROCEDURE — 3072F PR LOW RISK FOR RETINOPATHY: ICD-10-PCS | Mod: CPTII,S$GLB,, | Performed by: PHYSICIAN ASSISTANT

## 2021-07-22 PROCEDURE — 99999 PR PBB SHADOW E&M-EST. PATIENT-LVL V: ICD-10-PCS | Mod: PBBFAC,,, | Performed by: PHYSICIAN ASSISTANT

## 2021-07-22 PROCEDURE — 3074F SYST BP LT 130 MM HG: CPT | Mod: CPTII,S$GLB,, | Performed by: PHYSICIAN ASSISTANT

## 2021-07-22 PROCEDURE — 3078F DIAST BP <80 MM HG: CPT | Mod: CPTII,S$GLB,, | Performed by: PHYSICIAN ASSISTANT

## 2021-07-22 PROCEDURE — 3072F LOW RISK FOR RETINOPATHY: CPT | Mod: CPTII,S$GLB,, | Performed by: PHYSICIAN ASSISTANT

## 2021-07-22 PROCEDURE — 3078F PR MOST RECENT DIASTOLIC BLOOD PRESSURE < 80 MM HG: ICD-10-PCS | Mod: CPTII,S$GLB,, | Performed by: PHYSICIAN ASSISTANT

## 2021-07-22 PROCEDURE — 3008F PR BODY MASS INDEX (BMI) DOCUMENTED: ICD-10-PCS | Mod: CPTII,S$GLB,, | Performed by: PHYSICIAN ASSISTANT

## 2021-07-22 PROCEDURE — 3051F PR MOST RECENT HEMOGLOBIN A1C LEVEL 7.0 - < 8.0%: ICD-10-PCS | Mod: CPTII,S$GLB,, | Performed by: PHYSICIAN ASSISTANT

## 2021-07-22 PROCEDURE — 3008F BODY MASS INDEX DOCD: CPT | Mod: CPTII,S$GLB,, | Performed by: PHYSICIAN ASSISTANT

## 2021-07-22 PROCEDURE — 99999 PR PBB SHADOW E&M-EST. PATIENT-LVL V: CPT | Mod: PBBFAC,,, | Performed by: PHYSICIAN ASSISTANT

## 2021-07-22 PROCEDURE — 3051F HG A1C>EQUAL 7.0%<8.0%: CPT | Mod: CPTII,S$GLB,, | Performed by: PHYSICIAN ASSISTANT

## 2021-08-05 ENCOUNTER — LAB VISIT (OUTPATIENT)
Dept: LAB | Facility: HOSPITAL | Age: 66
End: 2021-08-05
Attending: PHYSICIAN ASSISTANT
Payer: COMMERCIAL

## 2021-08-05 DIAGNOSIS — H90.3 ASYMMETRIC SNHL (SENSORINEURAL HEARING LOSS): ICD-10-CM

## 2021-08-05 DIAGNOSIS — H90.A21 SENSORINEURAL HEARING LOSS (SNHL) OF RIGHT EAR WITH RESTRICTED HEARING OF LEFT EAR: ICD-10-CM

## 2021-08-05 LAB
CREAT SERPL-MCNC: 1.1 MG/DL (ref 0.5–1.4)
EST. GFR  (AFRICAN AMERICAN): >60 ML/MIN/1.73 M^2
EST. GFR  (NON AFRICAN AMERICAN): >60 ML/MIN/1.73 M^2

## 2021-08-05 PROCEDURE — 82565 ASSAY OF CREATININE: CPT | Performed by: PHYSICIAN ASSISTANT

## 2021-08-05 PROCEDURE — 36415 COLL VENOUS BLD VENIPUNCTURE: CPT | Performed by: PHYSICIAN ASSISTANT

## 2021-08-06 ENCOUNTER — TELEPHONE (OUTPATIENT)
Dept: OTOLARYNGOLOGY | Facility: CLINIC | Age: 66
End: 2021-08-06

## 2021-08-11 ENCOUNTER — TELEPHONE (OUTPATIENT)
Dept: CARDIOLOGY | Facility: CLINIC | Age: 66
End: 2021-08-11

## 2021-08-12 ENCOUNTER — LAB VISIT (OUTPATIENT)
Dept: PRIMARY CARE CLINIC | Facility: OTHER | Age: 66
End: 2021-08-12
Payer: COMMERCIAL

## 2021-08-12 ENCOUNTER — LAB VISIT (OUTPATIENT)
Dept: LAB | Facility: HOSPITAL | Age: 66
End: 2021-08-12
Attending: FAMILY MEDICINE
Payer: COMMERCIAL

## 2021-08-12 ENCOUNTER — TELEPHONE (OUTPATIENT)
Dept: INTERNAL MEDICINE | Facility: CLINIC | Age: 66
End: 2021-08-12

## 2021-08-12 DIAGNOSIS — Z20.822 ENCOUNTER FOR LABORATORY TESTING FOR COVID-19 VIRUS: ICD-10-CM

## 2021-08-12 DIAGNOSIS — Z79.4 TYPE 2 DIABETES MELLITUS WITH HYPEROSMOLARITY WITHOUT COMA, WITH LONG-TERM CURRENT USE OF INSULIN: ICD-10-CM

## 2021-08-12 DIAGNOSIS — E11.00 TYPE 2 DIABETES MELLITUS WITH HYPEROSMOLARITY WITHOUT COMA, WITH LONG-TERM CURRENT USE OF INSULIN: ICD-10-CM

## 2021-08-12 LAB
ESTIMATED AVG GLUCOSE: 223 MG/DL (ref 68–131)
HBA1C MFR BLD: 9.4 % (ref 4–5.6)

## 2021-08-12 PROCEDURE — 83036 HEMOGLOBIN GLYCOSYLATED A1C: CPT | Performed by: FAMILY MEDICINE

## 2021-08-12 PROCEDURE — 36415 COLL VENOUS BLD VENIPUNCTURE: CPT | Performed by: FAMILY MEDICINE

## 2021-08-12 PROCEDURE — U0003 INFECTIOUS AGENT DETECTION BY NUCLEIC ACID (DNA OR RNA); SEVERE ACUTE RESPIRATORY SYNDROME CORONAVIRUS 2 (SARS-COV-2) (CORONAVIRUS DISEASE [COVID-19]), AMPLIFIED PROBE TECHNIQUE, MAKING USE OF HIGH THROUGHPUT TECHNOLOGIES AS DESCRIBED BY CMS-2020-01-R: HCPCS

## 2021-08-16 LAB
SARS-COV-2 RNA RESP QL NAA+PROBE: NORMAL
TEST PERFORMANCE INFO SPEC: NORMAL

## 2021-08-24 ENCOUNTER — TELEPHONE (OUTPATIENT)
Dept: OTOLARYNGOLOGY | Facility: CLINIC | Age: 66
End: 2021-08-24

## 2021-08-24 ENCOUNTER — PATIENT MESSAGE (OUTPATIENT)
Dept: OTOLARYNGOLOGY | Facility: CLINIC | Age: 66
End: 2021-08-24

## 2021-09-13 ENCOUNTER — PATIENT OUTREACH (OUTPATIENT)
Dept: ADMINISTRATIVE | Facility: HOSPITAL | Age: 66
End: 2021-09-13

## 2021-09-13 ENCOUNTER — TELEPHONE (OUTPATIENT)
Dept: INTERNAL MEDICINE | Facility: CLINIC | Age: 66
End: 2021-09-13

## 2021-09-20 ENCOUNTER — PATIENT MESSAGE (OUTPATIENT)
Dept: PULMONOLOGY | Facility: CLINIC | Age: 66
End: 2021-09-20

## 2021-09-23 ENCOUNTER — PATIENT OUTREACH (OUTPATIENT)
Dept: ADMINISTRATIVE | Facility: HOSPITAL | Age: 66
End: 2021-09-23

## 2021-09-28 ENCOUNTER — PATIENT OUTREACH (OUTPATIENT)
Dept: ADMINISTRATIVE | Facility: OTHER | Age: 66
End: 2021-09-28

## 2021-09-29 ENCOUNTER — OFFICE VISIT (OUTPATIENT)
Dept: SLEEP MEDICINE | Facility: CLINIC | Age: 66
End: 2021-09-29
Payer: COMMERCIAL

## 2021-09-29 ENCOUNTER — PATIENT MESSAGE (OUTPATIENT)
Dept: OTOLARYNGOLOGY | Facility: CLINIC | Age: 66
End: 2021-09-29

## 2021-09-29 VITALS — BODY MASS INDEX: 38.5 KG/M2 | WEIGHT: 300 LBS | HEIGHT: 74 IN

## 2021-09-29 DIAGNOSIS — E66.01 SEVERE OBESITY (BMI 35.0-39.9) WITH COMORBIDITY: Primary | ICD-10-CM

## 2021-09-29 DIAGNOSIS — G47.33 OSA ON CPAP: ICD-10-CM

## 2021-09-29 DIAGNOSIS — I25.10 CORONARY ARTERY DISEASE INVOLVING NATIVE CORONARY ARTERY OF NATIVE HEART WITHOUT ANGINA PECTORIS: ICD-10-CM

## 2021-09-29 PROCEDURE — 3008F BODY MASS INDEX DOCD: CPT | Mod: CPTII,95,, | Performed by: NURSE PRACTITIONER

## 2021-09-29 PROCEDURE — 1159F MED LIST DOCD IN RCRD: CPT | Mod: CPTII,95,, | Performed by: NURSE PRACTITIONER

## 2021-09-29 PROCEDURE — 3046F HEMOGLOBIN A1C LEVEL >9.0%: CPT | Mod: CPTII,95,, | Performed by: NURSE PRACTITIONER

## 2021-09-29 PROCEDURE — 3072F LOW RISK FOR RETINOPATHY: CPT | Mod: CPTII,95,, | Performed by: NURSE PRACTITIONER

## 2021-09-29 PROCEDURE — 4010F ACE/ARB THERAPY RXD/TAKEN: CPT | Mod: CPTII,95,, | Performed by: NURSE PRACTITIONER

## 2021-09-29 PROCEDURE — 99213 OFFICE O/P EST LOW 20 MIN: CPT | Mod: 95,,, | Performed by: NURSE PRACTITIONER

## 2021-09-29 PROCEDURE — 3046F PR MOST RECENT HEMOGLOBIN A1C LEVEL > 9.0%: ICD-10-PCS | Mod: CPTII,95,, | Performed by: NURSE PRACTITIONER

## 2021-09-29 PROCEDURE — 3072F PR LOW RISK FOR RETINOPATHY: ICD-10-PCS | Mod: CPTII,95,, | Performed by: NURSE PRACTITIONER

## 2021-09-29 PROCEDURE — 1159F PR MEDICATION LIST DOCUMENTED IN MEDICAL RECORD: ICD-10-PCS | Mod: CPTII,95,, | Performed by: NURSE PRACTITIONER

## 2021-09-29 PROCEDURE — 1160F RVW MEDS BY RX/DR IN RCRD: CPT | Mod: CPTII,95,, | Performed by: NURSE PRACTITIONER

## 2021-09-29 PROCEDURE — 1160F PR REVIEW ALL MEDS BY PRESCRIBER/CLIN PHARMACIST DOCUMENTED: ICD-10-PCS | Mod: CPTII,95,, | Performed by: NURSE PRACTITIONER

## 2021-09-29 PROCEDURE — 99213 PR OFFICE/OUTPT VISIT, EST, LEVL III, 20-29 MIN: ICD-10-PCS | Mod: 95,,, | Performed by: NURSE PRACTITIONER

## 2021-09-29 PROCEDURE — 4010F PR ACE/ARB THEARPY RXD/TAKEN: ICD-10-PCS | Mod: CPTII,95,, | Performed by: NURSE PRACTITIONER

## 2021-09-29 PROCEDURE — 3008F PR BODY MASS INDEX (BMI) DOCUMENTED: ICD-10-PCS | Mod: CPTII,95,, | Performed by: NURSE PRACTITIONER

## 2021-10-27 ENCOUNTER — OFFICE VISIT (OUTPATIENT)
Dept: INTERNAL MEDICINE | Facility: CLINIC | Age: 66
End: 2021-10-27
Payer: COMMERCIAL

## 2021-10-27 VITALS
SYSTOLIC BLOOD PRESSURE: 100 MMHG | HEART RATE: 95 BPM | TEMPERATURE: 98 F | OXYGEN SATURATION: 95 % | BODY MASS INDEX: 37.4 KG/M2 | WEIGHT: 291.44 LBS | DIASTOLIC BLOOD PRESSURE: 70 MMHG | HEIGHT: 74 IN

## 2021-10-27 DIAGNOSIS — E11.69 HYPERLIPIDEMIA ASSOCIATED WITH TYPE 2 DIABETES MELLITUS: ICD-10-CM

## 2021-10-27 DIAGNOSIS — E66.01 CLASS 2 SEVERE OBESITY DUE TO EXCESS CALORIES WITH SERIOUS COMORBIDITY AND BODY MASS INDEX (BMI) OF 38.0 TO 38.9 IN ADULT: Chronic | ICD-10-CM

## 2021-10-27 DIAGNOSIS — Z12.5 SCREENING PSA (PROSTATE SPECIFIC ANTIGEN): ICD-10-CM

## 2021-10-27 DIAGNOSIS — I10 ESSENTIAL HYPERTENSION: ICD-10-CM

## 2021-10-27 DIAGNOSIS — E11.65 TYPE 2 DIABETES MELLITUS WITH HYPERGLYCEMIA, WITH LONG-TERM CURRENT USE OF INSULIN: Primary | Chronic | ICD-10-CM

## 2021-10-27 DIAGNOSIS — Z79.4 TYPE 2 DIABETES MELLITUS WITH HYPERGLYCEMIA, WITH LONG-TERM CURRENT USE OF INSULIN: Primary | Chronic | ICD-10-CM

## 2021-10-27 DIAGNOSIS — E78.5 HYPERLIPIDEMIA ASSOCIATED WITH TYPE 2 DIABETES MELLITUS: ICD-10-CM

## 2021-10-27 DIAGNOSIS — E11.42 TYPE 2 DIABETES MELLITUS WITH DIABETIC POLYNEUROPATHY, WITH LONG-TERM CURRENT USE OF INSULIN: ICD-10-CM

## 2021-10-27 DIAGNOSIS — E11.9 TYPE 2 DIABETES MELLITUS WITHOUT RETINOPATHY: ICD-10-CM

## 2021-10-27 DIAGNOSIS — Z79.4 TYPE 2 DIABETES MELLITUS WITH DIABETIC POLYNEUROPATHY, WITH LONG-TERM CURRENT USE OF INSULIN: ICD-10-CM

## 2021-10-27 LAB
ALBUMIN SERPL BCP-MCNC: 3.6 G/DL (ref 3.5–5.2)
ALBUMIN/CREAT UR: 44.6 UG/MG (ref 0–30)
ALP SERPL-CCNC: 73 U/L (ref 55–135)
ALT SERPL W/O P-5'-P-CCNC: 19 U/L (ref 10–44)
ANION GAP SERPL CALC-SCNC: 6 MMOL/L (ref 8–16)
AST SERPL-CCNC: 15 U/L (ref 10–40)
BILIRUB SERPL-MCNC: 0.6 MG/DL (ref 0.1–1)
BUN SERPL-MCNC: 15 MG/DL (ref 8–23)
CALCIUM SERPL-MCNC: 9.5 MG/DL (ref 8.7–10.5)
CHLORIDE SERPL-SCNC: 102 MMOL/L (ref 95–110)
CHOLEST SERPL-MCNC: 125 MG/DL (ref 120–199)
CHOLEST/HDLC SERPL: 3.4 {RATIO} (ref 2–5)
CO2 SERPL-SCNC: 24 MMOL/L (ref 23–29)
COMPLEXED PSA SERPL-MCNC: 2 NG/ML (ref 0–4)
CREAT SERPL-MCNC: 1.1 MG/DL (ref 0.5–1.4)
CREAT UR-MCNC: 83 MG/DL (ref 23–375)
EST. GFR  (AFRICAN AMERICAN): >60 ML/MIN/1.73 M^2
EST. GFR  (NON AFRICAN AMERICAN): >60 ML/MIN/1.73 M^2
ESTIMATED AVG GLUCOSE: 252 MG/DL (ref 68–131)
GLUCOSE SERPL-MCNC: 238 MG/DL (ref 70–110)
HBA1C MFR BLD: 10.4 % (ref 4–5.6)
HDLC SERPL-MCNC: 37 MG/DL (ref 40–75)
HDLC SERPL: 29.6 % (ref 20–50)
LDLC SERPL CALC-MCNC: 74 MG/DL (ref 63–159)
MICROALBUMIN UR DL<=1MG/L-MCNC: 37 UG/ML
NONHDLC SERPL-MCNC: 88 MG/DL
POTASSIUM SERPL-SCNC: 4.1 MMOL/L (ref 3.5–5.1)
PROT SERPL-MCNC: 6.9 G/DL (ref 6–8.4)
SODIUM SERPL-SCNC: 132 MMOL/L (ref 136–145)
TRIGL SERPL-MCNC: 70 MG/DL (ref 30–150)

## 2021-10-27 PROCEDURE — 99999 PR PBB SHADOW E&M-EST. PATIENT-LVL III: CPT | Mod: PBBFAC,,, | Performed by: FAMILY MEDICINE

## 2021-10-27 PROCEDURE — 83036 HEMOGLOBIN GLYCOSYLATED A1C: CPT | Performed by: FAMILY MEDICINE

## 2021-10-27 PROCEDURE — 3078F DIAST BP <80 MM HG: CPT | Mod: CPTII,S$GLB,, | Performed by: FAMILY MEDICINE

## 2021-10-27 PROCEDURE — 99999 PR PBB SHADOW E&M-EST. PATIENT-LVL III: ICD-10-PCS | Mod: PBBFAC,,, | Performed by: FAMILY MEDICINE

## 2021-10-27 PROCEDURE — 1159F PR MEDICATION LIST DOCUMENTED IN MEDICAL RECORD: ICD-10-PCS | Mod: CPTII,S$GLB,, | Performed by: FAMILY MEDICINE

## 2021-10-27 PROCEDURE — 80061 LIPID PANEL: CPT | Performed by: FAMILY MEDICINE

## 2021-10-27 PROCEDURE — 82570 ASSAY OF URINE CREATININE: CPT | Performed by: FAMILY MEDICINE

## 2021-10-27 PROCEDURE — 3074F PR MOST RECENT SYSTOLIC BLOOD PRESSURE < 130 MM HG: ICD-10-PCS | Mod: CPTII,S$GLB,, | Performed by: FAMILY MEDICINE

## 2021-10-27 PROCEDURE — 84153 ASSAY OF PSA TOTAL: CPT | Performed by: FAMILY MEDICINE

## 2021-10-27 PROCEDURE — 4010F ACE/ARB THERAPY RXD/TAKEN: CPT | Mod: CPTII,S$GLB,, | Performed by: FAMILY MEDICINE

## 2021-10-27 PROCEDURE — 99214 OFFICE O/P EST MOD 30 MIN: CPT | Mod: S$GLB,,, | Performed by: FAMILY MEDICINE

## 2021-10-27 PROCEDURE — 3008F BODY MASS INDEX DOCD: CPT | Mod: CPTII,S$GLB,, | Performed by: FAMILY MEDICINE

## 2021-10-27 PROCEDURE — 3046F HEMOGLOBIN A1C LEVEL >9.0%: CPT | Mod: CPTII,S$GLB,, | Performed by: FAMILY MEDICINE

## 2021-10-27 PROCEDURE — 1160F PR REVIEW ALL MEDS BY PRESCRIBER/CLIN PHARMACIST DOCUMENTED: ICD-10-PCS | Mod: CPTII,S$GLB,, | Performed by: FAMILY MEDICINE

## 2021-10-27 PROCEDURE — 3046F PR MOST RECENT HEMOGLOBIN A1C LEVEL > 9.0%: ICD-10-PCS | Mod: CPTII,S$GLB,, | Performed by: FAMILY MEDICINE

## 2021-10-27 PROCEDURE — 3072F PR LOW RISK FOR RETINOPATHY: ICD-10-PCS | Mod: CPTII,S$GLB,, | Performed by: FAMILY MEDICINE

## 2021-10-27 PROCEDURE — 3074F SYST BP LT 130 MM HG: CPT | Mod: CPTII,S$GLB,, | Performed by: FAMILY MEDICINE

## 2021-10-27 PROCEDURE — 80053 COMPREHEN METABOLIC PANEL: CPT | Performed by: FAMILY MEDICINE

## 2021-10-27 PROCEDURE — 99214 PR OFFICE/OUTPT VISIT, EST, LEVL IV, 30-39 MIN: ICD-10-PCS | Mod: S$GLB,,, | Performed by: FAMILY MEDICINE

## 2021-10-27 PROCEDURE — 3008F PR BODY MASS INDEX (BMI) DOCUMENTED: ICD-10-PCS | Mod: CPTII,S$GLB,, | Performed by: FAMILY MEDICINE

## 2021-10-27 PROCEDURE — 3078F PR MOST RECENT DIASTOLIC BLOOD PRESSURE < 80 MM HG: ICD-10-PCS | Mod: CPTII,S$GLB,, | Performed by: FAMILY MEDICINE

## 2021-10-27 PROCEDURE — 4010F PR ACE/ARB THEARPY RXD/TAKEN: ICD-10-PCS | Mod: CPTII,S$GLB,, | Performed by: FAMILY MEDICINE

## 2021-10-27 PROCEDURE — 1160F RVW MEDS BY RX/DR IN RCRD: CPT | Mod: CPTII,S$GLB,, | Performed by: FAMILY MEDICINE

## 2021-10-27 PROCEDURE — 1159F MED LIST DOCD IN RCRD: CPT | Mod: CPTII,S$GLB,, | Performed by: FAMILY MEDICINE

## 2021-10-27 PROCEDURE — 3072F LOW RISK FOR RETINOPATHY: CPT | Mod: CPTII,S$GLB,, | Performed by: FAMILY MEDICINE

## 2021-10-27 RX ORDER — PEN NEEDLE, DIABETIC 30 GX3/16"
NEEDLE, DISPOSABLE MISCELLANEOUS
COMMUNITY
End: 2022-04-20 | Stop reason: SDUPTHER

## 2021-10-27 RX ORDER — INSULIN GLARGINE 100 [IU]/ML
20 INJECTION, SOLUTION SUBCUTANEOUS 2 TIMES DAILY
Qty: 15 ML | Refills: 1 | Status: SHIPPED | OUTPATIENT
Start: 2021-10-27 | End: 2021-11-23

## 2021-11-04 ENCOUNTER — PATIENT OUTREACH (OUTPATIENT)
Dept: ADMINISTRATIVE | Facility: OTHER | Age: 66
End: 2021-11-04
Payer: COMMERCIAL

## 2021-11-10 ENCOUNTER — OFFICE VISIT (OUTPATIENT)
Dept: CARDIOLOGY | Facility: CLINIC | Age: 66
End: 2021-11-10
Payer: COMMERCIAL

## 2021-11-10 VITALS
OXYGEN SATURATION: 98 % | SYSTOLIC BLOOD PRESSURE: 122 MMHG | DIASTOLIC BLOOD PRESSURE: 74 MMHG | HEART RATE: 85 BPM | BODY MASS INDEX: 37.55 KG/M2 | WEIGHT: 292.56 LBS | HEIGHT: 74 IN

## 2021-11-10 DIAGNOSIS — Z79.4 TYPE 2 DIABETES MELLITUS WITH OTHER CIRCULATORY COMPLICATION, WITH LONG-TERM CURRENT USE OF INSULIN: ICD-10-CM

## 2021-11-10 DIAGNOSIS — E66.01 CLASS 2 SEVERE OBESITY DUE TO EXCESS CALORIES WITH SERIOUS COMORBIDITY AND BODY MASS INDEX (BMI) OF 38.0 TO 38.9 IN ADULT: Chronic | ICD-10-CM

## 2021-11-10 DIAGNOSIS — I25.10 CORONARY ARTERY DISEASE INVOLVING NATIVE CORONARY ARTERY OF NATIVE HEART WITHOUT ANGINA PECTORIS: Primary | Chronic | ICD-10-CM

## 2021-11-10 DIAGNOSIS — E78.5 HYPERLIPIDEMIA ASSOCIATED WITH TYPE 2 DIABETES MELLITUS: ICD-10-CM

## 2021-11-10 DIAGNOSIS — E11.65 TYPE 2 DIABETES MELLITUS WITH HYPERGLYCEMIA, WITH LONG-TERM CURRENT USE OF INSULIN: Chronic | ICD-10-CM

## 2021-11-10 DIAGNOSIS — E11.59 TYPE 2 DIABETES MELLITUS WITH OTHER CIRCULATORY COMPLICATION, WITH LONG-TERM CURRENT USE OF INSULIN: ICD-10-CM

## 2021-11-10 DIAGNOSIS — Z79.4 TYPE 2 DIABETES MELLITUS WITH HYPERGLYCEMIA, WITH LONG-TERM CURRENT USE OF INSULIN: Chronic | ICD-10-CM

## 2021-11-10 DIAGNOSIS — E11.69 HYPERLIPIDEMIA ASSOCIATED WITH TYPE 2 DIABETES MELLITUS: ICD-10-CM

## 2021-11-10 DIAGNOSIS — I10 ESSENTIAL HYPERTENSION: ICD-10-CM

## 2021-11-10 DIAGNOSIS — E11.42 TYPE 2 DIABETES MELLITUS WITH DIABETIC POLYNEUROPATHY, WITH LONG-TERM CURRENT USE OF INSULIN: ICD-10-CM

## 2021-11-10 DIAGNOSIS — I87.2 VENOUS INSUFFICIENCY: ICD-10-CM

## 2021-11-10 DIAGNOSIS — I21.4 NSTEMI (NON-ST ELEVATED MYOCARDIAL INFARCTION): ICD-10-CM

## 2021-11-10 DIAGNOSIS — Z98.61 S/P PTCA (PERCUTANEOUS TRANSLUMINAL CORONARY ANGIOPLASTY): ICD-10-CM

## 2021-11-10 DIAGNOSIS — G47.33 OSA ON CPAP: ICD-10-CM

## 2021-11-10 DIAGNOSIS — Z79.4 TYPE 2 DIABETES MELLITUS WITH DIABETIC POLYNEUROPATHY, WITH LONG-TERM CURRENT USE OF INSULIN: ICD-10-CM

## 2021-11-10 PROCEDURE — 3008F BODY MASS INDEX DOCD: CPT | Mod: CPTII,S$GLB,, | Performed by: INTERNAL MEDICINE

## 2021-11-10 PROCEDURE — 99214 OFFICE O/P EST MOD 30 MIN: CPT | Mod: S$GLB,,, | Performed by: INTERNAL MEDICINE

## 2021-11-10 PROCEDURE — 3008F PR BODY MASS INDEX (BMI) DOCUMENTED: ICD-10-PCS | Mod: CPTII,S$GLB,, | Performed by: INTERNAL MEDICINE

## 2021-11-10 PROCEDURE — 3046F HEMOGLOBIN A1C LEVEL >9.0%: CPT | Mod: CPTII,S$GLB,, | Performed by: INTERNAL MEDICINE

## 2021-11-10 PROCEDURE — 3072F LOW RISK FOR RETINOPATHY: CPT | Mod: CPTII,S$GLB,, | Performed by: INTERNAL MEDICINE

## 2021-11-10 PROCEDURE — 3066F PR DOCUMENTATION OF TREATMENT FOR NEPHROPATHY: ICD-10-PCS | Mod: CPTII,S$GLB,, | Performed by: INTERNAL MEDICINE

## 2021-11-10 PROCEDURE — 4010F PR ACE/ARB THEARPY RXD/TAKEN: ICD-10-PCS | Mod: CPTII,S$GLB,, | Performed by: INTERNAL MEDICINE

## 2021-11-10 PROCEDURE — 1159F PR MEDICATION LIST DOCUMENTED IN MEDICAL RECORD: ICD-10-PCS | Mod: CPTII,S$GLB,, | Performed by: INTERNAL MEDICINE

## 2021-11-10 PROCEDURE — 99214 PR OFFICE/OUTPT VISIT, EST, LEVL IV, 30-39 MIN: ICD-10-PCS | Mod: S$GLB,,, | Performed by: INTERNAL MEDICINE

## 2021-11-10 PROCEDURE — 3046F PR MOST RECENT HEMOGLOBIN A1C LEVEL > 9.0%: ICD-10-PCS | Mod: CPTII,S$GLB,, | Performed by: INTERNAL MEDICINE

## 2021-11-10 PROCEDURE — 3066F NEPHROPATHY DOC TX: CPT | Mod: CPTII,S$GLB,, | Performed by: INTERNAL MEDICINE

## 2021-11-10 PROCEDURE — 3078F PR MOST RECENT DIASTOLIC BLOOD PRESSURE < 80 MM HG: ICD-10-PCS | Mod: CPTII,S$GLB,, | Performed by: INTERNAL MEDICINE

## 2021-11-10 PROCEDURE — 99999 PR PBB SHADOW E&M-EST. PATIENT-LVL V: CPT | Mod: PBBFAC,,, | Performed by: INTERNAL MEDICINE

## 2021-11-10 PROCEDURE — 3074F PR MOST RECENT SYSTOLIC BLOOD PRESSURE < 130 MM HG: ICD-10-PCS | Mod: CPTII,S$GLB,, | Performed by: INTERNAL MEDICINE

## 2021-11-10 PROCEDURE — 3072F PR LOW RISK FOR RETINOPATHY: ICD-10-PCS | Mod: CPTII,S$GLB,, | Performed by: INTERNAL MEDICINE

## 2021-11-10 PROCEDURE — 99999 PR PBB SHADOW E&M-EST. PATIENT-LVL V: ICD-10-PCS | Mod: PBBFAC,,, | Performed by: INTERNAL MEDICINE

## 2021-11-10 PROCEDURE — 1159F MED LIST DOCD IN RCRD: CPT | Mod: CPTII,S$GLB,, | Performed by: INTERNAL MEDICINE

## 2021-11-10 PROCEDURE — 3078F DIAST BP <80 MM HG: CPT | Mod: CPTII,S$GLB,, | Performed by: INTERNAL MEDICINE

## 2021-11-10 PROCEDURE — 4010F ACE/ARB THERAPY RXD/TAKEN: CPT | Mod: CPTII,S$GLB,, | Performed by: INTERNAL MEDICINE

## 2021-11-10 PROCEDURE — 3074F SYST BP LT 130 MM HG: CPT | Mod: CPTII,S$GLB,, | Performed by: INTERNAL MEDICINE

## 2021-11-10 PROCEDURE — 3060F POS MICROALBUMINURIA REV: CPT | Mod: CPTII,S$GLB,, | Performed by: INTERNAL MEDICINE

## 2021-11-10 PROCEDURE — 3060F PR POS MICROALBUMINURIA RESULT DOCUMENTED/REVIEW: ICD-10-PCS | Mod: CPTII,S$GLB,, | Performed by: INTERNAL MEDICINE

## 2021-11-16 ENCOUNTER — OFFICE VISIT (OUTPATIENT)
Dept: PODIATRY | Facility: CLINIC | Age: 66
End: 2021-11-16
Payer: COMMERCIAL

## 2021-11-16 VITALS — BODY MASS INDEX: 37.55 KG/M2 | WEIGHT: 292.56 LBS | HEIGHT: 74 IN

## 2021-11-16 DIAGNOSIS — E11.42 TYPE 2 DIABETES MELLITUS WITH DIABETIC POLYNEUROPATHY, WITH LONG-TERM CURRENT USE OF INSULIN: ICD-10-CM

## 2021-11-16 DIAGNOSIS — Z79.4 TYPE 2 DIABETES MELLITUS WITH DIABETIC POLYNEUROPATHY, WITH LONG-TERM CURRENT USE OF INSULIN: ICD-10-CM

## 2021-11-16 DIAGNOSIS — E11.9 COMPREHENSIVE DIABETIC FOOT EXAMINATION, TYPE 2 DM, ENCOUNTER FOR: Primary | ICD-10-CM

## 2021-11-16 DIAGNOSIS — I87.2 VENOUS INSUFFICIENCY OF BOTH LOWER EXTREMITIES: ICD-10-CM

## 2021-11-16 PROCEDURE — 99203 PR OFFICE/OUTPT VISIT, NEW, LEVL III, 30-44 MIN: ICD-10-PCS | Mod: S$GLB,,, | Performed by: PODIATRIST

## 2021-11-16 PROCEDURE — 4010F ACE/ARB THERAPY RXD/TAKEN: CPT | Mod: CPTII,S$GLB,, | Performed by: PODIATRIST

## 2021-11-16 PROCEDURE — 1159F PR MEDICATION LIST DOCUMENTED IN MEDICAL RECORD: ICD-10-PCS | Mod: CPTII,S$GLB,, | Performed by: PODIATRIST

## 2021-11-16 PROCEDURE — 99203 OFFICE O/P NEW LOW 30 MIN: CPT | Mod: S$GLB,,, | Performed by: PODIATRIST

## 2021-11-16 PROCEDURE — 99999 PR PBB SHADOW E&M-EST. PATIENT-LVL IV: CPT | Mod: PBBFAC,,, | Performed by: PODIATRIST

## 2021-11-16 PROCEDURE — 1160F PR REVIEW ALL MEDS BY PRESCRIBER/CLIN PHARMACIST DOCUMENTED: ICD-10-PCS | Mod: CPTII,S$GLB,, | Performed by: PODIATRIST

## 2021-11-16 PROCEDURE — 3066F PR DOCUMENTATION OF TREATMENT FOR NEPHROPATHY: ICD-10-PCS | Mod: CPTII,S$GLB,, | Performed by: PODIATRIST

## 2021-11-16 PROCEDURE — 99999 PR PBB SHADOW E&M-EST. PATIENT-LVL IV: ICD-10-PCS | Mod: PBBFAC,,, | Performed by: PODIATRIST

## 2021-11-16 PROCEDURE — 3066F NEPHROPATHY DOC TX: CPT | Mod: CPTII,S$GLB,, | Performed by: PODIATRIST

## 2021-11-16 PROCEDURE — 3072F LOW RISK FOR RETINOPATHY: CPT | Mod: CPTII,S$GLB,, | Performed by: PODIATRIST

## 2021-11-16 PROCEDURE — 3060F POS MICROALBUMINURIA REV: CPT | Mod: CPTII,S$GLB,, | Performed by: PODIATRIST

## 2021-11-16 PROCEDURE — 3008F BODY MASS INDEX DOCD: CPT | Mod: CPTII,S$GLB,, | Performed by: PODIATRIST

## 2021-11-16 PROCEDURE — 3072F PR LOW RISK FOR RETINOPATHY: ICD-10-PCS | Mod: CPTII,S$GLB,, | Performed by: PODIATRIST

## 2021-11-16 PROCEDURE — 1159F MED LIST DOCD IN RCRD: CPT | Mod: CPTII,S$GLB,, | Performed by: PODIATRIST

## 2021-11-16 PROCEDURE — 4010F PR ACE/ARB THEARPY RXD/TAKEN: ICD-10-PCS | Mod: CPTII,S$GLB,, | Performed by: PODIATRIST

## 2021-11-16 PROCEDURE — 1160F RVW MEDS BY RX/DR IN RCRD: CPT | Mod: CPTII,S$GLB,, | Performed by: PODIATRIST

## 2021-11-16 PROCEDURE — 3046F PR MOST RECENT HEMOGLOBIN A1C LEVEL > 9.0%: ICD-10-PCS | Mod: CPTII,S$GLB,, | Performed by: PODIATRIST

## 2021-11-16 PROCEDURE — 3046F HEMOGLOBIN A1C LEVEL >9.0%: CPT | Mod: CPTII,S$GLB,, | Performed by: PODIATRIST

## 2021-11-16 PROCEDURE — 3008F PR BODY MASS INDEX (BMI) DOCUMENTED: ICD-10-PCS | Mod: CPTII,S$GLB,, | Performed by: PODIATRIST

## 2021-11-16 PROCEDURE — 3060F PR POS MICROALBUMINURIA RESULT DOCUMENTED/REVIEW: ICD-10-PCS | Mod: CPTII,S$GLB,, | Performed by: PODIATRIST

## 2021-11-23 ENCOUNTER — IMMUNIZATION (OUTPATIENT)
Dept: PHARMACY | Facility: CLINIC | Age: 66
End: 2021-11-23
Payer: COMMERCIAL

## 2021-11-23 ENCOUNTER — OFFICE VISIT (OUTPATIENT)
Dept: INTERNAL MEDICINE | Facility: CLINIC | Age: 66
End: 2021-11-23
Payer: COMMERCIAL

## 2021-11-23 VITALS
BODY MASS INDEX: 37.51 KG/M2 | DIASTOLIC BLOOD PRESSURE: 64 MMHG | RESPIRATION RATE: 16 BRPM | WEIGHT: 292.31 LBS | HEIGHT: 74 IN | OXYGEN SATURATION: 97 % | HEART RATE: 82 BPM | SYSTOLIC BLOOD PRESSURE: 102 MMHG

## 2021-11-23 DIAGNOSIS — Z79.4 TYPE 2 DIABETES MELLITUS WITH DIABETIC POLYNEUROPATHY, WITH LONG-TERM CURRENT USE OF INSULIN: ICD-10-CM

## 2021-11-23 DIAGNOSIS — Z79.4 TYPE 2 DIABETES MELLITUS WITH HYPERGLYCEMIA, WITH LONG-TERM CURRENT USE OF INSULIN: Chronic | ICD-10-CM

## 2021-11-23 DIAGNOSIS — E11.65 TYPE 2 DIABETES MELLITUS WITH HYPERGLYCEMIA, WITH LONG-TERM CURRENT USE OF INSULIN: Chronic | ICD-10-CM

## 2021-11-23 DIAGNOSIS — E66.01 SEVERE OBESITY (BMI 35.0-39.9) WITH COMORBIDITY: ICD-10-CM

## 2021-11-23 DIAGNOSIS — Z79.4 TYPE 2 DIABETES MELLITUS WITH OTHER CIRCULATORY COMPLICATION, WITH LONG-TERM CURRENT USE OF INSULIN: Primary | ICD-10-CM

## 2021-11-23 DIAGNOSIS — E11.42 TYPE 2 DIABETES MELLITUS WITH DIABETIC POLYNEUROPATHY, WITH LONG-TERM CURRENT USE OF INSULIN: ICD-10-CM

## 2021-11-23 DIAGNOSIS — E11.59 TYPE 2 DIABETES MELLITUS WITH OTHER CIRCULATORY COMPLICATION, WITH LONG-TERM CURRENT USE OF INSULIN: Primary | ICD-10-CM

## 2021-11-23 DIAGNOSIS — E11.9 TYPE 2 DIABETES MELLITUS WITHOUT RETINOPATHY: ICD-10-CM

## 2021-11-23 PROCEDURE — 3066F NEPHROPATHY DOC TX: CPT | Mod: CPTII,S$GLB,, | Performed by: NURSE PRACTITIONER

## 2021-11-23 PROCEDURE — 3066F PR DOCUMENTATION OF TREATMENT FOR NEPHROPATHY: ICD-10-PCS | Mod: CPTII,S$GLB,, | Performed by: NURSE PRACTITIONER

## 2021-11-23 PROCEDURE — 3060F POS MICROALBUMINURIA REV: CPT | Mod: CPTII,S$GLB,, | Performed by: NURSE PRACTITIONER

## 2021-11-23 PROCEDURE — 99999 PR PBB SHADOW E&M-EST. PATIENT-LVL V: CPT | Mod: PBBFAC,,, | Performed by: NURSE PRACTITIONER

## 2021-11-23 PROCEDURE — 3060F PR POS MICROALBUMINURIA RESULT DOCUMENTED/REVIEW: ICD-10-PCS | Mod: CPTII,S$GLB,, | Performed by: NURSE PRACTITIONER

## 2021-11-23 PROCEDURE — 3072F PR LOW RISK FOR RETINOPATHY: ICD-10-PCS | Mod: CPTII,S$GLB,, | Performed by: NURSE PRACTITIONER

## 2021-11-23 PROCEDURE — 99214 PR OFFICE/OUTPT VISIT, EST, LEVL IV, 30-39 MIN: ICD-10-PCS | Mod: S$GLB,,, | Performed by: NURSE PRACTITIONER

## 2021-11-23 PROCEDURE — 4010F ACE/ARB THERAPY RXD/TAKEN: CPT | Mod: CPTII,S$GLB,, | Performed by: NURSE PRACTITIONER

## 2021-11-23 PROCEDURE — 3072F LOW RISK FOR RETINOPATHY: CPT | Mod: CPTII,S$GLB,, | Performed by: NURSE PRACTITIONER

## 2021-11-23 PROCEDURE — 4010F PR ACE/ARB THEARPY RXD/TAKEN: ICD-10-PCS | Mod: CPTII,S$GLB,, | Performed by: NURSE PRACTITIONER

## 2021-11-23 PROCEDURE — 99214 OFFICE O/P EST MOD 30 MIN: CPT | Mod: S$GLB,,, | Performed by: NURSE PRACTITIONER

## 2021-11-23 PROCEDURE — 99999 PR PBB SHADOW E&M-EST. PATIENT-LVL V: ICD-10-PCS | Mod: PBBFAC,,, | Performed by: NURSE PRACTITIONER

## 2021-11-23 RX ORDER — METFORMIN HYDROCHLORIDE 500 MG/1
1000 TABLET, EXTENDED RELEASE ORAL 2 TIMES DAILY WITH MEALS
Qty: 360 TABLET | Refills: 3 | Status: SHIPPED | OUTPATIENT
Start: 2021-11-23 | End: 2021-12-06 | Stop reason: DRUGHIGH

## 2021-11-23 RX ORDER — INSULIN GLARGINE 100 [IU]/ML
25 INJECTION, SOLUTION SUBCUTANEOUS 2 TIMES DAILY
Qty: 15 ML | Refills: 1 | Status: SHIPPED | OUTPATIENT
Start: 2021-11-23 | End: 2022-02-03 | Stop reason: SDUPTHER

## 2021-11-24 DIAGNOSIS — L29.9 PRURITUS: ICD-10-CM

## 2021-11-26 RX ORDER — HYDROXYZINE HYDROCHLORIDE 10 MG/1
TABLET, FILM COATED ORAL
Qty: 60 TABLET | Refills: 1 | Status: SHIPPED | OUTPATIENT
Start: 2021-11-26

## 2021-11-30 ENCOUNTER — CLINICAL SUPPORT (OUTPATIENT)
Dept: DIABETES | Facility: CLINIC | Age: 66
End: 2021-11-30
Payer: COMMERCIAL

## 2021-11-30 VITALS — HEIGHT: 74 IN | BODY MASS INDEX: 37.8 KG/M2 | WEIGHT: 294.56 LBS

## 2021-11-30 DIAGNOSIS — Z79.4 TYPE 2 DIABETES MELLITUS WITH OTHER CIRCULATORY COMPLICATION, WITH LONG-TERM CURRENT USE OF INSULIN: Primary | ICD-10-CM

## 2021-11-30 DIAGNOSIS — Z79.4 TYPE 2 DIABETES MELLITUS WITH DIABETIC POLYNEUROPATHY, WITH LONG-TERM CURRENT USE OF INSULIN: ICD-10-CM

## 2021-11-30 DIAGNOSIS — E11.42 TYPE 2 DIABETES MELLITUS WITH DIABETIC POLYNEUROPATHY, WITH LONG-TERM CURRENT USE OF INSULIN: ICD-10-CM

## 2021-11-30 DIAGNOSIS — Z79.4 TYPE 2 DIABETES MELLITUS WITH HYPERGLYCEMIA, WITH LONG-TERM CURRENT USE OF INSULIN: Chronic | ICD-10-CM

## 2021-11-30 DIAGNOSIS — E11.59 TYPE 2 DIABETES MELLITUS WITH OTHER CIRCULATORY COMPLICATION, WITH LONG-TERM CURRENT USE OF INSULIN: Primary | ICD-10-CM

## 2021-11-30 DIAGNOSIS — E11.65 TYPE 2 DIABETES MELLITUS WITH HYPERGLYCEMIA, WITH LONG-TERM CURRENT USE OF INSULIN: Chronic | ICD-10-CM

## 2021-11-30 PROCEDURE — G0108 PR DIAB MANAGE TRN  PER INDIV: ICD-10-PCS | Mod: S$GLB,,, | Performed by: DIETITIAN, REGISTERED

## 2021-11-30 PROCEDURE — 99999 PR PBB SHADOW E&M-EST. PATIENT-LVL II: CPT | Mod: PBBFAC,,, | Performed by: DIETITIAN, REGISTERED

## 2021-11-30 PROCEDURE — 99999 PR PBB SHADOW E&M-EST. PATIENT-LVL II: ICD-10-PCS | Mod: PBBFAC,,, | Performed by: DIETITIAN, REGISTERED

## 2021-11-30 PROCEDURE — G0108 DIAB MANAGE TRN  PER INDIV: HCPCS | Mod: S$GLB,,, | Performed by: DIETITIAN, REGISTERED

## 2021-12-13 ENCOUNTER — IMMUNIZATION (OUTPATIENT)
Dept: PRIMARY CARE CLINIC | Facility: CLINIC | Age: 66
End: 2021-12-13
Payer: COMMERCIAL

## 2021-12-13 DIAGNOSIS — Z23 NEED FOR VACCINATION: Primary | ICD-10-CM

## 2021-12-13 PROCEDURE — 0064A COVID-19, MRNA, LNP-S, PF, 100 MCG/0.25 ML DOSE VACCINE (MODERNA BOOSTER): CPT | Mod: CV19,PBBFAC | Performed by: FAMILY MEDICINE

## 2022-02-03 ENCOUNTER — OFFICE VISIT (OUTPATIENT)
Dept: INTERNAL MEDICINE | Facility: CLINIC | Age: 67
End: 2022-02-03
Payer: COMMERCIAL

## 2022-02-03 ENCOUNTER — IMMUNIZATION (OUTPATIENT)
Dept: PHARMACY | Facility: CLINIC | Age: 67
End: 2022-02-03
Payer: COMMERCIAL

## 2022-02-03 ENCOUNTER — PATIENT MESSAGE (OUTPATIENT)
Dept: PHARMACY | Facility: CLINIC | Age: 67
End: 2022-02-03
Payer: COMMERCIAL

## 2022-02-03 VITALS
HEIGHT: 74 IN | SYSTOLIC BLOOD PRESSURE: 120 MMHG | HEART RATE: 72 BPM | OXYGEN SATURATION: 97 % | BODY MASS INDEX: 37.6 KG/M2 | WEIGHT: 293 LBS | DIASTOLIC BLOOD PRESSURE: 70 MMHG | TEMPERATURE: 97 F

## 2022-02-03 DIAGNOSIS — E11.59 TYPE 2 DIABETES MELLITUS WITH OTHER CIRCULATORY COMPLICATION, WITH LONG-TERM CURRENT USE OF INSULIN: ICD-10-CM

## 2022-02-03 DIAGNOSIS — E11.59 HYPERTENSION ASSOCIATED WITH DIABETES: ICD-10-CM

## 2022-02-03 DIAGNOSIS — E11.65 TYPE 2 DIABETES MELLITUS WITH HYPERGLYCEMIA, WITH LONG-TERM CURRENT USE OF INSULIN: Primary | Chronic | ICD-10-CM

## 2022-02-03 DIAGNOSIS — E11.59 TYPE 2 DIABETES MELLITUS WITH OTHER CIRCULATORY COMPLICATION, WITHOUT LONG-TERM CURRENT USE OF INSULIN: ICD-10-CM

## 2022-02-03 DIAGNOSIS — E11.69 HYPERLIPIDEMIA ASSOCIATED WITH TYPE 2 DIABETES MELLITUS: ICD-10-CM

## 2022-02-03 DIAGNOSIS — I25.10 CORONARY ARTERY DISEASE INVOLVING NATIVE CORONARY ARTERY OF NATIVE HEART WITHOUT ANGINA PECTORIS: ICD-10-CM

## 2022-02-03 DIAGNOSIS — E66.01 CLASS 2 SEVERE OBESITY DUE TO EXCESS CALORIES WITH SERIOUS COMORBIDITY AND BODY MASS INDEX (BMI) OF 37.0 TO 37.9 IN ADULT: Chronic | ICD-10-CM

## 2022-02-03 DIAGNOSIS — G47.33 OSA ON CPAP: ICD-10-CM

## 2022-02-03 DIAGNOSIS — Z79.4 TYPE 2 DIABETES MELLITUS WITH HYPERGLYCEMIA, WITH LONG-TERM CURRENT USE OF INSULIN: Primary | Chronic | ICD-10-CM

## 2022-02-03 DIAGNOSIS — E78.5 HYPERLIPIDEMIA ASSOCIATED WITH TYPE 2 DIABETES MELLITUS: ICD-10-CM

## 2022-02-03 DIAGNOSIS — I10 ESSENTIAL HYPERTENSION: Chronic | ICD-10-CM

## 2022-02-03 DIAGNOSIS — I15.2 HYPERTENSION ASSOCIATED WITH DIABETES: ICD-10-CM

## 2022-02-03 DIAGNOSIS — Z23 NEED FOR SHINGLES VACCINE: ICD-10-CM

## 2022-02-03 DIAGNOSIS — E11.42 TYPE 2 DIABETES MELLITUS WITH DIABETIC POLYNEUROPATHY, WITH LONG-TERM CURRENT USE OF INSULIN: ICD-10-CM

## 2022-02-03 DIAGNOSIS — R39.81 FUNCTIONAL URINARY INCONTINENCE: ICD-10-CM

## 2022-02-03 DIAGNOSIS — Z79.4 TYPE 2 DIABETES MELLITUS WITH DIABETIC POLYNEUROPATHY, WITH LONG-TERM CURRENT USE OF INSULIN: ICD-10-CM

## 2022-02-03 DIAGNOSIS — Z79.4 TYPE 2 DIABETES MELLITUS WITH OTHER CIRCULATORY COMPLICATION, WITH LONG-TERM CURRENT USE OF INSULIN: ICD-10-CM

## 2022-02-03 PROCEDURE — 3008F PR BODY MASS INDEX (BMI) DOCUMENTED: ICD-10-PCS | Mod: CPTII,S$GLB,, | Performed by: FAMILY MEDICINE

## 2022-02-03 PROCEDURE — 1159F MED LIST DOCD IN RCRD: CPT | Mod: CPTII,S$GLB,, | Performed by: FAMILY MEDICINE

## 2022-02-03 PROCEDURE — 83036 HEMOGLOBIN GLYCOSYLATED A1C: CPT | Performed by: FAMILY MEDICINE

## 2022-02-03 PROCEDURE — 3078F DIAST BP <80 MM HG: CPT | Mod: CPTII,S$GLB,, | Performed by: FAMILY MEDICINE

## 2022-02-03 PROCEDURE — 4010F PR ACE/ARB THEARPY RXD/TAKEN: ICD-10-PCS | Mod: CPTII,S$GLB,, | Performed by: FAMILY MEDICINE

## 2022-02-03 PROCEDURE — 3078F PR MOST RECENT DIASTOLIC BLOOD PRESSURE < 80 MM HG: ICD-10-PCS | Mod: CPTII,S$GLB,, | Performed by: FAMILY MEDICINE

## 2022-02-03 PROCEDURE — 99215 PR OFFICE/OUTPT VISIT, EST, LEVL V, 40-54 MIN: ICD-10-PCS | Mod: S$GLB,,, | Performed by: FAMILY MEDICINE

## 2022-02-03 PROCEDURE — 3074F PR MOST RECENT SYSTOLIC BLOOD PRESSURE < 130 MM HG: ICD-10-PCS | Mod: CPTII,S$GLB,, | Performed by: FAMILY MEDICINE

## 2022-02-03 PROCEDURE — 3074F SYST BP LT 130 MM HG: CPT | Mod: CPTII,S$GLB,, | Performed by: FAMILY MEDICINE

## 2022-02-03 PROCEDURE — 99999 PR PBB SHADOW E&M-EST. PATIENT-LVL V: ICD-10-PCS | Mod: PBBFAC,,, | Performed by: FAMILY MEDICINE

## 2022-02-03 PROCEDURE — 4010F ACE/ARB THERAPY RXD/TAKEN: CPT | Mod: CPTII,S$GLB,, | Performed by: FAMILY MEDICINE

## 2022-02-03 PROCEDURE — 99999 PR PBB SHADOW E&M-EST. PATIENT-LVL V: CPT | Mod: PBBFAC,,, | Performed by: FAMILY MEDICINE

## 2022-02-03 PROCEDURE — 1159F PR MEDICATION LIST DOCUMENTED IN MEDICAL RECORD: ICD-10-PCS | Mod: CPTII,S$GLB,, | Performed by: FAMILY MEDICINE

## 2022-02-03 PROCEDURE — 99215 OFFICE O/P EST HI 40 MIN: CPT | Mod: S$GLB,,, | Performed by: FAMILY MEDICINE

## 2022-02-03 PROCEDURE — 3008F BODY MASS INDEX DOCD: CPT | Mod: CPTII,S$GLB,, | Performed by: FAMILY MEDICINE

## 2022-02-03 RX ORDER — HYDROCHLOROTHIAZIDE 25 MG/1
25 TABLET ORAL DAILY
Qty: 90 TABLET | Refills: 0 | Status: SHIPPED | OUTPATIENT
Start: 2022-02-03 | End: 2022-06-18

## 2022-02-03 RX ORDER — ISOSORBIDE MONONITRATE 30 MG/1
90 TABLET, EXTENDED RELEASE ORAL DAILY
Qty: 270 TABLET | Refills: 3 | Status: SHIPPED | OUTPATIENT
Start: 2022-02-03 | End: 2022-12-30

## 2022-02-03 RX ORDER — AMLODIPINE AND VALSARTAN 10; 320 MG/1; MG/1
1 TABLET ORAL DAILY
Qty: 90 TABLET | Refills: 3 | Status: SHIPPED | OUTPATIENT
Start: 2022-02-03 | End: 2022-12-26

## 2022-02-03 RX ORDER — CLINDAMYCIN HYDROCHLORIDE 300 MG/1
300 CAPSULE ORAL 3 TIMES DAILY
COMMUNITY
Start: 2021-12-06 | End: 2022-10-27

## 2022-02-03 RX ORDER — HYDROCODONE BITARTRATE AND ACETAMINOPHEN 10; 325 MG/1; MG/1
1 TABLET ORAL EVERY 6 HOURS PRN
COMMUNITY
Start: 2021-12-06 | End: 2022-10-27

## 2022-02-03 RX ORDER — ALPRAZOLAM 1 MG/1
1 TABLET ORAL 2 TIMES DAILY PRN
COMMUNITY
Start: 2021-12-06 | End: 2023-06-01

## 2022-02-03 RX ORDER — DULAGLUTIDE 4.5 MG/.5ML
4.5 INJECTION, SOLUTION SUBCUTANEOUS
Qty: 5 PEN | Refills: 0 | Status: SHIPPED | OUTPATIENT
Start: 2022-02-03 | End: 2022-04-20 | Stop reason: SDUPTHER

## 2022-02-03 RX ORDER — ATORVASTATIN CALCIUM 80 MG/1
80 TABLET, FILM COATED ORAL NIGHTLY
Qty: 90 TABLET | Refills: 3 | Status: SHIPPED | OUTPATIENT
Start: 2022-02-03 | End: 2022-12-30

## 2022-02-03 RX ORDER — INSULIN GLARGINE 100 [IU]/ML
30 INJECTION, SOLUTION SUBCUTANEOUS 2 TIMES DAILY
Qty: 30 ML | Refills: 0 | Status: SHIPPED | OUTPATIENT
Start: 2022-02-03 | End: 2022-05-04

## 2022-02-03 RX ORDER — LOSARTAN POTASSIUM 100 MG/1
100 TABLET ORAL DAILY
COMMUNITY
Start: 2021-12-28 | End: 2022-02-03 | Stop reason: ALTCHOICE

## 2022-02-03 NOTE — ASSESSMENT & PLAN NOTE
Diabetes Management Status    Statin: Taking  ACE/ARB: Taking    Screening or Prevention Patient's value Goal Complete/Controlled?   HgA1C Testing and Control   Lab Results   Component Value Date    HGBA1C 10.4 (H) 10/27/2021      Annually/Less than 8% No   Lipid profile : 10/27/2021 Annually Yes   LDL control Lab Results   Component Value Date    LDLCALC 74.0 10/27/2021    Annually/Less than 100 mg/dl  Yes   Nephropathy screening Lab Results   Component Value Date    LABMICR 37.0 10/27/2021     Lab Results   Component Value Date    PROTEINUA Negative 10/20/2020    Annually Yes   Blood pressure BP Readings from Last 1 Encounters:   02/03/22 120/70    Less than 140/90 Yes   Dilated retinal exam : 02/05/2021 Annually Yes   Foot exam   : 11/16/2021 Annually Yes     Lab Results   Component Value Date    HGBA1C 10.4 (H) 10/27/2021    HGBA1C 9.4 (H) 08/12/2021    HGBA1C 7.6 (H) 04/15/2021    ESTGFRAFRICA >60.0 10/27/2021    EGFRNONAA >60.0 10/27/2021    MICALBCREAT 44.6 (H) 10/27/2021    LDLCALC 74.0 10/27/2021     Last 6 Patient Entered Readings                                          Most Recent A1c: 10.4% on 10/27/2021  (Goal: 7%)     Recent Readings 2/3/2022 2/2/2022 2/2/2022 2/2/2022 2/2/2022    Blood Glucose (mg/dL) 154 219 221 223 224         HEALTH MAINTENANCE: Diabetic health maintenance interventions reviewed and are up to date except for:      Topic    Eye Exam      He says home BG ranges 120-140 since starting Jardiance. He says high BG readings from [facility] program reflect his wife's Dexcom, and we think we have fixed that problem today.

## 2022-02-03 NOTE — ASSESSMENT & PLAN NOTE
BP Readings from Last 6 Encounters:   02/03/22 120/70   11/23/21 102/64   11/10/21 122/74   10/27/21 100/70   07/22/21 113/72   06/17/21 96/68      Last 5 Patient Entered Readings                Current 30 Day Average: 144/86  Recent Readings 1/29/2022 1/22/2022 1/18/2022 1/15/2022 1/15/2022   SBP (mmHg) 138 151 160 145 114   DBP (mmHg) 86 97 78 94 84   Pulse 87 87 81 85 84

## 2022-02-03 NOTE — Clinical Note
Hi, Juan.  It appears he was on dual-ARB therapy. I switched him to Exforge. Please titrate BP meds per protocol.  He says home BG ranges 120-140 since starting Jardiance. He says high BG readings from [facility] program reflect his wife's Dexcom, and we think we have fixed that problem today. I ordered A1c with results CC: you. Please refill/titrate his meds per protocol.  He's supposed to f/u with me in just under 6 months. Let me know if you are having difficulty getting him to come in for A1c monitoring in the interim.  Thank you for your help caring for our patients!  -DANIEL

## 2022-02-03 NOTE — ASSESSMENT & PLAN NOTE
Lab Results   Component Value Date    CHOL 125 10/27/2021    CHOL 131 10/16/2020    TRIG 70 10/27/2021    TRIG 109 10/16/2020    HDL 37 (L) 10/27/2021    HDL 34 (L) 10/16/2020    LDLCALC 74.0 10/27/2021    LDLCALC 75.2 10/16/2020    NONHDLCHOL 88 10/27/2021    NONHDLCHOL 97 10/16/2020    AST 15 10/27/2021    ALT 19 10/27/2021     The ASCVD Risk score (Fady CUMMINGS Jr., et al., 2013) failed to calculate for the following reasons:    The patient has a prior MI or stroke diagnosis

## 2022-02-03 NOTE — PROGRESS NOTES
OFFICE VISIT 2/3/22  7:30 AM CST  LAST ENCOUNTER WITH ME:  10/27/2021   CHIEF COMPLAINT: Follow-up    DIAGNOSES SPECIFICALLY EVALUATED AND TREATED THIS ENCOUNTER  1. Type 2 diabetes mellitus with hyperglycemia, with long-term current use of insulin  - Hemoglobin A1C  - Ambulatory referral/consult to Ophthalmology; Future    2. Type 2 diabetes mellitus with diabetic polyneuropathy, with long-term current use of insulin    3. Type 2 diabetes mellitus with other circulatory complication, with long-term current use of insulin    4. Class 2 severe obesity due to excess calories with serious comorbidity and body mass index (BMI) of 37.0 to 37.9 in adult    5. DANTE on CPAP    6. Essential hypertension  - amlodipine-valsartan (EXFORGE)  mg per tablet; Take 1 tablet by mouth once daily.  Dispense: 90 tablet; Refill: 3    7. Functional urinary incontinence    8. Type 2 diabetes mellitus with other circulatory complication, without long-term current use of insulin  - dulaglutide (TRULICITY) 4.5 mg/0.5 mL pen injector; Inject 4.5 mg into the skin every 7 days.  Dispense: 5 pen; Refill: 0    9. Hyperlipemia  - atorvastatin (LIPITOR) 80 MG tablet; Take 1 tablet (80 mg total) by mouth every evening.  Dispense: 90 tablet; Refill: 3    10. Coronary artery disease involving native coronary artery without angina pectoris, unspecified whether native or transplanted heart  - isosorbide mononitrate (IMDUR) 30 MG 24 hr tablet; Take 3 tablets (90 mg total) by mouth once daily.  Dispense: 270 tablet; Refill: 3    11. Type 2 diabetes mellitus without retinopathy  - insulin (LANTUS SOLOSTAR U-100 INSULIN) glargine 100 units/mL (3mL) SubQ pen; Inject 30 Units into the skin 2 (two) times a day.  Dispense: 30 mL; Refill: 0    12. Hypertension associated with diabetes  - hydroCHLOROthiazide (HYDRODIURIL) 25 MG tablet; Take 1 tablet (25 mg total) by mouth once daily.  Dispense: 90 tablet; Refill: 0    13. Need for shingles vaccine  -  "varicella-zoster gE-AS01B, PF, (SHINGRIX) 50 mcg/0.5 mL injection; Inject 0.5 mL (one dose) into muscle now; give second dose at least 2 months later  Dispense: 1 each; Refill: 1     Follow up in about 6 months (around 7/29/2022) for re-evaluate problem(s) discussed today.    Problem List Items Addressed This Visit     Type 2 diabetes mellitus with circulatory disorder, with long-term current use of insulin    Relevant Medications    dulaglutide (TRULICITY) 4.5 mg/0.5 mL pen injector    insulin (LANTUS SOLOSTAR U-100 INSULIN) glargine 100 units/mL (3mL) SubQ pen    Type 2 diabetes mellitus with diabetic polyneuropathy, with long-term current use of insulin    Relevant Medications    dulaglutide (TRULICITY) 4.5 mg/0.5 mL pen injector    insulin (LANTUS SOLOSTAR U-100 INSULIN) glargine 100 units/mL (3mL) SubQ pen    Class 2 severe obesity due to excess calories with serious comorbidity and body mass index (BMI) of 37.0 to 37.9 in adult (Chronic)    Current Assessment & Plan     Wt Readings from Last 6 Encounters:   02/03/22 132.9 kg (292 lb 15.9 oz)   11/30/21 133.6 kg (294 lb 8.6 oz)   11/23/21 132.6 kg (292 lb 5.3 oz)   11/16/21 132.7 kg (292 lb 8.8 oz)   11/10/21 132.7 kg (292 lb 8.8 oz)   10/27/21 132.2 kg (291 lb 7.2 oz)     Estimated body mass index is 37.62 kg/m² as calculated from the following:    Height as of this encounter: 6' 2" (1.88 m).    Weight as of this encounter: 132.9 kg (292 lb 15.9 oz).    Therapeutic lifestyle changes encouraged.          DANTE on CPAP    Overview     PSG 2015 mild obstructive sleep apnea AHI 10.0.  On CPAP 9.5 cm with Full face mask.   AHI 0.5   HME: [facility]          Current Assessment & Plan     He reports compliance with use of CPAP for treatment of obstructive sleep apnea, and he reports perceived therapeutic benefit.          Essential hypertension (Chronic)    Current Assessment & Plan     BP Readings from Last 6 Encounters:   02/03/22 120/70   11/23/21 102/64   11/10/21 " 122/74   10/27/21 100/70   07/22/21 113/72   06/17/21 96/68      Last 5 Patient Entered Readings                Current 30 Day Average: 144/86  Recent Readings 1/29/2022 1/22/2022 1/18/2022 1/15/2022 1/15/2022   SBP (mmHg) 138 151 160 145 114   DBP (mmHg) 86 97 78 94 84   Pulse 87 87 81 85 84                      Relevant Medications    amlodipine-valsartan (EXFORGE)  mg per tablet    Functional urinary incontinence    Overview     Could be sugar dumping vs BPH. Will get PSA         Current Assessment & Plan     Lab Results   Component Value Date    CHOL 125 10/27/2021    CHOL 131 10/16/2020    TRIG 70 10/27/2021    TRIG 109 10/16/2020    HDL 37 (L) 10/27/2021    HDL 34 (L) 10/16/2020    LDLCALC 74.0 10/27/2021    LDLCALC 75.2 10/16/2020    NONHDLCHOL 88 10/27/2021    NONHDLCHOL 97 10/16/2020    AST 15 10/27/2021    ALT 19 10/27/2021     The ASCVD Risk score (Fady CUMMINGS Jr., et al., 2013) failed to calculate for the following reasons:    The patient has a prior MI or stroke diagnosis          Type 2 diabetes mellitus with hyperglycemia, with long-term current use of insulin - Primary (Chronic)    Current Assessment & Plan     Diabetes Management Status    Statin: Taking  ACE/ARB: Taking    Screening or Prevention Patient's value Goal Complete/Controlled?   HgA1C Testing and Control   Lab Results   Component Value Date    HGBA1C 10.4 (H) 10/27/2021      Annually/Less than 8% No   Lipid profile : 10/27/2021 Annually Yes   LDL control Lab Results   Component Value Date    LDLCALC 74.0 10/27/2021    Annually/Less than 100 mg/dl  Yes   Nephropathy screening Lab Results   Component Value Date    LABMICR 37.0 10/27/2021     Lab Results   Component Value Date    PROTEINUA Negative 10/20/2020    Annually Yes   Blood pressure BP Readings from Last 1 Encounters:   02/03/22 120/70    Less than 140/90 Yes   Dilated retinal exam : 02/05/2021 Annually Yes   Foot exam   : 11/16/2021 Annually Yes     Lab Results   Component Value  Date    HGBA1C 10.4 (H) 10/27/2021    HGBA1C 9.4 (H) 08/12/2021    HGBA1C 7.6 (H) 04/15/2021    ESTGFRAFRICA >60.0 10/27/2021    EGFRNONAA >60.0 10/27/2021    MICALBCREAT 44.6 (H) 10/27/2021    LDLCALC 74.0 10/27/2021     Last 6 Patient Entered Readings                                          Most Recent A1c: 10.4% on 10/27/2021  (Goal: 7%)     Recent Readings 2/3/2022 2/2/2022 2/2/2022 2/2/2022 2/2/2022    Blood Glucose (mg/dL) 154 219 221 223 224         HEALTH MAINTENANCE: Diabetic health maintenance interventions reviewed and are up to date except for:      Topic    Eye Exam      He says home BG ranges 120-140 since starting Jardiance. He says high BG readings from Diabetes Digital Medicine program reflect his wife's Dexcom, and we think we have fixed that problem today.         Relevant Medications    dulaglutide (TRULICITY) 4.5 mg/0.5 mL pen injector    insulin (LANTUS SOLOSTAR U-100 INSULIN) glargine 100 units/mL (3mL) SubQ pen    Other Relevant Orders    Hemoglobin A1C    Ambulatory referral/consult to Ophthalmology      Other Visit Diagnoses     Type 2 diabetes mellitus with other circulatory complication, without long-term current use of insulin        Relevant Medications    dulaglutide (TRULICITY) 4.5 mg/0.5 mL pen injector    insulin (LANTUS SOLOSTAR U-100 INSULIN) glargine 100 units/mL (3mL) SubQ pen    Hyperlipemia  (Chronic)       Relevant Medications    atorvastatin (LIPITOR) 80 MG tablet    Coronary artery disease involving native coronary artery without angina pectoris, unspecified whether native or transplanted heart        Relevant Medications    isosorbide mononitrate (IMDUR) 30 MG 24 hr tablet    Type 2 diabetes mellitus without retinopathy        Relevant Medications    dulaglutide (TRULICITY) 4.5 mg/0.5 mL pen injector    insulin (LANTUS SOLOSTAR U-100 INSULIN) glargine 100 units/mL (3mL) SubQ pen    Hypertension associated with diabetes        Relevant Medications    dulaglutide  "(TRULICITY) 4.5 mg/0.5 mL pen injector    insulin (LANTUS SOLOSTAR U-100 INSULIN) glargine 100 units/mL (3mL) SubQ pen    hydroCHLOROthiazide (HYDRODIURIL) 25 MG tablet    Need for shingles vaccine        Relevant Medications    varicella-zoster gE-AS01B, PF, (SHINGRIX) 50 mcg/0.5 mL injection      Unless noted herein, any chronic conditions are represented as and appear compensated/controlled and stable, and no other significant complaints or concerns were reported.    PRESCRIPTION DRUG MANAGEMENT  Outpatient Medications Prior to Visit   Medication Sig Dispense Refill    ALPRAZolam (XANAX) 1 MG tablet Take 1 mg by mouth 2 (two) times daily as needed.      aspirin (ECOTRIN) 81 MG EC tablet Take 81 mg by mouth once daily.      blood sugar diagnostic Strp 1 strip by Misc.(Non-Drug; Combo Route) route 4 (four) times daily. 200 strip 11    clindamycin (CLEOCIN) 300 MG capsule Take 300 mg by mouth 3 (three) times daily.      empagliflozin (JARDIANCE) 10 mg tablet Take 1 tablet (10 mg total) by mouth once daily. 30 tablet 11    gabapentin (NEURONTIN) 300 MG capsule TK 1 C PO TID  0    hydrOXYzine HCL (ATARAX) 10 MG Tab TAKE 1 TABLET(10 MG) BY MOUTH TWICE DAILY AS NEEDED. MAY CAUSE DROWSINESS. DO NOT DRIVE OR OPERATE HEAVY MACHINERY 60 tablet 1    metFORMIN (GLUCOPHAGE-XR) 500 MG ER 24hr tablet Take 1 tablet (500 mg total) by mouth daily with breakfast. 90 tablet 3    metoprolol tartrate (LOPRESSOR) 50 MG tablet TAKE 1 TABLET(50 MG) BY MOUTH TWICE DAILY 420 tablet 3    niacin (SLO-NIACIN) 500 mg tablet TAKE 1 TABLET(500 MG) BY MOUTH EVERY EVENING 30 tablet 6    nitroGLYCERIN (NITROSTAT) 0.4 MG SL tablet Place 1 tablet (0.4 mg total) under the tongue every 5 (five) minutes as needed. 25 tablet 4    pen needle, diabetic (BD ULTRA-FINE SHORT PEN NEEDLE) 31 gauge x 5/16" Ndle USE WITH LANTUS PEN AS DIRECTED 100 each 11    pen needle, diabetic 31 gauge x 5/16" Ndle BD Ultra-Fine Short Pen Needle 31 gauge x 5/16"   " USE WITH LANTUS PEN AS DIRECTED      ranolazine (RANEXA) 1,000 mg Tb12 TAKE 1 TABLET(1000 MG) BY MOUTH TWICE DAILY 60 tablet 6    amLODIPine (NORVASC) 5 MG tablet Take 1 tablet (5 mg total) by mouth once daily. 30 tablet 11    atorvastatin (LIPITOR) 80 MG tablet TAKE 1 TABLET(80 MG) BY MOUTH EVERY DAY 30 tablet 6    dulaglutide (TRULICITY) 4.5 mg/0.5 mL pen injector Inject 4.5 mg into the skin every 7 days. 4 pen 11    hydroCHLOROthiazide (HYDRODIURIL) 25 MG tablet Take 1 tablet (25 mg total) by mouth once daily. 90 tablet 3    insulin (LANTUS SOLOSTAR U-100 INSULIN) glargine 100 units/mL (3mL) SubQ pen Inject 25 Units into the skin 2 (two) times a day. 15 mL 1    irbesartan (AVAPRO) 300 MG tablet Take 1 tablet (300 mg total) by mouth every evening. 90 tablet 3    isosorbide mononitrate (IMDUR) 30 MG 24 hr tablet TAKE 3 TABLETS(90 MG) BY MOUTH EVERY  tablet 6    losartan (COZAAR) 100 MG tablet Take 100 mg by mouth once daily.      MICROLET LANCET Misc CHECK BLOOD SUGAR TWICE DAILY 100 each 3    varicella-zoster gE-AS01B, PF, (SHINGRIX) 50 mcg/0.5 mL injection Inject 0.5 mL (one dose) into muscle now; give second dose at least 2 months later 1 each 1    HYDROcodone-acetaminophen (NORCO)  mg per tablet Take 1 tablet by mouth every 6 (six) hours as needed.       No facility-administered medications prior to visit.     Medications Discontinued During This Encounter   Medication Reason    MICROLET LANCET Misc Duplicate Order    amLODIPine (NORVASC) 5 MG tablet Alternate therapy    irbesartan (AVAPRO) 300 MG tablet Alternate therapy    losartan (COZAAR) 100 MG tablet Alternate therapy    isosorbide mononitrate (IMDUR) 30 MG 24 hr tablet Reorder    dulaglutide (TRULICITY) 4.5 mg/0.5 mL pen injector Reorder    atorvastatin (LIPITOR) 80 MG tablet Reorder    insulin (LANTUS SOLOSTAR U-100 INSULIN) glargine 100 units/mL (3mL) SubQ pen Reorder    hydroCHLOROthiazide (HYDRODIURIL) 25 MG tablet  "Reorder    varicella-zoster gE-AS01B, PF, (SHINGRIX) 50 mcg/0.5 mL injection Reorder     Medications Ordered This Encounter   Medications    amlodipine-valsartan (EXFORGE)  mg per tablet     Sig: Take 1 tablet by mouth once daily.     Dispense:  90 tablet     Refill:  3     This REPLACES any prior prescription for any CCB or ARB. DISCONTINUE any prior existing prescription for any CCB or ARB.    atorvastatin (LIPITOR) 80 MG tablet     Sig: Take 1 tablet (80 mg total) by mouth every evening.     Dispense:  90 tablet     Refill:  3    dulaglutide (TRULICITY) 4.5 mg/0.5 mL pen injector     Sig: Inject 4.5 mg into the skin every 7 days.     Dispense:  5 pen     Refill:  0    hydroCHLOROthiazide (HYDRODIURIL) 25 MG tablet     Sig: Take 1 tablet (25 mg total) by mouth once daily.     Dispense:  90 tablet     Refill:  0    insulin (LANTUS SOLOSTAR U-100 INSULIN) glargine 100 units/mL (3mL) SubQ pen     Sig: Inject 30 Units into the skin 2 (two) times a day.     Dispense:  30 mL     Refill:  0     NOTE: His current dose is 30 units BID.    isosorbide mononitrate (IMDUR) 30 MG 24 hr tablet     Sig: Take 3 tablets (90 mg total) by mouth once daily.     Dispense:  270 tablet     Refill:  3    varicella-zoster gE-AS01B, PF, (SHINGRIX) 50 mcg/0.5 mL injection     Sig: Inject 0.5 mL (one dose) into muscle now; give second dose at least 2 months later     Dispense:  1 each     Refill:  1   Review of Systems   Respiratory: Negative for chest tightness and shortness of breath.    Cardiovascular: Negative for chest pain.   Endocrine: Negative for polydipsia and polyuria.      PHYSICAL EXAM  Vitals:    02/03/22 0746   BP: 120/70   BP Location: Right arm   Patient Position: Sitting   BP Method: Large (Manual)   Pulse: 72   Temp: 96.9 °F (36.1 °C)   TempSrc: Tympanic   SpO2: 97%   Weight: 132.9 kg (292 lb 15.9 oz)   Height: 6' 2" (1.88 m)   CONSTITUTIONAL: Vital signs noted. No apparent distress. Does not appear acutely " "ill or septic. Appears adequately hydrated.  PULM: Lungs clear. Breathing unlabored.  HEART: Auscultation reveals regular rate.  DERM: Skin warm and moist with normal turgor.  NEURO: There are no gross focal motor deficits.  PSYCHIATRIC: Alert and conversant. Mood grossly neutral. Judgment and insight grossly intact.  TOTAL TIME evaluating and managing this patient for this encounter was greater than or equal to 55 minutes. This time was spent personally by me on the following activities: review of nurse's notes, pre-charting, review of patient's past medical history, assessing age-appropriate health maintenance needs, review of any interval history, review and interpretation of lab results, obtaining history from the patient, evaluation of the patient's response to treatment, examination of the patient, medication reconciliation, managing and/or ordering prescription medications, medication counseling, ordering diagnostic tests, ordering referral to subspecialty provider(s), patient-education regarding diagnosis, treatment plan, and goals of treatment, discussing planned follow-up, counseling on appropriate therapeutic lifestyle changes, communicating with digital medicine clinicians about care plan, troubleshooting his BG monitoring prashant, and final documentation of the visit. This time was exclusive of any separately billable procedures for this patient and exclusive of time spent treating any other patients.   Documentation entered by me for this encounter may have been done in part using speech-recognition technology. Although I have made an effort to ensure accuracy, "sound like" errors may exist and should be interpreted in context.   "

## 2022-02-03 NOTE — ASSESSMENT & PLAN NOTE
"Wt Readings from Last 6 Encounters:   02/03/22 132.9 kg (292 lb 15.9 oz)   11/30/21 133.6 kg (294 lb 8.6 oz)   11/23/21 132.6 kg (292 lb 5.3 oz)   11/16/21 132.7 kg (292 lb 8.8 oz)   11/10/21 132.7 kg (292 lb 8.8 oz)   10/27/21 132.2 kg (291 lb 7.2 oz)     Estimated body mass index is 37.62 kg/m² as calculated from the following:    Height as of this encounter: 6' 2" (1.88 m).    Weight as of this encounter: 132.9 kg (292 lb 15.9 oz).    Therapeutic lifestyle changes encouraged.   "

## 2022-02-04 LAB
ESTIMATED AVG GLUCOSE: 160 MG/DL (ref 68–131)
HBA1C MFR BLD: 7.2 % (ref 4–5.6)

## 2022-02-05 ENCOUNTER — PATIENT OUTREACH (OUTPATIENT)
Dept: ADMINISTRATIVE | Facility: OTHER | Age: 67
End: 2022-02-05
Payer: COMMERCIAL

## 2022-02-05 NOTE — PROGRESS NOTES
Health Maintenance Due   Topic Date Due    Eye Exam  02/05/2022     Updates were requested from care everywhere.  Chart was reviewed for overdue Proactive [facility] Encounters (BRIGIDO) topics (CRS, Breast Cancer Screening, Eye exam)  Health Maintenance has been updated.  LINKS immunization registry triggered.  Immunizations were reconciled.

## 2022-02-07 ENCOUNTER — OFFICE VISIT (OUTPATIENT)
Dept: OPHTHALMOLOGY | Facility: CLINIC | Age: 67
End: 2022-02-07
Payer: COMMERCIAL

## 2022-02-07 DIAGNOSIS — H43.811 POSTERIOR VITREOUS DETACHMENT, RIGHT: ICD-10-CM

## 2022-02-07 DIAGNOSIS — E11.9 DIABETES MELLITUS WITHOUT COMPLICATION: Primary | ICD-10-CM

## 2022-02-07 DIAGNOSIS — E11.65 TYPE 2 DIABETES MELLITUS WITH HYPERGLYCEMIA, WITH LONG-TERM CURRENT USE OF INSULIN: Chronic | ICD-10-CM

## 2022-02-07 DIAGNOSIS — Z96.1 PSEUDOPHAKIA OF BOTH EYES: ICD-10-CM

## 2022-02-07 DIAGNOSIS — Z79.4 TYPE 2 DIABETES MELLITUS WITH HYPERGLYCEMIA, WITH LONG-TERM CURRENT USE OF INSULIN: Chronic | ICD-10-CM

## 2022-02-07 PROCEDURE — 1159F MED LIST DOCD IN RCRD: CPT | Mod: CPTII,S$GLB,, | Performed by: OPTOMETRIST

## 2022-02-07 PROCEDURE — 92250 FUNDUS PHOTOGRAPHY W/I&R: CPT | Mod: S$GLB,,, | Performed by: OPTOMETRIST

## 2022-02-07 PROCEDURE — 99999 PR PBB SHADOW E&M-EST. PATIENT-LVL III: CPT | Mod: PBBFAC,,, | Performed by: OPTOMETRIST

## 2022-02-07 PROCEDURE — 4010F ACE/ARB THERAPY RXD/TAKEN: CPT | Mod: CPTII,S$GLB,, | Performed by: OPTOMETRIST

## 2022-02-07 PROCEDURE — 3051F PR MOST RECENT HEMOGLOBIN A1C LEVEL 7.0 - < 8.0%: ICD-10-PCS | Mod: CPTII,S$GLB,, | Performed by: OPTOMETRIST

## 2022-02-07 PROCEDURE — 2023F PR DILATED RETINAL EXAM W/O EVID OF RETINOPATHY: ICD-10-PCS | Mod: CPTII,S$GLB,, | Performed by: OPTOMETRIST

## 2022-02-07 PROCEDURE — 2023F DILAT RTA XM W/O RTNOPTHY: CPT | Mod: CPTII,S$GLB,, | Performed by: OPTOMETRIST

## 2022-02-07 PROCEDURE — 1159F PR MEDICATION LIST DOCUMENTED IN MEDICAL RECORD: ICD-10-PCS | Mod: CPTII,S$GLB,, | Performed by: OPTOMETRIST

## 2022-02-07 PROCEDURE — 92014 COMPRE OPH EXAM EST PT 1/>: CPT | Mod: S$GLB,,, | Performed by: OPTOMETRIST

## 2022-02-07 PROCEDURE — 92014 PR EYE EXAM, EST PATIENT,COMPREHESV: ICD-10-PCS | Mod: S$GLB,,, | Performed by: OPTOMETRIST

## 2022-02-07 PROCEDURE — 3051F HG A1C>EQUAL 7.0%<8.0%: CPT | Mod: CPTII,S$GLB,, | Performed by: OPTOMETRIST

## 2022-02-07 PROCEDURE — 92250 COLOR FUNDUS PHOTOGRAPHY - OU - BOTH EYES: ICD-10-PCS | Mod: S$GLB,,, | Performed by: OPTOMETRIST

## 2022-02-07 PROCEDURE — 99999 PR PBB SHADOW E&M-EST. PATIENT-LVL III: ICD-10-PCS | Mod: PBBFAC,,, | Performed by: OPTOMETRIST

## 2022-02-07 PROCEDURE — 4010F PR ACE/ARB THEARPY RXD/TAKEN: ICD-10-PCS | Mod: CPTII,S$GLB,, | Performed by: OPTOMETRIST

## 2022-02-07 NOTE — PROGRESS NOTES
HPI     NP to DKT  Last eye exam was with Dr. Skinner 12/09/2020    Diagnosed with diabetes in 2019  Lab Results       Component                Value               Date                       HGBA1C                   7.2 (H)             02/03/2022              Vision changes since last eye exam?: No     Any eye pain today: No    Other ocular symptoms: Pt c/o floaters OD     Interested in contact lens fitting today? No, glasses if needed     Pt uses OTC readers +1.50                  Last edited by Brenda Franz MA on 2/7/2022  8:59 AM. (History)            Assessment /Plan     For exam results, see Encounter Report.    Diabetes mellitus without complication  2 years, last A1c 7.2 Stressed importance of DM control to preserve vision. No diabetic retinopathy was seen in either eye today. Continue strict blood glucose control.  Reviewed importance of yearly dilated eye exams. Continue close care with PCP regarding diabetes.    Type 2 diabetes mellitus with hyperglycemia, with long-term current use of insulin  -     Ambulatory referral/consult to Ophthalmology    Posterior vitreous detachment, right  -     Color Fundus Photography - OU - Both Eyes  Retina flat RD precautions reviewed, recommend observation at this time.     Pseudophakia of both eyes  S/p CE/IOL at outside clinic doing well with OTC readers as needed.     RTC 1 year for CE with DFE sooner if any changes to vision or worsening symptoms.

## 2022-03-21 ENCOUNTER — PATIENT MESSAGE (OUTPATIENT)
Dept: ADMINISTRATIVE | Facility: HOSPITAL | Age: 67
End: 2022-03-21
Payer: COMMERCIAL

## 2022-04-06 ENCOUNTER — PATIENT MESSAGE (OUTPATIENT)
Dept: OTHER | Facility: OTHER | Age: 67
End: 2022-04-06
Payer: COMMERCIAL

## 2022-04-12 ENCOUNTER — PATIENT MESSAGE (OUTPATIENT)
Dept: ADMINISTRATIVE | Facility: OTHER | Age: 67
End: 2022-04-12
Payer: COMMERCIAL

## 2022-04-12 ENCOUNTER — PATIENT MESSAGE (OUTPATIENT)
Dept: PHARMACY | Facility: CLINIC | Age: 67
End: 2022-04-12
Payer: COMMERCIAL

## 2022-07-27 ENCOUNTER — LAB VISIT (OUTPATIENT)
Dept: LAB | Facility: HOSPITAL | Age: 67
End: 2022-07-27
Attending: FAMILY MEDICINE
Payer: COMMERCIAL

## 2022-07-27 DIAGNOSIS — Z79.4 TYPE 2 DIABETES MELLITUS WITH HYPERGLYCEMIA, WITH LONG-TERM CURRENT USE OF INSULIN: ICD-10-CM

## 2022-07-27 DIAGNOSIS — E11.65 TYPE 2 DIABETES MELLITUS WITH HYPERGLYCEMIA, WITH LONG-TERM CURRENT USE OF INSULIN: ICD-10-CM

## 2022-07-27 LAB
ESTIMATED AVG GLUCOSE: 146 MG/DL (ref 68–131)
HBA1C MFR BLD: 6.7 % (ref 4–5.6)

## 2022-07-27 PROCEDURE — 83036 HEMOGLOBIN GLYCOSYLATED A1C: CPT | Performed by: FAMILY MEDICINE

## 2022-07-27 PROCEDURE — 36415 COLL VENOUS BLD VENIPUNCTURE: CPT | Performed by: FAMILY MEDICINE

## 2022-07-28 ENCOUNTER — PATIENT MESSAGE (OUTPATIENT)
Dept: OTHER | Facility: OTHER | Age: 67
End: 2022-07-28
Payer: COMMERCIAL

## 2022-09-08 ENCOUNTER — OFFICE VISIT (OUTPATIENT)
Dept: CARDIOLOGY | Facility: CLINIC | Age: 67
End: 2022-09-08
Payer: COMMERCIAL

## 2022-09-08 VITALS
HEIGHT: 74 IN | DIASTOLIC BLOOD PRESSURE: 86 MMHG | HEART RATE: 95 BPM | WEIGHT: 308.63 LBS | BODY MASS INDEX: 39.61 KG/M2 | SYSTOLIC BLOOD PRESSURE: 138 MMHG | RESPIRATION RATE: 18 BRPM

## 2022-09-08 DIAGNOSIS — I25.10 CORONARY ARTERY DISEASE INVOLVING NATIVE CORONARY ARTERY OF NATIVE HEART WITHOUT ANGINA PECTORIS: Primary | Chronic | ICD-10-CM

## 2022-09-08 DIAGNOSIS — Z98.61 S/P PTCA (PERCUTANEOUS TRANSLUMINAL CORONARY ANGIOPLASTY): ICD-10-CM

## 2022-09-08 DIAGNOSIS — E11.69 HYPERLIPIDEMIA ASSOCIATED WITH TYPE 2 DIABETES MELLITUS: Chronic | ICD-10-CM

## 2022-09-08 DIAGNOSIS — E11.42 TYPE 2 DIABETES MELLITUS WITH DIABETIC POLYNEUROPATHY, WITH LONG-TERM CURRENT USE OF INSULIN: ICD-10-CM

## 2022-09-08 DIAGNOSIS — Z79.4 TYPE 2 DIABETES MELLITUS WITH OTHER CIRCULATORY COMPLICATION, WITH LONG-TERM CURRENT USE OF INSULIN: ICD-10-CM

## 2022-09-08 DIAGNOSIS — E78.5 HYPERLIPIDEMIA ASSOCIATED WITH TYPE 2 DIABETES MELLITUS: Chronic | ICD-10-CM

## 2022-09-08 DIAGNOSIS — I15.2 HYPERTENSION ASSOCIATED WITH DIABETES: ICD-10-CM

## 2022-09-08 DIAGNOSIS — G47.33 OSA ON CPAP: ICD-10-CM

## 2022-09-08 DIAGNOSIS — Z79.4 TYPE 2 DIABETES MELLITUS WITH HYPERGLYCEMIA, WITH LONG-TERM CURRENT USE OF INSULIN: Chronic | ICD-10-CM

## 2022-09-08 DIAGNOSIS — E11.59 HYPERTENSION ASSOCIATED WITH DIABETES: ICD-10-CM

## 2022-09-08 DIAGNOSIS — I10 ESSENTIAL HYPERTENSION: Chronic | ICD-10-CM

## 2022-09-08 DIAGNOSIS — E11.59 TYPE 2 DIABETES MELLITUS WITH OTHER CIRCULATORY COMPLICATION, WITH LONG-TERM CURRENT USE OF INSULIN: ICD-10-CM

## 2022-09-08 DIAGNOSIS — E66.01 CLASS 2 SEVERE OBESITY DUE TO EXCESS CALORIES WITH SERIOUS COMORBIDITY AND BODY MASS INDEX (BMI) OF 37.0 TO 37.9 IN ADULT: Chronic | ICD-10-CM

## 2022-09-08 DIAGNOSIS — E11.65 TYPE 2 DIABETES MELLITUS WITH HYPERGLYCEMIA, WITH LONG-TERM CURRENT USE OF INSULIN: Chronic | ICD-10-CM

## 2022-09-08 DIAGNOSIS — I87.2 VENOUS INSUFFICIENCY: ICD-10-CM

## 2022-09-08 DIAGNOSIS — Z79.4 TYPE 2 DIABETES MELLITUS WITH DIABETIC POLYNEUROPATHY, WITH LONG-TERM CURRENT USE OF INSULIN: ICD-10-CM

## 2022-09-08 PROCEDURE — 3044F PR MOST RECENT HEMOGLOBIN A1C LEVEL <7.0%: ICD-10-PCS | Mod: CPTII,S$GLB,, | Performed by: INTERNAL MEDICINE

## 2022-09-08 PROCEDURE — 99999 PR PBB SHADOW E&M-EST. PATIENT-LVL V: ICD-10-PCS | Mod: PBBFAC,,, | Performed by: INTERNAL MEDICINE

## 2022-09-08 PROCEDURE — 3079F PR MOST RECENT DIASTOLIC BLOOD PRESSURE 80-89 MM HG: ICD-10-PCS | Mod: CPTII,S$GLB,, | Performed by: INTERNAL MEDICINE

## 2022-09-08 PROCEDURE — 1101F PR PT FALLS ASSESS DOC 0-1 FALLS W/OUT INJ PAST YR: ICD-10-PCS | Mod: CPTII,S$GLB,, | Performed by: INTERNAL MEDICINE

## 2022-09-08 PROCEDURE — 3075F PR MOST RECENT SYSTOLIC BLOOD PRESS GE 130-139MM HG: ICD-10-PCS | Mod: CPTII,S$GLB,, | Performed by: INTERNAL MEDICINE

## 2022-09-08 PROCEDURE — 3044F HG A1C LEVEL LT 7.0%: CPT | Mod: CPTII,S$GLB,, | Performed by: INTERNAL MEDICINE

## 2022-09-08 PROCEDURE — 4010F PR ACE/ARB THEARPY RXD/TAKEN: ICD-10-PCS | Mod: CPTII,S$GLB,, | Performed by: INTERNAL MEDICINE

## 2022-09-08 PROCEDURE — 3008F PR BODY MASS INDEX (BMI) DOCUMENTED: ICD-10-PCS | Mod: CPTII,S$GLB,, | Performed by: INTERNAL MEDICINE

## 2022-09-08 PROCEDURE — 1160F PR REVIEW ALL MEDS BY PRESCRIBER/CLIN PHARMACIST DOCUMENTED: ICD-10-PCS | Mod: CPTII,S$GLB,, | Performed by: INTERNAL MEDICINE

## 2022-09-08 PROCEDURE — 1159F PR MEDICATION LIST DOCUMENTED IN MEDICAL RECORD: ICD-10-PCS | Mod: CPTII,S$GLB,, | Performed by: INTERNAL MEDICINE

## 2022-09-08 PROCEDURE — 99214 OFFICE O/P EST MOD 30 MIN: CPT | Mod: S$GLB,,, | Performed by: INTERNAL MEDICINE

## 2022-09-08 PROCEDURE — 3288F PR FALLS RISK ASSESSMENT DOCUMENTED: ICD-10-PCS | Mod: CPTII,S$GLB,, | Performed by: INTERNAL MEDICINE

## 2022-09-08 PROCEDURE — 1101F PT FALLS ASSESS-DOCD LE1/YR: CPT | Mod: CPTII,S$GLB,, | Performed by: INTERNAL MEDICINE

## 2022-09-08 PROCEDURE — 1159F MED LIST DOCD IN RCRD: CPT | Mod: CPTII,S$GLB,, | Performed by: INTERNAL MEDICINE

## 2022-09-08 PROCEDURE — 3079F DIAST BP 80-89 MM HG: CPT | Mod: CPTII,S$GLB,, | Performed by: INTERNAL MEDICINE

## 2022-09-08 PROCEDURE — 99999 PR PBB SHADOW E&M-EST. PATIENT-LVL V: CPT | Mod: PBBFAC,,, | Performed by: INTERNAL MEDICINE

## 2022-09-08 PROCEDURE — 1160F RVW MEDS BY RX/DR IN RCRD: CPT | Mod: CPTII,S$GLB,, | Performed by: INTERNAL MEDICINE

## 2022-09-08 PROCEDURE — 3008F BODY MASS INDEX DOCD: CPT | Mod: CPTII,S$GLB,, | Performed by: INTERNAL MEDICINE

## 2022-09-08 PROCEDURE — 3288F FALL RISK ASSESSMENT DOCD: CPT | Mod: CPTII,S$GLB,, | Performed by: INTERNAL MEDICINE

## 2022-09-08 PROCEDURE — 4010F ACE/ARB THERAPY RXD/TAKEN: CPT | Mod: CPTII,S$GLB,, | Performed by: INTERNAL MEDICINE

## 2022-09-08 PROCEDURE — 3075F SYST BP GE 130 - 139MM HG: CPT | Mod: CPTII,S$GLB,, | Performed by: INTERNAL MEDICINE

## 2022-09-08 PROCEDURE — 99214 PR OFFICE/OUTPT VISIT, EST, LEVL IV, 30-39 MIN: ICD-10-PCS | Mod: S$GLB,,, | Performed by: INTERNAL MEDICINE

## 2022-09-08 NOTE — PROGRESS NOTES
Subjective:   Patient ID:  Isaiah Harrison is a 67 y.o. male who presents for follow up of 6 month follow up      HPI  4/22/2020  A 66 yo male with cad s/p lad stent htn hlp obesity diabetes s/p back surgery venous insufficency is following up today. He is using cpap regularily compliant with dite he was doing physical therapy till the curfew. He has no chest pain no shortness of breath headcahes tia or claudication. He ahs been out of valsartan due to recall for 2 months. His htn is not well controlled. He is taking all other meds. He is compliant with diet and salt.has been working intermittently no issues clinically.his a1c i9s 6.9% and his ldl has increased to 100 he is probably not compliant despite what he mentions. He is eatingt probably lot of snacks on his work shift he will adjsut that.     1/6/2021  NEEDS EYE SURGERY. BACK FEELS BETTER WALKS FOR EXERCISE NO CHEST PAIN SHORTNESS OF BREATH USES CPAP MACHINE NOT CONSISTENT. HAS NO ISSUES OTHERWISE CLINICALLY CARDIAC WISE. HE HAS TOO MUCH SLEEPINESS TAKES TOO MUCH NAPS. NO LEG SWELLING TIA OR CLAUDICATION SNORING IMPROVING. HIS LDL IS ON TARGET HIS A1C IS 11. HE HAS NEED TO BE MORE COMPLIANT WITH DIET. HE NEEDS YE SURGERY FOR A FLOATER.      3/31/2021  He is here for f/u . He has no new complaints he is not able to lose weight he is eating too much he is not exercising and he is using his cpap . He noticed the difference. Ha sno leg swelling no chest pain.     11/10/2021  Here for f/u he is still non compliant with sweets reflecting on his a1c. His ldl is on target cardiac wise asymptomatic. He is compliant with meds.     9/8/2022   He is here for f/u no new symptoms the issue is still compliance with diet he had a salty breakfast today that is why his bp is up. Review of digital htn showed fluctuation compatible with salt non compliance sugar control is still the issue. Asymptomatic cardiac wise.   Past Medical History:   Diagnosis Date    Acute coronary  syndrome     Anticoagulant long-term use     Back pain     CAD (coronary artery disease) 10/17/2013    Class 2 severe obesity due to excess calories with serious comorbidity and body mass index (BMI) of 38.0 to 38.9 in adult 1/12/2015    Coronary artery disease     Diabetes mellitus, type 2     Gastric polyps     Hyperlipemia     Hypertension     NSTEMI (non-ST elevated myocardial infarction) 10/23/2014    Obesity 10/21/2014    DANTE on CPAP 2/19/2015    S/P PTCA (percutaneous transluminal coronary angioplasty) 10/17/2013    Sleep apnea 1/7/2015    Type 2 diabetes mellitus with circulatory disorder (coronary artery disease), without long-term current use of insulin 7/14/2015       Past Surgical History:   Procedure Laterality Date    APPENDECTOMY      BACK SURGERY      CATARACT EXTRACTION      CATARACT EXTRACTION, BILATERAL      COLONOSCOPY      CORONARY ANGIOPLASTY WITH STENT PLACEMENT      ESOPHAGOGASTRODUODENOSCOPY      EYE SURGERY      FRACTURE SURGERY      HERNIA REPAIR      SPINE SURGERY         Social History     Tobacco Use    Smoking status: Never    Smokeless tobacco: Never   Substance Use Topics    Alcohol use: Yes     Alcohol/week: 0.0 standard drinks     Comment: socially    Drug use: No       Family History   Problem Relation Age of Onset    Hypertension Mother     Diabetes Mother     Hypertension Father     Heart disease Maternal Grandfather        Current Outpatient Medications   Medication Sig    ALPRAZolam (XANAX) 1 MG tablet Take 1 mg by mouth 2 (two) times daily as needed.    amlodipine-valsartan (EXFORGE)  mg per tablet Take 1 tablet by mouth once daily.    aspirin (ECOTRIN) 81 MG EC tablet Take 81 mg by mouth once daily.    atorvastatin (LIPITOR) 80 MG tablet Take 1 tablet (80 mg total) by mouth every evening.    blood sugar diagnostic Strp 1 strip by Misc.(Non-Drug; Combo Route) route 4 (four) times daily.    clindamycin (CLEOCIN) 300 MG capsule Take 300 mg by mouth 3 (three) times daily.  "   dulaglutide (TRULICITY) 4.5 mg/0.5 mL pen injector Inject 4.5 mg into the skin every 7 days.    empagliflozin (JARDIANCE) 10 mg tablet Take 1 tablet (10 mg total) by mouth once daily.    gabapentin (NEURONTIN) 300 MG capsule TK 1 C PO TID    hydroCHLOROthiazide (HYDRODIURIL) 25 MG tablet TAKE 1 TABLET(25 MG) BY MOUTH EVERY DAY    HYDROcodone-acetaminophen (NORCO)  mg per tablet Take 1 tablet by mouth every 6 (six) hours as needed.    hydrOXYzine HCL (ATARAX) 10 MG Tab TAKE 1 TABLET(10 MG) BY MOUTH TWICE DAILY AS NEEDED. MAY CAUSE DROWSINESS. DO NOT DRIVE OR OPERATE HEAVY MACHINERY    insulin (LANTUS SOLOSTAR U-100 INSULIN) glargine 100 units/mL (3mL) SubQ pen Inject 30 Units into the skin 2 (two) times a day.    isosorbide mononitrate (IMDUR) 30 MG 24 hr tablet Take 3 tablets (90 mg total) by mouth once daily.    metFORMIN (GLUCOPHAGE-XR) 500 MG ER 24hr tablet Take 1 tablet (500 mg total) by mouth daily with breakfast.    metoprolol tartrate (LOPRESSOR) 50 MG tablet TAKE 1 TABLET(50 MG) BY MOUTH TWICE DAILY    niacin (SLO-NIACIN) 500 mg tablet TAKE 1 TABLET(500 MG) BY MOUTH EVERY EVENING    nitroGLYCERIN (NITROSTAT) 0.4 MG SL tablet Place 1 tablet (0.4 mg total) under the tongue every 5 (five) minutes as needed.    pen needle, diabetic (BD ULTRA-FINE SHORT PEN NEEDLE) 31 gauge x 5/16" Ndle USE WITH LANTUS PEN AS DIRECTED    ranolazine (RANEXA) 1,000 mg Tb12 TAKE 1 TABLET(1000 MG) BY MOUTH TWICE DAILY    varicella-zoster [id number], PF, (SHINGRIX) 50 mcg/0.5 mL injection Inject 0.5 mL (one dose) into muscle now; give second dose at least 2 months later     No current facility-administered medications for this visit.     Current Outpatient Medications on File Prior to Visit   Medication Sig    ALPRAZolam (XANAX) 1 MG tablet Take 1 mg by mouth 2 (two) times daily as needed.    amlodipine-valsartan (EXFORGE)  mg per tablet Take 1 tablet by mouth once daily.    aspirin (ECOTRIN) 81 MG EC tablet Take 81 mg by " "mouth once daily.    atorvastatin (LIPITOR) 80 MG tablet Take 1 tablet (80 mg total) by mouth every evening.    blood sugar diagnostic Strp 1 strip by Misc.(Non-Drug; Combo Route) route 4 (four) times daily.    clindamycin (CLEOCIN) 300 MG capsule Take 300 mg by mouth 3 (three) times daily.    dulaglutide (TRULICITY) 4.5 mg/0.5 mL pen injector Inject 4.5 mg into the skin every 7 days.    empagliflozin (JARDIANCE) 10 mg tablet Take 1 tablet (10 mg total) by mouth once daily.    gabapentin (NEURONTIN) 300 MG capsule TK 1 C PO TID    hydroCHLOROthiazide (HYDRODIURIL) 25 MG tablet TAKE 1 TABLET(25 MG) BY MOUTH EVERY DAY    HYDROcodone-acetaminophen (NORCO)  mg per tablet Take 1 tablet by mouth every 6 (six) hours as needed.    hydrOXYzine HCL (ATARAX) 10 MG Tab TAKE 1 TABLET(10 MG) BY MOUTH TWICE DAILY AS NEEDED. MAY CAUSE DROWSINESS. DO NOT DRIVE OR OPERATE HEAVY MACHINERY    insulin (LANTUS SOLOSTAR U-100 INSULIN) glargine 100 units/mL (3mL) SubQ pen Inject 30 Units into the skin 2 (two) times a day.    isosorbide mononitrate (IMDUR) 30 MG 24 hr tablet Take 3 tablets (90 mg total) by mouth once daily.    metFORMIN (GLUCOPHAGE-XR) 500 MG ER 24hr tablet Take 1 tablet (500 mg total) by mouth daily with breakfast.    metoprolol tartrate (LOPRESSOR) 50 MG tablet TAKE 1 TABLET(50 MG) BY MOUTH TWICE DAILY    niacin (SLO-NIACIN) 500 mg tablet TAKE 1 TABLET(500 MG) BY MOUTH EVERY EVENING    nitroGLYCERIN (NITROSTAT) 0.4 MG SL tablet Place 1 tablet (0.4 mg total) under the tongue every 5 (five) minutes as needed.    pen needle, diabetic (BD ULTRA-FINE SHORT PEN NEEDLE) 31 gauge x 5/16" Ndle USE WITH LANTUS PEN AS DIRECTED    ranolazine (RANEXA) 1,000 mg Tb12 TAKE 1 TABLET(1000 MG) BY MOUTH TWICE DAILY    varicella-zoster [id number], PF, (SHINGRIX) 50 mcg/0.5 mL injection Inject 0.5 mL (one dose) into muscle now; give second dose at least 2 months later     No current facility-administered medications on file prior to visit. "     Review of patient's allergies indicates:   Allergen Reactions    Pcn [penicillins] Hives      Review of Systems   Constitutional: Negative for malaise/fatigue.   Eyes:  Negative for blurred vision.   Cardiovascular:  Negative for chest pain, claudication, cyanosis, dyspnea on exertion, irregular heartbeat, leg swelling, near-syncope, orthopnea, palpitations and paroxysmal nocturnal dyspnea.   Respiratory:  Negative for cough, hemoptysis and shortness of breath.    Hematologic/Lymphatic: Negative for bleeding problem. Does not bruise/bleed easily.   Skin:  Negative for dry skin and itching.   Musculoskeletal:  Negative for falls, muscle weakness and myalgias.   Gastrointestinal:  Negative for abdominal pain, diarrhea, heartburn, hematemesis, hematochezia and melena.   Genitourinary:  Negative for flank pain and hematuria.   Neurological:  Negative for dizziness, focal weakness, headaches, light-headedness, numbness, paresthesias, seizures and weakness.   Psychiatric/Behavioral:  Negative for altered mental status and memory loss. The patient is not nervous/anxious.    Allergic/Immunologic: Negative for hives.     Objective:   Physical Exam  Vitals and nursing note reviewed.   Constitutional:       General: He is not in acute distress.     Appearance: He is well-developed. He is obese. He is not diaphoretic.   HENT:      Head: Normocephalic and atraumatic.   Eyes:      General:         Right eye: No discharge.         Left eye: No discharge.      Pupils: Pupils are equal, round, and reactive to light.   Neck:      Thyroid: No thyromegaly.      Vascular: No JVD.   Cardiovascular:      Rate and Rhythm: Normal rate and regular rhythm.      Pulses: Intact distal pulses.      Heart sounds: Normal heart sounds. No murmur heard.    No friction rub. No gallop.   Pulmonary:      Effort: Pulmonary effort is normal. No respiratory distress.      Breath sounds: Normal breath sounds. No wheezing or rales.   Chest:      Chest  "wall: No tenderness.   Abdominal:      General: Bowel sounds are normal. There is no distension.      Palpations: Abdomen is soft.      Tenderness: There is no abdominal tenderness.   Musculoskeletal:         General: Normal range of motion.      Cervical back: Neck supple.      Right lower leg: No edema.      Left lower leg: No edema.   Skin:     General: Skin is warm and dry.      Findings: No erythema or rash.      Comments: Chronic venous hyperpigmentation.   Neurological:      Mental Status: He is alert and oriented to person, place, and time.      Cranial Nerves: No cranial nerve deficit.   Psychiatric:         Behavior: Behavior normal.     Vitals:    09/08/22 1128 09/08/22 1130   BP: (!) 140/90 138/86   BP Location: Right arm Left arm   Patient Position: Sitting Sitting   BP Method: Large (Automatic) Large (Automatic)   Pulse: 95    Resp: 18    Weight: (!) 140 kg (308 lb 10.3 oz)    Height: 6' 2" (1.88 m)      Lab Results   Component Value Date    CHOL 125 10/27/2021    CHOL 131 10/16/2020    CHOL 155 04/15/2020     Lab Results   Component Value Date    HDL 37 (L) 10/27/2021    HDL 34 (L) 10/16/2020    HDL 43 04/15/2020     Lab Results   Component Value Date    LDLCALC 74.0 10/27/2021    LDLCALC 75.2 10/16/2020    LDLCALC 100.0 04/15/2020     Lab Results   Component Value Date    TRIG 70 10/27/2021    TRIG 109 10/16/2020    TRIG 60 04/15/2020     Lab Results   Component Value Date    CHOLHDL 29.6 10/27/2021    CHOLHDL 26.0 10/16/2020    CHOLHDL 27.7 04/15/2020       Chemistry        Component Value Date/Time     (L) 10/27/2021 0842    K 4.1 10/27/2021 0842     10/27/2021 0842    CO2 24 10/27/2021 0842    BUN 15 10/27/2021 0842    CREATININE 1.1 10/27/2021 0842     (H) 10/27/2021 0842        Component Value Date/Time    CALCIUM 9.5 10/27/2021 0842    ALKPHOS 73 10/27/2021 0842    AST 15 10/27/2021 0842    ALT 19 10/27/2021 0842    BILITOT 0.6 10/27/2021 0842    ESTGFRAFRICA >60.0 10/27/2021 " 0842    EGFRNONAA >60.0 10/27/2021 0842        Lab Results   Component Value Date    HGBA1C 6.7 (H) 07/27/2022         Lab Results   Component Value Date    TSH 1.188 06/30/2015     Lab Results   Component Value Date    INR 1.0 12/16/2015    INR 1.1 10/29/2014    INR 1.0 10/25/2014     Lab Results   Component Value Date    WBC 8.38 09/30/2020    HGB 14.8 09/30/2020    HCT 43.3 09/30/2020    MCV 93 09/30/2020     09/30/2020     BMP  Sodium   Date Value Ref Range Status   10/27/2021 132 (L) 136 - 145 mmol/L Final     Potassium   Date Value Ref Range Status   10/27/2021 4.1 3.5 - 5.1 mmol/L Final     Chloride   Date Value Ref Range Status   10/27/2021 102 95 - 110 mmol/L Final     CO2   Date Value Ref Range Status   10/27/2021 24 23 - 29 mmol/L Final     BUN   Date Value Ref Range Status   10/27/2021 15 8 - 23 mg/dL Final     Creatinine   Date Value Ref Range Status   10/27/2021 1.1 0.5 - 1.4 mg/dL Final     Calcium   Date Value Ref Range Status   10/27/2021 9.5 8.7 - 10.5 mg/dL Final     Anion Gap   Date Value Ref Range Status   10/27/2021 6 (L) 8 - 16 mmol/L Final     eGFR if    Date Value Ref Range Status   10/27/2021 >60.0 >60 mL/min/1.73 m^2 Final     eGFR if non    Date Value Ref Range Status   10/27/2021 >60.0 >60 mL/min/1.73 m^2 Final     Comment:     Calculation used to obtain the estimated glomerular filtration  rate (eGFR) is the CKD-EPI equation.        CrCl cannot be calculated (Patient's most recent lab result is older than the maximum 7 days allowed.).    Assessment:     1. Coronary artery disease involving native coronary artery of native heart without angina pectoris    2. Hyperlipidemia associated with type 2 diabetes mellitus    3. Class 2 severe obesity due to excess calories with serious comorbidity and body mass index (BMI) of 37.0 to 37.9 in adult    4. Essential hypertension    5. Type 2 diabetes mellitus with hyperglycemia, with long-term current use of  insulin    6. S/P PTCA (percutaneous transluminal coronary angioplasty)    7. LORNE on CPAP    8. Venous insufficiency    9. Type 2 diabetes mellitus with other circulatory complication, with long-term current use of insulin    10. Type 2 diabetes mellitus with diabetic polyneuropathy, with long-term current use of insulin    11. Hypertension associated with diabetes    Cad s/p lad stent asymptomatic   Diabetes improved control a1c therapeutic still has issues with diet compliance he will discuss with pcp  Hlp not compliant with diet  await repeat labs counseled.    Htn fluctuates due to salt intake and dietary indiscretion counseled.   Lorne on capap compliant encouraged to continue same.     Plan:   Continue current therapy  Cardiac low salt diet.  Risk factor modification and excercise program./weight loss  F/u in 6 months with lipid cmp a1c.

## 2022-09-13 ENCOUNTER — LAB VISIT (OUTPATIENT)
Dept: LAB | Facility: HOSPITAL | Age: 67
End: 2022-09-13
Attending: INTERNAL MEDICINE
Payer: COMMERCIAL

## 2022-09-13 DIAGNOSIS — E66.01 CLASS 2 SEVERE OBESITY DUE TO EXCESS CALORIES WITH SERIOUS COMORBIDITY AND BODY MASS INDEX (BMI) OF 37.0 TO 37.9 IN ADULT: Chronic | ICD-10-CM

## 2022-09-13 DIAGNOSIS — E11.65 TYPE 2 DIABETES MELLITUS WITH HYPERGLYCEMIA, WITH LONG-TERM CURRENT USE OF INSULIN: Chronic | ICD-10-CM

## 2022-09-13 DIAGNOSIS — Z79.4 TYPE 2 DIABETES MELLITUS WITH HYPERGLYCEMIA, WITH LONG-TERM CURRENT USE OF INSULIN: Chronic | ICD-10-CM

## 2022-09-13 LAB
ALBUMIN SERPL BCP-MCNC: 3.5 G/DL (ref 3.5–5.2)
ALP SERPL-CCNC: 70 U/L (ref 55–135)
ALT SERPL W/O P-5'-P-CCNC: 21 U/L (ref 10–44)
ANION GAP SERPL CALC-SCNC: 9 MMOL/L (ref 8–16)
AST SERPL-CCNC: 12 U/L (ref 10–40)
BILIRUB SERPL-MCNC: 1 MG/DL (ref 0.1–1)
BUN SERPL-MCNC: 22 MG/DL (ref 8–23)
CALCIUM SERPL-MCNC: 9.1 MG/DL (ref 8.7–10.5)
CHLORIDE SERPL-SCNC: 101 MMOL/L (ref 95–110)
CHOLEST SERPL-MCNC: 174 MG/DL (ref 120–199)
CHOLEST/HDLC SERPL: 4.4 {RATIO} (ref 2–5)
CO2 SERPL-SCNC: 27 MMOL/L (ref 23–29)
CREAT SERPL-MCNC: 1.1 MG/DL (ref 0.5–1.4)
EST. GFR  (NO RACE VARIABLE): >60 ML/MIN/1.73 M^2
ESTIMATED AVG GLUCOSE: 177 MG/DL (ref 68–131)
GLUCOSE SERPL-MCNC: 168 MG/DL (ref 70–110)
HBA1C MFR BLD: 7.8 % (ref 4–5.6)
HDLC SERPL-MCNC: 40 MG/DL (ref 40–75)
HDLC SERPL: 23 % (ref 20–50)
LDLC SERPL CALC-MCNC: 113.4 MG/DL (ref 63–159)
NONHDLC SERPL-MCNC: 134 MG/DL
POTASSIUM SERPL-SCNC: 4.3 MMOL/L (ref 3.5–5.1)
PROT SERPL-MCNC: 6.7 G/DL (ref 6–8.4)
SODIUM SERPL-SCNC: 137 MMOL/L (ref 136–145)
TRIGL SERPL-MCNC: 103 MG/DL (ref 30–150)

## 2022-09-13 PROCEDURE — 80053 COMPREHEN METABOLIC PANEL: CPT | Performed by: INTERNAL MEDICINE

## 2022-09-13 PROCEDURE — 83036 HEMOGLOBIN GLYCOSYLATED A1C: CPT | Performed by: INTERNAL MEDICINE

## 2022-09-13 PROCEDURE — 36415 COLL VENOUS BLD VENIPUNCTURE: CPT | Performed by: INTERNAL MEDICINE

## 2022-09-13 PROCEDURE — 80061 LIPID PANEL: CPT | Performed by: INTERNAL MEDICINE

## 2022-09-14 ENCOUNTER — TELEPHONE (OUTPATIENT)
Dept: CARDIOLOGY | Facility: CLINIC | Age: 67
End: 2022-09-14
Payer: COMMERCIAL

## 2022-09-14 NOTE — TELEPHONE ENCOUNTER
Spoke to patient and informed him he needs to exercise, lose weight and eat better. Patient verbalized and understanding.    He needs to exercise lose weight eat better.               Patient states he ia taking his meds and no change in the diet        HIS CHOLESTEROL IS INCREASED.. WHAT IS THE PROBLEM. HE PROBABLY NEEDS TO BE COMPLIANT.IS HE SKIPPING MEDS ? DIET          Patient was notified of results. All questions were answered. Pt verbalized understanding. Pt will call back with any other questions or concerns.      ----- Message from Capri Pearson sent at 9/14/2022  3:39 PM CDT -----  .Type:  Patient Returning Call    Who Called:se;f  Who Left Message for Patient: stefany  Does the patient know what this is regarding?: yes  Would the patient rather a call back or a response via [facility]?   Best Call Back Number:[phone]

## 2022-09-14 NOTE — TELEPHONE ENCOUNTER
.[facility] for patent to call the office regarding results.    ----- Message from Kirsty Lyles MD sent at 9/14/2022  7:45 AM CDT -----  HIS CHOLESTEROL IS INCREASED.. WHAT IS THE PROBLEM. HE PROBABLY NEEDS TO BE COMPLIANT.IS HE SKIPPING MEDS ? DIET

## 2022-10-27 ENCOUNTER — LAB VISIT (OUTPATIENT)
Dept: LAB | Facility: HOSPITAL | Age: 67
End: 2022-10-27
Attending: FAMILY MEDICINE
Payer: COMMERCIAL

## 2022-10-27 ENCOUNTER — HOSPITAL ENCOUNTER (OUTPATIENT)
Dept: CARDIOLOGY | Facility: HOSPITAL | Age: 67
Discharge: HOME | End: 2022-10-27
Attending: FAMILY MEDICINE
Payer: COMMERCIAL

## 2022-10-27 ENCOUNTER — IMMUNIZATION (OUTPATIENT)
Dept: PHARMACY | Facility: CLINIC | Age: 67
End: 2022-10-27
Payer: COMMERCIAL

## 2022-10-27 ENCOUNTER — OFFICE VISIT (OUTPATIENT)
Dept: INTERNAL MEDICINE | Facility: CLINIC | Age: 67
End: 2022-10-27
Payer: COMMERCIAL

## 2022-10-27 VITALS
TEMPERATURE: 98 F | BODY MASS INDEX: 39.16 KG/M2 | HEIGHT: 74 IN | SYSTOLIC BLOOD PRESSURE: 104 MMHG | WEIGHT: 305.13 LBS | DIASTOLIC BLOOD PRESSURE: 76 MMHG | OXYGEN SATURATION: 99 % | HEART RATE: 84 BPM

## 2022-10-27 DIAGNOSIS — I87.303 CHRONIC VENOUS HYPERTENSION INVOLVING BOTH SIDES: ICD-10-CM

## 2022-10-27 DIAGNOSIS — Z79.4 TYPE 2 DIABETES MELLITUS WITH HYPERGLYCEMIA, WITH LONG-TERM CURRENT USE OF INSULIN: Chronic | ICD-10-CM

## 2022-10-27 DIAGNOSIS — Z23 NEED FOR INFLUENZA VACCINATION: ICD-10-CM

## 2022-10-27 DIAGNOSIS — Z86.010 HISTORY OF COLON POLYPS: ICD-10-CM

## 2022-10-27 DIAGNOSIS — I25.10 CORONARY ARTERY DISEASE INVOLVING NATIVE CORONARY ARTERY OF NATIVE HEART WITHOUT ANGINA PECTORIS: Chronic | ICD-10-CM

## 2022-10-27 DIAGNOSIS — Z00.01 ENCOUNTER FOR WELL ADULT EXAM WITH ABNORMAL FINDINGS: Primary | ICD-10-CM

## 2022-10-27 DIAGNOSIS — I15.2 HYPERTENSION ASSOCIATED WITH DIABETES: ICD-10-CM

## 2022-10-27 DIAGNOSIS — Z12.11 SCREENING FOR COLORECTAL CANCER: ICD-10-CM

## 2022-10-27 DIAGNOSIS — Z23 NEED FOR PNEUMOCOCCAL VACCINE: ICD-10-CM

## 2022-10-27 DIAGNOSIS — E11.59 HYPERTENSION ASSOCIATED WITH DIABETES: ICD-10-CM

## 2022-10-27 DIAGNOSIS — Z00.01 ENCOUNTER FOR WELL ADULT EXAM WITH ABNORMAL FINDINGS: ICD-10-CM

## 2022-10-27 DIAGNOSIS — Z12.5 SCREENING PSA (PROSTATE SPECIFIC ANTIGEN): ICD-10-CM

## 2022-10-27 DIAGNOSIS — I10 ESSENTIAL HYPERTENSION: Chronic | ICD-10-CM

## 2022-10-27 DIAGNOSIS — Z12.12 SCREENING FOR COLORECTAL CANCER: ICD-10-CM

## 2022-10-27 DIAGNOSIS — E66.01 CLASS 2 SEVERE OBESITY DUE TO EXCESS CALORIES WITH SERIOUS COMORBIDITY AND BODY MASS INDEX (BMI) OF 39.0 TO 39.9 IN ADULT: Chronic | ICD-10-CM

## 2022-10-27 DIAGNOSIS — E11.65 TYPE 2 DIABETES MELLITUS WITH HYPERGLYCEMIA, WITH LONG-TERM CURRENT USE OF INSULIN: Chronic | ICD-10-CM

## 2022-10-27 DIAGNOSIS — Z23 NEED FOR COVID-19 VACCINE: ICD-10-CM

## 2022-10-27 LAB
ALBUMIN/CREAT UR: 20.8 UG/MG (ref 0–30)
CREAT UR-MCNC: 96 MG/DL (ref 23–375)
MICROALBUMIN UR DL<=1MG/L-MCNC: 20 UG/ML

## 2022-10-27 PROCEDURE — 93010 ELECTROCARDIOGRAM REPORT: CPT | Mod: ,,, | Performed by: INTERNAL MEDICINE

## 2022-10-27 PROCEDURE — 1100F PTFALLS ASSESS-DOCD GE2>/YR: CPT | Mod: CPTII,S$GLB,, | Performed by: FAMILY MEDICINE

## 2022-10-27 PROCEDURE — 4010F ACE/ARB THERAPY RXD/TAKEN: CPT | Mod: CPTII,S$GLB,, | Performed by: FAMILY MEDICINE

## 2022-10-27 PROCEDURE — 90471 IMMUNIZATION ADMIN: CPT | Mod: S$GLB,,, | Performed by: FAMILY MEDICINE

## 2022-10-27 PROCEDURE — 1159F MED LIST DOCD IN RCRD: CPT | Mod: CPTII,S$GLB,, | Performed by: FAMILY MEDICINE

## 2022-10-27 PROCEDURE — 1159F PR MEDICATION LIST DOCUMENTED IN MEDICAL RECORD: ICD-10-PCS | Mod: CPTII,S$GLB,, | Performed by: FAMILY MEDICINE

## 2022-10-27 PROCEDURE — 1100F PR PT FALLS ASSESS DOC 2+ FALLS/FALL W/INJURY/YR: ICD-10-PCS | Mod: CPTII,S$GLB,, | Performed by: FAMILY MEDICINE

## 2022-10-27 PROCEDURE — 90471 FLU VACCINE - QUADRIVALENT - ADJUVANTED: ICD-10-PCS | Mod: S$GLB,,, | Performed by: FAMILY MEDICINE

## 2022-10-27 PROCEDURE — 3051F HG A1C>EQUAL 7.0%<8.0%: CPT | Mod: CPTII,S$GLB,, | Performed by: FAMILY MEDICINE

## 2022-10-27 PROCEDURE — 4010F PR ACE/ARB THEARPY RXD/TAKEN: ICD-10-PCS | Mod: CPTII,S$GLB,, | Performed by: FAMILY MEDICINE

## 2022-10-27 PROCEDURE — 93005 ELECTROCARDIOGRAM TRACING: CPT

## 2022-10-27 PROCEDURE — 3051F PR MOST RECENT HEMOGLOBIN A1C LEVEL 7.0 - < 8.0%: ICD-10-PCS | Mod: CPTII,S$GLB,, | Performed by: FAMILY MEDICINE

## 2022-10-27 PROCEDURE — 82043 UR ALBUMIN QUANTITATIVE: CPT | Performed by: FAMILY MEDICINE

## 2022-10-27 PROCEDURE — 90694 VACC AIIV4 NO PRSRV 0.5ML IM: CPT | Mod: S$GLB,,, | Performed by: FAMILY MEDICINE

## 2022-10-27 PROCEDURE — 3288F FALL RISK ASSESSMENT DOCD: CPT | Mod: CPTII,S$GLB,, | Performed by: FAMILY MEDICINE

## 2022-10-27 PROCEDURE — 82570 ASSAY OF URINE CREATININE: CPT | Performed by: FAMILY MEDICINE

## 2022-10-27 PROCEDURE — 3288F PR FALLS RISK ASSESSMENT DOCUMENTED: ICD-10-PCS | Mod: CPTII,S$GLB,, | Performed by: FAMILY MEDICINE

## 2022-10-27 PROCEDURE — 99999 PR PBB SHADOW E&M-EST. PATIENT-LVL V: CPT | Mod: PBBFAC,,, | Performed by: FAMILY MEDICINE

## 2022-10-27 PROCEDURE — 99999 PR PBB SHADOW E&M-EST. PATIENT-LVL V: ICD-10-PCS | Mod: PBBFAC,,, | Performed by: FAMILY MEDICINE

## 2022-10-27 PROCEDURE — 99215 OFFICE O/P EST HI 40 MIN: CPT | Mod: 25,S$GLB,, | Performed by: FAMILY MEDICINE

## 2022-10-27 PROCEDURE — 93010 EKG 12-LEAD: ICD-10-PCS | Mod: ,,, | Performed by: INTERNAL MEDICINE

## 2022-10-27 PROCEDURE — 3078F PR MOST RECENT DIASTOLIC BLOOD PRESSURE < 80 MM HG: ICD-10-PCS | Mod: CPTII,S$GLB,, | Performed by: FAMILY MEDICINE

## 2022-10-27 PROCEDURE — 3074F PR MOST RECENT SYSTOLIC BLOOD PRESSURE < 130 MM HG: ICD-10-PCS | Mod: CPTII,S$GLB,, | Performed by: FAMILY MEDICINE

## 2022-10-27 PROCEDURE — 90694 FLU VACCINE - QUADRIVALENT - ADJUVANTED: ICD-10-PCS | Mod: S$GLB,,, | Performed by: FAMILY MEDICINE

## 2022-10-27 PROCEDURE — 3078F DIAST BP <80 MM HG: CPT | Mod: CPTII,S$GLB,, | Performed by: FAMILY MEDICINE

## 2022-10-27 PROCEDURE — 99215 PR OFFICE/OUTPT VISIT, EST, LEVL V, 40-54 MIN: ICD-10-PCS | Mod: 25,S$GLB,, | Performed by: FAMILY MEDICINE

## 2022-10-27 PROCEDURE — 3074F SYST BP LT 130 MM HG: CPT | Mod: CPTII,S$GLB,, | Performed by: FAMILY MEDICINE

## 2022-10-27 RX ORDER — INSULIN GLARGINE 100 [IU]/ML
30 INJECTION, SOLUTION SUBCUTANEOUS 2 TIMES DAILY
Qty: 60 ML | Refills: 1 | Status: SHIPPED | OUTPATIENT
Start: 2022-10-27 | End: 2022-11-03 | Stop reason: SDUPTHER

## 2022-10-27 NOTE — PROGRESS NOTES
OFFICE VISIT  10/27/22  9:30 AM CDT    HEALTH MAINTENANCE INTERVENTIONS - UP TO DATE  Health Maintenance Topics with due status: Not Due       Topic Last Completion Date    TETANUS VACCINE 08/18/2016    Eye Exam 02/07/2022    Lipid Panel 09/13/2022    Hemoglobin A1c 09/13/2022    High Dose Statin 10/27/2022    Aspirin/Antiplatelet Therapy 10/27/2022    Foot Exam 10/27/2022       HEALTH MAINTENANCE INTERVENTIONS - DUE OR DUE SOON  Health Maintenance Due   Topic Date Due    Pneumococcal Vaccines (Age 65+) (2 - PCV) 06/01/2012    COVID-19 Vaccine (4 - Booster for Moderna series) 02/07/2022    Colorectal Cancer Screening  03/25/2022    Shingles Vaccine (3 of 3) 03/31/2022    PROSTATE-SPECIFIC ANTIGEN  10/27/2022    Diabetes Urine Screening  10/27/2022     CHIEF COMPLAINT: Follow-up    Isaiah reports that his chronic conditions are stable, and he reports no new complaints or concerns unless noted herein below. He says he is doing well on his current medicines, he reports preceiving no adverse side-effects and wants to continue current treatment.     DIAGNOSES SPECIFICALLY EVALUATED AND TREATED THIS ENCOUNTER  1. Encounter for well adult exam with abnormal findings  - PSA, Screening; Future  - Microalbumin/Creatinine Ratio, Urine; Future    2. Hypertension associated with diabetes    3. Essential hypertension  - SCHEDULED EKG 12-LEAD (to [place]); Future    4. Coronary artery disease involving native coronary artery of native heart without angina pectoris  - SCHEDULED EKG 12-LEAD (to Muse); Future    5. Class 2 severe obesity due to excess calories with serious comorbidity and body mass index (BMI) of 39.0 to 39.9 in adult  - Ambulatory referral/consult to [facility]; Future  - Ambulatory referral/consult to Bariatric Surgery; Future    6. Need for influenza vaccination    7. Need for pneumococcal vaccine  - Pneumococcal Conjugate Vaccine (20 Valent) (IM)    8. Need for COVID-19 vaccine    9. Type 2 diabetes  mellitus with hyperglycemia, with long-term current use of insulin  - Microalbumin/Creatinine Ratio, Urine; Future  - insulin (LANTUS SOLOSTAR U-100 INSULIN) glargine 100 units/mL SubQ pen; Inject 30 Units into the skin 2 (two) times a day.  Dispense: 60 mL; Refill: 1  - Hemoglobin A1C; Future    10. Screening for colorectal cancer  - Ambulatory referral/consult to Endo Procedure ; Future    11. Screening PSA (prostate specific antigen)  - PSA, Screening; Future    12. History of colon polyps  - Ambulatory referral/consult to Endo Procedure ; Future    13. Chronic venous hypertension involving both sides     --------------------------------------------------------------------------------   JC FIGUEROA (MRN [id number], APPT: 10/27/22  9:30 AM CDT)  --------------------------------  Ready to check out. See orders.  Labs today: PSA, urine microalbumin  EKG today  Schedule A1c in late January and F/U with me soon afterward.  Schedule other appointments as able.  Highlight patient instructions on AVS.   Thanks.  --------------------------------------------------------------------------------  Orders Placed This Encounter    Pneumococcal Conjugate Vaccine (20 Valent) (IM)    Influenza - Quadrivalent (Adjuvanted)    PSA, Screening    Microalbumin/Creatinine Ratio, Urine    Hemoglobin A1C    Ambulatory referral/consult to [facility]    Ambulatory referral/consult to Bariatric Surgery    Ambulatory referral/consult to Endo Procedure     SCHEDULED EKG 12-LEAD (to Muse)    insulin (LANTUS SOLOSTAR U-100 INSULIN) glargine 100 units/mL SubQ pen       Follow up in about 3 months (around 2/7/2023) for review test results, discuss treatment plan, re-evaluate problem(s) discussed today.   Future Appointments   Date Time Provider Department Center   2/7/2023  2:40 PM Elizabeth Lejeune, NP [facility] SLEEP [place]   3/2/2023  9:00 AM LABORATORYSAM [facility] LAB [place]   3/9/2023 10:20 AM  "Kirsty Lyles MD ON CARDIO [facility]     Problem List Items Addressed This Visit       Class 2 severe obesity due to excess calories with serious comorbidity and body mass index (BMI) of 39.0 to 39.9 in adult (Chronic)    Current Assessment & Plan     Wt Readings from Last 6 Encounters:   09/08/22 (!) 140 kg (308 lb 10.3 oz)   02/03/22 132.9 kg (292 lb 15.9 oz)   11/30/21 133.6 kg (294 lb 8.6 oz)   11/23/21 132.6 kg (292 lb 5.3 oz)   11/16/21 132.7 kg (292 lb 8.8 oz)   11/10/21 132.7 kg (292 lb 8.8 oz)   Estimated body mass index is 39.63 kg/m² as calculated from the following:    Height as of 9/8/22: 6' 2" (1.88 m).    Weight as of 9/8/22: 140 kg (308 lb 10.3 oz).           Relevant Orders    Ambulatory referral/consult to [facility]    Ambulatory referral/consult to Bariatric Surgery    Coronary artery disease involving native coronary artery of native heart without angina pectoris (Chronic)    Relevant Orders    SCHEDULED EKG 12-LEAD (to Muse)    Essential hypertension (Chronic)    Relevant Orders    SCHEDULED EKG 12-LEAD (to Muse)    Type 2 diabetes mellitus with hyperglycemia, with long-term current use of insulin (Chronic)    Relevant Medications    insulin (LANTUS SOLOSTAR U-100 INSULIN) glargine 100 units/mL SubQ pen    Other Relevant Orders    Microalbumin/Creatinine Ratio, Urine    Hemoglobin A1C    Hypertension associated with diabetes    Current Assessment & Plan     BP Readings from Last 6 Encounters:   09/08/22 138/86   02/03/22 120/70   11/23/21 102/64   11/10/21 122/74   10/27/21 100/70   07/22/21 113/72      Last 5 Patient Entered Readings                Current 30 Day Average: 135/91  Recent Readings 10/15/2022 10/15/2022 10/13/2022 10/13/2022 10/12/2022   SBP (mmHg) 144 151 124 150 139   DBP (mmHg) 93 96 83 96 99   Pulse 81 82 79 81 103                 Lab Results   Component Value Date    EGFRNORACEVR >60.0 09/13/2022    CREATININE 1.1 09/13/2022    BUN 22 09/13/2022    K " 4.3 09/13/2022     09/13/2022     09/13/2022     Results for orders placed or performed during the hospital encounter of 05/24/13   EKG 12-lead    Collection Time: 05/25/13  1:47 AM    Narrative    Test Reason : STAT  Vent. Rate : 076 BPM     Atrial Rate : 076 BPM     P-R Int : 204 ms          QRS Dur : 102 ms      QT Int : 406 ms       P-R-T Axes : 058 006 049 degrees     QTc Int : 456 ms    Normal sinus rhythm  Septal infarct ,age undetermined  Abnormal ECG  When compared with ECG of 24-MAY-2013 12:25,  Septal infarct is now Present  Confirmed by OSWALD CRESPO, CARMELO ([id number]) on 5/27/2013 9:22:05 PM    Referred By:  TOD           Overread By: CARMELO AKERS MD              Relevant Medications    insulin (LANTUS SOLOSTAR U-100 INSULIN) glargine 100 units/mL SubQ pen     Other Visit Diagnoses       Encounter for well adult exam with abnormal findings    -  Primary    Relevant Orders    PSA, Screening    Microalbumin/Creatinine Ratio, Urine    Need for influenza vaccination        Need for pneumococcal vaccine        Relevant Orders    Pneumococcal Conjugate Vaccine (20 Valent) (IM)    Need for COVID-19 vaccine        Screening for colorectal cancer        Relevant Orders    Ambulatory referral/consult to Endo Procedure     Screening PSA (prostate specific antigen)        Relevant Orders    PSA, Screening    History of colon polyps        Relevant Orders    Ambulatory referral/consult to Endo Procedure     Chronic venous hypertension involving both sides            Unless noted herein, any chronic conditions are represented as and appear compensated/controlled and stable, and no other significant complaints or concerns were reported.    PRESCRIPTION DRUG MANAGEMENT  Outpatient Medications Prior to Visit   Medication Sig Dispense Refill    ALPRAZolam (XANAX) 1 MG tablet Take 1 mg by mouth 2 (two) times daily as needed.      amlodipine-valsartan (EXFORGE)  mg per tablet Take 1 tablet by  "mouth once daily. 90 tablet 3    aspirin (ECOTRIN) 81 MG EC tablet Take 81 mg by mouth once daily.      atorvastatin (LIPITOR) 80 MG tablet Take 1 tablet (80 mg total) by mouth every evening. 90 tablet 3    blood sugar diagnostic Strp 1 strip by Misc.(Non-Drug; Combo Route) route 4 (four) times daily. 200 strip 11    dulaglutide (TRULICITY) 4.5 mg/0.5 mL pen injector Inject 4.5 mg into the skin every 7 days. 4 pen 11    empagliflozin (JARDIANCE) 10 mg tablet Take 1 tablet (10 mg total) by mouth once daily. 90 tablet 3    gabapentin (NEURONTIN) 300 MG capsule TK 1 C PO TID  0    hydroCHLOROthiazide (HYDRODIURIL) 25 MG tablet TAKE 1 TABLET(25 MG) BY MOUTH EVERY DAY 90 tablet 1    hydrOXYzine HCL (ATARAX) 10 MG Tab TAKE 1 TABLET(10 MG) BY MOUTH TWICE DAILY AS NEEDED. MAY CAUSE DROWSINESS. DO NOT DRIVE OR OPERATE HEAVY MACHINERY 60 tablet 1    isosorbide mononitrate (IMDUR) 30 MG 24 hr tablet Take 3 tablets (90 mg total) by mouth once daily. 270 tablet 3    metFORMIN (GLUCOPHAGE-XR) 500 MG ER 24hr tablet Take 1 tablet (500 mg total) by mouth daily with breakfast. 90 tablet 3    metoprolol tartrate (LOPRESSOR) 50 MG tablet TAKE 1 TABLET(50 MG) BY MOUTH TWICE DAILY 420 tablet 3    niacin (SLO-NIACIN) 500 mg tablet TAKE 1 TABLET(500 MG) BY MOUTH EVERY EVENING 30 tablet 6    pen needle, diabetic (BD ULTRA-FINE SHORT PEN NEEDLE) 31 gauge x 5/16" Ndle USE WITH LANTUS PEN AS DIRECTED 100 each 11    ranolazine (RANEXA) 1,000 mg Tb12 TAKE 1 TABLET(1000 MG) BY MOUTH TWICE DAILY 60 tablet 6    HYDROcodone-acetaminophen (NORCO)  mg per tablet Take 1 tablet by mouth every 6 (six) hours as needed.      insulin (LANTUS SOLOSTAR U-100 INSULIN) glargine 100 units/mL (3mL) SubQ pen Inject 30 Units into the skin 2 (two) times a day. 60 mL 0    nitroGLYCERIN (NITROSTAT) 0.4 MG SL tablet Place 1 tablet (0.4 mg total) under the tongue every 5 (five) minutes as needed. (Patient not taking: Reported on 10/27/2022) 25 tablet 4    " "clindamycin (CLEOCIN) 300 MG capsule Take 300 mg by mouth 3 (three) times daily.      varicella-zoster gE-AS01B, PF, (SHINGRIX) 50 mcg/0.5 mL injection Inject 0.5 mL (one dose) into muscle now; give second dose at least 2 months later 1 each 1     No facility-administered medications prior to visit.     Medications Discontinued During This Encounter   Medication Reason    varicella-zoster gE-AS01B, PF, (SHINGRIX) 50 mcg/0.5 mL injection Patient no longer taking    clindamycin (CLEOCIN) 300 MG capsule Patient no longer taking    HYDROcodone-acetaminophen (NORCO)  mg per tablet Patient no longer taking    insulin (LANTUS SOLOSTAR U-100 INSULIN) glargine 100 units/mL (3mL) SubQ pen Reorder     Medications Ordered This Encounter   Medications    insulin (LANTUS SOLOSTAR U-100 INSULIN) glargine 100 units/mL SubQ pen     Sig: Inject 30 Units into the skin 2 (two) times a day.     Dispense:  60 mL     Refill:  1     PHYSICAL EXAM  Vitals:    10/27/22 0956   BP: 104/76   BP Location: Left arm   Patient Position: Sitting   BP Method: Large (Manual)   Pulse: 84   Temp: 97.6 °F (36.4 °C)   TempSrc: Tympanic   SpO2: 99%   Weight: (!) 138.4 kg (305 lb 1.9 oz)   Height: 6' 2" (1.88 m)   CONSTITUTIONAL: Vital signs noted. Appears well.  PSYCH: Alert and conversant. Mood is grossly neutral. Affect appropriate.  PULM: Breathing unlabored.  HEART: Regular.   DIABETIC FOOT EXAM: Inspection of feet reveals no open wounds, ulcerations, significant calluses, or evidence of arterial insufficiency. 10g monofilament sensation is intact in all 5 locations tested.     PAST MEDICAL HISTORY  Isaiah has a past medical history of Acute coronary syndrome, Anticoagulant long-term use, Back pain, CAD (coronary artery disease), Class 2 severe obesity due to excess calories with serious comorbidity and body mass index (BMI) of 38.0 to 38.9 in adult, Coronary artery disease, Diabetes mellitus, type 2, Gastric polyps, Hyperlipemia, Hypertension, " NSTEMI (non-ST elevated myocardial infarction), Obesity, DANTE on CPAP, S/P PTCA (percutaneous transluminal coronary angioplasty), Sleep apnea, and Type 2 diabetes mellitus with circulatory disorder (coronary artery disease), without long-term current use of insulin.    SURGICAL HISTORY  Isaiah has a past surgical history that includes Appendectomy; Hernia repair; Coronary angioplasty with stent; Fracture surgery; Colonoscopy; Esophagogastroduodenoscopy; Spine surgery; Back surgery; Cataract extraction, bilateral; Eye surgery; and Cataract extraction.    FAMILY HISTORY  Isaiah family history includes Diabetes in his mother; Heart disease in his maternal grandfather; Hypertension in his father and mother.     ALLERGIES  Review of patient's allergies indicates:   Allergen Reactions    Pcn [penicillins] Hives       SOCIAL HISTORY  Isaiah  reports that he has never smoked. He has never used smokeless tobacco. He reports current alcohol use. He reports that he does not use drugs.     Orders Placed This Encounter    Pneumococcal Conjugate Vaccine (20 Valent) (IM)    Influenza - Quadrivalent (Adjuvanted)    PSA, Screening    Microalbumin/Creatinine Ratio, Urine    Hemoglobin A1C    Ambulatory referral/consult to [facility]    Ambulatory referral/consult to Bariatric Surgery    Ambulatory referral/consult to Endo Procedure     SCHEDULED EKG 12-LEAD (to Muse)    insulin (LANTUS SOLOSTAR U-100 INSULIN) glargine 100 units/mL SubQ pen     TOTAL TIME evaluating and managing this patient for this encounter was greater than or equal to 43 minutes. This time was spent personally by me on the following activities: pre-charting, review of patient's past medical history, assessing age-appropriate health maintenance needs, review of any interval history, medication reconciliation, reconciling/updating problem list, obtaining history from the patient, examination of the patient, review and interpretation of lab  "results, review and interpretation of cardiology test results, evaluation of the patient's response to treatment, managing and/or ordering prescription medications, educating patient and answering their questions about treatment plan, goals of treatment, and follow-up, educating patient about needed immunizations, educating patient about needed cancer screenings, counseling on appropriate therapeutic lifestyle changes, and final documentation of the visit. This time was exclusive of any separately billable procedures for this patient and exclusive of time spent treating any other patients.   Documentation entered by me for this encounter may have been done in part using speech-recognition technology. Although I have made an effort to ensure accuracy, "sound like" errors may exist and should be interpreted in context.  "

## 2022-10-27 NOTE — ASSESSMENT & PLAN NOTE
"Wt Readings from Last 6 Encounters:   09/08/22 (!) 140 kg (308 lb 10.3 oz)   02/03/22 132.9 kg (292 lb 15.9 oz)   11/30/21 133.6 kg (294 lb 8.6 oz)   11/23/21 132.6 kg (292 lb 5.3 oz)   11/16/21 132.7 kg (292 lb 8.8 oz)   11/10/21 132.7 kg (292 lb 8.8 oz)     Estimated body mass index is 39.63 kg/m² as calculated from the following:    Height as of 9/8/22: 6' 2" (1.88 m).    Weight as of 9/8/22: 140 kg (308 lb 10.3 oz).    "

## 2022-10-27 NOTE — ASSESSMENT & PLAN NOTE
BP Readings from Last 6 Encounters:   09/08/22 138/86   02/03/22 120/70   11/23/21 102/64   11/10/21 122/74   10/27/21 100/70   07/22/21 113/72      Last 5 Patient Entered Readings                Current 30 Day Average: 135/91  Recent Readings 10/15/2022 10/15/2022 10/13/2022 10/13/2022 10/12/2022   SBP (mmHg) 144 151 124 150 139   DBP (mmHg) 93 96 83 96 99   Pulse 81 82 79 81 103               Lab Results   Component Value Date    EGFRNORACEVR >60.0 09/13/2022    CREATININE 1.1 09/13/2022    BUN 22 09/13/2022    K 4.3 09/13/2022     09/13/2022     09/13/2022     Results for orders placed or performed during the hospital encounter of 05/24/13   EKG 12-lead    Collection Time: 05/25/13  1:47 AM    Narrative    Test Reason : STAT  Vent. Rate : 076 BPM     Atrial Rate : 076 BPM     P-R Int : 204 ms          QRS Dur : 102 ms      QT Int : 406 ms       P-R-T Axes : 058 006 049 degrees     QTc Int : 456 ms    Normal sinus rhythm  Septal infarct ,age undetermined  Abnormal ECG  When compared with ECG of 24-MAY-2013 12:25,  Septal infarct is now Present  Confirmed by SOWALD CRESPO, CARMELO ([id number]) on 5/27/2013 9:22:05 PM    Referred By:  TOD           Overread By: CARMELO AKERS MD

## 2022-10-27 NOTE — LETTER
PHYSICIAN ORDERS    PATIENT IDENTIFYING INFORMATION:  JC FIGUEROA  [address] [place] [address] YOB: 1955  OUR MRN: [id number]   Home Phone [phone]   Work Phone Not on file.   Mobile [phone]        ORDER DATE: 10/27/2022    ORDERS  Fitted Compression Stockings, 20-30 mmHg gradient   Dispense: 2 pair   Refill as needed    SUPPORTING DIAGNOSES  Chronic venous hypertension involving both sides (ICD-10 = I87.303)       GLORIA Dow MD, NPI: [id number]  ------------------------------------------------------------------------------------------------    May be purchased at:  [facility]  [address] [place] [address]  [place] [address]  Phone:  [phone]  Fax:  [phone]

## 2022-10-27 NOTE — PATIENT INSTRUCTIONS
"Colon cancer screening saves lives, so I've entered an order for you to have a colonoscopy.    Before you can have your colonoscopy, you must schedule a telephone appointment for Pre-Admit Testing ("P.A.T."). During the P.A.T. phone appointment, one of our endoscopy nurses will review your medical history with you and tell you if any additional steps need to be taken to help your test go smoothly. The nurse will give you instructions on preparing for the colonoscopy and let you know what to expect. They will also answer any questions you may have about the test.    Someone from [facility] should be calling you soon to help schedule your P.A.T. phone appointment. If you haven't been contacted within the next 3 business days, you can schedule your P.A.T. phone appointment from your [facility] account or by calling our appointment desk at [phone].    You can learn more about cancer screening by colonoscopy at: [email or web address]       I recommend that you get the new bi-valent COVID-19 vaccine booster that protects against the Omicron sub-variants of COVID-19.    People who are fully vaccinated are much better protected from severe illness (hospitalization and death) from COVID-19 than people who are unvaccinated or not fully vaccinated.    You can learn more about the COVID-19 vaccine and booster shot options at [email or web address]-faqs.    You can schedule an appointment for your COVID-19 booster vaccination with [facility] or by calling [phone].    Please take care of yourself. You are important to me.    "

## 2022-10-31 ENCOUNTER — HOSPITAL ENCOUNTER (OUTPATIENT)
Dept: PREADMISSION TESTING | Facility: HOSPITAL | Age: 67
Discharge: HOME | End: 2022-10-31
Attending: FAMILY MEDICINE
Payer: COMMERCIAL

## 2022-10-31 DIAGNOSIS — Z86.010 HISTORY OF COLON POLYPS: ICD-10-CM

## 2022-10-31 DIAGNOSIS — Z12.11 SCREENING FOR COLORECTAL CANCER: Primary | ICD-10-CM

## 2022-10-31 DIAGNOSIS — Z12.12 SCREENING FOR COLORECTAL CANCER: Primary | ICD-10-CM

## 2022-10-31 RX ORDER — POLYETHYLENE GLYCOL 3350, SODIUM SULFATE ANHYDROUS, SODIUM BICARBONATE, SODIUM CHLORIDE, POTASSIUM CHLORIDE 236; 22.74; 6.74; 5.86; 2.97 G/4L; G/4L; G/4L; G/4L; G/4L
4 POWDER, FOR SOLUTION ORAL ONCE
Qty: 4000 ML | Refills: 0 | Status: SHIPPED | OUTPATIENT
Start: 2022-10-31 | End: 2022-10-31

## 2022-11-02 ENCOUNTER — OFFICE VISIT (OUTPATIENT)
Dept: PRIMARY CARE CLINIC | Facility: CLINIC | Age: 67
End: 2022-11-02
Payer: COMMERCIAL

## 2022-11-02 VITALS
HEART RATE: 80 BPM | DIASTOLIC BLOOD PRESSURE: 74 MMHG | RESPIRATION RATE: 18 BRPM | OXYGEN SATURATION: 97 % | WEIGHT: 305.31 LBS | SYSTOLIC BLOOD PRESSURE: 110 MMHG | BODY MASS INDEX: 39.2 KG/M2

## 2022-11-02 DIAGNOSIS — E66.01 CLASS 2 SEVERE OBESITY DUE TO EXCESS CALORIES WITH SERIOUS COMORBIDITY AND BODY MASS INDEX (BMI) OF 39.0 TO 39.9 IN ADULT: Chronic | ICD-10-CM

## 2022-11-02 DIAGNOSIS — Z79.4 TYPE 2 DIABETES MELLITUS WITH OTHER CIRCULATORY COMPLICATION, WITH LONG-TERM CURRENT USE OF INSULIN: Primary | ICD-10-CM

## 2022-11-02 DIAGNOSIS — E11.59 TYPE 2 DIABETES MELLITUS WITH OTHER CIRCULATORY COMPLICATION, WITH LONG-TERM CURRENT USE OF INSULIN: Primary | ICD-10-CM

## 2022-11-02 PROCEDURE — 3061F NEG MICROALBUMINURIA REV: CPT | Mod: CPTII,S$GLB,, | Performed by: PHYSICIAN ASSISTANT

## 2022-11-02 PROCEDURE — 3008F PR BODY MASS INDEX (BMI) DOCUMENTED: ICD-10-PCS | Mod: CPTII,S$GLB,, | Performed by: PHYSICIAN ASSISTANT

## 2022-11-02 PROCEDURE — 3078F PR MOST RECENT DIASTOLIC BLOOD PRESSURE < 80 MM HG: ICD-10-PCS | Mod: CPTII,S$GLB,, | Performed by: PHYSICIAN ASSISTANT

## 2022-11-02 PROCEDURE — 3288F PR FALLS RISK ASSESSMENT DOCUMENTED: ICD-10-PCS | Mod: CPTII,S$GLB,, | Performed by: PHYSICIAN ASSISTANT

## 2022-11-02 PROCEDURE — 3051F HG A1C>EQUAL 7.0%<8.0%: CPT | Mod: CPTII,S$GLB,, | Performed by: PHYSICIAN ASSISTANT

## 2022-11-02 PROCEDURE — 99999 PR PBB SHADOW E&M-EST. PATIENT-LVL V: CPT | Mod: PBBFAC,,, | Performed by: PHYSICIAN ASSISTANT

## 2022-11-02 PROCEDURE — 99214 OFFICE O/P EST MOD 30 MIN: CPT | Mod: S$GLB,,, | Performed by: PHYSICIAN ASSISTANT

## 2022-11-02 PROCEDURE — 99999 PR PBB SHADOW E&M-EST. PATIENT-LVL V: ICD-10-PCS | Mod: PBBFAC,,, | Performed by: PHYSICIAN ASSISTANT

## 2022-11-02 PROCEDURE — 3074F SYST BP LT 130 MM HG: CPT | Mod: CPTII,S$GLB,, | Performed by: PHYSICIAN ASSISTANT

## 2022-11-02 PROCEDURE — 3051F PR MOST RECENT HEMOGLOBIN A1C LEVEL 7.0 - < 8.0%: ICD-10-PCS | Mod: CPTII,S$GLB,, | Performed by: PHYSICIAN ASSISTANT

## 2022-11-02 PROCEDURE — 3061F PR NEG MICROALBUMINURIA RESULT DOCUMENTED/REVIEW: ICD-10-PCS | Mod: CPTII,S$GLB,, | Performed by: PHYSICIAN ASSISTANT

## 2022-11-02 PROCEDURE — 3078F DIAST BP <80 MM HG: CPT | Mod: CPTII,S$GLB,, | Performed by: PHYSICIAN ASSISTANT

## 2022-11-02 PROCEDURE — 4010F ACE/ARB THERAPY RXD/TAKEN: CPT | Mod: CPTII,S$GLB,, | Performed by: PHYSICIAN ASSISTANT

## 2022-11-02 PROCEDURE — 1159F MED LIST DOCD IN RCRD: CPT | Mod: CPTII,S$GLB,, | Performed by: PHYSICIAN ASSISTANT

## 2022-11-02 PROCEDURE — 1101F PR PT FALLS ASSESS DOC 0-1 FALLS W/OUT INJ PAST YR: ICD-10-PCS | Mod: CPTII,S$GLB,, | Performed by: PHYSICIAN ASSISTANT

## 2022-11-02 PROCEDURE — 4010F PR ACE/ARB THEARPY RXD/TAKEN: ICD-10-PCS | Mod: CPTII,S$GLB,, | Performed by: PHYSICIAN ASSISTANT

## 2022-11-02 PROCEDURE — 1159F PR MEDICATION LIST DOCUMENTED IN MEDICAL RECORD: ICD-10-PCS | Mod: CPTII,S$GLB,, | Performed by: PHYSICIAN ASSISTANT

## 2022-11-02 PROCEDURE — 3288F FALL RISK ASSESSMENT DOCD: CPT | Mod: CPTII,S$GLB,, | Performed by: PHYSICIAN ASSISTANT

## 2022-11-02 PROCEDURE — 3008F BODY MASS INDEX DOCD: CPT | Mod: CPTII,S$GLB,, | Performed by: PHYSICIAN ASSISTANT

## 2022-11-02 PROCEDURE — 1101F PT FALLS ASSESS-DOCD LE1/YR: CPT | Mod: CPTII,S$GLB,, | Performed by: PHYSICIAN ASSISTANT

## 2022-11-02 PROCEDURE — 3074F PR MOST RECENT SYSTOLIC BLOOD PRESSURE < 130 MM HG: ICD-10-PCS | Mod: CPTII,S$GLB,, | Performed by: PHYSICIAN ASSISTANT

## 2022-11-02 PROCEDURE — 3066F PR DOCUMENTATION OF TREATMENT FOR NEPHROPATHY: ICD-10-PCS | Mod: CPTII,S$GLB,, | Performed by: PHYSICIAN ASSISTANT

## 2022-11-02 PROCEDURE — 99214 PR OFFICE/OUTPT VISIT, EST, LEVL IV, 30-39 MIN: ICD-10-PCS | Mod: S$GLB,,, | Performed by: PHYSICIAN ASSISTANT

## 2022-11-02 PROCEDURE — 3066F NEPHROPATHY DOC TX: CPT | Mod: CPTII,S$GLB,, | Performed by: PHYSICIAN ASSISTANT

## 2022-11-02 NOTE — PATIENT INSTRUCTIONS
"    Nutrition (diet) -   Fill your days with nutritious food that your body will thank you for.  Research shows that the healthiest diet in the world is the Mediterranean diet.  While not a diet, this guide to nutritious food to help you live your healthiest life from the inside, out.   The major theme of this "diet" is eating foods that are whole.  Fruits, vegetables, whole grains, lean meats, seafood and low fat dairy.    Websites such as [email or web address] have great articles, free recipes, etc. to help you on your way.      Physical Activity-  Since humans have walked this earth, we have always been physically active.  Even as recent has 100 years ago, the average American was 5 times more physically active compared to today, and that was just in their daily living.  Not including "formal" exercise- such as the gym or sports. More than HALF of American's are officially couch potatoes.   Taking the stairs, walking around your neighbor, are ways to increase your activity.  Your body is meant to move.  Aim for 2 hours a week of exercise at a minimum.       Sleep-  Adults who aren't sleeping enough or getting poor quality sleep after weight loss, appear less successful at keeping the weight off than those who are getting adequate, restful sleep.  Prioritize quality sleep to take your weight loss journey even further.      Stress -  Stress is a well-known contributor to illness.  The stress hormone cortisol can drive up weight.  Cortisol also increases sugar cravings.  Many of us stress eat, which also leads to unhealthy habits.  Not having peace of mind, can do more than keep us up at night, it can keep our weight on.      Social Support - Your diet is more than what you eat. It is also who you spend time with.  Those in your life that promote happiness, laughter, and love can do more than just add a smile on your face.  Longevity is linked to strong social systems.      Substance Use - Tobacco, alcohol, and drug use " "are all linked to shorter lifespan with added health issues.  Talk to your provider if you need to discuss any of these issues further.              PRODUCE  [] All fresh fruit   [] All fresh vegetables   [] All fresh herbs  [] All herb purees + pastes  [] Pre-spiralized vegetable noodles   [] Steam-In-The-Bag begetables  [] Riced cauliflower  [] Jicama sticks  [] Love Beets  all varieties  [] Wholly Guacamole  all varieties  [] Hummus  all varieties, chickpea + vegetable  [] Tofu Shirataki noodles    [] Tofu  all varieties  [] Tempeh  all varieties    PROTEIN  CHICKEN   [] Boneless, skinless breasts  [] Boneless, skinless thighs  [] Ground chicken breast, at least 93% lean  [] Chicken breast cutlet  [] Aidell's  Chicken Sausage + Chicken Meatballs    TURKEY   [] Turkey breast tenderloin   [] Ground turkey breast, at least 93% lean  [] Homer Naturals  Turkey Sausage    BEEF  [] Tenderloin  [] Sirloin  [] Top Loin  [] Flank Steak  [] Round Steak  [] Filet  [] Lean ground beef, at least 93% lean + grass-fed preferable    PORK  [] Tenderloin  [] Pork Chop  [] Center Cut  [] Jolynn Naturals  No-Sugar Gomez    BISON  [] [place]  90 - 95% lean    SEAFOOD  [] All fresh fish + seafood; locally sourced when possible  [] Smoked salmon    HEAT + EAT ENTREES   [] Hitesh's Natural Foods  Chicken, Pork, Beef  [] Joseph  "All Natural" Grilled Chicken Breast + Strips, all varieties    SAUCES SPREADS + DIPS  [] Bitchin Sauce  Original, Chipotle, Cilantro Wallace  [] Selina's Kitchen  Tzatziki Yogurt Dip, Babaganoush, Hummus  [] Wholly Guacamole  all varieties  [] Hummus  all varieties  [] [place] Gringo Salsa  all varieties  [] Mrs. Tyra's Salsa  all varieties  [] Stubb's All Natural BBQ Sauce  [] Primal Kitchen  Vences, Ketchup, BBQ Sauce  [] Primal Kitchen Pasta Sauce  Roasted Garlic, Tomato Basil, no-dairy Vodka Sauce  [] Sal & Emely's  HeartSmart Pasta Sauce    DAIRY/DAIRY SUBSTITUTES/EGGS  EGGS   [] All " eggs  cage-free, pasture-raise preferable  [] Crepini  egg wraps  [] Vital Farms  Pasture-Raised Egg Bites  [] JUST Egg [vegan]     CREAMERS   [] Califia  Better Half, original + vanilla unsweetened  [] NutPods  all varieties    MILK   [] Horizon Organic  all varieties except chocolate  [] Organic Valley  all varieties except chocolate  [] Organic Valley  ultra-filtered, reduced fat milk     PLANT_BASED MILK ALTERNATIVES  [] All unsweetened almond milks  original, vanilla + chocolate  [] Ripple  unsweetened   [] Milkadamia  original +_ vanilla, unsweetened   [] Forager  original + vanilla, unsweetened   [] Silk Organic  soy milk, unsweetened  [] Oatly  unsweetened  [] Califia  regular + protein-fortified oat milk, unsweetened     CHEESES  [] Regular or reduced fat cheeses  [] BelGioso  Fresh Mozzarella Snack Packs, Parmesan Power-full Snack   [] Goat cheese  [] Fresh mozzarella  [] String cheese  all varieties  [] Keara Cottage Cheese  [] Gardenia's Cultured Cottage Cheese  [] Olimpia Life 'Just Like Mozzarella'  plant-based shreds and other varieties  [] Parmela Creamery  plant-based shredded cheese    YOGURT  [] Fage  2% low-fat, plain  [] Siggi's  plain, vanilla  [] Chobani Greek  nonfat + whole milk yogurt, plain   [] Chobani Less Sugar  all flavored varieties   [] Oikos Greek  nonfat, plain  [] Two Good  all varieties   [] Kiswahili Provisions  plain  [] [place] Organic  low-fat + nonfat, plain  [] [facility]  goat milk yogurt, plain  [] Kefit  unsweetened, plain  [] Forager  Greek style unsweetened, plain [dairy-free]  [] [place]  unsweetened Greek style, plain [dairy-free]  [] [place]  almond milk yogurt, vanilla or plain, unsweetened [dairy-free]    FREEZER SECTION  FROZEN VEGGIES  [] All plain frozen veggies + greens [e.g. broccoli, brussels, carrots, okra, mushrooms, zucchini, yellow squash, butternut squash, kale, spinach, lesley greens]  [] Riced veggies [e.g.  cauliflower, broccoli, butternut squash]  [] Edamame  all varieties  [] Green Giant  [] Veggie Spirals  [] Marinated Veggies [e.g. eggplant, peppers, zucchini]  [] Simply Steam [place] Sprouts  [] Birds Eye  [] Power Blend Italian Style  lentils, broccoli, kale, zucchini  []  [place] Sprouts & Carrots  [] Oven Roasters Broccoli & Cauliflower  [] [place] Blend  [] Tattooed   [] Green Bean Blend  [] Farmer's Market Ratatouille  [] Butter Balsamic Glazed Vegetables  [] Riced Cauliflower & Quinoa Mediterranean Style  [] Shraddha's Good Life  Southern Style Greens [sauteed kale + onion]    FROZEN FRUITS  [] All unsweetened frozen fruits  all varieties  [] Dole Fruits & Veggie Blends  Berries 'n Kale  [] Dole Mix-ins  Triple Berry     FROZEN ENTREES  [] The Good Kitchen meals  all varieties [ e.g. Chili Lime Chicken Over Riced Cauliflower]  [] Premium Paleo  Not Gilberto Momma's Meatloaf  [] Primal Kitchen  Chicken Pesto + Steak Fajitas w/ Peppers & Onions  [] Eating Well Frozen Entrees  Butter Chicken Masala, Steak Carne Asada, Creamy Pesto Chicken, Chicken + Wild Rice Stroganoff, Yellow Mustafa Chicken, Sun-dried Tomato Chicken, Chicken Lo Mein  [] Realgood Entree Bowls  Ecuadorean Inspired Beef Bowl over Riced Cauliflower, Chicken Burrito Bowl   [] Great Karma Coconut Mustafa  [] Nandini's  Tamale Fabian with Black Beans, Vegetable Lasagna  [] Kashi Mayan [place] Bake  [] Healthy Choice  Simply Steamers Chicken Fried Rice  [] Basil Pesto Chicken & German Style Pork Power Bowls  [] Tattooed   Enchilada Bowl  [] Valarie Farms  Spicy Black Bean Burgers    FROZEN PIZZAS  [] Cauli'flour Foods  Cauliflower Pizza Crusts  [] Outer Aisle  Cauliflower Crust  [] Nandini's  Veggie Crust Cheese Pizza  [] Quest Pizza     VEGETARIAN PRODUCTS  [] Beyond Meat  ground 'meat' + grilled 'chicken' strips  [] Tofurkey  Original Italian Sausage + Original Tempeh  [] Gardein  Beefless Ground + Meatless Meatballs  []  [facility] Farms Grillers  Original Burger, Crumbles, Meatballs    ICE CREAMS + FROZEN DESSERTS  [] Halo Top  regular + keto series, pops  [] Rebel  ice cream + dessert sandwiches  [] Enlightened  ice cream + bars  [] Nightfood  ice cream  [] Realgood  ice cream  [] Arctic Zero Fit  frozen pint  [] The Frozen Farmer  sorbets  [] Wholly Rollies  Protein Balls, all varieties  [] Dream Pops  Coconut Latte    FROZEN BREAKFASTS  [] Realgood  Breakfast Sandwiches on Cauliflower Cheesy Bread  [] Rebel  ice cream + dessert sandwiches  [] Enlightened  ice cream + bars  [] Nightfood  ice cream  [] Realgood  ice cream  [] Arctic Zero Fit  frozen pint  [] The Frozen Farmer  sorbets  [] Wholly Rollies  Protein Balls, all varieties  [] Dream Pops  Coconut Latte    BREADS/BUNS/WRAPS  [] Brooks Bread: All Types - In Freezer Section   [] Flat Out Light Wraps - All Varieties   [] Flat Out Protein Up Carb Down Flat Bread   [] Kontos Whole Wheat Pocket Josefina   [] Thanh YOMI 100% Whole Wheat Tortillas   [] LaTYelloYelloy Tortillas - Smart & Delicious; 50 or 80-calorie   [] Nature's Own 100% Whole Wheat Bread   [] Orowheat Healthful - 100% Whole Wheat Slice Bread and [place] Thins   [] Orowheat Healthful - Whole Wheat Nuts & Grain Bread; Flax & Seed Bread   [] Pepperidge Farm Natural Whole Grain 15 Bread   [] Pepperidge Farm Natural Whole Grain English Muffin - 100% Whole Wheat   [] Pepperidge Farm Very Thin 100% Whole Wheat   [] Daysi Bridges 45 Calories and Delightful   [] Pedro' 100% Whole Wheat Thin-Sliced Bagels and English Muffins   [] Western Bagel: Perfect 10     GLUTEN FREE  [] Emmanuel's Gluten Free Bread   [] Pickford Bakehouse 7-Grain Gluten Free Bread     LEGUME PASTA   [] Explore Asian Organic Black Bean Spaghetti   [] Modern Table   [] Tolerant Foods       NUT BUTTERS & JELLIES    NUT BUTTERS   [] Better'n Chocolate: Coconut Chocolate Peanut Butter Spread   [] Better'n Peanut Butter - All Types   [] Earth  Balance Coconut and Peanut Spread   [] Binu's Nut [place]   [] MaraNatha: All Natural Roasted Cashew Butter - [place] or Creamy   [] MaraNatha: Roasted Peanut Butter   [] Nuts 'N More Peanut [place] - All Flavors   [] PB2 Powder - Original or Chocolate   [] Skippy Natural - Creamy, Super Chunk   [] Smart Balance Peanut Butter - [place] or Creamy   [] Peanut Butter & Company:   [] Smooth , Crunch Time, The Heat Is On, Old Fashioned Smooth, Mighty Nut- Powdered Peanut Butter, Squeeze Pack   [] Smucker's Natural Peanut Butter - [place] or Creamy   [] Sunbutter Nut Butter   [] Wild Friends Protein Peanut Butter/[place] o Butter - Vanilla or Chocolate     JELLIES  o Polaner's All Fruit   o Clearly Organic Best Choice: Strawberry Fruit Spread       SNACKS    BARS  [] Kashi Bars - Chewy or Crunchy; Honey [place] o Flax or Peanut Butter   [] KIND Bars - 5 Grams of Sugar or Less   [] KIND Protein Bars - Strong and KIND   [] Nature Valley Protein Bar - All Varieties   [] Nature Valley Roasted Nut Crunch - [place] Crunch; Peanut Crunch   [] [facility] [place] [facility] Simple Nut Bar - Roasted Peanut & Honey   [] [facility] [place] [facility] Simple Nut Bar - [place], Cashew & Sea Salt   [] [facility] [place] [facility] Nut [place] Bar - Salted Caramel Peanut   [] Think Thin Protein Bars   [] Quest Bars, Power Crunch Bars, Pure Protein Bars     BEEF JERKY - NITRATE FREE   [] Game On   [] Grass Run Farms   [] Krave   [] Ostrim   [] Perky Jerky   [] Primal Strips Meatless Vegan Jerky   [] [place]     CHIPS   [] Beanitos Chips   [] Fruit Crisps - e.g. Brother's-All-Natural, Bare Fruit, Yoga Chips   [] Gogo's Soy Crisps: 1.3 ounce bag   [] Quest Protein Chips   [] Wasa Whole Wheat Crisp Bread     CRACKERS  [] Samantha's Gone Crackers   [] Nabisco Triscuit: Regular and Thin Crisp Crackers   [] Vans Say Cheese Crackers (G-F)     POPCORN/NUTS   [] Duncan Mcclure's Smart Pop Popcorn - Single Serving   [] 100-Calorie Pack of Nuts - All Varieties     PROTEIN POWDERS & DRINKS  []   Protein -  Whey Protein Powder   [] Garden of Life Raw Protein Powder   [] Iconic Ready-To-Drink Protein Drink   [] Martinez One Protein Powder   [] VegaSport Protein Powder     SALSA/HUMMUS/DIPS   [] Eat Well Embrace Life: Zaidalina Ag Carrot o Hummus   [] Pre-Portioned Guacamole Packs   [] Regan's   [] Tostitos Restaurant Style Salsa       SOUPS   [] Nandini's Organic Soups - Lentil, Vegetable, Split Pea, Low-Sodium     CANNED GOODS   [] 100% Pure Pumpkin   [] BlueRunner Creole Cream-Style Red Beans or Navy Beans   [] Cajun Power Chicken Gumbo Base   [] Chicken of the Sea Ashmore [place]   [] Beverly Fresh Cut Sliced Beets   [] Hormel Breast of Chicken in Water   [] LeSuer Tender Baby Whole Carrots   [] Pegllc Tabasco [place] Starter   [] Rubenist: Chunk Lite Tuna in Water, Gourmet Select Pouches   [] StarKist: [place] Tuna Fillets   [] Trappey's: Kidney, Butter, Wilder, Black Eye, Field, and Black Beans   [] GAMAL Trevino's Turnip Greens or Rayshawn Spinach     CONDIMENTS/ SAUCES/SPREADS/ SPICES  [] Isaiah Sheldon's Magic Seasonings - Regular or Salt Free   [] Zeyad Pitts's Sauces - All Flavors   [] Laughing Cow Light - All Flavors   [] Dash Salt-Free Marinade - All Flavors   [] Praveen & Emely's: Heart Smart Pasta Sauces   [] Tabasco     SALAD DRESSINGS  [] Caro's Naturals: Lite Honey Mustard   [] Perry's Own: Lighten Up Salad Dressing - All Varieties   [] OPA Greek Yogurt Dressings - Ranch, Blue o Cheese, Caesar, Feta Dill     SWEETENERS  [] Sweet Mosses Sweetener   [] Swerve   [] Truvia     BEVERAGES  [] Coconut Water   [] Crystal Light PURE - All Flavors   [] Honest Tea: Just Green Tea, Unsweetened   [] Kombucha Tea   [] [place]   [] [place] Sisters Bloody Samantha Mix   [] Metromint - Zero-Sugar; All Natural Flavored   [] Sky - Plain or Flavored   [] Eliseo Finley   [] Steaz - Zero-Sugar, All-Natural, Sparkling Tea   [] Tea Bags: Any Brand - e.g. Katarina, Yogi, Tazzo, Celestial   [] V8 100% Vegetable  Juice   [] Vitamin Water Zero   [] Water   [] Zevia - Stevia Sweetened Soft Drink     BEER/MANASA/LIQUORS  []Fontanez's Premier Light 64 Calories   [] Bud Select - 55 Calories   [] Louiswili Sisters Bloody Samantha Mix   [] Mohr Genuine Draft - 64 Calories   [] Red or White Wine - All Varieties     CEREALS: HOT/COLD   [] Olivia's Puffin's Original Cereal  [] Derian's Mill Oat Bran Hot Cereal - Cracked Wheat, Multi-Grain  [] Kashi GoLean Cereal  [] Kashi GoLean Hot Cereal packets - Vanilla; Honey Cinnamon  [] Ithaca's Special K Protein Cereal  [] Morales's Steel Cut Lisette Oatmeal  [] Nature's Path Smart Bran  [] Restorationist Instant Oatmeal packet, Original  [] Restorationist Old Fashioned Restorationist Oats  [] Uncle Mukesh's Whole Wheat & Flaxseed Original Cereal   Low-Salt Diet  This diet removes foods that are high in salt. It also limits the amount of salt you use when cooking. It is most often used for people with high blood pressure, edema (fluid retention), and kidney, liver, or heart disease.  Table salt contains the mineral sodium. Your body needs sodium to work normally. But too much sodium can make your health problems worse. Your healthcare provider is recommending a low-salt (also called low-sodium) diet for you. Your total daily allowance of salt is 1,500 to 2,300 milligrams (mg). It is less than 1 teaspoon of table salt. This means you can have only about 500 to 700 mg of sodium at each meal. People with certain health problems should limit salt intake to the lower end of the recommended range.    When you cook, dont add much salt. If you can cook without using salt, even better. Dont add salt to your food at the table.  When shopping, read food labels. Salt is often called sodium on the label. Choose foods that are salt-free, low salt, or very low salt. Note that foods with reduced salt may not lower your salt intake enough.    Beans, potatoes, and pasta  Ok: Dry beans, split peas, lentils, potatoes, rice, macaroni, pasta,  spaghetti without added salt  Avoid: Potato chips, tortilla chips, and similar products  Breads and cereals  Ok: Low-sodium breads, rolls, cereals, and cakes; low-salt crackers, matzo crackers  Avoid: Salted crackers, pretzels, popcorn, Sinhala toast, pancakes, muffins  Dairy  Ok: Milk, chocolate milk, hot chocolate mix, low-salt cheeses, and yogurt  Avoid: Processed cheese and cheese spreads; Roquefort, Camembert, and cottage cheese; buttermilk, instant breakfast drink  Desserts  Ok: Ice cream, frozen yogurt, juice bars, gelatin, cookies and pies, sugar, honey, jelly, hard candy  Avoid: Most pies, cakes and cookies prepared or processed with salt; instant pudding  Drinks  Ok: Tea, coffee, fizzy (carbonated) drinks, juices  Avoid: Flavored coffees, electrolyte replacement drinks, sports drinks  Meats  Ok: All fresh meat, fish, poultry, low-salt tuna, eggs, egg substitute  Avoid: Smoked, pickled, brine-cured, or salted meats and fish. This includes perez, chipped beef, corned beef, hot dogs, deli meats, ham, kosher meats, salt pork, sausage, canned tuna, salted codfish, smoked salmon, herring, sardines, or anchovies.  Seasonings and spices  Ok: Most seasonings are okay. Good substitutes for salt include: fresh herb blends, hot sauce, lemon, garlic, dickinson, vinegar, dry mustard, parsley, cilantro, horseradish, tomato paste, regular margarine, mayonnaise, unsalted butter, cream cheese, vegetable oil, cream, low-salt salad dressing and gravy.  Avoid: Regular ketchup, relishes, pickles, soy sauce, teriyaki sauce, Worcestershire sauce, BBQ sauce, tartar sauce, meat tenderizer, chili sauce, regular gravy, regular salad dressing, salted butter  Soups  Ok: Low-salt soups and broths made with allowed foods  Avoid: Bouillon cubes, soups with smoked or salted meats, regular soup and broth  Vegetables  Ok: Most vegetables are okay; also low-salt tomato and vegetable juices  Avoid: Sauerkraut and other brine-soaked vegetables;  pickles and other pickled vegetables; tomato juice, olives  Date Last Reviewed: 8/1/2016  © 5242-8255 [employer], [employer]. [address] [place] [address], [place] [address]. All rights reserved. This information is not intended as a substitute for professional medical care. Always follow your healthcare professional's instructions.

## 2022-11-02 NOTE — PROGRESS NOTES
Subjective:       Patient ID: Isaiah Harrison is a 67 y.o. male.    HPI    Lifestyle related medical issues:       Has gone through CDE       DMII x 2 years         Past Medical History:   Diagnosis Date    Acute coronary syndrome     Anticoagulant long-term use     Back pain     CAD (coronary artery disease) 10/17/2013    Class 2 severe obesity due to excess calories with serious comorbidity and body mass index (BMI) of 38.0 to 38.9 in adult 1/12/2015    Coronary artery disease     Diabetes mellitus, type 2     Gastric polyps     Hyperlipemia     Hypertension     NSTEMI (non-ST elevated myocardial infarction) 10/23/2014    Obesity 10/21/2014    DANTE on CPAP 2/19/2015    S/P PTCA (percutaneous transluminal coronary angioplasty) 10/17/2013    Sleep apnea 1/7/2015    Type 2 diabetes mellitus with circulatory disorder (coronary artery disease), without long-term current use of insulin 7/14/2015     Social Determinants of Health with Concerns     Physical Activity: Inactive    Days of Exercise per Week: 0 days    Minutes of Exercise per Session: 0 min   Social Connections: Unknown    Frequency of Communication with Friends and Family: Never    Frequency of Social Gatherings with Friends and Family: Never    Attends Spiritism Services: Not on file    Active Member of Clubs or Organizations: No    Attends Club or Organization Meetings: Never    Marital Status:    Depression: Not on file     Past Surgical History:   Procedure Laterality Date    APPENDECTOMY      BACK SURGERY      CATARACT EXTRACTION      CATARACT EXTRACTION, BILATERAL      COLONOSCOPY      CORONARY ANGIOPLASTY WITH STENT PLACEMENT      ESOPHAGOGASTRODUODENOSCOPY      EYE SURGERY      FRACTURE SURGERY      HERNIA REPAIR      SPINE SURGERY       Family History   Problem Relation Age of Onset    Hypertension Mother     Diabetes Mother     Hypertension Father     Heart disease Maternal Grandfather          Physical activity:       Diet:     24 hr recall  "    Salads at times, some seafood     Removes skin from chicken     Sleep: shift work     Stress:     Overall wellbeing:     Social support:     Barriers to goals:     Weight goal     Wt Readings from Last 3 Encounters:   11/02/22 (!) 138.5 kg (305 lb 5.4 oz)   10/27/22 (!) 138.4 kg (305 lb 1.9 oz)   09/08/22 (!) 140 kg (308 lb 10.3 oz)         Current Outpatient Medications   Medication Instructions    ALPRAZolam (XANAX) 1 mg, Oral, 2 times daily PRN    amlodipine-valsartan (EXFORGE)  mg per tablet 1 tablet, Oral, Daily    aspirin (ECOTRIN) 81 mg, Daily    atorvastatin (LIPITOR) 80 mg, Oral, Nightly    blood sugar diagnostic Strp 1 strip, Misc.(Non-Drug; Combo Route), 4 times daily    gabapentin (NEURONTIN) 300 MG capsule TK 1 C PO TID    hydroCHLOROthiazide (HYDRODIURIL) 25 MG tablet TAKE 1 TABLET(25 MG) BY MOUTH EVERY DAY    hydrOXYzine HCL (ATARAX) 10 MG Tab TAKE 1 TABLET(10 MG) BY MOUTH TWICE DAILY AS NEEDED. MAY CAUSE DROWSINESS. DO NOT DRIVE OR OPERATE HEAVY MACHINERY    insulin (LANTUS SOLOSTAR U-100 INSULIN) 40 Units, Subcutaneous, 2 times daily    isosorbide mononitrate (IMDUR) 90 mg, Oral, Daily    JARDIANCE 10 mg, Oral, Daily    metFORMIN (GLUCOPHAGE-XR) 500 mg, Oral, With breakfast    metoprolol tartrate (LOPRESSOR) 50 MG tablet TAKE 1 TABLET(50 MG) BY MOUTH TWICE DAILY    niacin (SLO-NIACIN) 500 mg tablet TAKE 1 TABLET(500 MG) BY MOUTH EVERY EVENING    nitroGLYCERIN (NITROSTAT) 0.4 mg, Sublingual, Every 5 min PRN    pen needle, diabetic (BD ULTRA-FINE SHORT PEN NEEDLE) 31 gauge x 5/16" Ndle USE WITH LANTUS PEN AS DIRECTED    pneumoc 20-miranda conj-dip cr,PF, (PREVNAR-20, PF,) 0.5 mL Syrg injection 0.5 mLs, Intramuscular    ranolazine (RANEXA) 1,000 mg Tb12 TAKE 1 TABLET(1000 MG) BY MOUTH TWICE DAILY    TRULICITY 4.5 mg, Subcutaneous, Every 7 days            Review of Systems   Constitutional:  Negative for fatigue, fever and unexpected weight change.   HENT:  Negative for sore throat and trouble " swallowing.    Respiratory:  Negative for cough and shortness of breath.    Cardiovascular:  Negative for chest pain.   Gastrointestinal:  Negative for abdominal pain.   Hematological:  Negative for adenopathy. Does not bruise/bleed easily.   All other systems reviewed and are negative.    Objective:   /74   Pulse 80   Resp 18   Wt (!) 138.5 kg (305 lb 5.4 oz)   SpO2 97%   BMI 39.20 kg/m²      Physical Exam  Constitutional:       Appearance: Normal appearance. He is obese.   HENT:      Head: Normocephalic and atraumatic.   Pulmonary:      Effort: Pulmonary effort is normal.   Skin:     Comments: Darkening lower limb   Normal temp    Neurological:      Mental Status: He is alert.         Lab Results   Component Value Date    WBC 8.38 09/30/2020    HGB 14.8 09/30/2020    HCT 43.3 09/30/2020     09/30/2020    CHOL 174 09/13/2022    TRIG 103 09/13/2022    HDL 40 09/13/2022    ALT 21 09/13/2022    AST 12 09/13/2022     09/13/2022    K 4.3 09/13/2022     09/13/2022    CREATININE 1.1 09/13/2022    BUN 22 09/13/2022    CO2 27 09/13/2022    TSH 1.188 06/30/2015    PSA 2.2 10/27/2022    INR 1.0 12/16/2015    HGBA1C 7.8 (H) 09/13/2022       Assessment:       1. Type 2 diabetes mellitus with other circulatory complication, with long-term current use of insulin    2. Class 2 severe obesity due to excess calories with serious comorbidity and body mass index (BMI) of 39.0 to 39.9 in adult          Plan:   Type 2 diabetes mellitus with other circulatory complication, with long-term current use of insulin    Class 2 severe obesity due to excess calories with serious comorbidity and body mass index (BMI) of 39.0 to 39.9 in adult  -     Ambulatory referral/consult to [facility]      Change from simple carbs to complex carbs   Increase veggies, fresh fruit as snacks  No cokes/sodas   Portion control     Physical Activity  - walking to increase muscle action in glucose metabolism    Diet  -  dietician - reviewed diabetes pathophys   Sleep - shift work     ? Mounjaro vs trulicity     Time spent: 40 minutes in face to face and with review prior and after of chart notes from specialists, in regards to diagnosis, prognosis, review of lab and test results, benefits of treatment as well as management of disease, counseling of patient and coordination of care between various health care providers .      11/9/2022

## 2022-11-04 ENCOUNTER — PATIENT MESSAGE (OUTPATIENT)
Dept: PRIMARY CARE CLINIC | Facility: CLINIC | Age: 67
End: 2022-11-04
Payer: COMMERCIAL

## 2022-11-06 NOTE — PROGRESS NOTES
"Results OK.  ----------  If you want to learn more about your test results and what they mean, it's as simple as 1, 2, 3.  1. Log in to [facility] using the [facility] or online at [email or web address].   2. From the "Test Results" tab, click on the test you want to know more about.  3. If using the mobile prashant, click the "Get Info" icon. (The "Get Info" icon looks like a [place] with a lower case letter i inside it.) If using your computer, click the "Get Info" icon or the "About This Test" link."

## 2022-11-06 NOTE — PROGRESS NOTES
"NORMAL  ----------  If you want to learn more about your test results and what they mean, it's as simple as 1, 2, 3.  1. Log in to [facility] using the [facility] or online at [email or web address].   2. From the "Test Results" tab, click on the test you want to know more about.  3. If using the mobile prashant, click the "Get Info" icon. (The "Get Info" icon looks like a [place] with a lower case letter i inside it.) If using your computer, click the "Get Info" icon or the "About This Test" link."

## 2022-11-08 DIAGNOSIS — E11.65 TYPE 2 DIABETES MELLITUS WITH HYPERGLYCEMIA, WITH LONG-TERM CURRENT USE OF INSULIN: ICD-10-CM

## 2022-11-08 DIAGNOSIS — Z79.4 TYPE 2 DIABETES MELLITUS WITH HYPERGLYCEMIA, WITH LONG-TERM CURRENT USE OF INSULIN: ICD-10-CM

## 2022-11-08 RX ORDER — INSULIN GLARGINE 100 [IU]/ML
INJECTION, SOLUTION SUBCUTANEOUS
Qty: 15 ML | OUTPATIENT
Start: 2022-11-08

## 2022-11-08 NOTE — TELEPHONE ENCOUNTER
No new care gaps identified.  [facility] Embedded Care Gaps. Reference number: [id number]. 11/08/2022   9:21:01 AM CST

## 2022-11-08 NOTE — TELEPHONE ENCOUNTER
Quick DC. Request already responded to by other means (e.g. phone or fax)   Refill Authorization Note   Isaiah Harrison  is requesting a refill authorization.  Brief Assessment and Rationale for Refill:  Quick Discontinue  Medication Therapy Plan:  Signed 11/3/22    Medication Reconciliation Completed:  No      Comments:     Note composed:12:45 PM 11/08/2022

## 2022-11-16 ENCOUNTER — PATIENT MESSAGE (OUTPATIENT)
Dept: ADMINISTRATIVE | Facility: OTHER | Age: 67
End: 2022-11-16
Payer: COMMERCIAL

## 2022-11-27 ENCOUNTER — PATIENT MESSAGE (OUTPATIENT)
Dept: ENDOSCOPY | Facility: HOSPITAL | Age: 67
End: 2022-11-27
Payer: COMMERCIAL

## 2022-12-09 ENCOUNTER — OFFICE VISIT (OUTPATIENT)
Dept: PRIMARY CARE CLINIC | Facility: CLINIC | Age: 67
End: 2022-12-09
Payer: COMMERCIAL

## 2022-12-09 VITALS — HEART RATE: 105 BPM | BODY MASS INDEX: 39.34 KG/M2 | WEIGHT: 306.44 LBS | OXYGEN SATURATION: 18 %

## 2022-12-09 DIAGNOSIS — E11.65 TYPE 2 DIABETES MELLITUS WITH HYPERGLYCEMIA, WITH LONG-TERM CURRENT USE OF INSULIN: Primary | Chronic | ICD-10-CM

## 2022-12-09 DIAGNOSIS — Z79.4 TYPE 2 DIABETES MELLITUS WITH HYPERGLYCEMIA, WITH LONG-TERM CURRENT USE OF INSULIN: Primary | Chronic | ICD-10-CM

## 2022-12-09 DIAGNOSIS — I25.10 CORONARY ARTERY DISEASE INVOLVING NATIVE CORONARY ARTERY OF NATIVE HEART WITHOUT ANGINA PECTORIS: Chronic | ICD-10-CM

## 2022-12-09 DIAGNOSIS — I15.2 HYPERTENSION ASSOCIATED WITH DIABETES: ICD-10-CM

## 2022-12-09 DIAGNOSIS — E11.59 HYPERTENSION ASSOCIATED WITH DIABETES: ICD-10-CM

## 2022-12-09 PROCEDURE — 99999 PR PBB SHADOW E&M-EST. PATIENT-LVL III: CPT | Mod: PBBFAC,,, | Performed by: PHYSICIAN ASSISTANT

## 2022-12-09 PROCEDURE — 1159F PR MEDICATION LIST DOCUMENTED IN MEDICAL RECORD: ICD-10-PCS | Mod: CPTII,S$GLB,, | Performed by: PHYSICIAN ASSISTANT

## 2022-12-09 PROCEDURE — 3288F FALL RISK ASSESSMENT DOCD: CPT | Mod: CPTII,S$GLB,, | Performed by: PHYSICIAN ASSISTANT

## 2022-12-09 PROCEDURE — 3051F PR MOST RECENT HEMOGLOBIN A1C LEVEL 7.0 - < 8.0%: ICD-10-PCS | Mod: CPTII,S$GLB,, | Performed by: PHYSICIAN ASSISTANT

## 2022-12-09 PROCEDURE — 3008F BODY MASS INDEX DOCD: CPT | Mod: CPTII,S$GLB,, | Performed by: PHYSICIAN ASSISTANT

## 2022-12-09 PROCEDURE — 1101F PT FALLS ASSESS-DOCD LE1/YR: CPT | Mod: CPTII,S$GLB,, | Performed by: PHYSICIAN ASSISTANT

## 2022-12-09 PROCEDURE — 99215 OFFICE O/P EST HI 40 MIN: CPT | Mod: S$GLB,,, | Performed by: PHYSICIAN ASSISTANT

## 2022-12-09 PROCEDURE — 1101F PR PT FALLS ASSESS DOC 0-1 FALLS W/OUT INJ PAST YR: ICD-10-PCS | Mod: CPTII,S$GLB,, | Performed by: PHYSICIAN ASSISTANT

## 2022-12-09 PROCEDURE — 3066F PR DOCUMENTATION OF TREATMENT FOR NEPHROPATHY: ICD-10-PCS | Mod: CPTII,S$GLB,, | Performed by: PHYSICIAN ASSISTANT

## 2022-12-09 PROCEDURE — 1159F MED LIST DOCD IN RCRD: CPT | Mod: CPTII,S$GLB,, | Performed by: PHYSICIAN ASSISTANT

## 2022-12-09 PROCEDURE — 4010F ACE/ARB THERAPY RXD/TAKEN: CPT | Mod: CPTII,S$GLB,, | Performed by: PHYSICIAN ASSISTANT

## 2022-12-09 PROCEDURE — 99999 PR PBB SHADOW E&M-EST. PATIENT-LVL III: ICD-10-PCS | Mod: PBBFAC,,, | Performed by: PHYSICIAN ASSISTANT

## 2022-12-09 PROCEDURE — 3008F PR BODY MASS INDEX (BMI) DOCUMENTED: ICD-10-PCS | Mod: CPTII,S$GLB,, | Performed by: PHYSICIAN ASSISTANT

## 2022-12-09 PROCEDURE — 3061F NEG MICROALBUMINURIA REV: CPT | Mod: CPTII,S$GLB,, | Performed by: PHYSICIAN ASSISTANT

## 2022-12-09 PROCEDURE — 4010F PR ACE/ARB THEARPY RXD/TAKEN: ICD-10-PCS | Mod: CPTII,S$GLB,, | Performed by: PHYSICIAN ASSISTANT

## 2022-12-09 PROCEDURE — 3061F PR NEG MICROALBUMINURIA RESULT DOCUMENTED/REVIEW: ICD-10-PCS | Mod: CPTII,S$GLB,, | Performed by: PHYSICIAN ASSISTANT

## 2022-12-09 PROCEDURE — 3066F NEPHROPATHY DOC TX: CPT | Mod: CPTII,S$GLB,, | Performed by: PHYSICIAN ASSISTANT

## 2022-12-09 PROCEDURE — 3288F PR FALLS RISK ASSESSMENT DOCUMENTED: ICD-10-PCS | Mod: CPTII,S$GLB,, | Performed by: PHYSICIAN ASSISTANT

## 2022-12-09 PROCEDURE — 99215 PR OFFICE/OUTPT VISIT, EST, LEVL V, 40-54 MIN: ICD-10-PCS | Mod: S$GLB,,, | Performed by: PHYSICIAN ASSISTANT

## 2022-12-09 PROCEDURE — 3051F HG A1C>EQUAL 7.0%<8.0%: CPT | Mod: CPTII,S$GLB,, | Performed by: PHYSICIAN ASSISTANT

## 2022-12-09 RX ORDER — TIRZEPATIDE 2.5 MG/.5ML
2.5 INJECTION, SOLUTION SUBCUTANEOUS
Qty: 4 PEN | Refills: 2 | Status: SHIPPED | OUTPATIENT
Start: 2022-12-09 | End: 2023-01-08

## 2022-12-12 NOTE — PROGRESS NOTES
Subjective:       Patient ID: Isaiah Harrison is a 67 y.o. male.    Chief Complaint: Follow-up (PT PRESENTS TO CLINIC FOR F/U.)    Follow up DM II and lifestyle changes    Has made small changes but non consistently except smaller portions     Wife packs lunch   Reviewed diet again     Eating cookies, cheetos, etc still   Using vending machine at work at times   Drinking more water     Review of Systems   Constitutional:  Negative for fatigue, fever and unexpected weight change.   HENT:  Negative for sore throat and trouble swallowing.    Respiratory:  Negative for cough and shortness of breath.    Cardiovascular:  Negative for chest pain.   Gastrointestinal:  Negative for abdominal pain.   Hematological:  Negative for adenopathy. Does not bruise/bleed easily.   All other systems reviewed and are negative.      Objective:      Physical Exam  Constitutional:       Appearance: Normal appearance. He is obese.   HENT:      Head: Normocephalic and atraumatic.   Pulmonary:      Effort: Pulmonary effort is normal.   Neurological:      General: No focal deficit present.      Mental Status: He is alert.       Assessment:       Problem List Items Addressed This Visit       Coronary artery disease involving native coronary artery of native heart without angina pectoris (Chronic)    Type 2 diabetes mellitus with hyperglycemia, with long-term current use of insulin - Primary (Chronic)    Relevant Orders    POCT Glucose, Hand-Held Device    Hypertension associated with diabetes         Plan:       Type 2 diabetes mellitus with hyperglycemia, with long-term current use of insulin  -     POCT Glucose, Hand-Held Device    Hypertension associated with diabetes    Coronary artery disease involving native coronary artery of native heart without angina pectoris    Other orders  -     tirzepatide (MOUNJARO) 2.5 mg/0.5 mL PnIj; Inject 2.5 mg into the skin every 7 days.  Dispense: 4 pen; Refill: 2         Non fasting sugar in clinic  186  Discussed case with PCP   He has a follow up with PCP on 1/23/23- keep appt for further medication management   Cont to see myself for lifestyle changes   Switching him to mounjaro as it is superior to trulicity in weight reduction and a1c   We had a candid discussion about diet   He is still eating cookies, snacks that are highly processed, sugary and void of nutrients   Stressed the importance of feeding his body foods that are helpful and not harmful to health   Veggies, lean meats, low glycemic fruits   Avoiding simple carbs and eating complex carbs   Plant fats   Water, water, water   Exercise     Time spent:45 minutes in face to face and with review prior and after of chart notes from specialists, in regards to diagnosis, prognosis, review of lab and test results, benefits of treatment as well as management of disease, counseling of patient and coordination of care between various health care providers .

## 2023-01-23 ENCOUNTER — PATIENT MESSAGE (OUTPATIENT)
Dept: PREADMISSION TESTING | Facility: HOSPITAL | Age: 68
End: 2023-01-23
Payer: COMMERCIAL

## 2023-01-23 ENCOUNTER — TELEPHONE (OUTPATIENT)
Dept: PREADMISSION TESTING | Facility: HOSPITAL | Age: 68
End: 2023-01-23
Payer: COMMERCIAL

## 2023-01-23 NOTE — TELEPHONE ENCOUNTER
returned patient's call regarding procedure date. rescheduled procedure to a later date per patient's request. instructions sent via portal.

## 2023-01-23 NOTE — TELEPHONE ENCOUNTER
----- Message from Rell Mcclain sent at 1/23/2023  1:19 PM CST -----  Contact: lucas  Suki received a call to change appt on 02/02. Please call him back at [phone].        Thanks  DD

## 2023-02-07 ENCOUNTER — OFFICE VISIT (OUTPATIENT)
Dept: SLEEP MEDICINE | Facility: CLINIC | Age: 68
End: 2023-02-07
Payer: COMMERCIAL

## 2023-02-07 VITALS
HEIGHT: 74 IN | OXYGEN SATURATION: 97 % | BODY MASS INDEX: 38.85 KG/M2 | RESPIRATION RATE: 17 BRPM | WEIGHT: 302.69 LBS | DIASTOLIC BLOOD PRESSURE: 74 MMHG | HEART RATE: 73 BPM | SYSTOLIC BLOOD PRESSURE: 126 MMHG

## 2023-02-07 DIAGNOSIS — E66.01 SEVERE OBESITY (BMI 35.0-39.9) WITH COMORBIDITY: ICD-10-CM

## 2023-02-07 DIAGNOSIS — G47.33 OSA (OBSTRUCTIVE SLEEP APNEA): Primary | ICD-10-CM

## 2023-02-07 PROCEDURE — 3008F BODY MASS INDEX DOCD: CPT | Mod: CPTII,S$GLB,, | Performed by: NURSE PRACTITIONER

## 2023-02-07 PROCEDURE — 1100F PR PT FALLS ASSESS DOC 2+ FALLS/FALL W/INJURY/YR: ICD-10-PCS | Mod: CPTII,S$GLB,, | Performed by: NURSE PRACTITIONER

## 2023-02-07 PROCEDURE — 99999 PR PBB SHADOW E&M-EST. PATIENT-LVL V: CPT | Mod: PBBFAC,,, | Performed by: NURSE PRACTITIONER

## 2023-02-07 PROCEDURE — 3288F FALL RISK ASSESSMENT DOCD: CPT | Mod: CPTII,S$GLB,, | Performed by: NURSE PRACTITIONER

## 2023-02-07 PROCEDURE — 3074F PR MOST RECENT SYSTOLIC BLOOD PRESSURE < 130 MM HG: ICD-10-PCS | Mod: CPTII,S$GLB,, | Performed by: NURSE PRACTITIONER

## 2023-02-07 PROCEDURE — 3078F DIAST BP <80 MM HG: CPT | Mod: CPTII,S$GLB,, | Performed by: NURSE PRACTITIONER

## 2023-02-07 PROCEDURE — 1100F PTFALLS ASSESS-DOCD GE2>/YR: CPT | Mod: CPTII,S$GLB,, | Performed by: NURSE PRACTITIONER

## 2023-02-07 PROCEDURE — 3072F LOW RISK FOR RETINOPATHY: CPT | Mod: CPTII,S$GLB,, | Performed by: NURSE PRACTITIONER

## 2023-02-07 PROCEDURE — 3008F PR BODY MASS INDEX (BMI) DOCUMENTED: ICD-10-PCS | Mod: CPTII,S$GLB,, | Performed by: NURSE PRACTITIONER

## 2023-02-07 PROCEDURE — 1160F RVW MEDS BY RX/DR IN RCRD: CPT | Mod: CPTII,S$GLB,, | Performed by: NURSE PRACTITIONER

## 2023-02-07 PROCEDURE — 1159F MED LIST DOCD IN RCRD: CPT | Mod: CPTII,S$GLB,, | Performed by: NURSE PRACTITIONER

## 2023-02-07 PROCEDURE — 3072F PR LOW RISK FOR RETINOPATHY: ICD-10-PCS | Mod: CPTII,S$GLB,, | Performed by: NURSE PRACTITIONER

## 2023-02-07 PROCEDURE — 3074F SYST BP LT 130 MM HG: CPT | Mod: CPTII,S$GLB,, | Performed by: NURSE PRACTITIONER

## 2023-02-07 PROCEDURE — 3078F PR MOST RECENT DIASTOLIC BLOOD PRESSURE < 80 MM HG: ICD-10-PCS | Mod: CPTII,S$GLB,, | Performed by: NURSE PRACTITIONER

## 2023-02-07 PROCEDURE — 3288F PR FALLS RISK ASSESSMENT DOCUMENTED: ICD-10-PCS | Mod: CPTII,S$GLB,, | Performed by: NURSE PRACTITIONER

## 2023-02-07 PROCEDURE — 99999 PR PBB SHADOW E&M-EST. PATIENT-LVL V: ICD-10-PCS | Mod: PBBFAC,,, | Performed by: NURSE PRACTITIONER

## 2023-02-07 PROCEDURE — 1159F PR MEDICATION LIST DOCUMENTED IN MEDICAL RECORD: ICD-10-PCS | Mod: CPTII,S$GLB,, | Performed by: NURSE PRACTITIONER

## 2023-02-07 PROCEDURE — 99213 PR OFFICE/OUTPT VISIT, EST, LEVL III, 20-29 MIN: ICD-10-PCS | Mod: S$GLB,,, | Performed by: NURSE PRACTITIONER

## 2023-02-07 PROCEDURE — 1160F PR REVIEW ALL MEDS BY PRESCRIBER/CLIN PHARMACIST DOCUMENTED: ICD-10-PCS | Mod: CPTII,S$GLB,, | Performed by: NURSE PRACTITIONER

## 2023-02-07 PROCEDURE — 99213 OFFICE O/P EST LOW 20 MIN: CPT | Mod: S$GLB,,, | Performed by: NURSE PRACTITIONER

## 2023-02-07 RX ORDER — TIRZEPATIDE 2.5 MG/.5ML
INJECTION, SOLUTION SUBCUTANEOUS
COMMUNITY
Start: 2023-01-27 | End: 2023-03-03

## 2023-02-07 NOTE — PROGRESS NOTES
"Subjective:      Patient ID: Isaiah Harrison is a 67 y.o. male.    Chief Complaint: Sleep Apnea    HPI  Presents for sleep apnea. CPAP outdated from 2015 and interested in new machine. He benefits from CPAP use.   BMI 38- no weight loss.        Patient Active Problem List   Diagnosis    Hyperlipidemia associated with type 2 diabetes mellitus    Coronary artery disease involving native coronary artery of native heart without angina pectoris    S/P PTCA (percutaneous transluminal coronary angioplasty)    NSTEMI (non-ST elevated myocardial infarction)    Class 2 severe obesity due to excess calories with serious comorbidity and body mass index (BMI) of 39.0 to 39.9 in adult    DANTE on CPAP    Venous insufficiency    Type 2 diabetes mellitus with circulatory disorder, with long-term current use of insulin    Essential hypertension    PLMD (periodic limb movement disorder)    Rhomboid muscle strain, initial encounter    Depression    Functional urinary incontinence    Type 2 diabetes mellitus with hyperglycemia, with long-term current use of insulin    Type 2 diabetes mellitus with diabetic polyneuropathy, with long-term current use of insulin    Hypertension associated with diabetes     /74   Pulse 73   Resp 17   Ht 6' 2" (1.88 m)   Wt (!) 137.3 kg (302 lb 11.1 oz)   SpO2 97%   BMI 38.86 kg/m²   Body mass index is 38.86 kg/m².    Review of Systems   Constitutional: Negative.    HENT: Negative.     Respiratory: Negative.     Cardiovascular: Negative.    Musculoskeletal: Negative.    Gastrointestinal: Negative.    Neurological: Negative.    Psychiatric/Behavioral: Negative.     Objective:      Physical Exam  Constitutional:       Appearance: He is well-developed. He is obese.   HENT:      Head: Normocephalic and atraumatic.      Nose: Nose normal.   Neck:      Thyroid: No thyroid mass or thyromegaly.      Trachea: Trachea normal.   Cardiovascular:      Rate and Rhythm: Normal rate and regular rhythm.      Heart " sounds: Normal heart sounds.   Pulmonary:      Effort: Pulmonary effort is normal.      Breath sounds: Normal breath sounds. No wheezing, rhonchi or rales.   Abdominal:      Palpations: Abdomen is soft. There is no splenomegaly.      Tenderness: There is no abdominal tenderness.   Musculoskeletal:         General: Normal range of motion.      Cervical back: Normal range of motion and neck supple.   Skin:     General: Skin is warm and dry.   Neurological:      Mental Status: He is alert and oriented to person, place, and time.   Psychiatric:         Mood and Affect: Mood normal.         Behavior: Behavior normal.     Personal Diagnostic Review  Compliance Summary  1/8/2023 - 2/6/2023 (30 days)  Days with Device Usage 18 days  Days without Device Usage 12 days  Percent Days with Device Usage 60.0%  Cumulative Usage 4 days 2 hrs. 48 mins. 1 secs.  Maximum Usage (1 Day) 8 hrs. 44 mins. 28 secs.  Average Usage (All Days) 3 hrs. 17 mins. 36 secs.  Average Usage (Days Used) 5 hrs. 29 mins. 20 secs.  Minimum Usage (1 Day) 1 hrs. 53 mins. 53 secs.  Percent of Days with Usage >= 4 Hours 50.0%  Percent of Days with Usage < 4 Hours 50.0%  Date Range  Total Blower Time 4 days 2 hrs. 50 mins. 1 secs.  CPAP Summary ([facility])  Average Time in Large Leak Per Day 5 mins. 43 secs.  Average AHI 1.0  CPAP 9.5 cmH2O      Assessment:       1. DANTE (obstructive sleep apnea)    2. Severe obesity (BMI 35.0-39.9) with comorbidity          Outpatient Encounter Medications as of 2/7/2023   Medication Sig Dispense Refill    ALPRAZolam (XANAX) 1 MG tablet Take 1 mg by mouth 2 (two) times daily as needed.      amlodipine-valsartan (EXFORGE)  mg per tablet TAKE 1 TABLET BY MOUTH EVERY DAY 90 tablet 2    aspirin (ECOTRIN) 81 MG EC tablet Take 81 mg by mouth once daily.      atorvastatin (LIPITOR) 80 MG tablet TAKE 1 TABLET(80 MG) BY MOUTH EVERY EVENING 90 tablet 2    blood sugar diagnostic Strp 1 strip by Misc.(Non-Drug; Combo  "Route) route 4 (four) times daily. 200 strip 11    empagliflozin (JARDIANCE) 10 mg tablet Take 1 tablet (10 mg total) by mouth once daily. 90 tablet 3    gabapentin (NEURONTIN) 300 MG capsule TK 1 C PO TID  0    hydroCHLOROthiazide (HYDRODIURIL) 25 MG tablet TAKE 1 TABLET(25 MG) BY MOUTH EVERY DAY 90 tablet 2    hydrOXYzine HCL (ATARAX) 10 MG Tab TAKE 1 TABLET(10 MG) BY MOUTH TWICE DAILY AS NEEDED. MAY CAUSE DROWSINESS. DO NOT DRIVE OR OPERATE HEAVY MACHINERY 60 tablet 1    insulin (LANTUS SOLOSTAR U-100 INSULIN) glargine 100 units/mL SubQ pen Inject 40 Units into the skin 2 (two) times a day. 72 mL 3    isosorbide mononitrate (IMDUR) 30 MG 24 hr tablet TAKE 3 TABLETS(90 MG) BY MOUTH EVERY  tablet 3    metoprolol tartrate (LOPRESSOR) 50 MG tablet TAKE 1 TABLET BY MOUTH TWICE DAILY 180 tablet 3    MOUNJARO 2.5 mg/0.5 mL PnIj SMARTSI.5 Milligram(s) SUB-Q Once a Week      niacin (SLO-NIACIN) 500 mg tablet TAKE 1 TABLET(500 MG) BY MOUTH EVERY EVENING 30 tablet 6    nitroGLYCERIN (NITROSTAT) 0.4 MG SL tablet Place 1 tablet (0.4 mg total) under the tongue every 5 (five) minutes as needed. 25 tablet 4    pen needle, diabetic (BD ULTRA-FINE SHORT PEN NEEDLE) 31 gauge x 5/16" Ndle USE WITH LANTUS PEN AS DIRECTED 100 each 11    pneumoc 20-miranda conj-dip cr,PF, (PREVNAR-20, PF,) 0.5 mL Syrg injection Inject 0.5 mLs into the muscle. 0.5 mL 0    ranolazine (RANEXA) 1,000 mg Tb12 TAKE 1 TABLET(1000 MG) BY MOUTH TWICE DAILY 60 tablet 6    metFORMIN (GLUCOPHAGE-XR) 500 MG ER 24hr tablet Take 1 tablet (500 mg total) by mouth daily with breakfast. 90 tablet 3     No facility-administered encounter medications on file as of 2023.     Orders Placed This Encounter   Procedures    CPAP FOR HOME USE     Order Specific Question:   Length of need (1-99 months):     Answer:   99     Order Specific Question:   Type ():     Answer:   CPAP     Order Specific Question:   CPAP setting (cmH20):     Answer:   10     Order Specific " Question:   Fulfillment Priority:     Answer:   Level 4:  all others     Order Specific Question:   Humidification ():     Answer:   Heated     Order Specific Question:   Choose ONE mask type and its corresponding cushions and/or pillows:     Answer:    Nasal Mask, 1 per 90 days:  Nasal Cushions, (6 per 90 days):  Nasal Pillows, (6 per 90 days)     Order Specific Question:   Choose EITHER Heated or Non-Heated Tubjing     Answer:    Non-Heated Tubing, 1 per 90 days     Order Specific Question:   All other supplies as needed as listed below:     Answer:    Headgear, 1 per 180 days     Order Specific Question:   All other supplies as needed as listed below:     Answer:    Disposable Filter, 6 per 90 days     Order Specific Question:   All other supplies as needed as listed below:     Answer:    Non-Disposable Filter, 1 per 180 days     Order Specific Question:   All other supplies as needed as listed below:     Answer:    Humidifier Chamber, 1 per 180 days     Order Specific Question:   All other supplies as needed as listed below:     Answer:    Chin Strap, 1 per 180 days     Plan:   CPAP 10 cm H2O  follow up in 10 weeks with download of data card and review of symptoms.         Weight loss and exercise to improve overall health.    Problem List Items Addressed This Visit    None  Visit Diagnoses       DANTE (obstructive sleep apnea)    -  Primary    Relevant Orders    CPAP FOR HOME USE    Severe obesity (BMI 35.0-39.9) with comorbidity

## 2023-02-17 ENCOUNTER — PATIENT OUTREACH (OUTPATIENT)
Dept: ADMINISTRATIVE | Facility: HOSPITAL | Age: 68
End: 2023-02-17
Payer: COMMERCIAL

## 2023-02-17 NOTE — PROGRESS NOTES
DM Eye Report: Called and spoke to pt about overdue DM eye exam, I scheduled pt for exam with Dr LISSETT Andujar 03/06/23.

## 2023-02-28 ENCOUNTER — PATIENT MESSAGE (OUTPATIENT)
Dept: SLEEP MEDICINE | Facility: CLINIC | Age: 68
End: 2023-02-28
Payer: COMMERCIAL

## 2023-02-28 DIAGNOSIS — G47.33 OSA (OBSTRUCTIVE SLEEP APNEA): Primary | ICD-10-CM

## 2023-03-01 DIAGNOSIS — I15.2 HYPERTENSION ASSOCIATED WITH DIABETES: ICD-10-CM

## 2023-03-01 DIAGNOSIS — E11.59 HYPERTENSION ASSOCIATED WITH DIABETES: ICD-10-CM

## 2023-03-01 RX ORDER — LOSARTAN POTASSIUM 100 MG/1
TABLET ORAL
Qty: 90 TABLET | Refills: 3 | Status: SHIPPED | OUTPATIENT
Start: 2023-03-01 | End: 2023-03-01

## 2023-03-01 NOTE — TELEPHONE ENCOUNTER
Placed call to patient and went over medication list to verify blood pressure medications. Confirmed patient is taking all listed b/p meds. Spoke with Dr. Lyles regarding meds and Dr. Lyles discontinued losartan at this time. Patient verbalized an understanding and pharmacy contacted and verfied receipt that prescription was cancelled.

## 2023-03-09 ENCOUNTER — ANESTHESIA (OUTPATIENT)
Dept: ENDOSCOPY | Facility: HOSPITAL | Age: 68
End: 2023-03-09
Payer: COMMERCIAL

## 2023-03-09 ENCOUNTER — HOSPITAL ENCOUNTER (OUTPATIENT)
Facility: HOSPITAL | Age: 68
Discharge: HOME | End: 2023-03-09
Attending: INTERNAL MEDICINE | Admitting: INTERNAL MEDICINE
Payer: COMMERCIAL

## 2023-03-09 ENCOUNTER — ANESTHESIA EVENT (OUTPATIENT)
Dept: ENDOSCOPY | Facility: HOSPITAL | Age: 68
End: 2023-03-09
Payer: COMMERCIAL

## 2023-03-09 VITALS
OXYGEN SATURATION: 95 % | SYSTOLIC BLOOD PRESSURE: 111 MMHG | WEIGHT: 290 LBS | HEIGHT: 74 IN | DIASTOLIC BLOOD PRESSURE: 62 MMHG | TEMPERATURE: 98 F | RESPIRATION RATE: 20 BRPM | HEART RATE: 73 BPM | BODY MASS INDEX: 37.22 KG/M2

## 2023-03-09 DIAGNOSIS — Z12.11 COLON CANCER SCREENING: Primary | ICD-10-CM

## 2023-03-09 LAB — POCT GLUCOSE: 96 MG/DL (ref 70–110)

## 2023-03-09 PROCEDURE — G0105 COLORECTAL SCRN; HI RISK IND: HCPCS | Mod: ,,, | Performed by: INTERNAL MEDICINE

## 2023-03-09 PROCEDURE — G0105 COLORECTAL SCRN; HI RISK IND: ICD-10-PCS | Mod: ,,, | Performed by: INTERNAL MEDICINE

## 2023-03-09 PROCEDURE — 37000008 HC ANESTHESIA 1ST 15 MINUTES: Performed by: INTERNAL MEDICINE

## 2023-03-09 PROCEDURE — 63600175 PHARM REV CODE 636 W HCPCS: Performed by: NURSE ANESTHETIST, CERTIFIED REGISTERED

## 2023-03-09 PROCEDURE — 82962 GLUCOSE BLOOD TEST: CPT | Performed by: INTERNAL MEDICINE

## 2023-03-09 PROCEDURE — 25000003 PHARM REV CODE 250: Performed by: NURSE ANESTHETIST, CERTIFIED REGISTERED

## 2023-03-09 PROCEDURE — 37000009 HC ANESTHESIA EA ADD 15 MINS: Performed by: INTERNAL MEDICINE

## 2023-03-09 PROCEDURE — G0105 COLORECTAL SCRN; HI RISK IND: HCPCS | Performed by: INTERNAL MEDICINE

## 2023-03-09 RX ORDER — LIDOCAINE HYDROCHLORIDE 10 MG/ML
INJECTION, SOLUTION EPIDURAL; INFILTRATION; INTRACAUDAL; PERINEURAL
Status: DISCONTINUED | OUTPATIENT
Start: 2023-03-09 | End: 2023-03-09

## 2023-03-09 RX ORDER — SODIUM CHLORIDE, SODIUM LACTATE, POTASSIUM CHLORIDE, CALCIUM CHLORIDE 600; 310; 30; 20 MG/100ML; MG/100ML; MG/100ML; MG/100ML
INJECTION, SOLUTION INTRAVENOUS CONTINUOUS
Status: DISCONTINUED | OUTPATIENT
Start: 2023-03-09 | End: 2023-03-09 | Stop reason: HOSPADM

## 2023-03-09 RX ORDER — PROPOFOL 10 MG/ML
VIAL (ML) INTRAVENOUS
Status: DISCONTINUED | OUTPATIENT
Start: 2023-03-09 | End: 2023-03-09

## 2023-03-09 RX ADMIN — PROPOFOL 80 MG: 10 INJECTION, EMULSION INTRAVENOUS at 07:03

## 2023-03-09 RX ADMIN — PROPOFOL 30 MG: 10 INJECTION, EMULSION INTRAVENOUS at 08:03

## 2023-03-09 RX ADMIN — SODIUM CHLORIDE, SODIUM LACTATE, POTASSIUM CHLORIDE, AND CALCIUM CHLORIDE: .6; .31; .03; .02 INJECTION, SOLUTION INTRAVENOUS at 07:03

## 2023-03-09 RX ADMIN — LIDOCAINE HYDROCHLORIDE 50 MG: 10 INJECTION, SOLUTION EPIDURAL; INFILTRATION; INTRACAUDAL; PERINEURAL at 07:03

## 2023-03-09 RX ADMIN — PROPOFOL 30 MG: 10 INJECTION, EMULSION INTRAVENOUS at 07:03

## 2023-03-09 NOTE — ANESTHESIA POSTPROCEDURE EVALUATION
Anesthesia Post Evaluation    Patient: Isaiah Harrison    Procedure(s) Performed: Procedure(s) (LRB):  COLONOSCOPY (N/A)    Final Anesthesia Type: MAC      Patient location during evaluation: PACU  Patient participation: Yes- Able to Participate  Level of consciousness: awake and alert  Post-procedure vital signs: reviewed and stable  Pain management: adequate  Airway patency: patent    PONV status at discharge: No PONV  Anesthetic complications: no      Cardiovascular status: blood pressure returned to baseline  Respiratory status: unassisted and room air  Hydration status: euvolemic  Follow-up not needed.          Vitals Value Taken Time   BP 92/66 03/09/23 0811   Temp  03/09/23 0812   Pulse 69 03/09/23 0811   Resp 18 03/09/23 0811   SpO2 92 % 03/09/23 0811         No case tracking events are documented in the log.      Pain/Amanda Score: Amanda Score: 8 (3/9/2023  8:11 AM)

## 2023-03-09 NOTE — H&P
PRE PROCEDURE H&P    Patient Name: Isaiah Harrison  MRN: [id number]  : 1955  Date of Procedure:  3/9/2023  Referring Physician: LACI Dow MD  Primary Physician: LACI Dow MD  Procedure Physician: Lisa Auguste MD       Planned Procedure: Colonoscopy  Diagnosis: previous adenomatous polyp  Chief Complaint: Same as above    HPI: Patient is an 67 y.o. male is here for the above.         Past Medical History:   Past Medical History:   Diagnosis Date    Acute coronary syndrome     Anticoagulant long-term use     Back pain     CAD (coronary artery disease) 10/17/2013    Class 2 severe obesity due to excess calories with serious comorbidity and body mass index (BMI) of 38.0 to 38.9 in adult 2015    Coronary artery disease     Diabetes mellitus, type 2     Gastric polyps     Hyperlipemia     Hypertension     NSTEMI (non-ST elevated myocardial infarction) 10/23/2014    Obesity 10/21/2014    DANTE on CPAP 2015    S/P PTCA (percutaneous transluminal coronary angioplasty) 10/17/2013    Sleep apnea 2015    Type 2 diabetes mellitus with circulatory disorder (coronary artery disease), without long-term current use of insulin 2015        Past Surgical History:  Past Surgical History:   Procedure Laterality Date    APPENDECTOMY      BACK SURGERY      CATARACT EXTRACTION      CATARACT EXTRACTION, BILATERAL      COLONOSCOPY      CORONARY ANGIOPLASTY WITH STENT PLACEMENT      ESOPHAGOGASTRODUODENOSCOPY      EYE SURGERY      FRACTURE SURGERY      HERNIA REPAIR      SPINE SURGERY          Home Medications:  Prior to Admission medications    Medication Sig Start Date End Date Taking? Authorizing Provider   ALPRAZolam (XANAX) 1 MG tablet Take 1 mg by mouth 2 (two) times daily as needed. 21  Yes Historical Provider   amlodipine-valsartan (EXFORGE)  mg per tablet TAKE 1 TABLET BY MOUTH EVERY DAY 22  Yes LACI Dow MD   aspirin (ECOTRIN) 81 MG EC tablet Take 81 mg by mouth  "once daily.   Yes Historical Provider   atorvastatin (LIPITOR) 80 MG tablet TAKE 1 TABLET(80 MG) BY MOUTH EVERY EVENING 12/30/22  Yes LACI Dow MD   blood sugar diagnostic Strp 1 strip by Misc.(Non-Drug; Combo Route) route 4 (four) times daily. 12/6/21  Yes LACI Dow MD   empagliflozin (JARDIANCE) 10 mg tablet Take 1 tablet (10 mg total) by mouth once daily. 6/1/22 6/1/23 Yes LACI Dow MD   hydroCHLOROthiazide (HYDRODIURIL) 25 MG tablet TAKE 1 TABLET(25 MG) BY MOUTH EVERY DAY 1/20/23  Yes LACI Dow MD   hydrOXYzine HCL (ATARAX) 10 MG Tab TAKE 1 TABLET(10 MG) BY MOUTH TWICE DAILY AS NEEDED. MAY CAUSE DROWSINESS. DO NOT DRIVE OR OPERATE HEAVY MACHINERY 11/26/21  Yes Josefa Conrad MD   insulin (LANTUS SOLOSTAR U-100 INSULIN) glargine 100 units/mL SubQ pen Inject 40 Units into the skin 2 (two) times a day. 11/3/22 11/3/23 Yes LACI Dow MD   isosorbide mononitrate (IMDUR) 30 MG 24 hr tablet TAKE 3 TABLETS(90 MG) BY MOUTH EVERY DAY 12/30/22  Yes LACI Dow MD   metFORMIN (GLUCOPHAGE-XR) 500 MG ER 24hr tablet Take 1 tablet (500 mg total) by mouth daily with breakfast. 12/6/21 3/9/23 Yes LACI Dow MD   metoprolol tartrate (LOPRESSOR) 50 MG tablet TAKE 1 TABLET BY MOUTH TWICE DAILY 12/30/22  Yes Colette Hastings PA-C   niacin (SLO-NIACIN) 500 mg tablet TAKE 1 TABLET(500 MG) BY MOUTH EVERY EVENING 5/17/22  Yes Kirsty Lyles MD   pen needle, diabetic (BD ULTRA-FINE SHORT PEN NEEDLE) 31 gauge x 5/16" Ndle USE WITH LANTUS PEN AS DIRECTED 4/20/22  Yes LACI Dow MD   ranolazine (RANEXA) 1,000 mg Tb12 TAKE 1 TABLET(1000 MG) BY MOUTH TWICE DAILY 8/14/22  Yes Kirsty Lyels MD   tirzepatide (MOUNJARO) 2.5 mg/0.5 mL PnIj Inject 2.5 mg into the skin every 7 days. 3/3/23 3/31/23 Yes TRAMAINE Godoy   gabapentin (NEURONTIN) 300 MG capsule TK 1 C PO TID 2/12/19   Historical Provider   nitroGLYCERIN (NITROSTAT) 0.4 MG SL tablet Place 1 tablet (0.4 mg total) under the " "tongue every 5 (five) minutes as needed. 3/14/20   Kirsty Lyles MD   pneumoc 20-miranda conj-dip cr,PF, (PREVNAR-20, PF,) 0.5 mL Syrg injection Inject 0.5 mLs into the muscle. 10/27/22   Edgar Cruz, PharmD        Allergies:  Review of patient's allergies indicates:   Allergen Reactions    Pcn [penicillins] Hives        Social History:   Social History     Socioeconomic History    Marital status:    Tobacco Use    Smoking status: Never    Smokeless tobacco: Never   Substance and Sexual Activity    Alcohol use: Yes     Alcohol/week: 0.0 standard drinks     Comment: socially    Drug use: No       Family History:  Family History   Problem Relation Age of Onset    Hypertension Mother     Diabetes Mother     Hypertension Father     Heart disease Maternal Grandfather        ROS: No acute cardiac events, no acute respiratory complaints.     Physical Exam (all patients):    BP (!) 101/55 (BP Location: Left arm, Patient Position: Lying)   Pulse 72   Temp 97.9 °F (36.6 °C) (Temporal)   Resp 18   Ht 6' 2" (1.88 m)   Wt 131.5 kg (290 lb)   SpO2 96%   BMI 37.23 kg/m²   Lungs: Clear to auscultation bilaterally, respirations unlabored  Heart: Regular rate and rhythm, S1 and S2 normal, no obvious murmurs  Abdomen:         Soft, non-tender, bowel sounds normal, no masses, no organomegaly    Lab Results   Component Value Date    WBC 8.38 09/30/2020    MCV 93 09/30/2020    RDW 11.7 09/30/2020     09/30/2020    INR 1.0 12/16/2015     (H) 09/13/2022    HGBA1C 7.8 (H) 09/13/2022    BUN 22 09/13/2022     09/13/2022    K 4.3 09/13/2022     09/13/2022        SEDATION PLAN: per anesthesia      History reviewed, vital signs satisfactory, cardiopulmonary status satisfactory, sedation options, risks and plans have been discussed with the patient  All their questions were answered and the patient agrees to the sedation procedures as planned and the patient is deemed an appropriate candidate for the " sedation as planned.    Procedure explained to patient, informed consent obtained and placed in chart.    Lisa Auguste  3/9/2023  7:54 AM

## 2023-03-09 NOTE — ANESTHESIA PREPROCEDURE EVALUATION
03/09/2023  Isaiah Harrison is a 67 y.o., male.      Pre-op Assessment    I have reviewed the Patient Summary Reports.     I have reviewed the Nursing Notes. I have reviewed the NPO Status.   I have reviewed the Medications.     Review of Systems  Anesthesia Hx:  No problems with previous Anesthesia  Denies Family Hx of Anesthesia complications.   Denies Personal Hx of Anesthesia complications.   Social:  Non-Smoker    Hematology/Oncology:  Hematology Normal   Oncology Normal     EENT/Dental:EENT/Dental Normal   Cardiovascular:   Hypertension Past MI CAD  CABG/stent  ECG has been reviewed.    Pulmonary:   Sleep Apnea, CPAP    Renal/:  Renal/ Normal     Hepatic/GI:  Hepatic/GI Normal    Musculoskeletal:  Musculoskeletal Normal    Neurological:   Neuromuscular Disease,    Endocrine:   Diabetes, type 2, using insulin  Obesity / BMI > 30  Dermatological:  Skin Normal    Psych:   Psychiatric History          Physical Exam  General: Well nourished, Cooperative, Alert and Oriented    Airway:  Mallampati: II   Mouth Opening: Normal  TM Distance: Normal  Tongue: Normal  Neck ROM: Normal ROM    Dental:  Intact    Chest/Lungs:  Clear to auscultation, Normal Respiratory Rate    Heart:  Rate: Normal  Rhythm: Regular Rhythm        Anesthesia Plan  Type of Anesthesia, risks & benefits discussed:    Anesthesia Type: MAC  Intra-op Monitoring Plan: Standard ASA Monitors  Induction:  IV  Informed Consent: Informed consent signed with the Patient and all parties understand the risks and agree with anesthesia plan.  All questions answered.   ASA Score: 3  Day of Surgery Review of History & Physical: H&P Update referred to the surgeon/provider.I have interviewed and examined the patient. I have reviewed the patient's H&P dated: There are no significant changes.     Ready For Surgery From Anesthesia Perspective.     .

## 2023-03-09 NOTE — TRANSFER OF CARE
"Anesthesia Transfer of Care Note    Patient: Isaiah Harrison    Procedure(s) Performed: Procedure(s) (LRB):  COLONOSCOPY (N/A)    Patient location: PACU    Anesthesia Type: MAC    Transport from OR: Transported from OR on room air with adequate spontaneous ventilation    Post pain: adequate analgesia    Post assessment: no apparent anesthetic complications    Post vital signs: stable    Level of consciousness: responds to stimulation    Nausea/Vomiting: no nausea/vomiting    Complications: none    Transfer of care protocol was followed      Last vitals:   Visit Vitals  BP (!) 101/55 (BP Location: Left arm, Patient Position: Lying)   Pulse 72   Temp 36.6 °C (97.9 °F) (Temporal)   Resp 18   Ht 6' 2" (1.88 m)   Wt 131.5 kg (290 lb)   SpO2 96%   BMI 37.23 kg/m²     "

## 2023-03-09 NOTE — PROVATION PATIENT INSTRUCTIONS
Discharge Summary/Instructions after an Endoscopic Procedure  Patient Name: Isaiah Harrison  Patient MRN: [id number]  Patient YOB: 1955  Thursday, March 9, 2023 Lisa Auguste MD  Dear patient,  As a result of recent federal legislation (The Federal Cures Act), you may   receive lab or pathology results from your procedure in your [facility]   account before your physician is able to contact you. Your physician or   their representative will relay the results to you with their   recommendations at their soonest availability.  Thank you,  RESTRICTIONS:  During your procedure today, you received medications for sedation.  These   medications may affect your judgment, balance and coordination.  Therefore,   for 24 hours, you have the following restrictions:   - DO NOT drive a car, operate machinery, make legal/financial decisions,   sign important papers or drink alcohol.    ACTIVITY:  Today: no heavy lifting, straining or running due to procedural   sedation/anesthesia.  The following day: return to full activity including work.  DIET:  Eat and drink normally unless instructed otherwise.     TREATMENT FOR COMMON SIDE EFFECTS:  - Mild abdominal pain, nausea, belching, bloating or excessive gas:  rest,   eat lightly and use a heating pad.  - Sore Throat: treat with throat lozenges and/or gargle with warm salt   water.  - Because air was used during the procedure, expelling large amounts of air   from your rectum or belching is normal.  - If a bowel prep was taken, you may not have a bowel movement for 1-3 days.    This is normal.  SYMPTOMS TO WATCH FOR AND REPORT TO YOUR PHYSICIAN:  1. Abdominal pain or bloating, other than gas cramps.  2. Chest pain.  3. Back pain.  4. Signs of infection such as: chills or fever occurring within 24 hours   after the procedure.  5. Rectal bleeding, which would show as bright red, maroon, or black stools.   (A tablespoon of blood from the rectum is not serious, especially if    hemorrhoids are present.)  6. Vomiting.  7. Weakness or dizziness.  GO DIRECTLY TO THE NEAREST EMERGENCY ROOM IF YOU HAVE ANY OF THE FOLLOWING:      Difficulty breathing              Chills and/or fever over 101 F   Persistent vomiting and/or vomiting blood   Severe abdominal pain   Severe chest pain   Black, tarry stools   Bleeding- more than one tablespoon   Any other symptom or condition that you feel may need urgent attention  Your doctor recommends these additional instructions:  If any biopsies were taken, your doctors clinic will contact you in 1 to 2   weeks with any results.  - Patient has a contact number available for emergencies.  The signs and   symptoms of potential delayed complications were discussed with the   patient.  Return to normal activities tomorrow.  Written discharge   instructions were provided to the patient.   - Discharge patient to home (via wheelchair).   - Resume previous diet today.   - Continue present medications.   - Repeat colonoscopy in 6 months because the bowel preparation was   suboptimal.  For questions, problems or results please call your physician Lisa Auguste MD at Work:  [phone]  If you have any questions about the above instructions, call the GI   department at [phone] or call the endoscopy unit at [phone]   from 7am until 3 pm.  [facility] [place], EMERGENCY ROOM PHONE NUMBER:   [phone]  IF A COMPLICATION OR EMERGENCY SITUATION ARISES AND YOU ARE UNABLE TO REACH   YOUR PHYSICIAN - GO DIRECTLY TO THE EMERGENCY ROOM.  I have read or have had read to me these discharge instructions for my   procedure and have received a written copy.  I understand these   instructions and will follow-up with my physician if I have any questions.     __________________________________       _____________________________________  Nurse Signature                                          Patient/Designated   Responsible Party Signature  Lisa  MD Lisa Auguste MD  3/9/2023 8:13:04 AM  This report has been verified and signed electronically.  Dear patient,  As a result of recent federal legislation (The Federal Cures Act), you may   receive lab or pathology results from your procedure in your [facility]   account before your physician is able to contact you. Your physician or   their representative will relay the results to you with their   recommendations at their soonest availability.  Thank you,  PROVATION

## 2023-04-19 ENCOUNTER — PATIENT MESSAGE (OUTPATIENT)
Dept: OPHTHALMOLOGY | Facility: CLINIC | Age: 68
End: 2023-04-19

## 2023-04-19 ENCOUNTER — OFFICE VISIT (OUTPATIENT)
Dept: OPHTHALMOLOGY | Facility: CLINIC | Age: 68
End: 2023-04-19
Payer: COMMERCIAL

## 2023-04-19 DIAGNOSIS — H43.811 POSTERIOR VITREOUS DETACHMENT, RIGHT: ICD-10-CM

## 2023-04-19 DIAGNOSIS — Z96.1 PSEUDOPHAKIA OF BOTH EYES: ICD-10-CM

## 2023-04-19 DIAGNOSIS — E11.9 DIABETES MELLITUS WITHOUT COMPLICATION: Primary | ICD-10-CM

## 2023-04-19 PROCEDURE — 4010F ACE/ARB THERAPY RXD/TAKEN: CPT | Mod: CPTII,S$GLB,, | Performed by: OPTOMETRIST

## 2023-04-19 PROCEDURE — 92014 PR EYE EXAM, EST PATIENT,COMPREHESV: ICD-10-PCS | Mod: S$GLB,,, | Performed by: OPTOMETRIST

## 2023-04-19 PROCEDURE — 92015 DETERMINE REFRACTIVE STATE: CPT | Mod: S$GLB,,, | Performed by: OPTOMETRIST

## 2023-04-19 PROCEDURE — 1159F MED LIST DOCD IN RCRD: CPT | Mod: CPTII,S$GLB,, | Performed by: OPTOMETRIST

## 2023-04-19 PROCEDURE — 4010F PR ACE/ARB THEARPY RXD/TAKEN: ICD-10-PCS | Mod: CPTII,S$GLB,, | Performed by: OPTOMETRIST

## 2023-04-19 PROCEDURE — 99999 PR PBB SHADOW E&M-EST. PATIENT-LVL III: CPT | Mod: PBBFAC,,, | Performed by: OPTOMETRIST

## 2023-04-19 PROCEDURE — 99999 PR PBB SHADOW E&M-EST. PATIENT-LVL III: ICD-10-PCS | Mod: PBBFAC,,, | Performed by: OPTOMETRIST

## 2023-04-19 PROCEDURE — 92015 PR REFRACTION: ICD-10-PCS | Mod: S$GLB,,, | Performed by: OPTOMETRIST

## 2023-04-19 PROCEDURE — 92014 COMPRE OPH EXAM EST PT 1/>: CPT | Mod: S$GLB,,, | Performed by: OPTOMETRIST

## 2023-04-19 PROCEDURE — 1159F PR MEDICATION LIST DOCUMENTED IN MEDICAL RECORD: ICD-10-PCS | Mod: CPTII,S$GLB,, | Performed by: OPTOMETRIST

## 2023-04-19 NOTE — PROGRESS NOTES
HPI    NIDDM exam  No visual complaints  Last eye exam 02/07/2022 DKT.  New patient to [facility].  Update glasses RX.  Patient wears OTC readers, left readers today.  Lab Results       Component                Value               Date                       HGBA1C                   7.8 (H)             09/13/2022            Last edited by Pedro Chowdhury, OD on 4/19/2023 10:23 AM.            Assessment /Plan     For exam results, see Encounter Report.    Diabetes mellitus without complication    Posterior vitreous detachment, right    Pseudophakia of both eyes      No Background Diabetic Retinopathy  Strict BG control, f/u w/ PCP, and annual DFE  Stressed importance of DM control to preserve vision    Discussed signs and symptoms of retinal detachment.  Informed patient to return to clinic if any worsening of symptoms or decreased vision.    Dispense Final Rx for glasses or OTC  RTC 1 year  Discussed above and answered questions.

## 2023-04-27 ENCOUNTER — OFFICE VISIT (OUTPATIENT)
Dept: INTERNAL MEDICINE | Facility: CLINIC | Age: 68
End: 2023-04-27
Payer: COMMERCIAL

## 2023-04-27 ENCOUNTER — TELEPHONE (OUTPATIENT)
Dept: INTERNAL MEDICINE | Facility: CLINIC | Age: 68
End: 2023-04-27
Payer: COMMERCIAL

## 2023-04-27 VITALS
HEART RATE: 71 BPM | BODY MASS INDEX: 38.68 KG/M2 | DIASTOLIC BLOOD PRESSURE: 74 MMHG | WEIGHT: 301.38 LBS | TEMPERATURE: 97 F | OXYGEN SATURATION: 98 % | HEIGHT: 74 IN | SYSTOLIC BLOOD PRESSURE: 112 MMHG

## 2023-04-27 DIAGNOSIS — I15.2 HYPERTENSION ASSOCIATED WITH DIABETES: Chronic | ICD-10-CM

## 2023-04-27 DIAGNOSIS — E11.59 TYPE 2 DIABETES MELLITUS WITH OTHER CIRCULATORY COMPLICATION, WITH LONG-TERM CURRENT USE OF INSULIN: ICD-10-CM

## 2023-04-27 DIAGNOSIS — G47.33 OSA ON CPAP: ICD-10-CM

## 2023-04-27 DIAGNOSIS — Z79.4 TYPE 2 DIABETES MELLITUS WITH OTHER CIRCULATORY COMPLICATION, WITH LONG-TERM CURRENT USE OF INSULIN: ICD-10-CM

## 2023-04-27 DIAGNOSIS — Z79.4 TYPE 2 DIABETES MELLITUS WITH HYPERGLYCEMIA, WITH LONG-TERM CURRENT USE OF INSULIN: ICD-10-CM

## 2023-04-27 DIAGNOSIS — E11.59 HYPERTENSION ASSOCIATED WITH DIABETES: Chronic | ICD-10-CM

## 2023-04-27 DIAGNOSIS — E11.65 TYPE 2 DIABETES MELLITUS WITH HYPERGLYCEMIA, WITH LONG-TERM CURRENT USE OF INSULIN: ICD-10-CM

## 2023-04-27 PROCEDURE — 4010F ACE/ARB THERAPY RXD/TAKEN: CPT | Mod: CPTII,S$GLB,, | Performed by: FAMILY MEDICINE

## 2023-04-27 PROCEDURE — 3008F BODY MASS INDEX DOCD: CPT | Mod: CPTII,S$GLB,, | Performed by: FAMILY MEDICINE

## 2023-04-27 PROCEDURE — 1101F PR PT FALLS ASSESS DOC 0-1 FALLS W/OUT INJ PAST YR: ICD-10-PCS | Mod: CPTII,S$GLB,, | Performed by: FAMILY MEDICINE

## 2023-04-27 PROCEDURE — 3051F PR MOST RECENT HEMOGLOBIN A1C LEVEL 7.0 - < 8.0%: ICD-10-PCS | Mod: CPTII,S$GLB,, | Performed by: FAMILY MEDICINE

## 2023-04-27 PROCEDURE — 99999 PR PBB SHADOW E&M-EST. PATIENT-LVL V: CPT | Mod: PBBFAC,,, | Performed by: FAMILY MEDICINE

## 2023-04-27 PROCEDURE — 3288F PR FALLS RISK ASSESSMENT DOCUMENTED: ICD-10-PCS | Mod: CPTII,S$GLB,, | Performed by: FAMILY MEDICINE

## 2023-04-27 PROCEDURE — 1126F PR PAIN SEVERITY QUANTIFIED, NO PAIN PRESENT: ICD-10-PCS | Mod: CPTII,S$GLB,, | Performed by: FAMILY MEDICINE

## 2023-04-27 PROCEDURE — 99999 PR PBB SHADOW E&M-EST. PATIENT-LVL V: ICD-10-PCS | Mod: PBBFAC,,, | Performed by: FAMILY MEDICINE

## 2023-04-27 PROCEDURE — 83036 HEMOGLOBIN GLYCOSYLATED A1C: CPT | Performed by: FAMILY MEDICINE

## 2023-04-27 PROCEDURE — 1126F AMNT PAIN NOTED NONE PRSNT: CPT | Mod: CPTII,S$GLB,, | Performed by: FAMILY MEDICINE

## 2023-04-27 PROCEDURE — 3072F PR LOW RISK FOR RETINOPATHY: ICD-10-PCS | Mod: CPTII,S$GLB,, | Performed by: FAMILY MEDICINE

## 2023-04-27 PROCEDURE — 3074F PR MOST RECENT SYSTOLIC BLOOD PRESSURE < 130 MM HG: ICD-10-PCS | Mod: CPTII,S$GLB,, | Performed by: FAMILY MEDICINE

## 2023-04-27 PROCEDURE — 3008F PR BODY MASS INDEX (BMI) DOCUMENTED: ICD-10-PCS | Mod: CPTII,S$GLB,, | Performed by: FAMILY MEDICINE

## 2023-04-27 PROCEDURE — 4010F PR ACE/ARB THEARPY RXD/TAKEN: ICD-10-PCS | Mod: CPTII,S$GLB,, | Performed by: FAMILY MEDICINE

## 2023-04-27 PROCEDURE — 80053 COMPREHEN METABOLIC PANEL: CPT | Performed by: FAMILY MEDICINE

## 2023-04-27 PROCEDURE — 80061 LIPID PANEL: CPT | Performed by: FAMILY MEDICINE

## 2023-04-27 PROCEDURE — 99214 PR OFFICE/OUTPT VISIT, EST, LEVL IV, 30-39 MIN: ICD-10-PCS | Mod: S$GLB,,, | Performed by: FAMILY MEDICINE

## 2023-04-27 PROCEDURE — 99214 OFFICE O/P EST MOD 30 MIN: CPT | Mod: S$GLB,,, | Performed by: FAMILY MEDICINE

## 2023-04-27 PROCEDURE — 3078F PR MOST RECENT DIASTOLIC BLOOD PRESSURE < 80 MM HG: ICD-10-PCS | Mod: CPTII,S$GLB,, | Performed by: FAMILY MEDICINE

## 2023-04-27 PROCEDURE — 1101F PT FALLS ASSESS-DOCD LE1/YR: CPT | Mod: CPTII,S$GLB,, | Performed by: FAMILY MEDICINE

## 2023-04-27 PROCEDURE — 3051F HG A1C>EQUAL 7.0%<8.0%: CPT | Mod: CPTII,S$GLB,, | Performed by: FAMILY MEDICINE

## 2023-04-27 PROCEDURE — 3288F FALL RISK ASSESSMENT DOCD: CPT | Mod: CPTII,S$GLB,, | Performed by: FAMILY MEDICINE

## 2023-04-27 PROCEDURE — 3072F LOW RISK FOR RETINOPATHY: CPT | Mod: CPTII,S$GLB,, | Performed by: FAMILY MEDICINE

## 2023-04-27 PROCEDURE — 3078F DIAST BP <80 MM HG: CPT | Mod: CPTII,S$GLB,, | Performed by: FAMILY MEDICINE

## 2023-04-27 PROCEDURE — 3074F SYST BP LT 130 MM HG: CPT | Mod: CPTII,S$GLB,, | Performed by: FAMILY MEDICINE

## 2023-04-27 NOTE — PROGRESS NOTES
"OFFICE VISIT 4/27/23 10:00 AM CDT    Subjective   CHIEF COMPLAINT: Follow-up    Refer to the history and data embedded within the "Assessment & Plan" section below for the status of the conditions evaluated and managed today.    Review of Systems   Respiratory:  Negative for chest tightness and shortness of breath.    Cardiovascular:  Negative for chest pain.   Endocrine: Negative for polydipsia and polyuria.       Objective   Vitals:    04/27/23 1020   BP: 112/74   BP Location: Right arm   Patient Position: Sitting   BP Method: Large (Manual)   Pulse: 71   Temp: 96.9 °F (36.1 °C)   TempSrc: Tympanic   SpO2: 98%   Weight: (!) 136.7 kg (301 lb 5.9 oz)   Height: 6' 2" (1.88 m)   Physical Exam  Vitals reviewed.   Constitutional:       General: He is not in acute distress.     Appearance: Normal appearance. He is not ill-appearing or diaphoretic.   Cardiovascular:      Rate and Rhythm: Normal rate and regular rhythm.   Pulmonary:      Effort: Pulmonary effort is normal.      Breath sounds: Normal breath sounds.   Skin:     General: Skin is warm and dry.   Neurological:      Mental Status: He is alert and oriented to person, place, and time. Mental status is at baseline.   Psychiatric:         Mood and Affect: Mood normal.         Behavior: Behavior normal.         Judgment: Judgment normal.        Assessment and Plan   1. Type 2 diabetes mellitus with other circulatory complication, with long-term current use of insulin  Assessment & Plan:  Diabetes Management Status    Statin: Taking  ACE/ARB: Taking    Screening or Prevention Patient's value Goal Complete/Controlled?   HgA1C Testing and Control   Lab Results   Component Value Date    HGBA1C 7.8 (H) 09/13/2022      Annually/Less than 8% Yes   Lipid profile : 09/13/2022 Annually Yes   LDL control Lab Results   Component Value Date    LDLCALC 113.4 09/13/2022    Annually/Less than 100 mg/dl  No   Nephropathy screening Lab Results   Component Value Date    LABMICR 20.0 " 10/27/2022     Lab Results   Component Value Date    PROTEINUA Negative 10/20/2020    Annually Yes   Blood pressure BP Readings from Last 1 Encounters:   04/27/23 112/74    Less than 140/90 Yes   Dilated retinal exam : 04/19/2023 Annually Yes   Foot exam   : 10/27/2022 Annually Yes     Lab Results   Component Value Date    HGBA1C 7.8 (H) 09/13/2022    HGBA1C 6.7 (H) 07/27/2022    HGBA1C 7.2 (H) 02/03/2022    EGFRNORACEVR >60.0 09/13/2022    MICALBCREAT 20.8 10/27/2022    LDLCALC 113.4 09/13/2022     No results found for: GLUTAMICACID, CPEPTIDE   Last 5 Patient Entered Readings                                          Most Recent A1c: 7.8% on 9/13/2022  (Goal: 7%)       Recent Readings 4/3/2023 4/1/2023 3/31/2023 3/17/2023 3/14/2023    Blood Glucose (mg/dL) 203 159 177 143 166           HEALTH MAINTENANCE: Diabetic health maintenance interventions reviewed and are up to date except for:      Topic    Hemoglobin A1C         Orders:  -     Hemoglobin A1C; Future; Expected date: 04/27/2023  -     Comprehensive Metabolic Panel; Future; Expected date: 04/27/2023  -     Lipid Panel; Future; Expected date: 04/27/2023  -     Ambulatory referral/consult to Diabetes Education; Future; Expected date: 05/04/2023  -     Hemoglobin A1C; Future; Expected date: 06/29/2023    2. Hypertension associated with diabetes  Assessment & Plan:  BP Readings from Last 6 Encounters:   04/27/23 112/74   03/09/23 111/62   02/07/23 126/74   11/02/22 110/74   10/27/22 104/76   09/08/22 138/86      Last 5 Patient Entered Readings                Current 30 Day Average:   Recent Readings 2/4/2023 1/1/2023 12/18/2022 12/14/2022 12/11/2022   SBP (mmHg) 153 145 137 138 121   DBP (mmHg) 88 90 89 89 74   Pulse 84 83 88 78 74                 Lab Results   Component Value Date    EGFRNORACEVR >60.0 09/13/2022    CREATININE 1.1 09/13/2022    BUN 22 09/13/2022    K 4.3 09/13/2022     09/13/2022     09/13/2022     Results for orders placed or  performed during the hospital encounter of 05/24/13   EKG 12-lead    Collection Time: 05/25/13  1:47 AM    Narrative    Test Reason : STAT    Vent. Rate : 076 BPM     Atrial Rate : 076 BPM     P-R Int : 204 ms          QRS Dur : 102 ms      QT Int : 406 ms       P-R-T Axes : 058 006 049 degrees     QTc Int : 456 ms    Normal sinus rhythm  Septal infarct ,age undetermined  Abnormal ECG  When compared with ECG of 24-MAY-2013 12:25,  Septal infarct is now Present  Confirmed by CARMELO AKERS MD ([id number]) on 5/27/2013 9:22:05 PM    Referred By:  TOD           Overread By: ACRMELO AKERS MD         Orders:  -     Comprehensive Metabolic Panel; Future; Expected date: 04/27/2023  -     Lipid Panel; Future; Expected date: 04/27/2023    3. DANTE on CPAP  Overview:  PSG 2015 mild obstructive sleep apnea AHI 10.0.  On CPAP 9.5 cm with Full face mask.   AHI 0.5   HME: [facility]     Assessment & Plan:  He reports compliance with use of CPAP for treatment of obstructive sleep apnea, and he reports perceived therapeutic benefit.       4. Type 2 diabetes mellitus with hyperglycemia, with long-term current use of insulin  -     empagliflozin (JARDIANCE) 10 mg tablet; Take 1 tablet (10 mg total) by mouth once daily.  Dispense: 90 tablet; Refill: 1  -     Discontinue: tirzepatide 5 mg/0.5 mL PnIj; Inject 5 mg into the skin every 7 days.  Dispense: 12 pen; Refill: 0  -     Ambulatory referral/consult to Diabetes Education; Future; Expected date: 05/04/2023  -     Hemoglobin A1C; Future; Expected date: 06/29/2023    Unless noted herein, any chronic conditions are represented as and appear stable, and no other significant complaints or concerns were reported.    Follow up in about 2 months (around 7/3/2023) for review test results, discuss treatment plan, re-evaluation.      Assessment and Plan    1. Type 2 diabetes mellitus with other circulatory complication, with long-term current use of insulin  - Hemoglobin A1C; Future  - Comprehensive  "Metabolic Panel; Future  - Lipid Panel; Future  - Ambulatory referral/consult to Diabetes Education; Future  - Hemoglobin A1C; Future  - Lipid Panel  - Comprehensive Metabolic Panel  - Hemoglobin A1C    2. Hypertension associated with diabetes  - Comprehensive Metabolic Panel; Future  - Lipid Panel; Future  - Lipid Panel  - Comprehensive Metabolic Panel    3. DANTE on CPAP    4. Type 2 diabetes mellitus with hyperglycemia, with long-term current use of insulin  - empagliflozin (JARDIANCE) 10 mg tablet; Take 1 tablet (10 mg total) by mouth once daily.  Dispense: 90 tablet; Refill: 1  - Ambulatory referral/consult to Diabetes Education; Future  - Hemoglobin A1C; Future    Orders Placed This Encounter   Procedures    Hemoglobin A1C    Comprehensive Metabolic Panel    Lipid Panel    Hemoglobin A1C    Ambulatory referral/consult to Diabetes Education        Documentation entered by me for this encounter may have been done in part using speech-recognition technology. Although I have made an effort to ensure accuracy, "sound like" errors may exist and should be interpreted in context.  "

## 2023-04-27 NOTE — ASSESSMENT & PLAN NOTE
Diabetes Management Status    Statin: Taking  ACE/ARB: Taking    Screening or Prevention Patient's value Goal Complete/Controlled?   HgA1C Testing and Control   Lab Results   Component Value Date    HGBA1C 7.8 (H) 09/13/2022      Annually/Less than 8% Yes   Lipid profile : 09/13/2022 Annually Yes   LDL control Lab Results   Component Value Date    LDLCALC 113.4 09/13/2022    Annually/Less than 100 mg/dl  No   Nephropathy screening Lab Results   Component Value Date    LABMICR 20.0 10/27/2022     Lab Results   Component Value Date    PROTEINUA Negative 10/20/2020    Annually Yes   Blood pressure BP Readings from Last 1 Encounters:   04/27/23 112/74    Less than 140/90 Yes   Dilated retinal exam : 04/19/2023 Annually Yes   Foot exam   : 10/27/2022 Annually Yes     Lab Results   Component Value Date    HGBA1C 7.8 (H) 09/13/2022    HGBA1C 6.7 (H) 07/27/2022    HGBA1C 7.2 (H) 02/03/2022    EGFRNORACEVR >60.0 09/13/2022    MICALBCREAT 20.8 10/27/2022    LDLCALC 113.4 09/13/2022     No results found for: GLUTAMICACID, CPEPTIDE   Last 5 Patient Entered Readings                                          Most Recent A1c: 7.8% on 9/13/2022  (Goal: 7%)     Recent Readings 4/3/2023 4/1/2023 3/31/2023 3/17/2023 3/14/2023    Blood Glucose (mg/dL) 203 159 177 143 166         HEALTH MAINTENANCE: Diabetic health maintenance interventions reviewed and are up to date except for:      Topic    Hemoglobin A1C

## 2023-04-27 NOTE — ASSESSMENT & PLAN NOTE
BP Readings from Last 6 Encounters:   04/27/23 112/74   03/09/23 111/62   02/07/23 126/74   11/02/22 110/74   10/27/22 104/76   09/08/22 138/86      Last 5 Patient Entered Readings                Current 30 Day Average:   Recent Readings 2/4/2023 1/1/2023 12/18/2022 12/14/2022 12/11/2022   SBP (mmHg) 153 145 137 138 121   DBP (mmHg) 88 90 89 89 74   Pulse 84 83 88 78 74               Lab Results   Component Value Date    EGFRNORACEVR >60.0 09/13/2022    CREATININE 1.1 09/13/2022    BUN 22 09/13/2022    K 4.3 09/13/2022     09/13/2022     09/13/2022     Results for orders placed or performed during the hospital encounter of 05/24/13   EKG 12-lead    Collection Time: 05/25/13  1:47 AM    Narrative    Test Reason : STAT    Vent. Rate : 076 BPM     Atrial Rate : 076 BPM     P-R Int : 204 ms          QRS Dur : 102 ms      QT Int : 406 ms       P-R-T Axes : 058 006 049 degrees     QTc Int : 456 ms    Normal sinus rhythm  Septal infarct ,age undetermined  Abnormal ECG  When compared with ECG of 24-MAY-2013 12:25,  Septal infarct is now Present  Confirmed by CARMELO AKERS MD ([id number]) on 5/27/2013 9:22:05 PM    Referred By:  TOD           Overread By: CARMELO AKERS MD

## 2023-04-28 LAB
ALBUMIN SERPL BCP-MCNC: 3.6 G/DL (ref 3.5–5.2)
ALP SERPL-CCNC: 79 U/L (ref 55–135)
ALT SERPL W/O P-5'-P-CCNC: 23 U/L (ref 10–44)
ANION GAP SERPL CALC-SCNC: 11 MMOL/L (ref 8–16)
AST SERPL-CCNC: 14 U/L (ref 10–40)
BILIRUB SERPL-MCNC: 0.7 MG/DL (ref 0.1–1)
BUN SERPL-MCNC: 26 MG/DL (ref 8–23)
CALCIUM SERPL-MCNC: 9.6 MG/DL (ref 8.7–10.5)
CHLORIDE SERPL-SCNC: 102 MMOL/L (ref 95–110)
CHOLEST SERPL-MCNC: 159 MG/DL (ref 120–199)
CHOLEST/HDLC SERPL: 4.2 {RATIO} (ref 2–5)
CO2 SERPL-SCNC: 22 MMOL/L (ref 23–29)
CREAT SERPL-MCNC: 1.2 MG/DL (ref 0.5–1.4)
EST. GFR  (NO RACE VARIABLE): >60 ML/MIN/1.73 M^2
ESTIMATED AVG GLUCOSE: 174 MG/DL (ref 68–131)
GLUCOSE SERPL-MCNC: 283 MG/DL (ref 70–110)
HBA1C MFR BLD: 7.7 % (ref 4–5.6)
HDLC SERPL-MCNC: 38 MG/DL (ref 40–75)
HDLC SERPL: 23.9 % (ref 20–50)
LDLC SERPL CALC-MCNC: 106.4 MG/DL (ref 63–159)
NONHDLC SERPL-MCNC: 121 MG/DL
POTASSIUM SERPL-SCNC: 4.2 MMOL/L (ref 3.5–5.1)
PROT SERPL-MCNC: 7 G/DL (ref 6–8.4)
SODIUM SERPL-SCNC: 135 MMOL/L (ref 136–145)
TRIGL SERPL-MCNC: 73 MG/DL (ref 30–150)

## 2023-05-03 ENCOUNTER — TELEPHONE (OUTPATIENT)
Dept: DIABETES | Facility: CLINIC | Age: 68
End: 2023-05-03
Payer: COMMERCIAL

## 2023-05-07 NOTE — PROGRESS NOTES
Results to be addressed at upcoming appointment(s) already scheduled.  Future Appointments  6/29/2023  10:05 AM   LABORATORY, [facility]            [place]  7/7/2023   8:40 AM    LACI Dow MD       [facility]             [place]

## 2023-05-09 ENCOUNTER — OFFICE VISIT (OUTPATIENT)
Dept: PODIATRY | Facility: CLINIC | Age: 68
End: 2023-05-09
Payer: COMMERCIAL

## 2023-05-09 DIAGNOSIS — L03.031 CELLULITIS OF SECOND TOE, RIGHT: ICD-10-CM

## 2023-05-09 DIAGNOSIS — E11.42 TYPE 2 DIABETES MELLITUS WITH DIABETIC POLYNEUROPATHY, WITH LONG-TERM CURRENT USE OF INSULIN: ICD-10-CM

## 2023-05-09 DIAGNOSIS — M20.11 HAV (HALLUX ABDUCTO VALGUS), RIGHT: ICD-10-CM

## 2023-05-09 DIAGNOSIS — Z79.4 TYPE 2 DIABETES MELLITUS WITH DIABETIC POLYNEUROPATHY, WITH LONG-TERM CURRENT USE OF INSULIN: ICD-10-CM

## 2023-05-09 DIAGNOSIS — M79.674 PAIN IN TOE OF RIGHT FOOT: ICD-10-CM

## 2023-05-09 DIAGNOSIS — M20.41 HAMMER TOE OF RIGHT FOOT: Primary | ICD-10-CM

## 2023-05-09 DIAGNOSIS — L97.511 DIABETIC ULCER OF TOE OF RIGHT FOOT ASSOCIATED WITH TYPE 2 DIABETES MELLITUS, LIMITED TO BREAKDOWN OF SKIN: ICD-10-CM

## 2023-05-09 DIAGNOSIS — E11.621 DIABETIC ULCER OF TOE OF RIGHT FOOT ASSOCIATED WITH TYPE 2 DIABETES MELLITUS, LIMITED TO BREAKDOWN OF SKIN: ICD-10-CM

## 2023-05-09 PROCEDURE — 1159F PR MEDICATION LIST DOCUMENTED IN MEDICAL RECORD: ICD-10-PCS | Mod: CPTII,S$GLB,, | Performed by: PODIATRIST

## 2023-05-09 PROCEDURE — 3288F FALL RISK ASSESSMENT DOCD: CPT | Mod: CPTII,S$GLB,, | Performed by: PODIATRIST

## 2023-05-09 PROCEDURE — 99999 PR PBB SHADOW E&M-EST. PATIENT-LVL III: CPT | Mod: PBBFAC,,, | Performed by: PODIATRIST

## 2023-05-09 PROCEDURE — 99214 OFFICE O/P EST MOD 30 MIN: CPT | Mod: 25,S$GLB,, | Performed by: PODIATRIST

## 2023-05-09 PROCEDURE — 3051F PR MOST RECENT HEMOGLOBIN A1C LEVEL 7.0 - < 8.0%: ICD-10-PCS | Mod: CPTII,S$GLB,, | Performed by: PODIATRIST

## 2023-05-09 PROCEDURE — 1101F PT FALLS ASSESS-DOCD LE1/YR: CPT | Mod: CPTII,S$GLB,, | Performed by: PODIATRIST

## 2023-05-09 PROCEDURE — 3072F PR LOW RISK FOR RETINOPATHY: ICD-10-PCS | Mod: CPTII,S$GLB,, | Performed by: PODIATRIST

## 2023-05-09 PROCEDURE — 4010F ACE/ARB THERAPY RXD/TAKEN: CPT | Mod: CPTII,S$GLB,, | Performed by: PODIATRIST

## 2023-05-09 PROCEDURE — 1101F PR PT FALLS ASSESS DOC 0-1 FALLS W/OUT INJ PAST YR: ICD-10-PCS | Mod: CPTII,S$GLB,, | Performed by: PODIATRIST

## 2023-05-09 PROCEDURE — 1160F PR REVIEW ALL MEDS BY PRESCRIBER/CLIN PHARMACIST DOCUMENTED: ICD-10-PCS | Mod: CPTII,S$GLB,, | Performed by: PODIATRIST

## 2023-05-09 PROCEDURE — 11042 PR DEBRIDEMENT, SKIN, SUB-Q TISSUE,=<20 SQ CM: ICD-10-PCS | Mod: S$GLB,,, | Performed by: PODIATRIST

## 2023-05-09 PROCEDURE — 99214 PR OFFICE/OUTPT VISIT, EST, LEVL IV, 30-39 MIN: ICD-10-PCS | Mod: 25,S$GLB,, | Performed by: PODIATRIST

## 2023-05-09 PROCEDURE — 4010F PR ACE/ARB THEARPY RXD/TAKEN: ICD-10-PCS | Mod: CPTII,S$GLB,, | Performed by: PODIATRIST

## 2023-05-09 PROCEDURE — 3072F LOW RISK FOR RETINOPATHY: CPT | Mod: CPTII,S$GLB,, | Performed by: PODIATRIST

## 2023-05-09 PROCEDURE — 11042 DBRDMT SUBQ TIS 1ST 20SQCM/<: CPT | Mod: S$GLB,,, | Performed by: PODIATRIST

## 2023-05-09 PROCEDURE — 1159F MED LIST DOCD IN RCRD: CPT | Mod: CPTII,S$GLB,, | Performed by: PODIATRIST

## 2023-05-09 PROCEDURE — 3051F HG A1C>EQUAL 7.0%<8.0%: CPT | Mod: CPTII,S$GLB,, | Performed by: PODIATRIST

## 2023-05-09 PROCEDURE — 1160F RVW MEDS BY RX/DR IN RCRD: CPT | Mod: CPTII,S$GLB,, | Performed by: PODIATRIST

## 2023-05-09 PROCEDURE — 3288F PR FALLS RISK ASSESSMENT DOCUMENTED: ICD-10-PCS | Mod: CPTII,S$GLB,, | Performed by: PODIATRIST

## 2023-05-09 PROCEDURE — 99999 PR PBB SHADOW E&M-EST. PATIENT-LVL III: ICD-10-PCS | Mod: PBBFAC,,, | Performed by: PODIATRIST

## 2023-05-09 RX ORDER — DOXYCYCLINE 100 MG/1
100 CAPSULE ORAL EVERY 12 HOURS
Qty: 14 CAPSULE | Refills: 0 | Status: SHIPPED | OUTPATIENT
Start: 2023-05-09 | End: 2023-05-16

## 2023-05-09 RX ORDER — GENTAMICIN SULFATE 1 MG/G
OINTMENT TOPICAL 2 TIMES DAILY
Qty: 30 G | Refills: 1 | Status: SHIPPED | OUTPATIENT
Start: 2023-05-09 | End: 2023-09-20

## 2023-05-09 NOTE — PROGRESS NOTES
Subjective:       Patient ID: Isaiah Harrison is a 68 y.o. male.    Chief Complaint: Foot Problem (Patient in with a complaint of having a corn or bunion on his right foot that is causing irritation, patient is pre diabetic and was last seen by his pcp on 4/27/2023.)    HPI: Isaiah Harrison presents to the office today, with complaints of pains to the right foot. The pains are stated as moderate to severe at the 1st and 2nd toes. Patient states limping with gait at times due to the pains. Pains are exacerbated by walking and standing. Sitting and limited WB does alleviate and/or decrease the pains. The patient does have hammer toe contractures. States alternation of shoe gear has not been helpful. Patient's Primary Care Provider is LACI Dow MD. Patient is a DMII.    Hemoglobin A1C   Date Value Ref Range Status   04/27/2023 7.7 (H) 4.0 - 5.6 % Final     Comment:     ADA Screening Guidelines:  5.7-6.4%  Consistent with prediabetes  >or=6.5%  Consistent with diabetes    High levels of fetal hemoglobin interfere with the HbA1C  assay. Heterozygous hemoglobin variants (HbS, HgC, etc)do  not significantly interfere with this assay.   However, presence of multiple variants may affect accuracy.     09/13/2022 7.8 (H) 4.0 - 5.6 % Final     Comment:     ADA Screening Guidelines:  5.7-6.4%  Consistent with prediabetes  >or=6.5%  Consistent with diabetes    High levels of fetal hemoglobin interfere with the HbA1C  assay. Heterozygous hemoglobin variants (HbS, HgC, etc)do  not significantly interfere with this assay.   However, presence of multiple variants may affect accuracy.     07/27/2022 6.7 (H) 4.0 - 5.6 % Final     Comment:     ADA Screening Guidelines:  5.7-6.4%  Consistent with prediabetes  >or=6.5%  Consistent with diabetes    High levels of fetal hemoglobin interfere with the HbA1C  assay. Heterozygous hemoglobin variants (HbS, HgC, etc)do  not significantly interfere with this assay.   However,  presence of multiple variants may affect accuracy.           Review of patient's allergies indicates:   Allergen Reactions    Pcn [penicillins] Hives       Past Medical History:   Diagnosis Date    Acute coronary syndrome     Anticoagulant long-term use     Back pain     CAD (coronary artery disease) 10/17/2013    Class 2 severe obesity due to excess calories with serious comorbidity and body mass index (BMI) of 38.0 to 38.9 in adult 01/12/2015    Coronary artery disease     Diabetes mellitus, type 2 2010    BS didn't check 04/19/2023 Insulin 2 years    Gastric polyps     Hyperlipemia     Hypertension     NSTEMI (non-ST elevated myocardial infarction) 10/23/2014    Obesity 10/21/2014    DANTE on CPAP 02/19/2015    S/P PTCA (percutaneous transluminal coronary angioplasty) 10/17/2013    Sleep apnea 01/07/2015    Type 2 diabetes mellitus with circulatory disorder (coronary artery disease), without long-term current use of insulin 07/14/2015       Family History   Problem Relation Age of Onset    Hypertension Mother     Diabetes Mother     Hypertension Father     Diabetes Sister     Hypertension Sister     Heart disease Maternal Grandfather        Social History     Socioeconomic History    Marital status:    Tobacco Use    Smoking status: Never    Smokeless tobacco: Never   Substance and Sexual Activity    Alcohol use: Yes     Alcohol/week: 0.0 standard drinks     Comment: socially    Drug use: No       Past Surgical History:   Procedure Laterality Date    APPENDECTOMY      BACK SURGERY      CATARACT EXTRACTION      CATARACT EXTRACTION, BILATERAL      COLONOSCOPY      COLONOSCOPY N/A 3/9/2023    Procedure: COLONOSCOPY;  Surgeon: Lisa Auguste MD;  Location: [facility];  Service: Endoscopy;  Laterality: N/A;    CORONARY ANGIOPLASTY WITH STENT PLACEMENT      ESOPHAGOGASTRODUODENOSCOPY      EYE SURGERY      FRACTURE SURGERY      HERNIA REPAIR      SPINE SURGERY         Review of Systems   Constitutional:  Negative  for chills, fatigue and fever.   HENT:  Negative for hearing loss.    Eyes:  Negative for photophobia and visual disturbance.   Respiratory:  Negative for cough, chest tightness, shortness of breath and wheezing.    Cardiovascular:  Negative for chest pain and palpitations.   Gastrointestinal:  Negative for constipation, diarrhea, nausea and vomiting.   Endocrine: Negative for cold intolerance and heat intolerance.   Genitourinary:  Negative for flank pain.   Musculoskeletal:  Positive for gait problem. Negative for neck pain and neck stiffness.   Neurological:  Positive for numbness. Negative for light-headedness and headaches.   Psychiatric/Behavioral:  Negative for sleep disturbance.        Objective:   There were no vitals taken for this visit.    Physical Exam    LOWER EXTREMITY PHYSICAL EXAMINATION    DERMATOLOGY: Skin is supple, dry and intact. Callus/Ulceration is noted, RLE, medial 2nd toe. There is mild erythema noted. No probe to bone is noted. No drainage is noted. Cellulitis is noted.     ORTHOPEDIC: Manual Muscle Testing is 5/5 in all planes on the right foot, without pains, with and without resistance. Gait pattern is antalgic. There is moderate to severe semi-rigid hammer toe contracture to the right foot at the 2nd toe. Associated metatarsalgia is noted. Mild HAV is noted.     VASCULAR: The right dorsalis pedis pulse is 2/4 and the posterior tibial pulse is 2/4. Hair growth is noted on the dorsal foot and digits. Proximal to distal, warm to warm. Capillary refill time is WNL at less than 3s    NEUROLOGY: Protective sensation is not intact via 5.07 [place] monofilament. Proprioception is intact. Sensation to light touch is intact.     Assessment:     1. Hammer toe of right foot    2. Pain in toe of right foot    3. Hav (hallux abducto valgus), right    4. Type 2 diabetes mellitus with diabetic polyneuropathy, with long-term current use of insulin    5. Cellulitis of second toe, right    6.  Diabetic ulcer of toe of right foot associated with type 2 diabetes mellitus, limited to breakdown of skin          Plan:     Hammer toe of right foot    Pain in toe of right foot    Hav (hallux abducto valgus), right    Type 2 diabetes mellitus with diabetic polyneuropathy, with long-term current use of insulin    Cellulitis of second toe, right  -     doxycycline (VIBRAMYCIN) 100 MG Cap; Take 1 capsule (100 mg total) by mouth every 12 (twelve) hours. for 7 days  Dispense: 14 capsule; Refill: 0  -     gentamicin (GARAMYCIN) 0.1 % ointment; Apply topically 2 (two) times a day.  Dispense: 30 g; Refill: 1    Diabetic ulcer of toe of right foot associated with type 2 diabetes mellitus, limited to breakdown of skin  -     gentamicin (GARAMYCIN) 0.1 % ointment; Apply topically 2 (two) times a day.  Dispense: 30 g; Refill: 1      Thorough discussion is had with the patient today, concerning the diagnosis, its etiology, and the treatment algorithm at present.     This patient does have hammertoe (digital) contractures. I did advise the patient to ambulate with shoe gear that is high in the tox box to allow for extra room and depth in the sagittal plane, in order to alleviate and lessen the potential for dorsal digital break down at the IPJs. I do also recommended shoe gear that is soft and supple in the foot bed as to lessen the potential for plantar distal digital break down at the contracted digits. If the patient does not feel the aforementioned is necessary, he or she may also purchase OTC padding devices to be worn across the MTPJ, at the distal aspects of the digits, and/or at the dorsal aspects of the IPJs. The patient does acknowledge understanding and is said to be amenable to compliance.     The wound was surgically debrided after adequate prep with alcohol and/or betadine paint. Excisional wound debridement was performed using sharp #10/#15 blade/rounded scalpel and tissue nipper, with removal of all non-viable  skin and soft tissues; necrotic skin/tissue formation. The woundbase/wound bed was also debrided to encourage bleeding as to promote/stimulate healing. Debridement was excisional and included epidermal, dermal and subcutaneous tissues. Post debridement measurements are as above. Hemostasis was achieved. Patient tolerated procedure well and without complications. Local woundcare with topical Abx ointment dressings and bandage thereafter.      Rec. shoe gear with soft and accommodative foot bed and with a high toe box for alleviation of symptoms. Possible EE or EEE in width as well for alleviation of symptoms.            Future Appointments   Date Time Provider Department Center   6/29/2023 10:05 AM LABORATORY, [facility]   7/7/2023  8:40 AM LACI Dow MD [facility] [place]

## 2023-05-31 ENCOUNTER — HOSPITAL ENCOUNTER (EMERGENCY)
Facility: HOSPITAL | Age: 68
Discharge: HOME | End: 2023-06-01
Attending: EMERGENCY MEDICINE
Payer: COMMERCIAL

## 2023-05-31 DIAGNOSIS — M25.511 RIGHT SHOULDER PAIN: ICD-10-CM

## 2023-05-31 PROCEDURE — 99283 EMERGENCY DEPT VISIT LOW MDM: CPT

## 2023-05-31 NOTE — Clinical Note
"Isaiah Lipscomb" Hunter was seen and treated in our emergency department on 5/31/2023.  He may return with limitations on 06/02/2023.  Light duty until followed up by pcp     Sincerely,      Jim Garner NP    "

## 2023-06-01 ENCOUNTER — OFFICE VISIT (OUTPATIENT)
Dept: INTERNAL MEDICINE | Facility: CLINIC | Age: 68
End: 2023-06-01
Payer: COMMERCIAL

## 2023-06-01 VITALS
TEMPERATURE: 98 F | DIASTOLIC BLOOD PRESSURE: 70 MMHG | WEIGHT: 293 LBS | SYSTOLIC BLOOD PRESSURE: 130 MMHG | HEART RATE: 72 BPM | RESPIRATION RATE: 14 BRPM | OXYGEN SATURATION: 99 % | BODY MASS INDEX: 37.62 KG/M2

## 2023-06-01 VITALS
HEIGHT: 74 IN | WEIGHT: 294.75 LBS | OXYGEN SATURATION: 97 % | BODY MASS INDEX: 37.83 KG/M2 | DIASTOLIC BLOOD PRESSURE: 76 MMHG | HEART RATE: 92 BPM | SYSTOLIC BLOOD PRESSURE: 112 MMHG

## 2023-06-01 DIAGNOSIS — M25.511 ACUTE PAIN OF RIGHT SHOULDER: ICD-10-CM

## 2023-06-01 DIAGNOSIS — S49.91XA INJURY OF RIGHT SHOULDER, INITIAL ENCOUNTER: Primary | ICD-10-CM

## 2023-06-01 DIAGNOSIS — F40.240 CLAUSTROPHOBIA: ICD-10-CM

## 2023-06-01 PROCEDURE — 1125F AMNT PAIN NOTED PAIN PRSNT: CPT | Mod: CPTII,S$GLB,, | Performed by: NURSE PRACTITIONER

## 2023-06-01 PROCEDURE — 3072F PR LOW RISK FOR RETINOPATHY: ICD-10-PCS | Mod: CPTII,S$GLB,, | Performed by: NURSE PRACTITIONER

## 2023-06-01 PROCEDURE — 1101F PR PT FALLS ASSESS DOC 0-1 FALLS W/OUT INJ PAST YR: ICD-10-PCS | Mod: CPTII,S$GLB,, | Performed by: NURSE PRACTITIONER

## 2023-06-01 PROCEDURE — 3051F PR MOST RECENT HEMOGLOBIN A1C LEVEL 7.0 - < 8.0%: ICD-10-PCS | Mod: CPTII,S$GLB,, | Performed by: NURSE PRACTITIONER

## 2023-06-01 PROCEDURE — 3008F PR BODY MASS INDEX (BMI) DOCUMENTED: ICD-10-PCS | Mod: CPTII,S$GLB,, | Performed by: NURSE PRACTITIONER

## 2023-06-01 PROCEDURE — 25000003 PHARM REV CODE 250: Performed by: NURSE PRACTITIONER

## 2023-06-01 PROCEDURE — 4010F ACE/ARB THERAPY RXD/TAKEN: CPT | Mod: CPTII,S$GLB,, | Performed by: NURSE PRACTITIONER

## 2023-06-01 PROCEDURE — 1101F PT FALLS ASSESS-DOCD LE1/YR: CPT | Mod: CPTII,S$GLB,, | Performed by: NURSE PRACTITIONER

## 2023-06-01 PROCEDURE — 99213 PR OFFICE/OUTPT VISIT, EST, LEVL III, 20-29 MIN: ICD-10-PCS | Mod: S$GLB,,, | Performed by: NURSE PRACTITIONER

## 2023-06-01 PROCEDURE — 3074F SYST BP LT 130 MM HG: CPT | Mod: CPTII,S$GLB,, | Performed by: NURSE PRACTITIONER

## 2023-06-01 PROCEDURE — 1159F MED LIST DOCD IN RCRD: CPT | Mod: CPTII,S$GLB,, | Performed by: NURSE PRACTITIONER

## 2023-06-01 PROCEDURE — 3074F PR MOST RECENT SYSTOLIC BLOOD PRESSURE < 130 MM HG: ICD-10-PCS | Mod: CPTII,S$GLB,, | Performed by: NURSE PRACTITIONER

## 2023-06-01 PROCEDURE — 3072F LOW RISK FOR RETINOPATHY: CPT | Mod: CPTII,S$GLB,, | Performed by: NURSE PRACTITIONER

## 2023-06-01 PROCEDURE — 3008F BODY MASS INDEX DOCD: CPT | Mod: CPTII,S$GLB,, | Performed by: NURSE PRACTITIONER

## 2023-06-01 PROCEDURE — 99999 PR PBB SHADOW E&M-EST. PATIENT-LVL V: ICD-10-PCS | Mod: PBBFAC,,, | Performed by: NURSE PRACTITIONER

## 2023-06-01 PROCEDURE — 3288F FALL RISK ASSESSMENT DOCD: CPT | Mod: CPTII,S$GLB,, | Performed by: NURSE PRACTITIONER

## 2023-06-01 PROCEDURE — 1159F PR MEDICATION LIST DOCUMENTED IN MEDICAL RECORD: ICD-10-PCS | Mod: CPTII,S$GLB,, | Performed by: NURSE PRACTITIONER

## 2023-06-01 PROCEDURE — 3288F PR FALLS RISK ASSESSMENT DOCUMENTED: ICD-10-PCS | Mod: CPTII,S$GLB,, | Performed by: NURSE PRACTITIONER

## 2023-06-01 PROCEDURE — 4010F PR ACE/ARB THEARPY RXD/TAKEN: ICD-10-PCS | Mod: CPTII,S$GLB,, | Performed by: NURSE PRACTITIONER

## 2023-06-01 PROCEDURE — 99999 PR PBB SHADOW E&M-EST. PATIENT-LVL V: CPT | Mod: PBBFAC,,, | Performed by: NURSE PRACTITIONER

## 2023-06-01 PROCEDURE — 1125F PR PAIN SEVERITY QUANTIFIED, PAIN PRESENT: ICD-10-PCS | Mod: CPTII,S$GLB,, | Performed by: NURSE PRACTITIONER

## 2023-06-01 PROCEDURE — 3078F PR MOST RECENT DIASTOLIC BLOOD PRESSURE < 80 MM HG: ICD-10-PCS | Mod: CPTII,S$GLB,, | Performed by: NURSE PRACTITIONER

## 2023-06-01 PROCEDURE — 3078F DIAST BP <80 MM HG: CPT | Mod: CPTII,S$GLB,, | Performed by: NURSE PRACTITIONER

## 2023-06-01 PROCEDURE — 3051F HG A1C>EQUAL 7.0%<8.0%: CPT | Mod: CPTII,S$GLB,, | Performed by: NURSE PRACTITIONER

## 2023-06-01 PROCEDURE — 99213 OFFICE O/P EST LOW 20 MIN: CPT | Mod: S$GLB,,, | Performed by: NURSE PRACTITIONER

## 2023-06-01 RX ORDER — HYDROCODONE BITARTRATE AND ACETAMINOPHEN 5; 325 MG/1; MG/1
1 TABLET ORAL
Status: COMPLETED | OUTPATIENT
Start: 2023-06-01 | End: 2023-06-01

## 2023-06-01 RX ORDER — HYDROCODONE BITARTRATE AND ACETAMINOPHEN 5; 325 MG/1; MG/1
1 TABLET ORAL EVERY 6 HOURS PRN
Qty: 10 TABLET | Refills: 0 | Status: SHIPPED | OUTPATIENT
Start: 2023-06-01 | End: 2023-09-20

## 2023-06-01 RX ORDER — ALPRAZOLAM 0.5 MG/1
0.5 TABLET ORAL ONCE
Qty: 1 TABLET | Refills: 0 | Status: SHIPPED | OUTPATIENT
Start: 2023-06-01 | End: 2023-09-20

## 2023-06-01 RX ADMIN — HYDROCODONE BITARTRATE AND ACETAMINOPHEN 1 TABLET: 5; 325 TABLET ORAL at 12:06

## 2023-06-01 NOTE — LETTER
June 1, 2023      The [address]  [address] [address] [facility]  [place] [address]  Phone: [phone]  Fax: [phone]       Patient: Isaiah Harrison   YOB: 1955  Date of Visit: 06/01/2023    To Whom It May Concern:    Iain Harrison  was at [facility] on 06/01/2023. The patient may return to work/school on 6/4/23 with no restrictions. If you have any questions or concerns, or if I can be of further assistance, please do not hesitate to contact me.    Sincerely,    FARA Martell

## 2023-06-01 NOTE — LETTER
June 1, 2023      The [address]  [address] [address] [facility]  [place] [address]  Phone: [phone]  Fax: [phone]       Patient: Isaiah Harrison   YOB: 1955  Date of Visit: 06/01/2023    To Whom It May Concern:    Iain Harrison  was at [facility] on 06/01/2023. The patient may return to work/school on 6/5/23 with no restrictions. If you have any questions or concerns, or if I can be of further assistance, please do not hesitate to contact me.    Sincerely,    FARA Martell

## 2023-06-01 NOTE — ED PROVIDER NOTES
HISTORY     Chief Complaint   Patient presents with    Shoulder Pain     Pulling bed sheets earlier tonight and felt he pulled a muscle. Radial pulse intact. Limited ROM. Took tylenol with no relief      Review of patient's allergies indicates:   Allergen Reactions    Pcn [penicillins] Hives        HPI   The history is provided by the patient. No  was used.   Arm Injury   The incident occurred just prior to arrival. The incident occurred at home. Injury mechanism: Right shoulder pain after pulling sheets across bed. There is an injury to the Right shoulder. Pertinent negatives include no chest pain, no altered mental status, no numbness, no visual disturbance, no abdominal pain, no nausea, no vomiting, no bladder incontinence, no headaches, no hearing loss and no weakness.      PCP: LACI Dow MD     Past Medical History:  Past Medical History:   Diagnosis Date    Acute coronary syndrome     Anticoagulant long-term use     Back pain     CAD (coronary artery disease) 10/17/2013    Class 2 severe obesity due to excess calories with serious comorbidity and body mass index (BMI) of 38.0 to 38.9 in adult 01/12/2015    Coronary artery disease     Diabetes mellitus, type 2 2010    BS didn't check 04/19/2023 Insulin 2 years    Gastric polyps     Hyperlipemia     Hypertension     NSTEMI (non-ST elevated myocardial infarction) 10/23/2014    Obesity 10/21/2014    DANTE on CPAP 02/19/2015    S/P PTCA (percutaneous transluminal coronary angioplasty) 10/17/2013    Sleep apnea 01/07/2015    Type 2 diabetes mellitus with circulatory disorder (coronary artery disease), without long-term current use of insulin 07/14/2015        Past Surgical History:  Past Surgical History:   Procedure Laterality Date    APPENDECTOMY      BACK SURGERY      CATARACT EXTRACTION      CATARACT EXTRACTION, BILATERAL      COLONOSCOPY      COLONOSCOPY N/A 3/9/2023    Procedure: COLONOSCOPY;  Surgeon: Lisa Auguste MD;   Location: [facility];  Service: Endoscopy;  Laterality: N/A;    CORONARY ANGIOPLASTY WITH STENT PLACEMENT      ESOPHAGOGASTRODUODENOSCOPY      EYE SURGERY      FRACTURE SURGERY      HERNIA REPAIR      SPINE SURGERY          Family History:  Family History   Problem Relation Age of Onset    Hypertension Mother     Diabetes Mother     Hypertension Father     Diabetes Sister     Hypertension Sister     Heart disease Maternal Grandfather         Social History:  Social History     Tobacco Use    Smoking status: Never    Smokeless tobacco: Never   Substance and Sexual Activity    Alcohol use: Yes     Alcohol/week: 0.0 standard drinks     Comment: socially    Drug use: No    Sexual activity: Yes     Partners: Female         ROS   Review of Systems   Constitutional:  Negative for fever.   HENT:  Negative for hearing loss and sore throat.    Eyes:  Negative for visual disturbance.   Respiratory:  Negative for shortness of breath.    Cardiovascular:  Negative for chest pain.   Gastrointestinal:  Negative for abdominal pain, nausea and vomiting.   Genitourinary:  Negative for bladder incontinence and dysuria.   Musculoskeletal:  Positive for arthralgias. Negative for back pain.   Skin:  Negative for rash.   Neurological:  Negative for weakness, numbness and headaches.   Hematological:  Does not bruise/bleed easily.     PHYSICAL EXAM     Initial Vitals [05/31/23 2259]   BP Pulse Resp Temp SpO2   128/82 84 16 98.1 °F (36.7 °C) 97 %      MAP       --           Physical Exam    Nursing note and vitals reviewed.  Constitutional: He appears well-developed and well-nourished. He is not diaphoretic. No distress.   HENT:   Head: Normocephalic and atraumatic.   Eyes: Right eye exhibits no discharge. Left eye exhibits no discharge.   Neck: Neck supple.   Normal range of motion.  Cardiovascular:  Normal rate.           Pulmonary/Chest: No respiratory distress.   Abdominal: Abdomen is soft. He exhibits no distension. There is no abdominal  tenderness.   Musculoskeletal:      Cervical back: Normal range of motion and neck supple.      Comments: Decreased range of motion of the right shoulder secondary to pain.  There is no obvious deformity.  Distal pulses 2+.  Neurovascularly intact.     Neurological: He is alert and oriented to person, place, and time. He has normal strength.   Skin: Skin is warm and dry.   Psychiatric: He has a normal mood and affect. His behavior is normal. Thought content normal.        ED COURSE   Procedures  ED ONGOING VITALS:  Vitals:    05/31/23 2259 05/31/23 2332 06/01/23 0021 06/01/23 0030   BP: 128/82 121/77  130/70   Pulse: 84 84  72   Resp: 16 14 14 14   Temp: 98.1 °F (36.7 °C) 98.1 °F (36.7 °C)  98 °F (36.7 °C)   TempSrc: Oral Oral  Oral   SpO2: 97% 99%     Weight: 132.9 kg (292 lb 15.9 oz)            ABNORMAL LAB VALUES:  Labs Reviewed - No data to display      ALL LAB VALUES:  none      RADIOLOGY STUDIES:  Imaging Results              X-Ray Shoulder Complete 2 View Right (Final result)  Result time 05/31/23 23:54:49      Final result by Diamond Finney MD (05/31/23 23:54:49)                   Impression:      No acute fracture or dislocation.  Lateral down slope of the acromion.  Bones appear demineralized.      Electronically signed by: Diamond Finney  Date:    05/31/2023  Time:    23:54               Narrative:    EXAMINATION:  XR SHOULDER COMPLETE 2 OR MORE VIEWS RIGHT    CLINICAL HISTORY:  Pain in right shoulder                                                The above vital signs and test results have been reviewed by the emergency provider.     ED Medications:  Discharge Medication List as of 6/1/2023 12:06 AM        START taking these medications    Details   HYDROcodone-acetaminophen (NORCO) 5-325 mg per tablet Take 1 tablet by mouth every 6 (six) hours as needed for Pain., Starting Thu 6/1/2023, Print           Discharge Medications:  Discharge Medication List as of 6/1/2023 12:06 AM        START taking  these medications    Details   HYDROcodone-acetaminophen (NORCO) 5-325 mg per tablet Take 1 tablet by mouth every 6 (six) hours as needed for Pain., Starting Thu 6/1/2023, Print             Follow-up Information       pcp of choice.    Why: As needed             [facility] - Emergency Dept..    Specialty: Emergency Medicine  Why: If symptoms worsen  Contact information:  [address] [facility] [address] [phone]                          I discussed with patient and/or family/caretaker that evaluation in the ED does not suggest any emergent or life threatening medical conditions requiring immediate intervention beyond what was provided in the ED, and I believe patient is safe for discharge. Regardless, an unremarkable evaluation in the ED does not preclude the development or presence of a serious or life threatening condition. As such, patient was instructed to return immediately for any worsening or change in current symptoms.        MEDICAL DECISION MAKING   Medical Decision Making:   Initial Assessment:   Patient presents to the ER for right shoulder pain  Differential Diagnosis:   Shoulder fracture, shoulder dislocation, ligament injury  Clinical Tests:   Radiological Study: Ordered and Reviewed  ED Management:  Patient is to follow up with his PCP as needed and return to the ER for any worsening or concerns.  Sling has been applied to the right arm by nursing staff             CLINICAL IMPRESSION       ICD-10-CM ICD-9-CM   1. Right shoulder pain  M25.511 719.41       Disposition:   Disposition: Discharged  Condition: Stable       Jim Garner NP  06/01/23 0106

## 2023-06-05 NOTE — PROGRESS NOTES
Subjective:       Patient ID: Isaiah Harrison is a 68 y.o. male.    Chief Complaint: pulled muscle (Last 24 hrs)    HPI      Pt seen in ER  Here for follow up  Reports that he was fixing his bed when he heard and felt a pop to his R shoulder and extreme pain thereafter  Went to ER. Xray neg  Here requesting MRI.  Currently using sling. Pain is mild to moderate.  Given norco for pain      Past Medical History:   Diagnosis Date    Acute coronary syndrome     Anticoagulant long-term use     Back pain     CAD (coronary artery disease) 10/17/2013    Class 2 severe obesity due to excess calories with serious comorbidity and body mass index (BMI) of 38.0 to 38.9 in adult 01/12/2015    Coronary artery disease     Diabetes mellitus, type 2 2010    BS didn't check 04/19/2023 Insulin 2 years    Gastric polyps     Hyperlipemia     Hypertension     NSTEMI (non-ST elevated myocardial infarction) 10/23/2014    Obesity 10/21/2014    DANTE on CPAP 02/19/2015    S/P PTCA (percutaneous transluminal coronary angioplasty) 10/17/2013    Sleep apnea 01/07/2015    Type 2 diabetes mellitus with circulatory disorder (coronary artery disease), without long-term current use of insulin 07/14/2015     Past Surgical History:   Procedure Laterality Date    APPENDECTOMY      BACK SURGERY      CATARACT EXTRACTION      CATARACT EXTRACTION, BILATERAL      COLONOSCOPY      COLONOSCOPY N/A 3/9/2023    Procedure: COLONOSCOPY;  Surgeon: Lisa Auguste MD;  Location: [facility];  Service: Endoscopy;  Laterality: N/A;    CORONARY ANGIOPLASTY WITH STENT PLACEMENT      ESOPHAGOGASTRODUODENOSCOPY      EYE SURGERY      FRACTURE SURGERY      HERNIA REPAIR      SPINE SURGERY       Social History     Socioeconomic History    Marital status:    Tobacco Use    Smoking status: Never    Smokeless tobacco: Never   Substance and Sexual Activity    Alcohol use: Yes     Alcohol/week: 0.0 standard drinks     Comment: socially     "Drug use: No    Sexual activity: Yes     Partners: Female     Review of patient's allergies indicates:   Allergen Reactions    Pcn [penicillins] Hives     Current Outpatient Medications   Medication Sig    amlodipine-valsartan (EXFORGE)  mg per tablet TAKE 1 TABLET BY MOUTH EVERY DAY    aspirin (ECOTRIN) 81 MG EC tablet Take 81 mg by mouth once daily.    atorvastatin (LIPITOR) 80 MG tablet TAKE 1 TABLET(80 MG) BY MOUTH EVERY EVENING    blood sugar diagnostic Strp 1 strip by Misc.(Non-Drug; Combo Route) route 4 (four) times daily.    empagliflozin (JARDIANCE) 10 mg tablet Take 1 tablet (10 mg total) by mouth once daily.    gentamicin (GARAMYCIN) 0.1 % ointment Apply topically 2 (two) times a day.    hydroCHLOROthiazide (HYDRODIURIL) 25 MG tablet TAKE 1 TABLET(25 MG) BY MOUTH EVERY DAY    HYDROcodone-acetaminophen (NORCO) 5-325 mg per tablet Take 1 tablet by mouth every 6 (six) hours as needed for Pain.    hydrOXYzine HCL (ATARAX) 10 MG Tab TAKE 1 TABLET(10 MG) BY MOUTH TWICE DAILY AS NEEDED. MAY CAUSE DROWSINESS. DO NOT DRIVE OR OPERATE HEAVY MACHINERY    insulin (LANTUS SOLOSTAR U-100 INSULIN) glargine 100 units/mL SubQ pen Inject 40 Units into the skin 2 (two) times a day.    isosorbide mononitrate (IMDUR) 30 MG 24 hr tablet TAKE 3 TABLETS(90 MG) BY MOUTH EVERY DAY    metoprolol tartrate (LOPRESSOR) 50 MG tablet TAKE 1 TABLET BY MOUTH TWICE DAILY    niacin (SLO-NIACIN) 500 mg tablet TAKE 1 TABLET(500 MG) BY MOUTH EVERY EVENING    nitroGLYCERIN (NITROSTAT) 0.4 MG SL tablet Place 1 tablet (0.4 mg total) under the tongue every 5 (five) minutes as needed.    pen needle, diabetic (BD ULTRA-FINE SHORT PEN NEEDLE) 31 gauge x 5/16" Ndle USE WITH LANTUS PEN AS DIRECTED    pneumoc 20-miranda conj-dip cr,PF, (PREVNAR-20, PF,) 0.5 mL Syrg injection Inject 0.5 mLs into the muscle.    ranolazine (RANEXA) 1,000 mg Tb12 TAKE 1 TABLET(1000 MG) BY MOUTH TWICE DAILY    tirzepatide (MOUNJARO) 5 mg/0.5 mL Ale " Inject 5 mg into the skin every 7 days.    ALPRAZolam (XANAX) 0.5 MG tablet Take 1 tablet (0.5 mg total) by mouth once. 30 minutes prior to MRI for 1 dose    metFORMIN (GLUCOPHAGE-XR) 500 MG ER 24hr tablet Take 1 tablet (500 mg total) by mouth daily with breakfast.     No current facility-administered medications for this visit.           Review of Systems   Constitutional:  Negative for activity change, appetite change, chills, diaphoresis, fatigue, fever and unexpected weight change.   HENT:  Negative for congestion, ear pain, postnasal drip, rhinorrhea, sinus pressure, sinus pain, sneezing, sore throat, tinnitus, trouble swallowing and voice change.    Eyes:  Negative for photophobia, pain and visual disturbance.   Respiratory:  Negative for cough, chest tightness, shortness of breath and wheezing.    Cardiovascular:  Negative for chest pain, palpitations and leg swelling.   Gastrointestinal:  Negative for abdominal distention, abdominal pain, constipation, diarrhea, nausea and vomiting.   Genitourinary:  Negative for decreased urine volume, difficulty urinating, dysuria, flank pain, frequency, hematuria and urgency.   Musculoskeletal:  Positive for arthralgias and myalgias. Negative for back pain, joint swelling, neck pain and neck stiffness.   Allergic/Immunologic: Negative for immunocompromised state.   Neurological:  Negative for dizziness, tremors, seizures, syncope, facial asymmetry, speech difficulty, weakness, light-headedness, numbness and headaches.   Hematological:  Negative for adenopathy. Does not bruise/bleed easily.   Psychiatric/Behavioral:  Negative for confusion and sleep disturbance.      Objective:      Physical Exam  Vitals reviewed.   Cardiovascular:      Rate and Rhythm: Normal rate and regular rhythm.      Pulses: Normal pulses.      Heart sounds: Normal heart sounds.   Pulmonary:      Effort: Pulmonary effort is normal.      Breath sounds: Normal breath sounds.   Musculoskeletal:       Right shoulder: Tenderness present. Decreased range of motion.      Comments: Arm sling in place to R arm   Neurological:      Mental Status: He is alert.       Assessment:     Vitals:    06/01/23 1501   BP: 112/76   Pulse: 92         1. Injury of right shoulder, initial encounter    2. Acute pain of right shoulder    3. Claustrophobia        Plan:   Injury of right shoulder, initial encounter  -     Ambulatory referral/consult to Orthopedics; Future; Expected date: 06/08/2023    Acute pain of right shoulder  -     Ambulatory referral/consult to Orthopedics; Future; Expected date: 06/08/2023  -     MRI Shoulder Without Contrast Right; Future; Expected date: 06/01/2023    Claustrophobia    Other orders  -     ALPRAZolam (XANAX) 0.5 MG tablet; Take 1 tablet (0.5 mg total) by mouth once. 30 minutes prior to MRI for 1 dose  Dispense: 1 tablet; Refill: 0        As above

## 2023-06-08 ENCOUNTER — HOSPITAL ENCOUNTER (OUTPATIENT)
Dept: RADIOLOGY | Facility: HOSPITAL | Age: 68
Discharge: HOME | End: 2023-06-08
Attending: NURSE PRACTITIONER
Payer: COMMERCIAL

## 2023-06-08 DIAGNOSIS — M25.511 ACUTE PAIN OF RIGHT SHOULDER: ICD-10-CM

## 2023-06-09 ENCOUNTER — TELEPHONE (OUTPATIENT)
Dept: INTERNAL MEDICINE | Facility: CLINIC | Age: 68
End: 2023-06-09
Payer: COMMERCIAL

## 2023-06-09 ENCOUNTER — PATIENT MESSAGE (OUTPATIENT)
Dept: INTERNAL MEDICINE | Facility: CLINIC | Age: 68
End: 2023-06-09
Payer: COMMERCIAL

## 2023-06-09 DIAGNOSIS — M25.511 ACUTE PAIN OF RIGHT SHOULDER: Primary | ICD-10-CM

## 2023-06-09 NOTE — TELEPHONE ENCOUNTER
----- Message from Saadia Chan sent at 6/9/2023  8:20 AM CDT -----  Contact: Isaiah Martinez went for his MRI yesterday, but got claustrophobic. Is there an open MRI machine that can be used? Or can he be referred out of [facility] for an open machine? Please call him back at [phone].    Thanks  TS

## 2023-06-12 ENCOUNTER — TELEPHONE (OUTPATIENT)
Dept: INTERNAL MEDICINE | Facility: CLINIC | Age: 68
End: 2023-06-12
Payer: COMMERCIAL

## 2023-06-19 ENCOUNTER — TELEPHONE (OUTPATIENT)
Dept: INTERNAL MEDICINE | Facility: CLINIC | Age: 68
End: 2023-06-19
Payer: COMMERCIAL

## 2023-06-19 NOTE — TELEPHONE ENCOUNTER
----- Message from Michell Patterson sent at 6/19/2023 11:39 AM CDT -----  Contact: Isaiah  Type:  Test Results     Who Called: Isaiah   Name of Test (Lab/Mammo/Etc): MRI   Date of Test:  6/8/23   Ordering Provider:  Wilda Phoenix NP   Where the test was performed: [facility]   Would the patient rather a call back or a response via [facility]? Call back   Best Call Back Number: [phone]   Additional Information:     Thanks   Am

## 2023-06-20 ENCOUNTER — TELEPHONE (OUTPATIENT)
Dept: INTERNAL MEDICINE | Facility: CLINIC | Age: 68
End: 2023-06-20
Payer: COMMERCIAL

## 2023-06-20 ENCOUNTER — PATIENT MESSAGE (OUTPATIENT)
Dept: INTERNAL MEDICINE | Facility: CLINIC | Age: 68
End: 2023-06-20
Payer: COMMERCIAL

## 2023-06-20 DIAGNOSIS — S43.431A TEAR OF RIGHT GLENOID LABRUM, INITIAL ENCOUNTER: ICD-10-CM

## 2023-06-20 DIAGNOSIS — M67.819 TENDINOSIS OF ROTATOR CUFF: Primary | ICD-10-CM

## 2023-06-20 DIAGNOSIS — M75.21 RIGHT BICIPITAL TENOSYNOVITIS: ICD-10-CM

## 2023-06-20 NOTE — TELEPHONE ENCOUNTER
----- Message from Blanka Landon sent at 6/20/2023  8:56 AM CDT -----  .Type:  Test Results    Who Called: .Isaiah Harrison   Name of Test (Lab/Mammo/Etc): MRI  Date of Test: 06/08/2023  Ordering Provider: Alban  Where the test was performed: [facility]  Would the patient rather a call back or a response via [facility]? Call back  Best Call Back Number: .[phone]  Additional Information:          Arm band on/IV intact

## 2023-06-21 DIAGNOSIS — M25.511 RIGHT SHOULDER PAIN, UNSPECIFIED CHRONICITY: Primary | ICD-10-CM

## 2023-06-28 ENCOUNTER — OFFICE VISIT (OUTPATIENT)
Dept: SPORTS MEDICINE | Facility: CLINIC | Age: 68
End: 2023-06-28
Payer: COMMERCIAL

## 2023-06-28 ENCOUNTER — HOSPITAL ENCOUNTER (OUTPATIENT)
Dept: RADIOLOGY | Facility: HOSPITAL | Age: 68
Discharge: HOME | End: 2023-06-28
Attending: ORTHOPAEDIC SURGERY
Payer: COMMERCIAL

## 2023-06-28 DIAGNOSIS — M67.819 TENDINOSIS OF ROTATOR CUFF: ICD-10-CM

## 2023-06-28 DIAGNOSIS — M75.21 RIGHT BICIPITAL TENOSYNOVITIS: ICD-10-CM

## 2023-06-28 DIAGNOSIS — M25.511 RIGHT SHOULDER PAIN, UNSPECIFIED CHRONICITY: ICD-10-CM

## 2023-06-28 DIAGNOSIS — S43.431A TEAR OF RIGHT GLENOID LABRUM, INITIAL ENCOUNTER: ICD-10-CM

## 2023-06-28 PROCEDURE — 3288F FALL RISK ASSESSMENT DOCD: CPT | Mod: CPTII,S$GLB,, | Performed by: ORTHOPAEDIC SURGERY

## 2023-06-28 PROCEDURE — 1101F PT FALLS ASSESS-DOCD LE1/YR: CPT | Mod: CPTII,S$GLB,, | Performed by: ORTHOPAEDIC SURGERY

## 2023-06-28 PROCEDURE — 73030 X-RAY EXAM OF SHOULDER: CPT | Mod: 26,RT,, | Performed by: RADIOLOGY

## 2023-06-28 PROCEDURE — 1159F MED LIST DOCD IN RCRD: CPT | Mod: CPTII,S$GLB,, | Performed by: ORTHOPAEDIC SURGERY

## 2023-06-28 PROCEDURE — 73030 XR SHOULDER COMPLETE 2 OR MORE VIEWS RIGHT: ICD-10-PCS | Mod: 26,RT,, | Performed by: RADIOLOGY

## 2023-06-28 PROCEDURE — 3072F PR LOW RISK FOR RETINOPATHY: ICD-10-PCS | Mod: CPTII,S$GLB,, | Performed by: ORTHOPAEDIC SURGERY

## 2023-06-28 PROCEDURE — 4010F ACE/ARB THERAPY RXD/TAKEN: CPT | Mod: CPTII,S$GLB,, | Performed by: ORTHOPAEDIC SURGERY

## 2023-06-28 PROCEDURE — 3288F PR FALLS RISK ASSESSMENT DOCUMENTED: ICD-10-PCS | Mod: CPTII,S$GLB,, | Performed by: ORTHOPAEDIC SURGERY

## 2023-06-28 PROCEDURE — 1101F PR PT FALLS ASSESS DOC 0-1 FALLS W/OUT INJ PAST YR: ICD-10-PCS | Mod: CPTII,S$GLB,, | Performed by: ORTHOPAEDIC SURGERY

## 2023-06-28 PROCEDURE — 4010F PR ACE/ARB THEARPY RXD/TAKEN: ICD-10-PCS | Mod: CPTII,S$GLB,, | Performed by: ORTHOPAEDIC SURGERY

## 2023-06-28 PROCEDURE — 3072F LOW RISK FOR RETINOPATHY: CPT | Mod: CPTII,S$GLB,, | Performed by: ORTHOPAEDIC SURGERY

## 2023-06-28 PROCEDURE — 1159F PR MEDICATION LIST DOCUMENTED IN MEDICAL RECORD: ICD-10-PCS | Mod: CPTII,S$GLB,, | Performed by: ORTHOPAEDIC SURGERY

## 2023-06-28 PROCEDURE — 99204 OFFICE O/P NEW MOD 45 MIN: CPT | Mod: S$GLB,,, | Performed by: ORTHOPAEDIC SURGERY

## 2023-06-28 PROCEDURE — 99204 PR OFFICE/OUTPT VISIT, NEW, LEVL IV, 45-59 MIN: ICD-10-PCS | Mod: S$GLB,,, | Performed by: ORTHOPAEDIC SURGERY

## 2023-06-28 PROCEDURE — 3051F HG A1C>EQUAL 7.0%<8.0%: CPT | Mod: CPTII,S$GLB,, | Performed by: ORTHOPAEDIC SURGERY

## 2023-06-28 PROCEDURE — 3051F PR MOST RECENT HEMOGLOBIN A1C LEVEL 7.0 - < 8.0%: ICD-10-PCS | Mod: CPTII,S$GLB,, | Performed by: ORTHOPAEDIC SURGERY

## 2023-06-28 PROCEDURE — 73030 X-RAY EXAM OF SHOULDER: CPT | Mod: TC,RT

## 2023-06-28 PROCEDURE — 99999 PR PBB SHADOW E&M-EST. PATIENT-LVL IV: CPT | Mod: PBBFAC,,, | Performed by: ORTHOPAEDIC SURGERY

## 2023-06-28 PROCEDURE — 99999 PR PBB SHADOW E&M-EST. PATIENT-LVL IV: ICD-10-PCS | Mod: PBBFAC,,, | Performed by: ORTHOPAEDIC SURGERY

## 2023-06-28 NOTE — PATIENT INSTRUCTIONS
Assessment:  Isaiah Harrison is a  68 y.o. male   [employer] with a chief complaint of Pain of the Right Shoulder    Right shoulder impingement syndrome   Rotator cuff tendinosis   Bicep tendinis     Encounter Diagnoses   Name Primary?    Tendinosis of rotator cuff     Right bicipital tenosynovitis     Tear of right glenoid labrum, initial encounter       Plan:  Work restrictions: No climbing, no repetitive overhead motions, no lifting over 20 lbs, no overhead lifting  Order PT at [place] with Jarred 2-3x a week for 8 weeks  Deferred CSI due to history of diabetes and high blood sugar/ A1C   Naproxen 500 BID, reach out to PCP to make sure its okay with Aspirin     Follow-up: 2 months or sooner if there are any problems between now and then.    Leave Review:   Google: Leave Google Review  Healthgrades: Leave Healthgrades Review    After Hours Number: [phone]

## 2023-06-28 NOTE — PROGRESS NOTES
Patient ID: Isaiah Harrison  YOB: 1955  MRN: [id number]    Chief Complaint: Pain of the Right Shoulder      Referred By: Wilda Phoenix NP    History of Present Illness: Isaiah Harrison is a RHD 68 y.o. male   [employer] with a chief complaint of Pain of the Right Shoulder    The patient presents today for evaluation of his right shoulder. He has already had an MRI of this shoulder, but no other treatment. He has not had any injections, but he is diabetic.  He reports mostly lateral shoulder pain with mostly overhead movement. He denies any weakness. He has not had any physical therapy.     HPI    Past Medical History:   Past Medical History:   Diagnosis Date    Acute coronary syndrome     Anticoagulant long-term use     Back pain     CAD (coronary artery disease) 10/17/2013    Class 2 severe obesity due to excess calories with serious comorbidity and body mass index (BMI) of 38.0 to 38.9 in adult 01/12/2015    Coronary artery disease     Diabetes mellitus, type 2 2010    BS didn't check 04/19/2023 Insulin 2 years    Gastric polyps     Hyperlipemia     Hypertension     NSTEMI (non-ST elevated myocardial infarction) 10/23/2014    Obesity 10/21/2014    DANTE on CPAP 02/19/2015    S/P PTCA (percutaneous transluminal coronary angioplasty) 10/17/2013    Sleep apnea 01/07/2015    Type 2 diabetes mellitus with circulatory disorder (coronary artery disease), without long-term current use of insulin 07/14/2015     Past Surgical History:   Procedure Laterality Date    APPENDECTOMY      BACK SURGERY      CATARACT EXTRACTION      CATARACT EXTRACTION, BILATERAL      COLONOSCOPY      COLONOSCOPY N/A 3/9/2023    Procedure: COLONOSCOPY;  Surgeon: Lisa Auguste MD;  Location: [facility];  Service: Endoscopy;  Laterality: N/A;    CORONARY ANGIOPLASTY WITH STENT PLACEMENT      ESOPHAGOGASTRODUODENOSCOPY      EYE SURGERY      FRACTURE SURGERY      HERNIA REPAIR      SPINE SURGERY       Family  History   Problem Relation Age of Onset    Hypertension Mother     Diabetes Mother     Hypertension Father     Diabetes Sister     Hypertension Sister     Heart disease Maternal Grandfather      Social History     Socioeconomic History    Marital status:    Tobacco Use    Smoking status: Never    Smokeless tobacco: Never   Substance and Sexual Activity    Alcohol use: Yes     Alcohol/week: 0.0 standard drinks     Comment: socially    Drug use: No    Sexual activity: Yes     Partners: Female     Medication List with Changes/Refills   New Medications    NAPROXEN (NAPROSYN) 500 MG TABLET    Take 1 tablet (500 mg total) by mouth 2 (two) times daily with meals.   Current Medications    ALPRAZOLAM (XANAX) 0.5 MG TABLET    Take 1 tablet (0.5 mg total) by mouth once. 30 minutes prior to MRI for 1 dose    AMLODIPINE-VALSARTAN (EXFORGE)  MG PER TABLET    TAKE 1 TABLET BY MOUTH EVERY DAY    ASPIRIN (ECOTRIN) 81 MG EC TABLET    Take 81 mg by mouth once daily.    ATORVASTATIN (LIPITOR) 80 MG TABLET    TAKE 1 TABLET(80 MG) BY MOUTH EVERY EVENING    BLOOD SUGAR DIAGNOSTIC STRP    1 strip by Misc.(Non-Drug; Combo Route) route 4 (four) times daily.    EMPAGLIFLOZIN (JARDIANCE) 10 MG TABLET    Take 1 tablet (10 mg total) by mouth once daily.    GENTAMICIN (GARAMYCIN) 0.1 % OINTMENT    Apply topically 2 (two) times a day.    HYDROCHLOROTHIAZIDE (HYDRODIURIL) 25 MG TABLET    TAKE 1 TABLET(25 MG) BY MOUTH EVERY DAY    HYDROCODONE-ACETAMINOPHEN (NORCO) 5-325 MG PER TABLET    Take 1 tablet by mouth every 6 (six) hours as needed for Pain.    HYDROXYZINE HCL (ATARAX) 10 MG TAB    TAKE 1 TABLET(10 MG) BY MOUTH TWICE DAILY AS NEEDED. MAY CAUSE DROWSINESS. DO NOT DRIVE OR OPERATE HEAVY MACHINERY    INSULIN (LANTUS SOLOSTAR U-100 INSULIN) GLARGINE 100 UNITS/ML SUBQ PEN    Inject 40 Units into the skin 2 (two) times a day.    ISOSORBIDE MONONITRATE (IMDUR) 30 MG 24 HR TABLET    TAKE 3 TABLETS(90 MG) BY MOUTH EVERY DAY    METFORMIN  "(GLUCOPHAGE-XR) 500 MG ER 24HR TABLET    Take 1 tablet (500 mg total) by mouth daily with breakfast.    METOPROLOL TARTRATE (LOPRESSOR) 50 MG TABLET    TAKE 1 TABLET BY MOUTH TWICE DAILY    NIACIN (SLO-NIACIN) 500 MG TABLET    TAKE 1 TABLET(500 MG) BY MOUTH EVERY EVENING    NITROGLYCERIN (NITROSTAT) 0.4 MG SL TABLET    Place 1 tablet (0.4 mg total) under the tongue every 5 (five) minutes as needed.    PEN NEEDLE, DIABETIC (BD ULTRA-FINE SHORT PEN NEEDLE) 31 GAUGE X 5/16" NDLE    USE WITH LANTUS PEN AS DIRECTED    PNEUMOC 20-YORDAN CONJ-DIP CR,PF, (PREVNAR-20, PF,) 0.5 ML SYRG INJECTION    Inject 0.5 mLs into the muscle.    RANOLAZINE (RANEXA) 1,000 MG TB12    TAKE 1 TABLET(1000 MG) BY MOUTH TWICE DAILY    TIRZEPATIDE 7.5 MG/0.5 ML PNIJ    Inject 7.5 mg into the skin every 7 days.     Review of patient's allergies indicates:   Allergen Reactions    Pcn [penicillins] Hives     ROS    Physical Exam:   There is no height or weight on file to calculate BMI.  There were no vitals filed for this visit.   GENERAL: Well appearing, appropriate for stated age, no acute distress.  CARDIOVASCULAR: Pulses regular by peripheral palpation.  PULMONARY: Respirations are even and non-labored.  NEURO: Awake, alert, and oriented x 3.  PSYCH: Mood & affect are appropriate.  HEENT: Head is normocephalic and atraumatic.          Right Shoulder Exam     Range of Motion   Active abduction:  170     Tests & Signs   Baldwin test: positive  Active Compression Test (Laramie's Sign): negative    Muscle Strength   Right Upper Extremity   Shoulder Abduction: 5/5   Shoulder Internal Rotation: 5/5   Shoulder External Rotation: 4/5   Left Upper Extremity  Shoulder Abduction: 5/5   Shoulder Internal Rotation: 5/5   Shoulder External Rotation: 4/5       Imaging:    X-ray Shoulder 2 or More Views Right  Narrative: EXAMINATION:  XR SHOULDER COMPLETE 2 OR MORE VIEWS RIGHT    CLINICAL HISTORY:  Pain in right shoulder    TECHNIQUE:  Two or three views of the " right shoulder were preformed.    COMPARISON:  05/31/2023    FINDINGS:  No acute fracture or dislocation.  No AC joint arthropathy.  Minimal degenerative change at the glenohumeral joint.  Impression: 1.  As above    Electronically signed by: Barrington Waters DO  Date:    06/28/2023  Time:    10:33      Relevant imaging results reviewed and interpreted by me, discussed with the patient and / or family today.     Other Tests:         Patient Instructions   Assessment:  Isaiah Harrison is a  68 y.o. male   [employer] with a chief complaint of Pain of the Right Shoulder    Right shoulder impingement syndrome   Rotator cuff tendinosis   Bicep tendinis     Encounter Diagnoses   Name Primary?    Tendinosis of rotator cuff     Right bicipital tenosynovitis     Tear of right glenoid labrum, initial encounter       Plan:  Work restrictions: No climbing, no repetitive overhead motions, no lifting over 20 lbs, no overhead lifting  Order PT at [facility] [place] with Jarred 2-3x a week for 8 weeks  Deferred CSI due to history of diabetes and high blood sugar/ A1C   Naproxen 500 BID, reach out to PCP to make sure its okay with Aspirin     Follow-up: 2 months or sooner if there are any problems between now and then.    Leave Review:   Google: Leave Google Review  Healthgrades: Leave Healthgrades Review    After Hours Number: [phone]       Provider Note/Medical Decision Making:       I discussed worrisome and red flag signs and symptoms with the patient. The patient expressed understanding and agreed to alert me immediately or to go to the emergency room if they experience any of these.   Treatment plan was developed with input from the patient/family, and they expressed understanding and agreement with the plan. All questions were answered today.          Santy Colindres MD  Orthopaedic Surgery & Sports Medicine       Disclaimer: This note was prepared using a voice recognition system and is likely to have sound alike  errors within the text.     I, Monica Lambert, acted as a scribe for Santy Colindres MD for the duration of this office visit.

## 2023-06-30 RX ORDER — NAPROXEN 500 MG/1
500 TABLET ORAL 2 TIMES DAILY WITH MEALS
Qty: 60 TABLET | Refills: 1 | Status: SHIPPED | OUTPATIENT
Start: 2023-06-30 | End: 2023-08-29

## 2023-07-11 ENCOUNTER — CLINICAL SUPPORT (OUTPATIENT)
Dept: REHABILITATION | Facility: HOSPITAL | Age: 68
End: 2023-07-11
Attending: ORTHOPAEDIC SURGERY
Payer: COMMERCIAL

## 2023-07-11 DIAGNOSIS — M25.511 ACUTE PAIN OF RIGHT SHOULDER: Primary | ICD-10-CM

## 2023-07-11 DIAGNOSIS — M75.21 RIGHT BICIPITAL TENOSYNOVITIS: ICD-10-CM

## 2023-07-11 DIAGNOSIS — M25.611 DECREASED ROM OF RIGHT SHOULDER: ICD-10-CM

## 2023-07-11 DIAGNOSIS — S43.431A TEAR OF RIGHT GLENOID LABRUM, INITIAL ENCOUNTER: ICD-10-CM

## 2023-07-11 DIAGNOSIS — M67.819 TENDINOSIS OF ROTATOR CUFF: ICD-10-CM

## 2023-07-11 DIAGNOSIS — M62.511 MUSCLE WASTING AND ATROPHY, NOT ELSEWHERE CLASSIFIED, RIGHT SHOULDER: ICD-10-CM

## 2023-07-11 PROCEDURE — 97110 THERAPEUTIC EXERCISES: CPT

## 2023-07-11 PROCEDURE — 97162 PT EVAL MOD COMPLEX 30 MIN: CPT

## 2023-07-11 NOTE — PLAN OF CARE
[facility]   Physical Therapy Initial Evaluation      Date: 7/11/2023   Name: Isaiah Mcclain Hunter  Clinic Number: [id number]    Therapy Diagnosis:    Encounter Diagnoses   Name Primary?    Tendinosis of rotator cuff     Right bicipital tenosynovitis     Tear of right glenoid labrum, initial encounter     Acute pain of right shoulder Yes    Decreased ROM of right shoulder     Muscle wasting and atrophy, not elsewhere classified, right shoulder       Physician: Santy Colindres MD     Physician Orders: PT Eval and Treat  Medical Diagnosis from Referral: Tendinosis of rotator cuff, Right bicipital tenosynovitis, Tear of right glenoid labrum  Evaluation Date: 7/11/2023  Authorization Period Expiration: 6/19/2024  Plan of Care Expiration: 8/11/2023  Progress Note Due: 8/11/2023  Visit # / Visits authorized: 1/1   FOTO: 1/3     Precautions: Co-Morbidities     Time In: 10:18 am   Time Out: 11:13 am  Total Billable Time (timed & untimed codes): 55 minutes    SUBJECTIVE   Date of onset: Around 1 month ago    History of current condition - Isaiah reports: While making the bed around a month ago, he felt a pop and 10/10 pain in his shoulder while pulling up and away from his body. He went to the emergency room immediately after and then scheduled an appointment with orthopedics. Patient reports that his pain has gotten better since the initial event, but still has difficulty and pain with forward and overhead reaching as well as significant pain at night.      Falls: None    Imaging: MRI performed on 6/16/23    Pain:  Current 1/10, worst 10/10, best 1/10   Location: Right lateral shoulder   Description: Aching   Aggravating Factors: Forward and overhead reaching   Easing Factors:  None    Prior Therapy: Yes  Social History: Pt lives with their spouse  Occupation: [employer] ,    Prior Level of Function: Independent and pain free with all ADL, IADL, community mobility and  functional activities.   Current Level of Function: Patient experiences quite a bit of difficulty with his typical ADL's.     Dominant Extremity: right    Pts goals: Pt reported goals are to decrease overall pain levels in order to return to prior functional level.       Medical History:   Past Medical History:   Diagnosis Date    Acute coronary syndrome     Anticoagulant long-term use     Back pain     CAD (coronary artery disease) 10/17/2013    Class 2 severe obesity due to excess calories with serious comorbidity and body mass index (BMI) of 38.0 to 38.9 in adult 01/12/2015    Coronary artery disease     Diabetes mellitus, type 2 2010    BS didn't check 04/19/2023 Insulin 2 years    Gastric polyps     Hyperlipemia     Hypertension     NSTEMI (non-ST elevated myocardial infarction) 10/23/2014    Obesity 10/21/2014    DANTE on CPAP 02/19/2015    S/P PTCA (percutaneous transluminal coronary angioplasty) 10/17/2013    Sleep apnea 01/07/2015    Type 2 diabetes mellitus with circulatory disorder (coronary artery disease), without long-term current use of insulin 07/14/2015       Surgical History:   Isaiah Harrison  has a past surgical history that includes Appendectomy; Hernia repair; Coronary angioplasty with stent; Fracture surgery; Colonoscopy; Esophagogastroduodenoscopy; Spine surgery; Back surgery; Cataract extraction, bilateral; Eye surgery; Cataract extraction; and Colonoscopy (N/A, 3/9/2023).    Medications:   Isaiah has a current medication list which includes the following prescription(s): alprazolam, amlodipine-valsartan, aspirin, atorvastatin, blood sugar diagnostic, empagliflozin, gentamicin, hydrochlorothiazide, hydrocodone-acetaminophen, hydroxyzine hcl, insulin, isosorbide mononitrate, metformin, metoprolol tartrate, naproxen, niacin, nitroglycerin, pen needle, diabetic, pneumoc 20-miranda conj-dip cr(pf), ranolazine, and tirzepatide.    Allergies:   Review of patient's allergies indicates:   Allergen  Reactions    Pcn [penicillins] Hives        OBJECTIVE     RANGE OF MOTION:  *denotes pain     Shoulder ROM  (Degrees) Right Left Goal   Shoulder Flexion 155*    Shoulder Abduction  150*    Shoulder ER  90 WFL -   Shoulder IR  55* WFL 70   Functional ER Dysfunctional Nonpainful  Funcitonal Nonpainful  Funcitonal Nonpainful    Funcigtonal IR Dysfunctional Painful  Funcitonal Nonpainful  Funcitonal Nonpainful        STRENGTH:  *denotes pain     MMT Right Left Goal   Shoulder Flexion 4+/5* 4+/5 5/5   Shoulder Abduction 4/5* 4+/5 5/5   Shoulder IR 5/5 5/5 -   Shoulder ER 4+/5 5/5 5/5   Elbow Flexion  4+/5 5/5 5/5   Teres Minor  4+/5 5/5 5/5   Supraspinatus  4/5* 4+/5 5/5         SPECIAL TESTS:     Right  (spine)   Baldwin Zeferino  Negative (pain in lateral shoulder)   Speeds Negative   Active Compression Negative   Neer  Positive (pain in lateral shoulder)   Painful Arc  Positive       Initial Observation:     FUNCTION:     CMS for FOTO Shoulder Survey    Therapist reviewed FOTO scores for Isaiah on 7/11/2023.   FOTO documents entered into [employer] - see Media section.    Intake Score: 65%         TREATMENT         THERAPEUTIC EXERCISES to develop strength, endurance, ROM, flexibility, posture, and core stabilization for (10) minutes including:  Intervention Performed Today     UBE   V2    Wall Slides  x  2x10   Internal Rotation with Wand    V2    External Rotation  x  Red Band, 2x10    Internal Rotation  x  Red Band, 2x10    Rows    V2    Shoulder Flexion    V2    Shoulder Scaption    V2            PATIENT EDUCATION AND HOME EXERCISES     Education provided: (5) minutes  Importance of performing exercises and ADL's within pain free limits to allow for proper healing.     Written Home Exercises Provided: {Exercises were reviewed and Isaiah was able to demonstrate them prior to the end of the session.  Isaiah demonstrated good  understanding of the education provided. See EMR under Patient Instructions for exercises  provided during therapy sessions.    ASSESSMENT   Isaiah is a 68 y.o. male referred to outpatient Physical Therapy with a medical diagnosis of Tendinosis of rotator cuff, Right bicipital tenosynovitis, Tear of right glenoid labrum. Pt presents with impairments including: impairments list: ROM, strength, endurance, and functional movement patterns.    Pt prognosis is Good.   Pt will benefit from skilled outpatient Physical Therapy to address the deficits stated above and in the chart below, provide pt/family education, and to maximize pt's level of independence.     Plan of care discussed with patient: Yes  Pt's spiritual, cultural and educational needs considered and patient is agreeable to the plan of care and goals as stated below:     Anticipated Barriers for therapy: co-morbidities    Medical Necessity is demonstrated by the following  History  Co-morbidities and personal factors that may impact the plan of care [] LOW: no personal factors / co-morbidities  [] MODERATE: 1-2 personal factors / co-morbidities  [x] HIGH: 3+ personal factors / co-morbidities    Moderate / High Support Documentation: See Past Medical History     Examination  Body Structures and Functions, activity limitations and participation restrictions that may impact the plan of care [] LOW: addressing 1-2 elements  [x] MODERATE: 3+ elements  [] HIGH: 4+ elements (please support below)    Moderate / High Support Documentation: See Evaluation Above     Clinical Presentation [] LOW: stable  [x] MODERATE: Evolving  [] HIGH: Unstable     Decision Making/ Complexity Score: moderate         GOALS:    Short Term Goals:  6 weeks Progress      Patient will report decreased pain to <6/10 at worst on VAS to progress toward Prior Level of Function.    Patient will report improved function by a functional score of >75 out of 100 on FOTO.    Patient will improve AROM to 50% of stated goals in order to progress towards Prior Level of Function.    Patient will  improve strength to 50% of stated goals in order to progress towards Prior Level of Function.        Long Term Goals:  12 weeks Progress     Patient will report decreased pain to <3/10 at worst on VAS to progress toward Prior Level of Function.      Patient will report improved function by a functional limitation score of >90 out of 100 on FOTO.    Patient will improve ROM and Functional Mobility to stated goals to return to Prior Level of Function.    Patient will improve strength to stated goals in order to return to Prior Level of Function.        PLAN   Plan of care Certification: 7/11/2023 to 8/11/2023.    Outpatient Physical Therapy 3 times weekly for 12 weeks to include any combination of the following interventions: virtual visits, dry needling, modalities, electrical stimulation (IFC, Pre-Mod, Attended with Functional Dry Needling), Cervical/Lumbar Traction, Gait Training, Manual Therapy, Neuromuscular Re-ed, Patient Education, Self Care, Therapeutic Activities, Therapeutic Exercise, and Therapeutic Activites     Jarred Robin, PT, DPT, SCS      I CERTIFY THE NEED FOR THESE SERVICES FURNISHED UNDER THIS PLAN OF TREATMENT AND WHILE UNDER MY CARE   Physician's comments:     Physician's Signature: ___________________________________________________

## 2023-07-13 ENCOUNTER — CLINICAL SUPPORT (OUTPATIENT)
Dept: REHABILITATION | Facility: HOSPITAL | Age: 68
End: 2023-07-13
Payer: COMMERCIAL

## 2023-07-13 DIAGNOSIS — M62.511 MUSCLE WASTING AND ATROPHY, NOT ELSEWHERE CLASSIFIED, RIGHT SHOULDER: ICD-10-CM

## 2023-07-13 DIAGNOSIS — M25.611 DECREASED ROM OF RIGHT SHOULDER: ICD-10-CM

## 2023-07-13 DIAGNOSIS — M25.511 ACUTE PAIN OF RIGHT SHOULDER: Primary | ICD-10-CM

## 2023-07-13 PROCEDURE — 97112 NEUROMUSCULAR REEDUCATION: CPT

## 2023-07-13 PROCEDURE — 97110 THERAPEUTIC EXERCISES: CPT

## 2023-07-13 PROCEDURE — 97140 MANUAL THERAPY 1/> REGIONS: CPT

## 2023-07-13 NOTE — PROGRESS NOTES
".[facility]   Physical Therapy Treatment Note     Name: Isaiah Mcclain Hunter  Clinic Number: [id number]    Therapy Diagnosis:   Encounter Diagnoses   Name Primary?    Acute pain of right shoulder Yes    Decreased ROM of right shoulder     Muscle wasting and atrophy, not elsewhere classified, right shoulder      Physician: Santy Colindres MD    Visit Date: 7/13/2023     Physician Orders: PT Eval and Treat  Medical Diagnosis from Referral: Tendinosis of rotator cuff, Right bicipital tenosynovitis, Tear of right glenoid labrum  Evaluation Date: 7/11/2023  Authorization Period Expiration: 12/31/23  Plan of Care Expiration: 8/11/2023  Progress Note Due: 8/11/2023  Visit # / Visits authorized: 1/25 (+1 for evaluation)  FOTO: 1/3      Precautions: Co-Morbidities     PTA Visit #: 0/5     Time In: 1:50 pm  Time Out: 2:40 pm  Total Billable Time: 45 minutes    SUBJECTIVE     Pt reports: He is feeling better today with moderate complaints of pain.   He was compliant with home exercise program.  Response to previous treatment: increased soreness after evaluation.   Functional change: patient reported independence with HEP.     Pain: 3/10  Location: Right Lateral Shoulder     OBJECTIVE     Objective Measures updated at progress report unless specified.     Comparable Signs  Supraspinatus MMT: 4/5*  Shoulder Flexion ROM: 155*  Shoulder ABD ROM: 150*  IR ROM: 55*    Treatment     Isaiah received the treatments listed below:        MANUAL THERAPY TECHNIQUES to improve pain, ROM for (10) minutes including:  Intervention Performed Today     Glenohumeral Mobilizations x Grade III/IV posterior/inferior glides             THERAPEUTIC EXERCISES to develop strength, endurance, ROM, flexibility, posture, and core stabilization for (25) minutes including:  Intervention Performed Today     UBE  x 3' fwd 3" bwd   Internal Rotation  x Blue 2 x 10   External Rotation x Red 2 x 10   Internal Rotation With Wand x 2 x 10 " 3s hold   Shoulder Scaption x Yellow 2 x 10   Bicep Curls x 3 x 10   Side-lying External Rotation  x 2 x 10          NEUROMUSCULAR RE-EDUCATION ACTIVITIES to improve Balance, Coordination, Kinesthetic, Sense, Proprioception, and Posture for (10) minutes.  The following were included:  Intervention Performed Today     Rows x Red 2 x 10   Extensions  x Red 2 x 10   Wall Slides  x 2 x 10            Patient Education and Home Exercises     Home Exercises Provided and Patient Education Provided     Education provided:   - Importance of performing exercises and ADL's within pain free limits to allow for proper healing    Written Home Exercises Provided: Patient instructed to cont prior HEP. Exercises were reviewed and Isaiah was able to demonstrate them prior to the end of the session.  Isaiah demonstrated good  understanding of the education provided. See EMR under Patient Instructions for exercises provided during therapy sessions    ASSESSMENT   Response to Manual Therapy: Following glenohumeral mobilizations, patient reports a decreased pain level.   Response to Therapeutic Exercise: Rotator cuff exercises were added to improve strength and and range of motion and were tolerated well with minimal complaints of pain.  Response to Neuromuscular Re-education: Rows, extensions, and wall slides were added to improve rotator cuff and scapular motor control.   Response to Therapeutic Activities: none.       Isaiah Is progressing well towards his goals.   Pt prognosis is Excellent.     Pt will continue to benefit from skilled outpatient physical therapy to address the deficits listed in the problem list box on initial evaluation, provide pt/family education and to maximize pt's level of independence in the home and community environment.     Pt's spiritual, cultural and educational needs considered and pt agreeable to plan of care and goals.     Anticipated barriers to physical therapy: co-morbidities     Goals:   Short Term Goals:   6 weeks Progress       Patient will report decreased pain to <6/10 at worst on VAS to progress toward Prior Level of Function. Progressing     Patient will report improved function by a functional score of >75 out of 100 on FOTO.     Patient will improve AROM to 50% of stated goals in order to progress towards Prior Level of Function.     Patient will improve strength to 50% of stated goals in order to progress towards Prior Level of Function.           Long Term Goals:  12 weeks Progress      Patient will report decreased pain to <3/10 at worst on VAS to progress toward Prior Level of Function.       Patient will report improved function by a functional limitation score of >90 out of 100 on FOTO.     Patient will improve ROM and Functional Mobility to stated goals to return to Prior Level of Function.     Patient will improve strength to stated goals in order to return to Prior Level of Function.        PLAN   Continue Plan of Care (POC) and progress per patient tolerance. See treatment section for details on planned progressions next session.      Jacob George, SPT  Jarred Robin, PT, DPT, SCS

## 2023-07-18 ENCOUNTER — CLINICAL SUPPORT (OUTPATIENT)
Dept: REHABILITATION | Facility: HOSPITAL | Age: 68
End: 2023-07-18
Payer: COMMERCIAL

## 2023-07-18 DIAGNOSIS — M25.511 ACUTE PAIN OF RIGHT SHOULDER: Primary | ICD-10-CM

## 2023-07-18 DIAGNOSIS — M25.611 DECREASED ROM OF RIGHT SHOULDER: ICD-10-CM

## 2023-07-18 DIAGNOSIS — M62.511 MUSCLE WASTING AND ATROPHY, NOT ELSEWHERE CLASSIFIED, RIGHT SHOULDER: ICD-10-CM

## 2023-07-18 PROCEDURE — 97110 THERAPEUTIC EXERCISES: CPT

## 2023-07-18 PROCEDURE — 97140 MANUAL THERAPY 1/> REGIONS: CPT

## 2023-07-18 PROCEDURE — 97112 NEUROMUSCULAR REEDUCATION: CPT

## 2023-07-18 NOTE — PROGRESS NOTES
[facility]   Physical Therapy Treatment Note     Name: Isaiah Mcclain Hunter  Clinic Number: [id number]    Therapy Diagnosis:   Encounter Diagnoses   Name Primary?    Acute pain of right shoulder Yes    Decreased ROM of right shoulder     Muscle wasting and atrophy, not elsewhere classified, right shoulder        Physician: Santy Colindres MD    Visit Date: 7/18/2023     Physician Orders: PT Eval and Treat  Medical Diagnosis from Referral: Tendinosis of rotator cuff, Right bicipital tenosynovitis, Tear of right glenoid labrum  Evaluation Date: 7/11/2023  Authorization Period Expiration: 12/31/23  Plan of Care Expiration: 8/11/2023  Progress Note Due: 8/11/2023  Visit # / Visits authorized: 2/25 (+1 for evaluation)  FOTO: 1/3      Precautions: Co-Morbidities     PTA Visit #: 0/5     Time In: 1:45 pm  Time Out: 2:30 pm  Total Billable Time: 40 minutes    SUBJECTIVE     Pt reports: He is feeling better today with minimal complaints of pain. He had some soreness after last session but it alleviated quickly.   He presents with reports that he has more pain with abduction motion (indicated) - patient advised to change position of hand so thumb pointed up and had more ROM and reported less pain.    He was compliant with home exercise program.  Response to previous treatment: increased soreness after evaluation.   Functional change: patient reported independence with HEP.     Pain: 3/10  Location: Right Lateral Shoulder     OBJECTIVE     Objective Measures updated at progress report unless specified.     Comparable Signs  Supraspinatus MMT: 4/5*  Shoulder Flexion ROM: 155*  Shoulder ABD ROM: 150*  IR ROM: 55*    Treatment     Isaiah received the treatments listed below:        MANUAL THERAPY TECHNIQUES to improve pain, ROM for (10) minutes including:  Intervention Performed Today     Glenohumeral Mobilizations - Grade III/IV posterior/inferior glides    Soft tissue mobilization and myofascial  "release x Subscapularis, upper trapezial, levator scapula, along medial scapular border.   Mobilization with movement x Scapular tilts     THERAPEUTIC EXERCISES to develop strength, endurance, ROM, flexibility, posture, and core stabilization for (20) minutes including:  Intervention Performed Today     UBE  x  3' fwd 3" bwd   Internal Rotation  x  Blue 3 x 10   External Rotation x  Red 3 x 10   Internal Rotation With Wand x  2 x 10 3s hold   Shoulder Scaption x  Yellow 3 x 10   Bicep Curls x  5# 3 x 10   Side-lying External Rotation  x  3# 3 x 10   Supine Punch x  3 x 10   Prone Horizontal Abduction            NEUROMUSCULAR RE-EDUCATION ACTIVITIES to improve Balance, Coordination, Kinesthetic, Sense, Proprioception, and Posture for (10) minutes.  The following were included:  Intervention Performed Today     Rows x  Red 3 x 10   Extensions  x  Red 3 x 10   Wall Slides  x  3 x 10            Patient Education and Home Exercises     Home Exercises Provided and Patient Education Provided     Education provided:   - Importance of performing exercises and ADL's within pain free limits to allow for proper healing    Written Home Exercises Provided: Patient instructed to cont prior HEP. Exercises were reviewed and Isaiah was able to demonstrate them prior to the end of the session.  Isaiah demonstrated good  understanding of the education provided. See EMR under Patient Instructions for exercises provided during therapy sessions    ASSESSMENT   Response to Manual Therapy: Following manual therapy, patient was able to abduct his shoulder further without pain.  Response to Therapeutic Exercise: Increased sets were added to rotator cuff exercises and supine punch was added to improve strength and endurance.  Response to Neuromuscular Re-education: Increased sets were added to rows, extensions, and wall slides to further improve rotator cuff motor control.   Response to Therapeutic Activities: none.       Isaiah Is progressing well " towards his goals.   Pt prognosis is Excellent.     Pt will continue to benefit from skilled outpatient physical therapy to address the deficits listed in the problem list box on initial evaluation, provide pt/family education and to maximize pt's level of independence in the home and community environment.     Pt's spiritual, cultural and educational needs considered and pt agreeable to plan of care and goals.     Anticipated barriers to physical therapy: co-morbidities     Goals:   Short Term Goals:  6 weeks Progress       Patient will report decreased pain to <6/10 at worst on VAS to progress toward Prior Level of Function. Progressing     Patient will report improved function by a functional score of >75 out of 100 on FOTO.     Patient will improve AROM to 50% of stated goals in order to progress towards Prior Level of Function.     Patient will improve strength to 50% of stated goals in order to progress towards Prior Level of Function.           Long Term Goals:  12 weeks Progress      Patient will report decreased pain to <3/10 at worst on VAS to progress toward Prior Level of Function.       Patient will report improved function by a functional limitation score of >90 out of 100 on FOTO.     Patient will improve ROM and Functional Mobility to stated goals to return to Prior Level of Function.     Patient will improve strength to stated goals in order to return to Prior Level of Function.        PLAN   Continue Plan of Care (POC) and progress per patient tolerance. See treatment section for details on planned progressions next session.      Jacob George, SPT  Estelle Mcclain, PT

## 2023-07-20 ENCOUNTER — CLINICAL SUPPORT (OUTPATIENT)
Dept: REHABILITATION | Facility: HOSPITAL | Age: 68
End: 2023-07-20
Payer: COMMERCIAL

## 2023-07-20 DIAGNOSIS — M62.511 MUSCLE WASTING AND ATROPHY, NOT ELSEWHERE CLASSIFIED, RIGHT SHOULDER: ICD-10-CM

## 2023-07-20 DIAGNOSIS — M25.611 DECREASED ROM OF RIGHT SHOULDER: ICD-10-CM

## 2023-07-20 DIAGNOSIS — M25.511 ACUTE PAIN OF RIGHT SHOULDER: Primary | ICD-10-CM

## 2023-07-20 PROCEDURE — 97112 NEUROMUSCULAR REEDUCATION: CPT

## 2023-07-20 PROCEDURE — 97110 THERAPEUTIC EXERCISES: CPT

## 2023-07-20 PROCEDURE — 97140 MANUAL THERAPY 1/> REGIONS: CPT

## 2023-07-20 NOTE — PROGRESS NOTES
[facility]   Physical Therapy Treatment Note     Name: sIaiah Mcclain Hunter  Clinic Number: [id number]    Therapy Diagnosis:   Encounter Diagnoses   Name Primary?    Acute pain of right shoulder Yes    Decreased ROM of right shoulder     Muscle wasting and atrophy, not elsewhere classified, right shoulder          Physician: Santy Colindres MD    Visit Date: 7/20/2023     Physician Orders: PT Eval and Treat  Medical Diagnosis from Referral: Tendinosis of rotator cuff, Right bicipital tenosynovitis, Tear of right glenoid labrum  Evaluation Date: 7/11/2023  Authorization Period Expiration: 12/31/23  Plan of Care Expiration: 8/11/2023  Progress Note Due: 8/11/2023  Visit # / Visits authorized: 3/25 (+1 for evaluation)  FOTO: 1/3      Precautions: Co-Morbidities     PTA Visit #: 0/5     Time In: 8:53 am  Time Out: 9:36 am  Total Billable Time: 42 minutes    SUBJECTIVE     Pt reports: He is continuing to feel much better -  a little muscle soreness after last visit. He is able to lift his arm much further. No pain today.    He was compliant with home exercise program.  Response to previous treatment: increased soreness after evaluation.   Functional change: patient reported independence with HEP.     Pain: 0/10  Location: Right Lateral Shoulder     OBJECTIVE     Objective Measures updated at progress report unless specified.     Comparable Signs  Supraspinatus MMT: 4/5*  Shoulder Flexion ROM: 155*  Shoulder ABD ROM: 150*  IR ROM: 55*    Treatment     Isaiah received the treatments listed below:        MANUAL THERAPY TECHNIQUES to improve pain, ROM for (9) minutes including:  Intervention Performed Today     Glenohumeral Mobilizations x Grade III/IV posterior/inferior glides, first rib mobility on right.     Soft tissue mobilization and myofascial release x Subscapularis, upper trapezial, levator scapula, along medial scapular border.   Mobilization with movement x Scapular tilts     THERAPEUTIC  "EXERCISES to develop strength, endurance, ROM, flexibility, posture, and core stabilization for (18) minutes including:  Intervention Performed Today     UBE  x  3' fwd 3" bwd   Internal Rotation  -  Blue 3 x 10   External Rotation x  Red 3 x 10   Internal Rotation With Wand -  2 x 10 3s hold   Shoulder Scaption x  Yellow; 3 x 10   Bicep Curls -  5# 3 x 10   Side-lying External Rotation  x  3# 3 x 10   Supine Punch x  3 x 10 #3               NEUROMUSCULAR RE-EDUCATION ACTIVITIES to improve Balance, Coordination, Kinesthetic, Sense, Proprioception, and Posture for (15) minutes.  The following were included:  Intervention Performed Today     Rows x  Blue 3 x 10   Extensions  x  Green  3 x 10   Wall Slides  -  3 x 10   Half foam roll series x  x  x  x  x Pectoral minor stretch 3 min  Swimmers 10x  Hugs 10x  Protraction/retraction 10x  [place] 10x   Prone  x  -  - 10x/ 2 sets Horizontal Abduction (add resistance)  "W's"  Rows               Patient Education and Home Exercises     Home Exercises Provided and Patient Education Provided     Education provided:   - Importance of performing exercises and ADL's within pain free limits to allow for proper healing    Written Home Exercises Provided: Patient instructed to cont prior HEP. Exercises were reviewed and Isaiah was able to demonstrate them prior to the end of the session.  Isaiah demonstrated good  understanding of the education provided. See EMR under Patient Instructions for exercises provided during therapy sessions    ASSESSMENT   Response to Manual Therapy: Following manual therapy, patient was able to abduct and flex his shoulder close to fully without pain.  Response to Therapeutic Exercise: Continued exercise's to increase strength and endurance.  Response to Neuromuscular Re-education: Modified to address thoracic mobility and scapular stability/motor control.   Response to Therapeutic Activities: none.       Isaiah Is progressing well towards his goals.   Pt " prognosis is Excellent.     Pt will continue to benefit from skilled outpatient physical therapy to address the deficits listed in the problem list box on initial evaluation, provide pt/family education and to maximize pt's level of independence in the home and community environment.     Pt's spiritual, cultural and educational needs considered and pt agreeable to plan of care and goals.     Anticipated barriers to physical therapy: co-morbidities     Goals:   Short Term Goals:  6 weeks Progress       Patient will report decreased pain to <6/10 at worst on VAS to progress toward Prior Level of Function. Progressing     Patient will report improved function by a functional score of >75 out of 100 on FOTO.     Patient will improve AROM to 50% of stated goals in order to progress towards Prior Level of Function.     Patient will improve strength to 50% of stated goals in order to progress towards Prior Level of Function.           Long Term Goals:  12 weeks Progress      Patient will report decreased pain to <3/10 at worst on VAS to progress toward Prior Level of Function.       Patient will report improved function by a functional limitation score of >90 out of 100 on FOTO.     Patient will improve ROM and Functional Mobility to stated goals to return to Prior Level of Function.     Patient will improve strength to stated goals in order to return to Prior Level of Function.        PLAN   Continue Plan of Care (POC) and progress per patient tolerance. See treatment section for details on planned progressions next session.      Estelle Mcclain, PT

## 2023-07-21 DIAGNOSIS — I20.0 UNSTABLE ANGINA: ICD-10-CM

## 2023-07-21 DIAGNOSIS — I25.10 CORONARY ARTERY DISEASE INVOLVING NATIVE CORONARY ARTERY WITHOUT ANGINA PECTORIS, UNSPECIFIED WHETHER NATIVE OR TRANSPLANTED HEART: ICD-10-CM

## 2023-07-21 RX ORDER — RANOLAZINE 1000 MG/1
1000 TABLET, EXTENDED RELEASE ORAL 2 TIMES DAILY
Qty: 60 TABLET | Refills: 6 | Status: SHIPPED | OUTPATIENT
Start: 2023-07-21

## 2023-07-21 RX ORDER — NIACIN 500 MG/1
TABLET, EXTENDED RELEASE ORAL
Qty: 30 TABLET | Refills: 6 | Status: SHIPPED | OUTPATIENT
Start: 2023-07-21

## 2023-07-25 ENCOUNTER — CLINICAL SUPPORT (OUTPATIENT)
Dept: REHABILITATION | Facility: HOSPITAL | Age: 68
End: 2023-07-25
Payer: COMMERCIAL

## 2023-07-25 DIAGNOSIS — M25.511 ACUTE PAIN OF RIGHT SHOULDER: Primary | ICD-10-CM

## 2023-07-25 DIAGNOSIS — M25.611 DECREASED ROM OF RIGHT SHOULDER: ICD-10-CM

## 2023-07-25 DIAGNOSIS — M62.511 MUSCLE WASTING AND ATROPHY, NOT ELSEWHERE CLASSIFIED, RIGHT SHOULDER: ICD-10-CM

## 2023-07-25 PROCEDURE — 97140 MANUAL THERAPY 1/> REGIONS: CPT

## 2023-07-25 PROCEDURE — 97112 NEUROMUSCULAR REEDUCATION: CPT

## 2023-07-25 PROCEDURE — 97110 THERAPEUTIC EXERCISES: CPT

## 2023-07-25 NOTE — PROGRESS NOTES
[facility]   Physical Therapy Treatment Note     Name: Isaiah Mcclain Hunter  Clinic Number: [id number]    Therapy Diagnosis:   Encounter Diagnoses   Name Primary?    Acute pain of right shoulder Yes    Decreased ROM of right shoulder     Muscle wasting and atrophy, not elsewhere classified, right shoulder            Physician: Santy Colindres MD    Visit Date: 7/25/2023     Physician Orders: PT Eval and Treat  Medical Diagnosis from Referral: Tendinosis of rotator cuff, Right bicipital tenosynovitis, Tear of right glenoid labrum  Evaluation Date: 7/11/2023  Authorization Period Expiration: 12/31/23  Plan of Care Expiration: 8/11/2023  Progress Note Due: 8/11/2023  Visit # / Visits authorized: 4/25 (+1 for evaluation)  FOTO: 1/3      Precautions: Co-Morbidities     PTA Visit #: 0/5     Time In: 9:15 am  Time Out: 10:00 am  Total Billable Time: 40 minutes    SUBJECTIVE     Pt reports: he is feeling good today with no pain, he is starting to feel better each day.     He was compliant with home exercise program.  Response to previous treatment: increased soreness after evaluation.   Functional change: patient reported independence with HEP.     Pain: 0/10  Location: Right Lateral Shoulder     OBJECTIVE     Objective Measures updated at progress report unless specified.     Comparable Signs  Supraspinatus MMT: 4/5*  Shoulder Flexion ROM: 155*  Shoulder ABD ROM: 150*  IR ROM: 55*    Treatment     Isaiah received the treatments listed below:        MANUAL THERAPY TECHNIQUES to improve pain, ROM for (10) minutes including:  Intervention Performed Today     Glenohumeral Mobilizations x Grade III/IV posterior/inferior glides.     Soft tissue mobilization and myofascial release  Subscapularis, upper trapezial, levator scapula, along medial scapular border.   Mobilization with movement  Scapular tilts           THERAPEUTIC EXERCISES to develop strength, endurance, ROM, flexibility, posture, and core  "stabilization for (20) minutes including:  Intervention Performed Today     UBE  x  3' fwd 3" bwd   Internal Rotation  x  Blue 3 x 10   External Rotation x  Green 3 x 10   Internal Rotation With Wand x  2 x 10 3s hold   Shoulder Scaption x  Yellow; 3 x 10   Bicep Curls -  5# 3 x 10   Side-lying External Rotation  x  3# 3 x 10   Supine Punch x  3 x 10 #5            NEUROMUSCULAR RE-EDUCATION ACTIVITIES to improve Balance, Coordination, Kinesthetic, Sense, Proprioception, and Posture for (10) minutes.  The following were included:  Intervention Performed Today     Rows x  Blue 3 x 10   Extensions  x  Green  3 x 10   Wall Slides  x  3 x 10   Half foam roll series - Pectoral minor stretch 3 min  Swimmers 10x  Hugs 10x  Protraction/retraction 10x  [place] 10x   Prone  x  x  -  3# 2 x 10 sets Horizontal Abduction   3# 2 x 10 Rows    W's              Patient Education and Home Exercises     Home Exercises Provided and Patient Education Provided     Education provided:   - Importance of performing exercises and ADL's within pain free limits to allow for proper healing    Written Home Exercises Provided: Patient instructed to cont prior HEP. Exercises were reviewed and Isaiah was able to demonstrate them prior to the end of the session.  Isaiah demonstrated good  understanding of the education provided. See EMR under Patient Instructions for exercises provided during therapy sessions    ASSESSMENT   Response to Manual Therapy: Following manual therapy, patient was able to fully abduct and flex his shoulder without pain.   Response to Therapeutic Exercise: Increased resistance was added to external rotation supine punch to further improve upper extremity strength.   Response to Neuromuscular Re-education: Prone rows were added to further improve scapular stability and motor control.   Response to Therapeutic Activities: none.       Isaiah Is progressing well towards his goals.   Pt prognosis is Excellent.     Pt will continue to " benefit from skilled outpatient physical therapy to address the deficits listed in the problem list box on initial evaluation, provide pt/family education and to maximize pt's level of independence in the home and community environment.     Pt's spiritual, cultural and educational needs considered and pt agreeable to plan of care and goals.     Anticipated barriers to physical therapy: co-morbidities     Goals:   Short Term Goals:  6 weeks Progress       Patient will report decreased pain to <6/10 at worst on VAS to progress toward Prior Level of Function. Progressing     Patient will report improved function by a functional score of >75 out of 100 on FOTO.     Patient will improve AROM to 50% of stated goals in order to progress towards Prior Level of Function.     Patient will improve strength to 50% of stated goals in order to progress towards Prior Level of Function.           Long Term Goals:  12 weeks Progress      Patient will report decreased pain to <3/10 at worst on VAS to progress toward Prior Level of Function.       Patient will report improved function by a functional limitation score of >90 out of 100 on FOTO.     Patient will improve ROM and Functional Mobility to stated goals to return to Prior Level of Function.     Patient will improve strength to stated goals in order to return to Prior Level of Function.        PLAN   Continue Plan of Care (POC) and progress per patient tolerance. See treatment section for details on planned progressions next session.      Jacob George, SPT  Jarred Robin, PT, DPT, SCS

## 2023-07-27 ENCOUNTER — PATIENT MESSAGE (OUTPATIENT)
Dept: OTHER | Facility: OTHER | Age: 68
End: 2023-07-27
Payer: COMMERCIAL

## 2023-07-27 ENCOUNTER — CLINICAL SUPPORT (OUTPATIENT)
Dept: REHABILITATION | Facility: HOSPITAL | Age: 68
End: 2023-07-27
Payer: COMMERCIAL

## 2023-07-27 DIAGNOSIS — M25.611 DECREASED ROM OF RIGHT SHOULDER: ICD-10-CM

## 2023-07-27 DIAGNOSIS — M62.511 MUSCLE WASTING AND ATROPHY, NOT ELSEWHERE CLASSIFIED, RIGHT SHOULDER: ICD-10-CM

## 2023-07-27 DIAGNOSIS — M25.511 ACUTE PAIN OF RIGHT SHOULDER: Primary | ICD-10-CM

## 2023-07-27 PROCEDURE — 97112 NEUROMUSCULAR REEDUCATION: CPT

## 2023-07-27 PROCEDURE — 97140 MANUAL THERAPY 1/> REGIONS: CPT

## 2023-07-27 PROCEDURE — 97110 THERAPEUTIC EXERCISES: CPT

## 2023-07-27 NOTE — PROGRESS NOTES
[facility]   Physical Therapy Treatment Note     Name: Isaiah Mcclain Hunter  Clinic Number: [id number]    Therapy Diagnosis:   Encounter Diagnoses   Name Primary?    Acute pain of right shoulder Yes    Decreased ROM of right shoulder     Muscle wasting and atrophy, not elsewhere classified, right shoulder        Physician: Santy Colindres MD    Visit Date: 7/27/2023     Physician Orders: PT Eval and Treat  Medical Diagnosis from Referral: Tendinosis of rotator cuff, Right bicipital tenosynovitis, Tear of right glenoid labrum  Evaluation Date: 7/11/2023  Authorization Period Expiration: 12/31/23  Plan of Care Expiration: 8/11/2023  Progress Note Due: 8/11/2023  Visit # / Visits authorized: 5/25 (+1 for evaluation)  FOTO: 1/3      Precautions: Co-Morbidities     PTA Visit #: 0/5     Time In: 9:05 am  Time Out: 10:00 am  Total Billable Time: 50 minutes    SUBJECTIVE     Pt reports: he is feeling good, making progress each day.     He was compliant with home exercise program.  Response to previous treatment: increased soreness after evaluation.   Functional change: patient reported independence with HEP.     Pain: 0/10  Location: Right Lateral Shoulder     OBJECTIVE     Objective Measures updated at progress report unless specified.     Comparable Signs  Supraspinatus MMT: 4/5*  Shoulder Flexion ROM: 155*  Shoulder ABD ROM: 150*  IR ROM: 55*    Treatment     Isaiah received the treatments listed below:        MANUAL THERAPY TECHNIQUES to improve pain, ROM for (10) minutes including:  Intervention Performed Today     Glenohumeral Mobilizations x Grade III/IV posterior/inferior glides.     Soft tissue mobilization and myofascial release  Subscapularis, upper trapezial, levator scapula, along medial scapular border.   Mobilization with movement  Scapular tilts           THERAPEUTIC EXERCISES to develop strength, endurance, ROM, flexibility, posture, and core stabilization for (25) minutes  "including:  Intervention Performed Today     UBE  x  3' fwd 3" bwd   Internal Rotation  x  Blue 3 x 10   External Rotation x  Green 3 x 10   Internal Rotation With Wand x  2 x 10 3s hold   Internal Rotation w/ Strap x  10s x 5   Shoulder Scaption x  Yellow; 3 x 10   Bicep Curls x  5# 3 x 10   Side-lying External Rotation  x  3# 3 x 10   Supine Punch x  3 x 10 #5            NEUROMUSCULAR RE-EDUCATION ACTIVITIES to improve Balance, Coordination, Kinesthetic, Sense, Proprioception, and Posture for (15) minutes.  The following were included:  Intervention Performed Today     Rows x  12.5# 3 x 10   Extensions  x  10# 3 x 10   Wall Slides  x  3 x 10   Half foam roll series - Pectoral minor stretch 3 min  Swimmers 10x  Hugs 10x  Protraction/retraction 10x  [place] 10x   Prone  x  x  -  3# 2 x 10 sets Horizontal Abduction   3# 2 x 10 Rows    W's              Patient Education and Home Exercises     Home Exercises Provided and Patient Education Provided     Education provided:   - Importance of performing exercises and ADL's within pain free limits to allow for proper healing    Written Home Exercises Provided: Patient instructed to cont prior HEP. Exercises were reviewed and Isaiah was able to demonstrate them prior to the end of the session.  Isaiah demonstrated good  understanding of the education provided. See EMR under Patient Instructions for exercises provided during therapy sessions    ASSESSMENT   Response to Manual Therapy: Following manual therapy, patient reports no pain with shoulder flexion.    Response to Therapeutic Exercise: Internal rotation with strap was added to further improve shoulder internal rotation range of motion.   Response to Neuromuscular Re-education: Increased resistance was added to rows and extensions to improve shoulder/scapular motor control and posture.   Response to Therapeutic Activities: none.       Isaiah Is progressing well towards his goals.   Pt prognosis is Excellent.     Pt will " continue to benefit from skilled outpatient physical therapy to address the deficits listed in the problem list box on initial evaluation, provide pt/family education and to maximize pt's level of independence in the home and community environment.     Pt's spiritual, cultural and educational needs considered and pt agreeable to plan of care and goals.     Anticipated barriers to physical therapy: co-morbidities     Goals:   Short Term Goals:  6 weeks Progress       Patient will report decreased pain to <6/10 at worst on VAS to progress toward Prior Level of Function. Progressing     Patient will report improved function by a functional score of >75 out of 100 on FOTO.     Patient will improve AROM to 50% of stated goals in order to progress towards Prior Level of Function.     Patient will improve strength to 50% of stated goals in order to progress towards Prior Level of Function.           Long Term Goals:  12 weeks Progress      Patient will report decreased pain to <3/10 at worst on VAS to progress toward Prior Level of Function.       Patient will report improved function by a functional limitation score of >90 out of 100 on FOTO.     Patient will improve ROM and Functional Mobility to stated goals to return to Prior Level of Function.     Patient will improve strength to stated goals in order to return to Prior Level of Function.        PLAN   Continue Plan of Care (POC) and progress per patient tolerance. See treatment section for details on planned progressions next session.      Jacob George, SPT  Jarred Robin, PT, DPT, SCS

## 2023-08-01 ENCOUNTER — CLINICAL SUPPORT (OUTPATIENT)
Dept: REHABILITATION | Facility: HOSPITAL | Age: 68
End: 2023-08-01
Payer: COMMERCIAL

## 2023-08-01 DIAGNOSIS — M25.611 DECREASED ROM OF RIGHT SHOULDER: ICD-10-CM

## 2023-08-01 DIAGNOSIS — M25.511 ACUTE PAIN OF RIGHT SHOULDER: Primary | ICD-10-CM

## 2023-08-01 DIAGNOSIS — M62.511 MUSCLE WASTING AND ATROPHY, NOT ELSEWHERE CLASSIFIED, RIGHT SHOULDER: ICD-10-CM

## 2023-08-01 PROCEDURE — 97140 MANUAL THERAPY 1/> REGIONS: CPT

## 2023-08-01 PROCEDURE — 97112 NEUROMUSCULAR REEDUCATION: CPT

## 2023-08-01 PROCEDURE — 97110 THERAPEUTIC EXERCISES: CPT

## 2023-08-01 NOTE — PROGRESS NOTES
"[facility]   Physical Therapy Treatment Note     Name: Isaiah Mcclain Hunter  Clinic Number: [id number]    Therapy Diagnosis:   Encounter Diagnoses   Name Primary?    Acute pain of right shoulder Yes    Decreased ROM of right shoulder     Muscle wasting and atrophy, not elsewhere classified, right shoulder          Physician: Santy Colindres MD    Visit Date: 8/1/2023     Physician Orders: PT Eval and Treat  Medical Diagnosis from Referral: Tendinosis of rotator cuff, Right bicipital tenosynovitis, Tear of right glenoid labrum  Evaluation Date: 7/11/2023  Authorization Period Expiration: 12/31/23  Plan of Care Expiration: 8/11/2023  Progress Note Due: 8/11/2023  Visit # / Visits authorized: 6/25   FOTO: 1/3      Precautions: Co-Morbidities     PTA Visit #: 0/5     Time In: 10:00 am   Time Out: 10:58 am  Total Billable Time: 55 minutes    SUBJECTIVE     Pt reports: he is feeling good, making progress each day.     He was compliant with home exercise program.  Response to previous treatment: increased soreness after evaluation.   Functional change: patient reported independence with HEP.     Pain: 0/10  Location: Right Lateral Shoulder     OBJECTIVE     Objective Measures updated at progress report unless specified.     Comparable Signs  Supraspinatus MMT: 4/5*  Shoulder Flexion ROM: 155*  Shoulder ABD ROM: 150*  IR ROM: 55*    Treatment     Isaiah received the treatments listed below:        MANUAL THERAPY TECHNIQUES to improve pain, ROM for (10) minutes including:  Intervention Performed Today     Glenohumeral Mobilizations x Grade III/IV posterior/inferior glides.     PROM x  Internal Rotation    Mobilization with movement  Scapular tilts           THERAPEUTIC EXERCISES to develop strength, endurance, ROM, flexibility, posture, and core stabilization for (25) minutes including:  Intervention Performed Today     UBE  x  3' fwd 3" bwd   Extension with Wand  x  10"x5   Internal Rotation with " "Strap  x  10"x5   Internal Rotation at 0  x  Blue 3 x 10   External Rotation at 0  x  Green 3 x 10   Internal Rotation With Wand   2 x 10 3s hold   Internal Rotation w/ Strap x  10s x 5   Shoulder Scaption x  Yellow; 3 x 10   Bicep Curls x  5# 3 x 10   Side-lying External Rotation  x  3# 3 x 10   Supine Punch x  3 x 10 #5            NEUROMUSCULAR RE-EDUCATION ACTIVITIES to improve Balance, Coordination, Kinesthetic, Sense, Proprioception, and Posture for (20) minutes.  The following were included:  Intervention Performed Today     Step Ups with Arm Assist x  20" box, 2x10    Rows x  12.5# 3 x 10   Lat Pulldowns - seated  x  22.5#, 3x10   Extensions  x  10# 3 x 10   Wall Slides  x  3 x 10   Half foam roll series - Pectoral minor stretch 3 min  Swimmers 10x  Hugs 10x  Protraction/retraction 10x  [place] 10x   Prone  x  x  -  3# 2 x 10 sets Horizontal Abduction   3# 2 x 10 Rows    W's              Patient Education and Home Exercises     Home Exercises Provided and Patient Education Provided     Education provided:   - Importance of performing exercises and ADL's within pain free limits to allow for proper healing    Written Home Exercises Provided: Patient instructed to cont prior HEP. Exercises were reviewed and Isaiah was able to demonstrate them prior to the end of the session.  Isaiah demonstrated good  understanding of the education provided. See EMR under Patient Instructions for exercises provided during therapy sessions    ASSESSMENT   Response to Manual Therapy: patient demonstrated significantly improved shoulder ROM with reaching overhead and behind his back.   Response to Therapeutic Exercise: added Extension with Wand to improve shoulder extension ROM.   Response to Neuromuscular Re-education: added Lat Pulldowns and Step Ups with Arm Assist to improved shoulder motor control.   Response to Therapeutic Activities: none.       Isaiah Is progressing well towards his goals.   Pt prognosis is Excellent.     Pt will " continue to benefit from skilled outpatient physical therapy to address the deficits listed in the problem list box on initial evaluation, provide pt/family education and to maximize pt's level of independence in the home and community environment.     Pt's spiritual, cultural and educational needs considered and pt agreeable to plan of care and goals.     Anticipated barriers to physical therapy: co-morbidities     Goals:   Short Term Goals:  6 weeks Progress       Patient will report decreased pain to <6/10 at worst on VAS to progress toward Prior Level of Function. Progressing     Patient will report improved function by a functional score of >75 out of 100 on FOTO.     Patient will improve AROM to 50% of stated goals in order to progress towards Prior Level of Function.     Patient will improve strength to 50% of stated goals in order to progress towards Prior Level of Function.           Long Term Goals:  12 weeks Progress      Patient will report decreased pain to <3/10 at worst on VAS to progress toward Prior Level of Function.       Patient will report improved function by a functional limitation score of >90 out of 100 on FOTO.     Patient will improve ROM and Functional Mobility to stated goals to return to Prior Level of Function.     Patient will improve strength to stated goals in order to return to Prior Level of Function.        PLAN   See 1 more visit for independence with HEP and discharge if appropriate.       Jarred Robin, PT, DPT, SCS

## 2023-08-03 ENCOUNTER — CLINICAL SUPPORT (OUTPATIENT)
Dept: REHABILITATION | Facility: HOSPITAL | Age: 68
End: 2023-08-03
Payer: COMMERCIAL

## 2023-08-03 DIAGNOSIS — M25.511 ACUTE PAIN OF RIGHT SHOULDER: Primary | ICD-10-CM

## 2023-08-03 DIAGNOSIS — M62.511 MUSCLE WASTING AND ATROPHY, NOT ELSEWHERE CLASSIFIED, RIGHT SHOULDER: ICD-10-CM

## 2023-08-03 DIAGNOSIS — M25.611 DECREASED ROM OF RIGHT SHOULDER: ICD-10-CM

## 2023-08-03 PROCEDURE — 97110 THERAPEUTIC EXERCISES: CPT

## 2023-08-03 NOTE — PLAN OF CARE
[facility]  Physical Therapy Discharge Note    [id number]    Therapy Diagnosis:   Encounter Diagnoses   Name Primary?    Acute pain of right shoulder Yes    Decreased ROM of right shoulder     Muscle wasting and atrophy, not elsewhere classified, right shoulder      Physician: Santy Colindres MD    Physician Orders: PT Eval and Treat  Medical Diagnosis from Referral: Tendinosis of rotator cuff, Right bicipital tenosynovitis, Tear of right glenoid labrum  Evaluation Date: 7/11/2023      Date of Last visit: 8/3/23  Total Visits Received: 8    ASSESSMENT      Patient has progressed well with Physical Therapy and is appropriate for discharge at this time.     Discharge reason: Patient has reached the maximum rehab potential for the present time    Discharge FOTO Score: 75    Goals:   Short Term Goals:  6 weeks Progress       Patient will report decreased pain to <6/10 at worst on VAS to progress toward Prior Level of Function. Goal Met  8/3/23    Patient will report improved function by a functional score of >75 out of 100 on FOTO.  Goal Met  8/3/23   Patient will improve AROM to 50% of stated goals in order to progress towards Prior Level of Function.  Goal Met  8/3/23   Patient will improve strength to 50% of stated goals in order to progress towards Prior Level of Function.  Goal Met  8/3/23         Long Term Goals:  12 weeks Progress      Patient will report decreased pain to <3/10 at worst on VAS to progress toward Prior Level of Function.    Goal Met  8/3/23   Patient will report improved function by a functional limitation score of >90 out of 100 on FOTO.  Not Met   Patient will improve ROM and Functional Mobility to stated goals to return to Prior Level of Function.  Not Met   Patient will improve strength to stated goals in order to return to Prior Level of Function. Goal Met  8/3/23       PLAN   This patient is discharged from Physical  Therapy.      Jarred Robin, PT, DPT, SCS

## 2023-08-03 NOTE — PROGRESS NOTES
[facility]   Physical Therapy Treatment Note + Discharge Note     Name: Isaiah Mcclain Hunter  Clinic Number: [id number]    Therapy Diagnosis:   Encounter Diagnoses   Name Primary?    Acute pain of right shoulder Yes    Decreased ROM of right shoulder     Muscle wasting and atrophy, not elsewhere classified, right shoulder        Physician: Santy Colindres MD    Visit Date: 8/3/2023     Physician Orders: PT Eval and Treat  Medical Diagnosis from Referral: Tendinosis of rotator cuff, Right bicipital tenosynovitis, Tear of right glenoid labrum  Evaluation Date: 7/11/2023  Authorization Period Expiration: 12/31/23  Plan of Care Expiration: 8/11/2023  Progress Note Due: 8/11/2023  Visit # / Visits authorized: 7/25   FOTO: 1/3      Precautions: Co-Morbidities     PTA Visit #: 0/5     Time In: 8:10 am   Time Out: 8:40 am  Total Billable Time: 25 minutes    SUBJECTIVE     Pt reports: no significant pain or shoulder stiffness today.      He was compliant with home exercise program.  Response to previous treatment: improved shoulder ROM with reaching behind his back.   Functional change: patient reported independence with HEP.     Pain: 0/10  Location: Right Lateral Shoulder     OBJECTIVE     RANGE OF MOTION:  *denotes pain     Shoulder ROM  (Degrees) Right Right  Updated  Goal   Shoulder Flexion 155* 160 160   Shoulder Abduction  150* 160 160   Shoulder IR  55* 55 70   Functional ER Dysfunctional Nonpainful  Funcitonal Nonpainful  Funcitonal Nonpainful    Funcigtonal IR Dysfunctional Painful  Funcitonal Nonpainful  Funcitonal Nonpainful        STRENGTH:  *denotes pain     MMT Right Left Goal   Shoulder Flexion 4+/5* 5/5 5/5   Shoulder Abduction 4/5* 5/5 5/5   Shoulder ER 4+/5 5/5 5/5   Elbow Flexion  4+/5 5/5 5/5   Teres Minor  4+/5 5/5 5/5   Supraspinatus  4/5* 5/5 5/5         FUNCTION:     CMS for FOTO Shoulder Survey    Therapist reviewed FOTO scores for Isaiah on 7/11/2023.   FOTO documents  "entered into EPIC - see Media section.    Intake Score (initial): 65%  Intake Score (discharge): 75%         Treatment     Isaiah received the treatments listed below:        MANUAL THERAPY TECHNIQUES to improve pain, ROM for (0) minutes including:  Intervention Performed Today     Glenohumeral Mobilizations  Grade III/IV posterior/inferior glides.     PROM   Internal Rotation    Mobilization with movement  Scapular tilts           THERAPEUTIC EXERCISES to develop strength, endurance, ROM, flexibility, posture, and core stabilization for (25) minutes including:  Intervention Performed Today     UBE  x  3' fwd 3" bwd   Extension with Wand  x  10"x5   Internal Rotation with Strap  x  10"x5   Internal Rotation at 0    Blue 3 x 10   External Rotation at 0    Green 3 x 10   Internal Rotation With Wand   2 x 10 3s hold   Internal Rotation w/ Strap   10s x 5   Shoulder Scaption   Yellow; 3 x 10   Bicep Curls   5# 3 x 10   Side-lying External Rotation    3# 3 x 10   Supine Punch   3 x 10 #5            NEUROMUSCULAR RE-EDUCATION ACTIVITIES to improve Balance, Coordination, Kinesthetic, Sense, Proprioception, and Posture for (0) minutes.  The following were included:  Intervention Performed Today     Step Ups with Arm Assist   20" box, 2x10    Rows   12.5# 3 x 10   Lat Pulldowns - seated    22.5#, 3x10   Extensions    10# 3 x 10   Wall Slides    3 x 10   Half foam roll series  Pectoral minor stretch 3 min  Swimmers 10x  Hugs 10x  Protraction/retraction 10x  [place] 10x   Prone    3# 2 x 10 sets Horizontal Abduction   3# 2 x 10 Rows    W's              Patient Education and Home Exercises     Home Exercises Provided and Patient Education Provided     Education provided:   - Importance of performing exercises and ADL's within pain free limits to allow for proper healing    Written Home Exercises Provided: Patient instructed to cont prior HEP. Exercises were reviewed and Isaiah was able to demonstrate them prior to the end of the " session.  Isaiah demonstrated good  understanding of the education provided. See EMR under Patient Instructions for exercises provided during therapy sessions    ASSESSMENT   Response to Manual Therapy: none.   Response to Therapeutic Exercise: updated goals, measurements, and FOTO for Discharge Note.   Response to Neuromuscular Re-education: none.   Response to Therapeutic Activities: none.       Isaiah Is progressing well towards his goals.   Pt prognosis is Excellent.     Pt will continue to benefit from skilled outpatient physical therapy to address the deficits listed in the problem list box on initial evaluation, provide pt/family education and to maximize pt's level of independence in the home and community environment.     Pt's spiritual, cultural and educational needs considered and pt agreeable to plan of care and goals.     Anticipated barriers to physical therapy: co-morbidities     Goals:   Short Term Goals:  6 weeks Progress       Patient will report decreased pain to <6/10 at worst on VAS to progress toward Prior Level of Function. Goal Met  8/3/23    Patient will report improved function by a functional score of >75 out of 100 on FOTO.  Goal Met  8/3/23   Patient will improve AROM to 50% of stated goals in order to progress towards Prior Level of Function.  Goal Met  8/3/23   Patient will improve strength to 50% of stated goals in order to progress towards Prior Level of Function.  Goal Met  8/3/23         Long Term Goals:  12 weeks Progress      Patient will report decreased pain to <3/10 at worst on VAS to progress toward Prior Level of Function.    Goal Met  8/3/23   Patient will report improved function by a functional limitation score of >90 out of 100 on FOTO.  Not Met   Patient will improve ROM and Functional Mobility to stated goals to return to Prior Level of Function.  Not Met   Patient will improve strength to stated goals in order to return to Prior Level of Function. Goal Met  8/3/23        PLAN   Patient is discharged with HEP. Will call clinic as needed for follow-up.        Jarred Robin, PT, DPT, SCS

## 2023-09-20 ENCOUNTER — OFFICE VISIT (OUTPATIENT)
Dept: PODIATRY | Facility: CLINIC | Age: 68
End: 2023-09-20
Payer: COMMERCIAL

## 2023-09-20 VITALS — BODY MASS INDEX: 37.73 KG/M2 | WEIGHT: 294 LBS | HEIGHT: 74 IN

## 2023-09-20 DIAGNOSIS — I87.2 VENOUS INSUFFICIENCY OF BOTH LOWER EXTREMITIES: ICD-10-CM

## 2023-09-20 DIAGNOSIS — E11.42 TYPE 2 DIABETES MELLITUS WITH DIABETIC POLYNEUROPATHY, WITH LONG-TERM CURRENT USE OF INSULIN: ICD-10-CM

## 2023-09-20 DIAGNOSIS — Z79.4 TYPE 2 DIABETES MELLITUS WITH DIABETIC POLYNEUROPATHY, WITH LONG-TERM CURRENT USE OF INSULIN: ICD-10-CM

## 2023-09-20 DIAGNOSIS — M20.41 HAMMER TOE OF RIGHT FOOT: Primary | ICD-10-CM

## 2023-09-20 DIAGNOSIS — M79.674 PAIN IN TOE OF RIGHT FOOT: ICD-10-CM

## 2023-09-20 DIAGNOSIS — B35.1 ONYCHOMYCOSIS: ICD-10-CM

## 2023-09-20 PROCEDURE — 3051F PR MOST RECENT HEMOGLOBIN A1C LEVEL 7.0 - < 8.0%: ICD-10-PCS | Mod: CPTII,S$GLB,, | Performed by: PODIATRIST

## 2023-09-20 PROCEDURE — 3008F BODY MASS INDEX DOCD: CPT | Mod: CPTII,S$GLB,, | Performed by: PODIATRIST

## 2023-09-20 PROCEDURE — 99214 PR OFFICE/OUTPT VISIT, EST, LEVL IV, 30-39 MIN: ICD-10-PCS | Mod: S$GLB,,, | Performed by: PODIATRIST

## 2023-09-20 PROCEDURE — 99214 OFFICE O/P EST MOD 30 MIN: CPT | Mod: S$GLB,,, | Performed by: PODIATRIST

## 2023-09-20 PROCEDURE — 1160F RVW MEDS BY RX/DR IN RCRD: CPT | Mod: CPTII,S$GLB,, | Performed by: PODIATRIST

## 2023-09-20 PROCEDURE — 1101F PR PT FALLS ASSESS DOC 0-1 FALLS W/OUT INJ PAST YR: ICD-10-PCS | Mod: CPTII,S$GLB,, | Performed by: PODIATRIST

## 2023-09-20 PROCEDURE — 1126F AMNT PAIN NOTED NONE PRSNT: CPT | Mod: CPTII,S$GLB,, | Performed by: PODIATRIST

## 2023-09-20 PROCEDURE — 3008F PR BODY MASS INDEX (BMI) DOCUMENTED: ICD-10-PCS | Mod: CPTII,S$GLB,, | Performed by: PODIATRIST

## 2023-09-20 PROCEDURE — 3288F FALL RISK ASSESSMENT DOCD: CPT | Mod: CPTII,S$GLB,, | Performed by: PODIATRIST

## 2023-09-20 PROCEDURE — 3288F PR FALLS RISK ASSESSMENT DOCUMENTED: ICD-10-PCS | Mod: CPTII,S$GLB,, | Performed by: PODIATRIST

## 2023-09-20 PROCEDURE — 99999 PR PBB SHADOW E&M-EST. PATIENT-LVL III: CPT | Mod: PBBFAC,,, | Performed by: PODIATRIST

## 2023-09-20 PROCEDURE — 1159F MED LIST DOCD IN RCRD: CPT | Mod: CPTII,S$GLB,, | Performed by: PODIATRIST

## 2023-09-20 PROCEDURE — 1101F PT FALLS ASSESS-DOCD LE1/YR: CPT | Mod: CPTII,S$GLB,, | Performed by: PODIATRIST

## 2023-09-20 PROCEDURE — 1126F PR PAIN SEVERITY QUANTIFIED, NO PAIN PRESENT: ICD-10-PCS | Mod: CPTII,S$GLB,, | Performed by: PODIATRIST

## 2023-09-20 PROCEDURE — 4010F PR ACE/ARB THEARPY RXD/TAKEN: ICD-10-PCS | Mod: CPTII,S$GLB,, | Performed by: PODIATRIST

## 2023-09-20 PROCEDURE — 3051F HG A1C>EQUAL 7.0%<8.0%: CPT | Mod: CPTII,S$GLB,, | Performed by: PODIATRIST

## 2023-09-20 PROCEDURE — 99999 PR PBB SHADOW E&M-EST. PATIENT-LVL III: ICD-10-PCS | Mod: PBBFAC,,, | Performed by: PODIATRIST

## 2023-09-20 PROCEDURE — 1159F PR MEDICATION LIST DOCUMENTED IN MEDICAL RECORD: ICD-10-PCS | Mod: CPTII,S$GLB,, | Performed by: PODIATRIST

## 2023-09-20 PROCEDURE — 1160F PR REVIEW ALL MEDS BY PRESCRIBER/CLIN PHARMACIST DOCUMENTED: ICD-10-PCS | Mod: CPTII,S$GLB,, | Performed by: PODIATRIST

## 2023-09-20 PROCEDURE — 4010F ACE/ARB THERAPY RXD/TAKEN: CPT | Mod: CPTII,S$GLB,, | Performed by: PODIATRIST

## 2023-09-20 RX ORDER — KETOCONAZOLE 20 MG/G
CREAM TOPICAL DAILY
Qty: 30 G | Refills: 3 | Status: SHIPPED | OUTPATIENT
Start: 2023-09-20

## 2023-09-20 NOTE — PROGRESS NOTES
Subjective:       Patient ID: Isaiah Harrison is a 68 y.o. male.    Chief Complaint: Callouses (C/o callous on right second toe, pt was last seen on 6/1/23 by PCP Wilda Phoenix NP, diabetic pt, pt is wearing slippers, no pain )    HPI: Isaiah Harrison presents to the office today, with complaints of pains to the right foot. The pains are stated as moderate to severe at the 1st and 2nd toes. Patient states limping with gait at times due to the pains. Pains are exacerbated by walking and standing. Sitting and limited WB does alleviate and/or decrease the pains. The patient does have hammer toe contractures. States alternation of shoe gear has not been helpful. Patient's Primary Care Provider is LACI Dow MD. Patient is a DMII.    Hemoglobin A1C   Date Value Ref Range Status   04/27/2023 7.7 (H) 4.0 - 5.6 % Final     Comment:     ADA Screening Guidelines:  5.7-6.4%  Consistent with prediabetes  >or=6.5%  Consistent with diabetes    High levels of fetal hemoglobin interfere with the HbA1C  assay. Heterozygous hemoglobin variants (HbS, HgC, etc)do  not significantly interfere with this assay.   However, presence of multiple variants may affect accuracy.     09/13/2022 7.8 (H) 4.0 - 5.6 % Final     Comment:     ADA Screening Guidelines:  5.7-6.4%  Consistent with prediabetes  >or=6.5%  Consistent with diabetes    High levels of fetal hemoglobin interfere with the HbA1C  assay. Heterozygous hemoglobin variants (HbS, HgC, etc)do  not significantly interfere with this assay.   However, presence of multiple variants may affect accuracy.     07/27/2022 6.7 (H) 4.0 - 5.6 % Final     Comment:     ADA Screening Guidelines:  5.7-6.4%  Consistent with prediabetes  >or=6.5%  Consistent with diabetes    High levels of fetal hemoglobin interfere with the HbA1C  assay. Heterozygous hemoglobin variants (HbS, HgC, etc)do  not significantly interfere with this assay.   However, presence of multiple variants may  affect accuracy.           Review of patient's allergies indicates:   Allergen Reactions    Pcn [penicillins] Hives       Past Medical History:   Diagnosis Date    Acute coronary syndrome     Anticoagulant long-term use     Back pain     CAD (coronary artery disease) 10/17/2013    Class 2 severe obesity due to excess calories with serious comorbidity and body mass index (BMI) of 38.0 to 38.9 in adult 01/12/2015    Coronary artery disease     Diabetes mellitus, type 2 2010    BS didn't check 04/19/2023 Insulin 2 years    Gastric polyps     Hyperlipemia     Hypertension     NSTEMI (non-ST elevated myocardial infarction) 10/23/2014    Obesity 10/21/2014    DANTE on CPAP 02/19/2015    S/P PTCA (percutaneous transluminal coronary angioplasty) 10/17/2013    Sleep apnea 01/07/2015    Type 2 diabetes mellitus with circulatory disorder (coronary artery disease), without long-term current use of insulin 07/14/2015       Family History   Problem Relation Age of Onset    Hypertension Mother     Diabetes Mother     Hypertension Father     Diabetes Sister     Hypertension Sister     Heart disease Maternal Grandfather        Social History     Socioeconomic History    Marital status:    Tobacco Use    Smoking status: Never    Smokeless tobacco: Never   Substance and Sexual Activity    Alcohol use: Yes     Alcohol/week: 0.0 standard drinks of alcohol     Comment: socially    Drug use: No    Sexual activity: Yes     Partners: Female     Social Determinants of Health     Financial Resource Strain: Low Risk  (7/7/2023)    Overall Financial Resource Strain (CARDIA)     Difficulty of Paying Living Expenses: Not hard at all   Food Insecurity: No Food Insecurity (7/7/2023)    Hunger Vital Sign     Worried About Running Out of Food in the Last Year: Never true     Ran Out of Food in the Last Year: Never true   Transportation Needs: No Transportation Needs (7/7/2023)    PRAPARE - Transportation     Lack of Transportation (Medical): No      Lack of Transportation (Non-Medical): No   Physical Activity: Inactive (7/7/2023)    Exercise Vital Sign     Days of Exercise per Week: 0 days     Minutes of Exercise per Session: 0 min   Stress: No Stress Concern Present (7/7/2023)    [place] [employer] - Occupational Stress Questionnaire     Feeling of Stress : Not at all   Social Connections: Unknown (7/7/2023)    Social Connection and Isolation Panel [NHANES]     Frequency of Communication with Friends and Family: Once a week     Frequency of Social Gatherings with Friends and Family: Never     Active Member of Clubs or Organizations: No     Attends Club or Organization Meetings: Never     Marital Status:    Housing Stability: Low Risk  (7/7/2023)    Housing Stability Vital Sign     Unable to Pay for Housing in the Last Year: No     Number of Places Lived in the Last Year: 0     Unstable Housing in the Last Year: No       Past Surgical History:   Procedure Laterality Date    APPENDECTOMY      BACK SURGERY      CATARACT EXTRACTION      CATARACT EXTRACTION, BILATERAL      COLONOSCOPY      COLONOSCOPY N/A 3/9/2023    Procedure: COLONOSCOPY;  Surgeon: Lisa Auguste MD;  Location: [facility];  Service: Endoscopy;  Laterality: N/A;    CORONARY ANGIOPLASTY WITH STENT PLACEMENT      ESOPHAGOGASTRODUODENOSCOPY      EYE SURGERY      FRACTURE SURGERY      HERNIA REPAIR      SPINE SURGERY         Review of Systems   Constitutional:  Negative for chills, fatigue and fever.   HENT:  Negative for hearing loss.    Eyes:  Negative for photophobia and visual disturbance.   Respiratory:  Negative for cough, chest tightness, shortness of breath and wheezing.    Cardiovascular:  Negative for chest pain and palpitations.   Gastrointestinal:  Negative for constipation, diarrhea, nausea and vomiting.   Endocrine: Negative for cold intolerance and heat intolerance.   Genitourinary:  Negative for flank pain.   Musculoskeletal:  Positive for gait  "problem. Negative for neck pain and neck stiffness.   Neurological:  Positive for numbness. Negative for light-headedness and headaches.   Psychiatric/Behavioral:  Negative for sleep disturbance.          Objective:   Ht 6' 2" (1.88 m)   Wt 133.4 kg (294 lb)   BMI 37.75 kg/m²     Physical Exam  LOWER EXTREMITY PHYSICAL EXAMINATION    DERMATOLOGY: Callus is noted, RLE, medial 2nd toe. Onychomycosis is noted to several nail plate of the LE.     ORTHOPEDIC: Manual Muscle Testing is 5/5 in all planes on the right foot, without pains, with and without resistance. Gait pattern is antalgic. There is moderate to severe semi-rigid hammer toe contracture to the right foot at the 2nd toe. Associated metatarsalgia is noted. Mild HAV is noted.     VASCULAR: The right dorsalis pedis pulse is 2/4 and the posterior tibial pulse is 2/4. Hair growth is noted on the dorsal foot and digits. Proximal to distal, warm to warm. Capillary refill time is WNL at less than 3s    NEUROLOGY: Protective sensation is not intact via 5.07 [place] Tito monofilament. Proprioception is intact. Sensation to light touch is intact.     Assessment:     1. Hammer toe of right foot    2. Pain in toe of right foot    3. Type 2 diabetes mellitus with diabetic polyneuropathy, with long-term current use of insulin    4. Venous insufficiency of both lower extremities    5. Onychomycosis          Plan:     Hammer toe of right foot    Pain in toe of right foot    Type 2 diabetes mellitus with diabetic polyneuropathy, with long-term current use of insulin    Venous insufficiency of both lower extremities    Onychomycosis  -     ketoconazole (NIZORAL) 2 % cream; Apply topically once daily.  Dispense: 30 g; Refill: 3      Thorough discussion is had with the patient today, concerning the diagnosis, its etiology, and the treatment algorithm at present.     This patient does have hammertoe (digital) contractures. I did advise the patient to ambulate with shoe gear " that is high in the tox box to allow for extra room and depth in the sagittal plane, in order to alleviate and lessen the potential for dorsal digital break down at the IPJs. I do also recommended shoe gear that is soft and supple in the foot bed as to lessen the potential for plantar distal digital break down at the contracted digits. If the patient does not feel the aforementioned is necessary, he or she may also purchase OTC padding devices to be worn across the MTPJ, at the distal aspects of the digits, and/or at the dorsal aspects of the IPJs. The patient does acknowledge understanding and is said to be amenable to compliance.     Rec. shoe gear with soft and accommodative foot bed and with a high toe box for alleviation of symptoms. Possible EE or EEE in width as well for alleviation of symptoms.    Discussed the various treatment options concerning onychomycosis, these being over-the-counter topicals (efficacy is low in regards to cure), prescription strength topicals (better efficacy as compared to OTC variants, but only slightly so, and potentially costly), laser therapy (which is not covered by insurance companies), oral medications (patient is advised that there is a potential, though less than 5%, for the potential of adverse health and side effects; namely liver pathology) and doing nothing (frequent debridements) as onychomycosis is a cosmetic ailment, and has no potential for long-term deleterious effects on the health.    Discuss with Cardiology HTN medications for possible adjustment due to problematic/persistent LE edema despite HCTZ with compression stockings.            No future appointments.

## 2023-10-02 ENCOUNTER — LAB VISIT (OUTPATIENT)
Dept: LAB | Facility: HOSPITAL | Age: 68
End: 2023-10-02
Attending: FAMILY MEDICINE
Payer: COMMERCIAL

## 2023-10-02 DIAGNOSIS — Z79.4 TYPE 2 DIABETES MELLITUS WITH HYPERGLYCEMIA, WITH LONG-TERM CURRENT USE OF INSULIN: ICD-10-CM

## 2023-10-02 DIAGNOSIS — E11.65 TYPE 2 DIABETES MELLITUS WITH HYPERGLYCEMIA, WITH LONG-TERM CURRENT USE OF INSULIN: ICD-10-CM

## 2023-10-02 LAB
ESTIMATED AVG GLUCOSE: 214 MG/DL (ref 68–131)
HBA1C MFR BLD: 9.1 % (ref 4–5.6)

## 2023-10-02 PROCEDURE — 83036 HEMOGLOBIN GLYCOSYLATED A1C: CPT | Performed by: FAMILY MEDICINE

## 2023-10-02 PROCEDURE — 36415 COLL VENOUS BLD VENIPUNCTURE: CPT | Performed by: FAMILY MEDICINE

## 2023-10-03 ENCOUNTER — TELEPHONE (OUTPATIENT)
Dept: INTERNAL MEDICINE | Facility: CLINIC | Age: 68
End: 2023-10-03
Payer: COMMERCIAL

## 2023-10-03 ENCOUNTER — PATIENT MESSAGE (OUTPATIENT)
Dept: INTERNAL MEDICINE | Facility: CLINIC | Age: 68
End: 2023-10-03
Payer: COMMERCIAL

## 2023-10-03 DIAGNOSIS — E11.65 TYPE 2 DIABETES MELLITUS WITH HYPERGLYCEMIA, WITH LONG-TERM CURRENT USE OF INSULIN: Primary | Chronic | ICD-10-CM

## 2023-10-03 DIAGNOSIS — Z79.4 TYPE 2 DIABETES MELLITUS WITH HYPERGLYCEMIA, WITH LONG-TERM CURRENT USE OF INSULIN: Primary | Chronic | ICD-10-CM

## 2023-10-03 NOTE — PROGRESS NOTES
Diabetes is much worse and uncontrolled.  Please schedule appointment soon to discuss treatment options.

## 2023-10-03 NOTE — TELEPHONE ENCOUNTER
----- Message from Cely Stoll sent at 10/3/2023 12:35 PM CDT -----  Regarding: return call  Type:  Patient Returning Call    Who Called: JC FIGUEROA [[id number]]    Who Left Message for Patient:Ibethdawna    Does the patient know what this is regarding? results    Best Call Back Number:Telephone Information:  Mobile          [phone]        Additional Information:

## 2023-10-03 NOTE — TELEPHONE ENCOUNTER
His recent labs show his diabetes is significantly worse and uncontrolled.  Lab Results   Component Value Date    HGBA1C 9.1 (H) 10/02/2023    HGBA1C 7.7 (H) 04/27/2023     @MY TEAM:   Notify him of results and referral to Diabetes Education.  Schedule him VV appointment with me within the next 2 weeks to discuss treatment options.     @[facility] Team: Please contact him to get him re-engaged.

## 2023-10-04 ENCOUNTER — TELEPHONE (OUTPATIENT)
Dept: INTERNAL MEDICINE | Facility: CLINIC | Age: 68
End: 2023-10-04
Payer: COMMERCIAL

## 2023-10-04 NOTE — TELEPHONE ENCOUNTER
----- Message from Lisa Ashley sent at 10/3/2023  4:03 PM CDT -----  Contact: JC FIGUEROA [[id number]]  ..Type:  Patient Requesting Call    Who Called:JC FIGUEROA [[id number]]  Does the patient know what this is regarding?: Results  Would the patient rather a call back or a response via [facility]?  Call  Best Call Back Number: .[phone]  Additional Information:

## 2023-10-06 ENCOUNTER — HOSPITAL ENCOUNTER (INPATIENT)
Facility: HOSPITAL | Age: 68
LOS: 2 days | Discharge: HOME | DRG: 282 | End: 2023-10-09
Attending: EMERGENCY MEDICINE | Admitting: STUDENT IN AN ORGANIZED HEALTH CARE EDUCATION/TRAINING PROGRAM
Payer: COMMERCIAL

## 2023-10-06 DIAGNOSIS — R07.89 OTHER CHEST PAIN: ICD-10-CM

## 2023-10-06 DIAGNOSIS — I21.4 NSTEMI (NON-ST ELEVATED MYOCARDIAL INFARCTION): ICD-10-CM

## 2023-10-06 DIAGNOSIS — I10 ESSENTIAL HYPERTENSION: Chronic | ICD-10-CM

## 2023-10-06 DIAGNOSIS — G47.33 OSA ON CPAP: ICD-10-CM

## 2023-10-06 DIAGNOSIS — I21.4 NON-ST ELEVATION MYOCARDIAL INFARCTION (NSTEMI): ICD-10-CM

## 2023-10-06 DIAGNOSIS — E11.69 HYPERLIPIDEMIA ASSOCIATED WITH TYPE 2 DIABETES MELLITUS: Primary | Chronic | ICD-10-CM

## 2023-10-06 DIAGNOSIS — Z98.61 CAD S/P PERCUTANEOUS CORONARY ANGIOPLASTY: ICD-10-CM

## 2023-10-06 DIAGNOSIS — R07.9 CHEST PAIN: ICD-10-CM

## 2023-10-06 DIAGNOSIS — E78.5 HYPERLIPIDEMIA ASSOCIATED WITH TYPE 2 DIABETES MELLITUS: Primary | Chronic | ICD-10-CM

## 2023-10-06 DIAGNOSIS — I10 HTN (HYPERTENSION): ICD-10-CM

## 2023-10-06 DIAGNOSIS — Z98.61 S/P PTCA (PERCUTANEOUS TRANSLUMINAL CORONARY ANGIOPLASTY): ICD-10-CM

## 2023-10-06 DIAGNOSIS — I25.10 CAD S/P PERCUTANEOUS CORONARY ANGIOPLASTY: ICD-10-CM

## 2023-10-06 LAB
ALBUMIN SERPL BCP-MCNC: 3.8 G/DL (ref 3.5–5.2)
ALP SERPL-CCNC: 86 U/L (ref 55–135)
ALT SERPL W/O P-5'-P-CCNC: 26 U/L (ref 10–44)
ANION GAP SERPL CALC-SCNC: 14 MMOL/L (ref 8–16)
APTT PPP: 24.8 SEC (ref 21–32)
AST SERPL-CCNC: 15 U/L (ref 10–40)
BACTERIA #/AREA URNS HPF: NORMAL /HPF
BASOPHILS # BLD AUTO: 0.06 K/UL (ref 0–0.2)
BASOPHILS NFR BLD: 0.8 % (ref 0–1.9)
BILIRUB SERPL-MCNC: 0.8 MG/DL (ref 0.1–1)
BILIRUB UR QL STRIP: NEGATIVE
BUN SERPL-MCNC: 21 MG/DL (ref 8–23)
CALCIUM SERPL-MCNC: 9.4 MG/DL (ref 8.7–10.5)
CHLORIDE SERPL-SCNC: 101 MMOL/L (ref 95–110)
CLARITY UR: CLEAR
CO2 SERPL-SCNC: 24 MMOL/L (ref 23–29)
COLOR UR: YELLOW
CREAT SERPL-MCNC: 1.3 MG/DL (ref 0.5–1.4)
DIFFERENTIAL METHOD: ABNORMAL
EOSINOPHIL # BLD AUTO: 0.1 K/UL (ref 0–0.5)
EOSINOPHIL NFR BLD: 0.9 % (ref 0–8)
ERYTHROCYTE [DISTWIDTH] IN BLOOD BY AUTOMATED COUNT: 11.9 % (ref 11.5–14.5)
EST. GFR  (NO RACE VARIABLE): >60 ML/MIN/1.73 M^2
GLUCOSE SERPL-MCNC: 333 MG/DL (ref 70–110)
GLUCOSE UR QL STRIP: ABNORMAL
HCT VFR BLD AUTO: 48.4 % (ref 40–54)
HGB BLD-MCNC: 16.7 G/DL (ref 14–18)
HGB UR QL STRIP: NEGATIVE
IMM GRANULOCYTES # BLD AUTO: 0.03 K/UL (ref 0–0.04)
IMM GRANULOCYTES NFR BLD AUTO: 0.4 % (ref 0–0.5)
INR PPP: 1 (ref 0.8–1.2)
KETONES UR QL STRIP: NEGATIVE
LEUKOCYTE ESTERASE UR QL STRIP: NEGATIVE
LYMPHOCYTES # BLD AUTO: 2.1 K/UL (ref 1–4.8)
LYMPHOCYTES NFR BLD: 27.6 % (ref 18–48)
MCH RBC QN AUTO: 32.8 PG (ref 27–31)
MCHC RBC AUTO-ENTMCNC: 34.5 G/DL (ref 32–36)
MCV RBC AUTO: 95 FL (ref 82–98)
MICROSCOPIC COMMENT: NORMAL
MONOCYTES # BLD AUTO: 0.6 K/UL (ref 0.3–1)
MONOCYTES NFR BLD: 7.7 % (ref 4–15)
NEUTROPHILS # BLD AUTO: 4.7 K/UL (ref 1.8–7.7)
NEUTROPHILS NFR BLD: 62.6 % (ref 38–73)
NITRITE UR QL STRIP: NEGATIVE
NRBC BLD-RTO: 0 /100 WBC
PH UR STRIP: 6 [PH] (ref 5–8)
PLATELET # BLD AUTO: 258 K/UL (ref 150–450)
PMV BLD AUTO: 10 FL (ref 9.2–12.9)
POTASSIUM SERPL-SCNC: 4.3 MMOL/L (ref 3.5–5.1)
PROT SERPL-MCNC: 7.5 G/DL (ref 6–8.4)
PROT UR QL STRIP: NEGATIVE
PROTHROMBIN TIME: 10.5 SEC (ref 9–12.5)
RBC # BLD AUTO: 5.09 M/UL (ref 4.6–6.2)
SODIUM SERPL-SCNC: 139 MMOL/L (ref 136–145)
SP GR UR STRIP: >1.03 (ref 1–1.03)
TROPONIN I SERPL DL<=0.01 NG/ML-MCNC: 0.38 NG/ML (ref 0–0.03)
TROPONIN I SERPL DL<=0.01 NG/ML-MCNC: <0.006 NG/ML (ref 0–0.03)
TROPONIN I SERPL DL<=0.01 NG/ML-MCNC: <0.006 NG/ML (ref 0–0.03)
URN SPEC COLLECT METH UR: ABNORMAL
UROBILINOGEN UR STRIP-ACNC: NEGATIVE EU/DL
WBC # BLD AUTO: 7.54 K/UL (ref 3.9–12.7)
YEAST URNS QL MICRO: NORMAL

## 2023-10-06 PROCEDURE — 85025 COMPLETE CBC W/AUTO DIFF WBC: CPT | Performed by: NURSE PRACTITIONER

## 2023-10-06 PROCEDURE — 93010 EKG 12-LEAD: ICD-10-PCS | Mod: ,,, | Performed by: INTERNAL MEDICINE

## 2023-10-06 PROCEDURE — 85730 THROMBOPLASTIN TIME PARTIAL: CPT | Performed by: STUDENT IN AN ORGANIZED HEALTH CARE EDUCATION/TRAINING PROGRAM

## 2023-10-06 PROCEDURE — 85610 PROTHROMBIN TIME: CPT | Performed by: STUDENT IN AN ORGANIZED HEALTH CARE EDUCATION/TRAINING PROGRAM

## 2023-10-06 PROCEDURE — 99285 EMERGENCY DEPT VISIT HI MDM: CPT | Mod: 25

## 2023-10-06 PROCEDURE — 25000003 PHARM REV CODE 250: Performed by: EMERGENCY MEDICINE

## 2023-10-06 PROCEDURE — 81000 URINALYSIS NONAUTO W/SCOPE: CPT | Performed by: NURSE PRACTITIONER

## 2023-10-06 PROCEDURE — 80061 LIPID PANEL: CPT | Performed by: STUDENT IN AN ORGANIZED HEALTH CARE EDUCATION/TRAINING PROGRAM

## 2023-10-06 PROCEDURE — 80053 COMPREHEN METABOLIC PANEL: CPT | Performed by: NURSE PRACTITIONER

## 2023-10-06 PROCEDURE — 84484 ASSAY OF TROPONIN QUANT: CPT | Performed by: NURSE PRACTITIONER

## 2023-10-06 PROCEDURE — 84484 ASSAY OF TROPONIN QUANT: CPT | Mod: 91 | Performed by: STUDENT IN AN ORGANIZED HEALTH CARE EDUCATION/TRAINING PROGRAM

## 2023-10-06 PROCEDURE — 84484 ASSAY OF TROPONIN QUANT: CPT | Mod: 91 | Performed by: EMERGENCY MEDICINE

## 2023-10-06 PROCEDURE — 93005 ELECTROCARDIOGRAM TRACING: CPT

## 2023-10-06 PROCEDURE — 93010 ELECTROCARDIOGRAM REPORT: CPT | Mod: ,,, | Performed by: INTERNAL MEDICINE

## 2023-10-06 PROCEDURE — 25000003 PHARM REV CODE 250: Performed by: NURSE PRACTITIONER

## 2023-10-06 PROCEDURE — G0378 HOSPITAL OBSERVATION PER HR: HCPCS

## 2023-10-06 RX ORDER — CLONIDINE HYDROCHLORIDE 0.1 MG/1
0.1 TABLET ORAL
Status: COMPLETED | OUTPATIENT
Start: 2023-10-06 | End: 2023-10-06

## 2023-10-06 RX ORDER — ASPIRIN 325 MG
325 TABLET ORAL
Status: COMPLETED | OUTPATIENT
Start: 2023-10-06 | End: 2023-10-06

## 2023-10-06 RX ORDER — HYDRALAZINE HYDROCHLORIDE 20 MG/ML
10 INJECTION INTRAMUSCULAR; INTRAVENOUS EVERY 8 HOURS PRN
Status: DISCONTINUED | OUTPATIENT
Start: 2023-10-06 | End: 2023-10-09 | Stop reason: HOSPADM

## 2023-10-06 RX ORDER — NITROGLYCERIN 0.4 MG/1
0.4 TABLET SUBLINGUAL EVERY 5 MIN PRN
Status: DISCONTINUED | OUTPATIENT
Start: 2023-10-06 | End: 2023-10-09 | Stop reason: HOSPADM

## 2023-10-06 RX ORDER — ASPIRIN 81 MG/1
81 TABLET ORAL DAILY
Status: DISCONTINUED | OUTPATIENT
Start: 2023-10-07 | End: 2023-10-09 | Stop reason: HOSPADM

## 2023-10-06 RX ORDER — AMLODIPINE AND VALSARTAN 10; 320 MG/1; MG/1
1 TABLET ORAL DAILY
Status: DISCONTINUED | OUTPATIENT
Start: 2023-10-07 | End: 2023-10-06 | Stop reason: CLARIF

## 2023-10-06 RX ORDER — RANOLAZINE 500 MG/1
1000 TABLET, EXTENDED RELEASE ORAL 2 TIMES DAILY
Status: DISCONTINUED | OUTPATIENT
Start: 2023-10-06 | End: 2023-10-09 | Stop reason: HOSPADM

## 2023-10-06 RX ORDER — HYDROXYZINE HYDROCHLORIDE 25 MG/1
25 TABLET, FILM COATED ORAL 3 TIMES DAILY PRN
Status: DISCONTINUED | OUTPATIENT
Start: 2023-10-06 | End: 2023-10-09 | Stop reason: HOSPADM

## 2023-10-06 RX ORDER — ACETAMINOPHEN 500 MG
1000 TABLET ORAL
Status: COMPLETED | OUTPATIENT
Start: 2023-10-06 | End: 2023-10-06

## 2023-10-06 RX ORDER — METOPROLOL TARTRATE 50 MG/1
50 TABLET ORAL 2 TIMES DAILY
Status: DISCONTINUED | OUTPATIENT
Start: 2023-10-06 | End: 2023-10-09 | Stop reason: HOSPADM

## 2023-10-06 RX ORDER — ATORVASTATIN CALCIUM 40 MG/1
80 TABLET, FILM COATED ORAL NIGHTLY
Status: DISCONTINUED | OUTPATIENT
Start: 2023-10-06 | End: 2023-10-09 | Stop reason: HOSPADM

## 2023-10-06 RX ORDER — ENOXAPARIN SODIUM 100 MG/ML
40 INJECTION SUBCUTANEOUS EVERY 24 HOURS
Status: DISCONTINUED | OUTPATIENT
Start: 2023-10-06 | End: 2023-10-07

## 2023-10-06 RX ORDER — AMLODIPINE BESYLATE 10 MG/1
10 TABLET ORAL DAILY
Status: DISCONTINUED | OUTPATIENT
Start: 2023-10-07 | End: 2023-10-09 | Stop reason: HOSPADM

## 2023-10-06 RX ORDER — HYDROCHLOROTHIAZIDE 25 MG/1
25 TABLET ORAL DAILY
Status: DISCONTINUED | OUTPATIENT
Start: 2023-10-07 | End: 2023-10-09 | Stop reason: HOSPADM

## 2023-10-06 RX ORDER — ISOSORBIDE MONONITRATE 30 MG/1
30 TABLET, EXTENDED RELEASE ORAL DAILY
Status: DISCONTINUED | OUTPATIENT
Start: 2023-10-07 | End: 2023-10-09 | Stop reason: HOSPADM

## 2023-10-06 RX ORDER — VALSARTAN 160 MG/1
320 TABLET ORAL DAILY
Status: DISCONTINUED | OUTPATIENT
Start: 2023-10-07 | End: 2023-10-09 | Stop reason: HOSPADM

## 2023-10-06 RX ADMIN — ACETAMINOPHEN 1000 MG: 500 TABLET ORAL at 07:10

## 2023-10-06 RX ADMIN — NITROGLYCERIN 1 INCH: 20 OINTMENT TOPICAL at 08:10

## 2023-10-06 RX ADMIN — CLONIDINE HYDROCHLORIDE 0.1 MG: 0.1 TABLET ORAL at 01:10

## 2023-10-06 RX ADMIN — ASPIRIN 325 MG ORAL TABLET 325 MG: 325 PILL ORAL at 08:10

## 2023-10-06 NOTE — Clinical Note
ostium   left main. Hemodynamics were performed.  An angiography was performed of the left coronary arteries.

## 2023-10-06 NOTE — ED PROVIDER NOTES
"SCRIBE #1 NOTE: I, Dona Lr, am scribing for, and in the presence of, Jim Newell MD. I have scribed the HPI, ROS, and PE.      SCRIBE #2 NOTE: I, Wilber Merchant, am scribing for, and in the presence of,  Naresh Bernal MD. I have scribed the remaining portions of the note not scribed by Scribe #1.      History     Chief Complaint   Patient presents with    Hypertension     Pt states he could "feel his blood pressure going up." Pt states he took one nitro tablet en route and he was flushed before coming. Pt denies chest pain currently.          Review of patient's allergies indicates:   Allergen Reactions    Pcn [penicillins] Hives         History of Present Illness     HPI    10/6/2023, 2:05 PM  History obtained from the patient      History of Present Illness: Isaiah Harrison is a 68 y.o. male patient with a PMHx of CAD and hypertension who presents to the Emergency Department for evaluation of hypertensive episode. Pt reports feeling his blood pressure go up and his face flush while he was in the [employer] parking lot today. Pt reports taking a nitro which brought his blood pressure back down, he wanted to come in to make sure everything was okay, but feels better now.  Symptoms are constant and moderate in severity. No mitigating or exacerbating factors reported. No other sxs reported. Patient denies any fever, chills, chest pain, SOB, leg swelling, and all other sxs at this time. No prior tx reported. No further complaints or concerns at this time.       Arrival mode: Personal vehicle     PCP: LACI Dow MD        Past Medical History:  Past Medical History:   Diagnosis Date    Acute coronary syndrome     Anticoagulant long-term use     Back pain     CAD (coronary artery disease) 10/17/2013    Class 2 severe obesity due to excess calories with serious comorbidity and body mass index (BMI) of 38.0 to 38.9 in adult 01/12/2015    Coronary artery disease     Diabetes mellitus, type 2 2010    BS " didn't check 04/19/2023 Insulin 2 years    Gastric polyps     Hyperlipemia     Hypertension     NSTEMI (non-ST elevated myocardial infarction) 10/23/2014    Obesity 10/21/2014    DANTE on CPAP 02/19/2015    S/P PTCA (percutaneous transluminal coronary angioplasty) 10/17/2013    Sleep apnea 01/07/2015    Type 2 diabetes mellitus with circulatory disorder (coronary artery disease), without long-term current use of insulin 07/14/2015       Past Surgical History:  Past Surgical History:   Procedure Laterality Date    APPENDECTOMY      BACK SURGERY      CATARACT EXTRACTION      CATARACT EXTRACTION, BILATERAL      COLONOSCOPY      COLONOSCOPY N/A 3/9/2023    Procedure: COLONOSCOPY;  Surgeon: Lisa Auguste MD;  Location: [facility] ENDO;  Service: Endoscopy;  Laterality: N/A;    CORONARY ANGIOPLASTY WITH STENT PLACEMENT      ESOPHAGOGASTRODUODENOSCOPY      EYE SURGERY      FRACTIONAL FLOW RESERVE (FFR), CORONARY  10/9/2023    Procedure: Fractional Flow [place] (FFR), Coronary;  Surgeon: Kirsty Lyles MD;  Location: [facility] CATH LAB;  Service: Cardiology;;    FRACTURE SURGERY      HERNIA REPAIR      INSTANTANEOUS WAVE-FREE RATIO (IFR) N/A 10/9/2023    Procedure: Instantaneous Wave-Free Ratio (IFR);  Surgeon: Kirsty Lyles MD;  Location: [facility] CATH LAB;  Service: Cardiology;  Laterality: N/A;    LEFT HEART CATHETERIZATION Left 10/9/2023    Procedure: Left heart cath;  Surgeon: Kirsty Lyles MD;  Location: [facility] CATH LAB;  Service: Cardiology;  Laterality: Left;    SPINE SURGERY           Family History:  Family History   Problem Relation Age of Onset    Hypertension Mother     Diabetes Mother     Hypertension Father     Diabetes Sister     Hypertension Sister     Heart disease Maternal Grandfather        Social History:  Social History     Tobacco Use    Smoking status: Never    Smokeless tobacco: Never   Substance and Sexual Activity    Alcohol use: Yes     Alcohol/week: 0.0 standard drinks of alcohol     Comment: socially     Drug use: No    Sexual activity: Yes     Partners: Female        Review of Systems     Review of Systems   Constitutional:  Negative for chills and fever.   HENT:  Negative for congestion and ear pain.    Respiratory:  Negative for cough and shortness of breath.    Cardiovascular:  Negative for chest pain and leg swelling.   Gastrointestinal:  Negative for diarrhea and vomiting.   Genitourinary:  Negative for dysuria and hematuria.   Musculoskeletal:  Negative for neck pain.   Skin:  Negative for rash.   Neurological:  Negative for dizziness and weakness.   Psychiatric/Behavioral:  Negative for agitation and confusion.    All other systems reviewed and are negative.       Physical Exam     Initial Vitals [10/06/23 1309]   BP Pulse Resp Temp SpO2   (!) 189/99 99 18 98.8 °F (37.1 °C) 100 %      MAP       --          Physical Exam  Nursing Notes and Vital Signs Reviewed.  Constitutional: Patient is in no acute distress. Well-developed and well-nourished.  Head: Atraumatic. Normocephalic.  Eyes: PERRL. EOM intact. Conjunctivae are not pale. No scleral icterus.  ENT: Mucous membranes are moist. Oropharynx is clear and symmetric.    Neck: Supple. Full ROM. No lymphadenopathy.  Cardiovascular: Regular rate. Regular rhythm. No murmurs, rubs, or gallops. Distal pulses are 2+ and symmetric. No leg swelling, no edema  Pulmonary/Chest: No respiratory distress. Clear to auscultation bilaterally. No wheezing or rales.   Abdominal: Soft and non-distended.  There is no tenderness.  No rebound, guarding, or rigidity.   Genitourinary: No CVA tenderness  Musculoskeletal: Moves all extremities. No obvious deformities. No edema. No calf tenderness.  Skin: Warm and dry.  Neurological:  Alert, awake, and appropriate.  Normal speech.  No acute focal neurological deficits are appreciated.  Psychiatric: Normal affect. Good eye contact. Appropriate in content.     ED Course   Critical Care    Date/Time: 10/6/2023 4:39 PM    Performed by:  Naresh Bernal Jr., MD  Authorized by: Naresh Bernal Jr., MD  Direct patient critical care time: 10 minutes  Additional history critical care time: 10 minutes  Ordering / reviewing critical care time: 10 minutes  Documentation critical care time: 10 minutes  Consulting other physicians critical care time: 10 minutes  Consult with family critical care time: 10 minutes  Other critical care time: 15 minutes  Total critical care time (exclusive of procedural time) : 75 minutes  Critical care time was exclusive of separately billable procedures and treating other patients and teaching time.  Critical care was necessary to treat or prevent imminent or life-threatening deterioration of the following conditions: cardiac failure (nstemi).  Critical care was time spent personally by me on the following activities: blood draw for specimens, development of treatment plan with patient or surrogate, discussions with consultants, interpretation of cardiac output measurements, evaluation of patient's response to treatment, examination of patient, obtaining history from patient or surrogate, ordering and performing treatments and interventions, ordering and review of laboratory studies, ordering and review of radiographic studies, pulse oximetry, re-evaluation of patient's condition and review of old charts.        ED Vital Signs:  Vitals:    10/08/23 0828 10/08/23 1720 10/08/23 1956 10/09/23 0030   BP: (!) 119/59  132/74 107/60   Pulse:  73 72 67   Resp:   18 18   Temp:   97.3 °F (36.3 °C) 98.4 °F (36.9 °C)   TempSrc:    Oral   SpO2:   96% 97%   Weight:       Height:        10/09/23 0449 10/09/23 0736 10/09/23 1154 10/09/23 1337   BP: 122/66 126/76 (!) 99/57 119/69   Pulse: 66 68 61 66   Resp: 17 16 18 17   Temp: 98 °F (36.7 °C) 98 °F (36.7 °C) 98.1 °F (36.7 °C) 97.9 °F (36.6 °C)   TempSrc: Oral Oral Oral Temporal   SpO2: 96% 96% 95% 97%   Weight:       Height:        10/09/23 1345 10/09/23 1400 10/09/23 1415 10/09/23 1430   BP:  123/74 117/69 (!) 111/54 (!) 116/50   Pulse: 63 65     Resp: 15 14     Temp:       TempSrc:       SpO2: 96% 96%     Weight:       Height:        10/09/23 1445 10/09/23 1502 10/09/23 1611   BP: 114/62 115/66 112/82   Pulse:  68 66   Resp:  18 18   Temp:  97.4 °F (36.3 °C) 98.2 °F (36.8 °C)   TempSrc:  Oral Oral   SpO2:  97% (!) 94%   Weight:      Height:          Abnormal Lab Results:  Labs Reviewed   CBC W/ AUTO DIFFERENTIAL - Abnormal; Notable for the following components:       Result Value    MCH 32.8 (*)     All other components within normal limits   COMPREHENSIVE METABOLIC PANEL - Abnormal; Notable for the following components:    Glucose 333 (*)     All other components within normal limits   URINALYSIS, REFLEX TO URINE CULTURE - Abnormal; Notable for the following components:    Specific Gravity, UA >1.030 (*)     Glucose, UA 4+ (*)     All other components within normal limits    Narrative:     Specimen Source->Urine   TROPONIN I - Abnormal; Notable for the following components:    Troponin I 0.376 (*)     All other components within normal limits   TROPONIN I   URINALYSIS MICROSCOPIC    Narrative:     Specimen Source->Urine   TROPONIN I   PROTIME-INR   APTT   B-TYPE NATRIURETIC PEPTIDE        All Lab Results:  Results for orders placed or performed during the hospital encounter of 10/06/23   CBC auto differential   Result Value Ref Range    WBC 7.54 3.90 - 12.70 K/uL    RBC 5.09 4.60 - 6.20 M/uL    Hemoglobin 16.7 14.0 - 18.0 g/dL    Hematocrit 48.4 40.0 - 54.0 %    MCV 95 82 - 98 fL    MCH 32.8 (H) 27.0 - 31.0 pg    MCHC 34.5 32.0 - 36.0 g/dL    RDW 11.9 11.5 - 14.5 %    Platelets 258 150 - 450 K/uL    MPV 10.0 9.2 - 12.9 fL    Immature Granulocytes 0.4 0.0 - 0.5 %    Gran # (ANC) 4.7 1.8 - 7.7 K/uL    Immature Grans (Abs) 0.03 0.00 - 0.04 K/uL    Lymph # 2.1 1.0 - 4.8 K/uL    Mono # 0.6 0.3 - 1.0 K/uL    Eos # 0.1 0.0 - 0.5 K/uL    Baso # 0.06 0.00 - 0.20 K/uL    nRBC 0 0 /100 WBC    Gran % 62.6 38.0 -  73.0 %    Lymph % 27.6 18.0 - 48.0 %    Mono % 7.7 4.0 - 15.0 %    Eosinophil % 0.9 0.0 - 8.0 %    Basophil % 0.8 0.0 - 1.9 %    Differential Method Automated    Comprehensive metabolic panel   Result Value Ref Range    Sodium 139 136 - 145 mmol/L    Potassium 4.3 3.5 - 5.1 mmol/L    Chloride 101 95 - 110 mmol/L    CO2 24 23 - 29 mmol/L    Glucose 333 (H) 70 - 110 mg/dL    BUN 21 8 - 23 mg/dL    Creatinine 1.3 0.5 - 1.4 mg/dL    Calcium 9.4 8.7 - 10.5 mg/dL    Total Protein 7.5 6.0 - 8.4 g/dL    Albumin 3.8 3.5 - 5.2 g/dL    Total Bilirubin 0.8 0.1 - 1.0 mg/dL    Alkaline Phosphatase 86 55 - 135 U/L    AST 15 10 - 40 U/L    ALT 26 10 - 44 U/L    eGFR >60 >60 mL/min/1.73 m^2    Anion Gap 14 8 - 16 mmol/L   Troponin I   Result Value Ref Range    Troponin I <0.006 0.000 - 0.026 ng/mL   Urinalysis, Reflex to Urine Culture Urine, Clean Catch    Specimen: Urine   Result Value Ref Range    Specimen UA Urine, Clean Catch     Color, UA Yellow Yellow, Straw, Lu    Appearance, UA Clear Clear    pH, UA 6.0 5.0 - 8.0    Specific Gravity, UA >1.030 (A) 1.005 - 1.030    Protein, UA Negative Negative    Glucose, UA 4+ (A) Negative    Ketones, UA Negative Negative    Bilirubin (UA) Negative Negative    Occult Blood UA Negative Negative    Nitrite, UA Negative Negative    Urobilinogen, UA Negative <2.0 EU/dL    Leukocytes, UA Negative Negative   Urinalysis Microscopic   Result Value Ref Range    Bacteria None None-Occ /hpf    Yeast, UA None None    Microscopic Comment SEE COMMENT    Troponin I   Result Value Ref Range    Troponin I <0.006 0.000 - 0.026 ng/mL   Protime-INR   Result Value Ref Range    Prothrombin Time 10.5 9.0 - 12.5 sec    INR 1.0 0.8 - 1.2   APTT   Result Value Ref Range    aPTT 24.8 21.0 - 32.0 sec   Lipid panel   Result Value Ref Range    Cholesterol 156 120 - 199 mg/dL    Triglycerides 95 30 - 150 mg/dL    HDL 34 (L) 40 - 75 mg/dL    LDL Cholesterol 103.0 63.0 - 159.0 mg/dL    HDL/Cholesterol Ratio 21.8 20.0 -  50.0 %    Total Cholesterol/HDL Ratio 4.6 2.0 - 5.0    Non-HDL Cholesterol 122 mg/dL   Troponin I   Result Value Ref Range    Troponin I 0.376 (H) 0.000 - 0.026 ng/mL   Troponin I   Result Value Ref Range    Troponin I 1.528 (H) 0.000 - 0.026 ng/mL   Troponin I   Result Value Ref Range    Troponin I 3.129 (H) 0.000 - 0.026 ng/mL   CBC Auto Differential   Result Value Ref Range    WBC 9.90 3.90 - 12.70 K/uL    RBC 4.79 4.60 - 6.20 M/uL    Hemoglobin 15.1 14.0 - 18.0 g/dL    Hematocrit 45.4 40.0 - 54.0 %    MCV 95 82 - 98 fL    MCH 31.5 (H) 27.0 - 31.0 pg    MCHC 33.3 32.0 - 36.0 g/dL    RDW 11.9 11.5 - 14.5 %    Platelets 244 150 - 450 K/uL    MPV 10.4 9.2 - 12.9 fL    Immature Granulocytes 0.8 (H) 0.0 - 0.5 %    Gran # (ANC) 6.2 1.8 - 7.7 K/uL    Immature Grans (Abs) 0.08 (H) 0.00 - 0.04 K/uL    Lymph # 2.7 1.0 - 4.8 K/uL    Mono # 0.9 0.3 - 1.0 K/uL    Eos # 0.1 0.0 - 0.5 K/uL    Baso # 0.05 0.00 - 0.20 K/uL    nRBC 0 0 /100 WBC    Gran % 62.2 38.0 - 73.0 %    Lymph % 26.9 18.0 - 48.0 %    Mono % 8.7 4.0 - 15.0 %    Eosinophil % 0.9 0.0 - 8.0 %    Basophil % 0.5 0.0 - 1.9 %    Differential Method Automated    Basic Metabolic Panel   Result Value Ref Range    Sodium 138 136 - 145 mmol/L    Potassium 4.0 3.5 - 5.1 mmol/L    Chloride 106 95 - 110 mmol/L    CO2 22 (L) 23 - 29 mmol/L    Glucose 191 (H) 70 - 110 mg/dL    BUN 22 8 - 23 mg/dL    Creatinine 1.0 0.5 - 1.4 mg/dL    Calcium 8.6 (L) 8.7 - 10.5 mg/dL    Anion Gap 10 8 - 16 mmol/L    eGFR >60 >60 mL/min/1.73 m^2   Magnesium   Result Value Ref Range    Magnesium 2.0 1.6 - 2.6 mg/dL   APTT   Result Value Ref Range    aPTT 26.5 21.0 - 32.0 sec   Protime-INR   Result Value Ref Range    Prothrombin Time 10.6 9.0 - 12.5 sec    INR 1.0 0.8 - 1.2   CBC auto differential   Result Value Ref Range    WBC 8.28 3.90 - 12.70 K/uL    RBC 4.80 4.60 - 6.20 M/uL    Hemoglobin 15.3 14.0 - 18.0 g/dL    Hematocrit 45.9 40.0 - 54.0 %    MCV 96 82 - 98 fL    MCH 31.9 (H) 27.0 - 31.0 pg     MCHC 33.3 32.0 - 36.0 g/dL    RDW 11.9 11.5 - 14.5 %    Platelets 231 150 - 450 K/uL    MPV 10.2 9.2 - 12.9 fL    Immature Granulocytes 0.5 0.0 - 0.5 %    Gran # (ANC) 5.4 1.8 - 7.7 K/uL    Immature Grans (Abs) 0.04 0.00 - 0.04 K/uL    Lymph # 2.0 1.0 - 4.8 K/uL    Mono # 0.8 0.3 - 1.0 K/uL    Eos # 0.1 0.0 - 0.5 K/uL    Baso # 0.04 0.00 - 0.20 K/uL    nRBC 0 0 /100 WBC    Gran % 64.7 38.0 - 73.0 %    Lymph % 24.4 18.0 - 48.0 %    Mono % 9.1 4.0 - 15.0 %    Eosinophil % 0.8 0.0 - 8.0 %    Basophil % 0.5 0.0 - 1.9 %    Differential Method Automated    Brain natriuretic peptide   Result Value Ref Range     (H) 0 - 99 pg/mL   APTT   Result Value Ref Range    aPTT 34.1 (H) 21.0 - 32.0 sec   Troponin I   Result Value Ref Range    Troponin I 2.429 (H) 0.000 - 0.026 ng/mL   APTT   Result Value Ref Range    aPTT 45.0 (H) 21.0 - 32.0 sec   Magnesium   Result Value Ref Range    Magnesium 2.0 1.6 - 2.6 mg/dL   CBC auto differential   Result Value Ref Range    WBC 8.30 3.90 - 12.70 K/uL    RBC 4.87 4.60 - 6.20 M/uL    Hemoglobin 15.5 14.0 - 18.0 g/dL    Hematocrit 46.4 40.0 - 54.0 %    MCV 95 82 - 98 fL    MCH 31.8 (H) 27.0 - 31.0 pg    MCHC 33.4 32.0 - 36.0 g/dL    RDW 12.0 11.5 - 14.5 %    Platelets 226 150 - 450 K/uL    MPV 10.3 9.2 - 12.9 fL    Immature Granulocytes 0.4 0.0 - 0.5 %    Gran # (ANC) 4.7 1.8 - 7.7 K/uL    Immature Grans (Abs) 0.03 0.00 - 0.04 K/uL    Lymph # 2.8 1.0 - 4.8 K/uL    Mono # 0.7 0.3 - 1.0 K/uL    Eos # 0.1 0.0 - 0.5 K/uL    Baso # 0.05 0.00 - 0.20 K/uL    nRBC 0 0 /100 WBC    Gran % 56.7 38.0 - 73.0 %    Lymph % 33.1 18.0 - 48.0 %    Mono % 8.4 4.0 - 15.0 %    Eosinophil % 0.8 0.0 - 8.0 %    Basophil % 0.6 0.0 - 1.9 %    Differential Method Automated    APTT   Result Value Ref Range    aPTT 37.6 (H) 21.0 - 32.0 sec   APTT   Result Value Ref Range    aPTT 58.9 (H) 21.0 - 32.0 sec   APTT   Result Value Ref Range    aPTT 50.4 (H) 21.0 - 32.0 sec   Magnesium   Result Value Ref Range     Magnesium 1.9 1.6 - 2.6 mg/dL   CBC auto differential   Result Value Ref Range    WBC 8.43 3.90 - 12.70 K/uL    RBC 5.01 4.60 - 6.20 M/uL    Hemoglobin 15.8 14.0 - 18.0 g/dL    Hematocrit 47.6 40.0 - 54.0 %    MCV 95 82 - 98 fL    MCH 31.5 (H) 27.0 - 31.0 pg    MCHC 33.2 32.0 - 36.0 g/dL    RDW 11.9 11.5 - 14.5 %    Platelets 250 150 - 450 K/uL    MPV 10.5 9.2 - 12.9 fL    Immature Granulocytes 1.7 (H) 0.0 - 0.5 %    Gran # (ANC) 4.4 1.8 - 7.7 K/uL    Immature Grans (Abs) 0.14 (H) 0.00 - 0.04 K/uL    Lymph # 2.8 1.0 - 4.8 K/uL    Mono # 0.9 0.3 - 1.0 K/uL    Eos # 0.1 0.0 - 0.5 K/uL    Baso # 0.06 0.00 - 0.20 K/uL    nRBC 0 0 /100 WBC    Gran % 52.1 38.0 - 73.0 %    Lymph % 33.5 18.0 - 48.0 %    Mono % 10.9 4.0 - 15.0 %    Eosinophil % 1.1 0.0 - 8.0 %    Basophil % 0.7 0.0 - 1.9 %    Differential Method Automated    APTT   Result Value Ref Range    aPTT 51.0 (H) 21.0 - 32.0 sec   Basic metabolic panel   Result Value Ref Range    Sodium 136 136 - 145 mmol/L    Potassium 4.0 3.5 - 5.1 mmol/L    Chloride 102 95 - 110 mmol/L    CO2 21 (L) 23 - 29 mmol/L    Glucose 196 (H) 70 - 110 mg/dL    BUN 21 8 - 23 mg/dL    Creatinine 1.2 0.5 - 1.4 mg/dL    Calcium 9.0 8.7 - 10.5 mg/dL    Anion Gap 13 8 - 16 mmol/L    eGFR >60 >60 mL/min/1.73 m^2   Echo   Result Value Ref Range    BSA 2.64 m2    LVIDd 4.56 3.5 - 6.0 cm    LV Systolic Volume 24.51 mL    LV Systolic Volume Index 9.6 mL/m2    LVIDs 2.60 2.1 - 4.0 cm    LV Diastolic Volume 95.45 mL    LV Diastolic Volume Index 37.29 mL/m2    IVS 1.05 0.6 - 1.1 cm    FS 43 28 - 44 %    Left Ventricle Relative Wall Thickness 0.52 cm    Posterior Wall 1.18 (A) 0.6 - 1.1 cm    LV mass 182.17 g    LV Mass Index 71 g/m2    MV Peak E Brandon 0.68 m/s    TDI LATERAL 0.11 m/s    TDI SEPTAL 0.08 m/s    E/E' ratio 7.16 m/s    MV Peak A Brandon 0.58 m/s    E/A ratio 1.17     IVRT 99.90 msec    E wave deceleration time 205.75 msec    LV SEPTAL E/E' RATIO 8.50 m/s    LV LATERAL E/E' RATIO 6.18 m/s    LVOT  peak karol 0.88 m/s    Left Ventricular Outflow Tract Mean Velocity 0.61 cm/s    Left Ventricular Outflow Tract Mean Gradient 1.72 mmHg    LA size 3.91 cm    Left Atrium Minor Axis 4.95 cm    Left Atrium Major Axis 5.71 cm    LA volume (mod) 56.17 cm3    LA Volume Index (Mod) 21.9 mL/m2    RVDD 3.38 cm    RVOT peak VTI 15.7 cm    TAPSE 2.34 cm    RA Major Axis 4.89 cm    AV mean gradient 2 mmHg    AV peak gradient 4 mmHg    Ao peak karol 1.03 m/s    Ao VTI 24.60 cm    LVOT peak VTI 19.40 cm    AV Velocity Ratio 0.85     AV index (prosthetic) 0.79     PV mean gradient 1 mmHg    PV PEAK VELOCITY 0.86 m/s    PV peak gradient 3 mmHg    Pulmonary Valve Mean Velocity 0.65 m/s    RVOT peak karol 0.71 m/s    Ao root annulus 3.44 cm    Mean e' 0.10 m/s    ZLVIDS -10.39     ZLVIDD -12.81     LA Volume Index 29.6 mL/m2    LA volume 75.78 cm3    LA WIDTH 4.3 cm    RA Width 3.6 cm    Est. RA pres 8 mmHg   Cardiac catheterization   Result Value Ref Range    Cath EF Quantitative 60 %   POCT glucose   Result Value Ref Range    POCT Glucose 178 (H) 70 - 110 mg/dL   POCT glucose   Result Value Ref Range    POCT Glucose 186 (H) 70 - 110 mg/dL   POCT glucose   Result Value Ref Range    POCT Glucose 196 (H) 70 - 110 mg/dL   POCT glucose   Result Value Ref Range    POCT Glucose 211 (H) 70 - 110 mg/dL   POCT glucose   Result Value Ref Range    POCT Glucose 176 (H) 70 - 110 mg/dL   POCT glucose   Result Value Ref Range    POCT Glucose 180 (H) 70 - 110 mg/dL   POCT glucose   Result Value Ref Range    POCT Glucose 193 (H) 70 - 110 mg/dL   ISTAT ACT-K   Result Value Ref Range    POC ACTIVATED CLOTTING TIME K 251 (H) 74 - 137 sec    Sample unknown          Imaging Results:  Imaging Results              X-Ray Chest AP Portable (Final result)  Result time 10/06/23 13:33:44      Final result by MOHAMUD Sanchez Sr., MD (10/06/23 13:33:44)                   Impression:      Normal study.      Electronically signed by: Jose M Sanchez  MD  Date:    10/06/2023  Time:    13:33               Narrative:    EXAMINATION:  XR CHEST AP PORTABLE    CLINICAL HISTORY:  Essential (primary) hypertension    COMPARISON:  04/01/2019    FINDINGS:  The size of the heart is normal. The lungs are clear. There is no pneumothorax.  The costophrenic angles are sharp.                                       The EKG was ordered, reviewed, and independently interpreted by the ED provider.  Interpretation time: 13:07  Rate: 94 BPM  Rhythm: normal sinus rhythm  Interpretation: Inferior infarct, age undetermined. Possible anterolateral infarct No STEMI.             The Emergency Provider reviewed the vital signs and test results, which are outlined above.     ED Discussion       3:58 PM: Dr. Newell transfers care of patient to Dr. Bernal pending lab results.      8:41 PM: Re-evaluated pt. Pt is resting comfortably and is in no acute distress.  Pt states Tylenol helped HA but is now having substernal CP again.  D/w pt all pertinent results. D/w pt any concerns expressed at this time. Answered all questions. Pt expresses understanding at this time.       9:08 PM: Discussed pt's case with Dr. Cancino (Cardiology) who recommends OBS and trending troponin. Dr. Cancino will likely order a nuclear stress test in the am.       9:44 PM: Discussed case with Dr. Fuentes ([facility]). Dr. Fuentes agrees with current care and management of pt and accepts admission.   Admitting Service:   Admitting Physician: Dr. Fuentes  Admit to: obs telemetry     9:44 PM: Re-evaluated pt. I have discussed test results, shared treatment plan, and the need for admission with patient and family at bedside. Pt and family express understanding at this time and agree with all information. All questions answered. Pt and family have no further questions or concerns at this time. Pt is ready for admit.          Medical Decision Making  Amount and/or Complexity of Data Reviewed  External Data Reviewed:  notes.     Details: Reviewed Internal med visit from 4/27/2023 for hypertension reguarding type II diabetes   Labs: ordered. Decision-making details documented in ED Course.  Radiology: ordered. Decision-making details documented in ED Course.  ECG/medicine tests: ordered. Decision-making details documented in ED Course.    Risk  OTC drugs.  Prescription drug management.       Additional MDM:   Heart Score:    History:          Moderately suspicious.  ECG:             Normal  Age:               >65 years  Risk factors: >= 3 risk factors or history of atherosclerotic disease  Troponin:       Less than or equal to normal limit  Heart Score = 5                ED Medication(s):  Medications   0.9%  NaCl infusion ( Intravenous Restarted 10/9/23 1340)   cloNIDine tablet 0.1 mg (0.1 mg Oral Given 10/6/23 1328)   acetaminophen tablet 1,000 mg (1,000 mg Oral Given 10/1957)   aspirin tablet 325 mg (325 mg Oral Given 10/6/23 2045)   nitroGLYCERIN 2% TD oint ointment 1 inch (1 inch Topical (Top) Given 10/6/23 2045)   heparin 25,000 units in dextrose 5% (100 units/ml) IV bolus from bag INITIAL BOLUS (max bolus 4000 units) (3,990 Units Intravenous Bolus from Bag 10/7/23 0909)       Discharge Medication List as of 10/9/2023  5:19 PM           Follow-up Information       LACI Dow MD Follow up in 3 day(s).    Specialty: Family Medicine  Contact information:  [id number]  [place] [address]  [phone]                                 Scribe Attestation:   Scribe #1: I performed the above scribed service and the documentation accurately describes the services I performed. I attest to the accuracy of the note.     Attending:   Physician Attestation Statement for Scribe #1: I, Jim Newell MD, personally performed the services described in this documentation, as scribed by Dona Lr, in my presence, and it is both accurate and complete.       Scribe Attestation:   Scribe #2: I performed the above  scribed service and the documentation accurately describes the services I performed. I attest to the accuracy of the note.    Attending Attestation:           Physician Attestation for Scribe:    Physician Attestation Statement for Scribe #2: I, Naresh Bernal MD, reviewed documentation, as scribed by Wilber Merchant in my presence, and it is both accurate and complete. I also acknowledge and confirm the content of the note done by Scribe #1.           Clinical Impression       ICD-10-CM ICD-9-CM   1. Hyperlipidemia associated with type 2 diabetes mellitus  E11.69 250.80    E78.5 272.4   2. HTN (hypertension)  I10 401.9   3. Chest pain  R07.9 786.50   4. Non-ST elevation myocardial infarction (NSTEMI)  I21.4 410.70   5. NSTEMI (non-ST elevated myocardial infarction)  I21.4 410.70   6. CAD S/P percutaneous coronary angioplasty  I25.10 414.01    Z98.61 V45.82   7. Essential hypertension  I10 401.9   8. S/P PTCA (percutaneous transluminal coronary angioplasty)  Z98.61 V45.82   9. DANTE on CPAP  G47.33 327.23   10. Other chest pain  R07.89 786.59       Disposition:   Disposition: Placed in Observation  Condition: Stable         Naresh Bernal Jr., MD  10/08/23 0318       Naresh Bernal Jr., MD  10/25/23 0141

## 2023-10-06 NOTE — FIRST PROVIDER EVALUATION
"Medical screening examination initiated.  I have conducted a focused provider triage encounter, findings are as follows:    Brief history of present illness:  reports htn and feeling flushed. Took nitro pta    Vitals:    10/06/23 1309   BP: (!) 189/99   BP Location: Right arm   Patient Position: Sitting   Pulse: 99   Resp: 18   Temp: 98.8 °F (37.1 °C)   TempSrc: Oral   SpO2: 100%   Weight: 133.4 kg (294 lb 3.3 oz)   Height: 6' 2" (1.88 m)       Pertinent physical exam:  nad    Brief workup plan:  labs,ekg, imaging, further eval    Preliminary workup initiated; this workup will be continued and followed by the physician or advanced practice provider that is assigned to the patient when roomed.  "

## 2023-10-06 NOTE — Clinical Note
The right DP pulse was non-palpable.  The right PT pulse was detected with doppler. The left radial pulse was allens test negative.

## 2023-10-07 LAB
ANION GAP SERPL CALC-SCNC: 10 MMOL/L (ref 8–16)
AORTIC ROOT ANNULUS: 3.44 CM
APTT PPP: 26.5 SEC (ref 21–32)
APTT PPP: 34.1 SEC (ref 21–32)
APTT PPP: 45 SEC (ref 21–32)
AV INDEX (PROSTH): 0.79
AV MEAN GRADIENT: 2 MMHG
AV PEAK GRADIENT: 4 MMHG
AV VELOCITY RATIO: 0.85
BASOPHILS # BLD AUTO: 0.04 K/UL (ref 0–0.2)
BASOPHILS # BLD AUTO: 0.05 K/UL (ref 0–0.2)
BASOPHILS NFR BLD: 0.5 % (ref 0–1.9)
BASOPHILS NFR BLD: 0.5 % (ref 0–1.9)
BNP SERPL-MCNC: 143 PG/ML (ref 0–99)
BSA FOR ECHO PROCEDURE: 2.64 M2
BUN SERPL-MCNC: 22 MG/DL (ref 8–23)
CALCIUM SERPL-MCNC: 8.6 MG/DL (ref 8.7–10.5)
CHLORIDE SERPL-SCNC: 106 MMOL/L (ref 95–110)
CHOLEST SERPL-MCNC: 156 MG/DL (ref 120–199)
CHOLEST/HDLC SERPL: 4.6 {RATIO} (ref 2–5)
CO2 SERPL-SCNC: 22 MMOL/L (ref 23–29)
CREAT SERPL-MCNC: 1 MG/DL (ref 0.5–1.4)
CV ECHO LV RWT: 0.52 CM
DIFFERENTIAL METHOD: ABNORMAL
DIFFERENTIAL METHOD: ABNORMAL
DOP CALC AO PEAK VEL: 1.03 M/S
DOP CALC AO VTI: 24.6 CM
DOP CALC LVOT PEAK VEL: 0.88 M/S
DOP CALC RVOT PEAK VEL: 0.71 M/S
DOP CALC RVOT VTI: 15.7 CM
DOP CALCLVOT PEAK VEL VTI: 19.4 CM
E WAVE DECELERATION TIME: 205.75 MSEC
E/A RATIO: 1.17
E/E' RATIO: 7.16 M/S
ECHO LV POSTERIOR WALL: 1.18 CM (ref 0.6–1.1)
EOSINOPHIL # BLD AUTO: 0.1 K/UL (ref 0–0.5)
EOSINOPHIL # BLD AUTO: 0.1 K/UL (ref 0–0.5)
EOSINOPHIL NFR BLD: 0.8 % (ref 0–8)
EOSINOPHIL NFR BLD: 0.9 % (ref 0–8)
ERYTHROCYTE [DISTWIDTH] IN BLOOD BY AUTOMATED COUNT: 11.9 % (ref 11.5–14.5)
ERYTHROCYTE [DISTWIDTH] IN BLOOD BY AUTOMATED COUNT: 11.9 % (ref 11.5–14.5)
EST. GFR  (NO RACE VARIABLE): >60 ML/MIN/1.73 M^2
FRACTIONAL SHORTENING: 43 % (ref 28–44)
GLUCOSE SERPL-MCNC: 191 MG/DL (ref 70–110)
HCT VFR BLD AUTO: 45.4 % (ref 40–54)
HCT VFR BLD AUTO: 45.9 % (ref 40–54)
HDLC SERPL-MCNC: 34 MG/DL (ref 40–75)
HDLC SERPL: 21.8 % (ref 20–50)
HGB BLD-MCNC: 15.1 G/DL (ref 14–18)
HGB BLD-MCNC: 15.3 G/DL (ref 14–18)
IMM GRANULOCYTES # BLD AUTO: 0.04 K/UL (ref 0–0.04)
IMM GRANULOCYTES # BLD AUTO: 0.08 K/UL (ref 0–0.04)
IMM GRANULOCYTES NFR BLD AUTO: 0.5 % (ref 0–0.5)
IMM GRANULOCYTES NFR BLD AUTO: 0.8 % (ref 0–0.5)
INR PPP: 1 (ref 0.8–1.2)
INTERVENTRICULAR SEPTUM: 1.05 CM (ref 0.6–1.1)
IVRT: 99.9 MSEC
LA MAJOR: 5.71 CM
LA MINOR: 4.95 CM
LA WIDTH: 4.3 CM
LDLC SERPL CALC-MCNC: 103 MG/DL (ref 63–159)
LEFT ATRIUM SIZE: 3.91 CM
LEFT ATRIUM VOLUME INDEX MOD: 21.9 ML/M2
LEFT ATRIUM VOLUME INDEX: 29.6 ML/M2
LEFT ATRIUM VOLUME MOD: 56.17 CM3
LEFT ATRIUM VOLUME: 75.78 CM3
LEFT INTERNAL DIMENSION IN SYSTOLE: 2.6 CM (ref 2.1–4)
LEFT VENTRICLE DIASTOLIC VOLUME INDEX: 37.29 ML/M2
LEFT VENTRICLE DIASTOLIC VOLUME: 95.45 ML
LEFT VENTRICLE MASS INDEX: 71 G/M2
LEFT VENTRICLE SYSTOLIC VOLUME INDEX: 9.6 ML/M2
LEFT VENTRICLE SYSTOLIC VOLUME: 24.51 ML
LEFT VENTRICULAR INTERNAL DIMENSION IN DIASTOLE: 4.56 CM (ref 3.5–6)
LEFT VENTRICULAR MASS: 182.17 G
LV LATERAL E/E' RATIO: 6.18 M/S
LV SEPTAL E/E' RATIO: 8.5 M/S
LVOT MG: 1.72 MMHG
LVOT MV: 0.61 CM/S
LYMPHOCYTES # BLD AUTO: 2 K/UL (ref 1–4.8)
LYMPHOCYTES # BLD AUTO: 2.7 K/UL (ref 1–4.8)
LYMPHOCYTES NFR BLD: 24.4 % (ref 18–48)
LYMPHOCYTES NFR BLD: 26.9 % (ref 18–48)
MAGNESIUM SERPL-MCNC: 2 MG/DL (ref 1.6–2.6)
MCH RBC QN AUTO: 31.5 PG (ref 27–31)
MCH RBC QN AUTO: 31.9 PG (ref 27–31)
MCHC RBC AUTO-ENTMCNC: 33.3 G/DL (ref 32–36)
MCHC RBC AUTO-ENTMCNC: 33.3 G/DL (ref 32–36)
MCV RBC AUTO: 95 FL (ref 82–98)
MCV RBC AUTO: 96 FL (ref 82–98)
MONOCYTES # BLD AUTO: 0.8 K/UL (ref 0.3–1)
MONOCYTES # BLD AUTO: 0.9 K/UL (ref 0.3–1)
MONOCYTES NFR BLD: 8.7 % (ref 4–15)
MONOCYTES NFR BLD: 9.1 % (ref 4–15)
MV PEAK A VEL: 0.58 M/S
MV PEAK E VEL: 0.68 M/S
NEUTROPHILS # BLD AUTO: 5.4 K/UL (ref 1.8–7.7)
NEUTROPHILS # BLD AUTO: 6.2 K/UL (ref 1.8–7.7)
NEUTROPHILS NFR BLD: 62.2 % (ref 38–73)
NEUTROPHILS NFR BLD: 64.7 % (ref 38–73)
NONHDLC SERPL-MCNC: 122 MG/DL
NRBC BLD-RTO: 0 /100 WBC
NRBC BLD-RTO: 0 /100 WBC
PLATELET # BLD AUTO: 231 K/UL (ref 150–450)
PLATELET # BLD AUTO: 244 K/UL (ref 150–450)
PMV BLD AUTO: 10.2 FL (ref 9.2–12.9)
PMV BLD AUTO: 10.4 FL (ref 9.2–12.9)
POCT GLUCOSE: 178 MG/DL (ref 70–110)
POCT GLUCOSE: 186 MG/DL (ref 70–110)
POCT GLUCOSE: 196 MG/DL (ref 70–110)
POTASSIUM SERPL-SCNC: 4 MMOL/L (ref 3.5–5.1)
PROTHROMBIN TIME: 10.6 SEC (ref 9–12.5)
PV MEAN GRADIENT: 1 MMHG
PV MV: 0.65 M/S
PV PEAK GRADIENT: 3 MMHG
PV PEAK VELOCITY: 0.86 M/S
RA MAJOR: 4.89 CM
RA PRESSURE ESTIMATED: 8 MMHG
RA WIDTH: 3.6 CM
RBC # BLD AUTO: 4.79 M/UL (ref 4.6–6.2)
RBC # BLD AUTO: 4.8 M/UL (ref 4.6–6.2)
RIGHT VENTRICULAR END-DIASTOLIC DIMENSION: 3.38 CM
SODIUM SERPL-SCNC: 138 MMOL/L (ref 136–145)
TDI LATERAL: 0.11 M/S
TDI SEPTAL: 0.08 M/S
TDI: 0.1 M/S
TRICUSPID ANNULAR PLANE SYSTOLIC EXCURSION: 2.34 CM
TRIGL SERPL-MCNC: 95 MG/DL (ref 30–150)
TROPONIN I SERPL DL<=0.01 NG/ML-MCNC: 1.53 NG/ML (ref 0–0.03)
TROPONIN I SERPL DL<=0.01 NG/ML-MCNC: 2.43 NG/ML (ref 0–0.03)
TROPONIN I SERPL DL<=0.01 NG/ML-MCNC: 3.13 NG/ML (ref 0–0.03)
WBC # BLD AUTO: 8.28 K/UL (ref 3.9–12.7)
WBC # BLD AUTO: 9.9 K/UL (ref 3.9–12.7)
Z-SCORE OF LEFT VENTRICULAR DIMENSION IN END DIASTOLE: -12.81
Z-SCORE OF LEFT VENTRICULAR DIMENSION IN END SYSTOLE: -10.39

## 2023-10-07 PROCEDURE — 85025 COMPLETE CBC W/AUTO DIFF WBC: CPT | Mod: 91 | Performed by: INTERNAL MEDICINE

## 2023-10-07 PROCEDURE — 99223 PR INITIAL HOSPITAL CARE,LEVL III: ICD-10-PCS | Mod: ,,, | Performed by: INTERNAL MEDICINE

## 2023-10-07 PROCEDURE — 83735 ASSAY OF MAGNESIUM: CPT | Performed by: STUDENT IN AN ORGANIZED HEALTH CARE EDUCATION/TRAINING PROGRAM

## 2023-10-07 PROCEDURE — 93010 EKG 12-LEAD: ICD-10-PCS | Mod: ,,, | Performed by: INTERNAL MEDICINE

## 2023-10-07 PROCEDURE — 85730 THROMBOPLASTIN TIME PARTIAL: CPT | Mod: 91 | Performed by: INTERNAL MEDICINE

## 2023-10-07 PROCEDURE — 84484 ASSAY OF TROPONIN QUANT: CPT | Mod: 91 | Performed by: STUDENT IN AN ORGANIZED HEALTH CARE EDUCATION/TRAINING PROGRAM

## 2023-10-07 PROCEDURE — 80048 BASIC METABOLIC PNL TOTAL CA: CPT | Performed by: STUDENT IN AN ORGANIZED HEALTH CARE EDUCATION/TRAINING PROGRAM

## 2023-10-07 PROCEDURE — 36415 COLL VENOUS BLD VENIPUNCTURE: CPT | Performed by: HOSPITALIST

## 2023-10-07 PROCEDURE — 85730 THROMBOPLASTIN TIME PARTIAL: CPT | Mod: 91 | Performed by: HOSPITALIST

## 2023-10-07 PROCEDURE — 84484 ASSAY OF TROPONIN QUANT: CPT | Mod: 91 | Performed by: NURSE PRACTITIONER

## 2023-10-07 PROCEDURE — 36415 COLL VENOUS BLD VENIPUNCTURE: CPT | Performed by: STUDENT IN AN ORGANIZED HEALTH CARE EDUCATION/TRAINING PROGRAM

## 2023-10-07 PROCEDURE — 96372 THER/PROPH/DIAG INJ SC/IM: CPT | Performed by: STUDENT IN AN ORGANIZED HEALTH CARE EDUCATION/TRAINING PROGRAM

## 2023-10-07 PROCEDURE — 85610 PROTHROMBIN TIME: CPT | Performed by: INTERNAL MEDICINE

## 2023-10-07 PROCEDURE — 83880 ASSAY OF NATRIURETIC PEPTIDE: CPT | Performed by: INTERNAL MEDICINE

## 2023-10-07 PROCEDURE — 93010 ELECTROCARDIOGRAM REPORT: CPT | Mod: ,,, | Performed by: INTERNAL MEDICINE

## 2023-10-07 PROCEDURE — 63600175 PHARM REV CODE 636 W HCPCS: Performed by: INTERNAL MEDICINE

## 2023-10-07 PROCEDURE — 85025 COMPLETE CBC W/AUTO DIFF WBC: CPT | Performed by: STUDENT IN AN ORGANIZED HEALTH CARE EDUCATION/TRAINING PROGRAM

## 2023-10-07 PROCEDURE — 93005 ELECTROCARDIOGRAM TRACING: CPT

## 2023-10-07 PROCEDURE — 84484 ASSAY OF TROPONIN QUANT: CPT | Performed by: STUDENT IN AN ORGANIZED HEALTH CARE EDUCATION/TRAINING PROGRAM

## 2023-10-07 PROCEDURE — 99223 1ST HOSP IP/OBS HIGH 75: CPT | Mod: ,,, | Performed by: INTERNAL MEDICINE

## 2023-10-07 PROCEDURE — 21400001 HC TELEMETRY ROOM

## 2023-10-07 PROCEDURE — 36415 COLL VENOUS BLD VENIPUNCTURE: CPT | Performed by: INTERNAL MEDICINE

## 2023-10-07 PROCEDURE — 25000003 PHARM REV CODE 250: Performed by: STUDENT IN AN ORGANIZED HEALTH CARE EDUCATION/TRAINING PROGRAM

## 2023-10-07 PROCEDURE — 99900035 HC TECH TIME PER 15 MIN (STAT)

## 2023-10-07 PROCEDURE — 94660 CPAP INITIATION&MGMT: CPT

## 2023-10-07 PROCEDURE — 63600175 PHARM REV CODE 636 W HCPCS: Performed by: STUDENT IN AN ORGANIZED HEALTH CARE EDUCATION/TRAINING PROGRAM

## 2023-10-07 PROCEDURE — 27100171 HC OXYGEN HIGH FLOW UP TO 24 HOURS

## 2023-10-07 PROCEDURE — 27000190 HC CPAP FULL FACE MASK W/VALVE

## 2023-10-07 RX ORDER — INSULIN ASPART 100 [IU]/ML
0-10 INJECTION, SOLUTION INTRAVENOUS; SUBCUTANEOUS EVERY 6 HOURS PRN
Status: DISCONTINUED | OUTPATIENT
Start: 2023-10-07 | End: 2023-10-09 | Stop reason: HOSPADM

## 2023-10-07 RX ORDER — HEPARIN SODIUM,PORCINE/D5W 25000/250
0-40 INTRAVENOUS SOLUTION INTRAVENOUS CONTINUOUS
Status: DISCONTINUED | OUTPATIENT
Start: 2023-10-07 | End: 2023-10-09

## 2023-10-07 RX ORDER — GLUCAGON 1 MG
1 KIT INJECTION
Status: DISCONTINUED | OUTPATIENT
Start: 2023-10-07 | End: 2023-10-09 | Stop reason: HOSPADM

## 2023-10-07 RX ADMIN — RANOLAZINE 1000 MG: 500 TABLET, EXTENDED RELEASE ORAL at 08:10

## 2023-10-07 RX ADMIN — RANOLAZINE 1000 MG: 500 TABLET, EXTENDED RELEASE ORAL at 09:10

## 2023-10-07 RX ADMIN — METOPROLOL TARTRATE 50 MG: 50 TABLET, FILM COATED ORAL at 08:10

## 2023-10-07 RX ADMIN — ASPIRIN 81 MG: 81 TABLET, COATED ORAL at 09:10

## 2023-10-07 RX ADMIN — HEPARIN SODIUM 12 UNITS/KG/HR: 10000 INJECTION, SOLUTION INTRAVENOUS at 09:10

## 2023-10-07 RX ADMIN — VALSARTAN 320 MG: 160 TABLET, FILM COATED ORAL at 09:10

## 2023-10-07 RX ADMIN — ENOXAPARIN SODIUM 40 MG: 40 INJECTION SUBCUTANEOUS at 01:10

## 2023-10-07 RX ADMIN — INSULIN DETEMIR 10 UNITS: 100 INJECTION, SOLUTION SUBCUTANEOUS at 08:10

## 2023-10-07 RX ADMIN — METOPROLOL TARTRATE 50 MG: 50 TABLET, FILM COATED ORAL at 09:10

## 2023-10-07 RX ADMIN — INSULIN ASPART 2 UNITS: 100 INJECTION, SOLUTION INTRAVENOUS; SUBCUTANEOUS at 01:10

## 2023-10-07 RX ADMIN — ATORVASTATIN CALCIUM 80 MG: 40 TABLET, FILM COATED ORAL at 01:10

## 2023-10-07 RX ADMIN — HYDROCHLOROTHIAZIDE 25 MG: 25 TABLET ORAL at 09:10

## 2023-10-07 RX ADMIN — RANOLAZINE 1000 MG: 500 TABLET, EXTENDED RELEASE ORAL at 01:10

## 2023-10-07 RX ADMIN — ISOSORBIDE MONONITRATE 30 MG: 30 TABLET, EXTENDED RELEASE ORAL at 09:10

## 2023-10-07 RX ADMIN — METOPROLOL TARTRATE 50 MG: 50 TABLET, FILM COATED ORAL at 01:10

## 2023-10-07 RX ADMIN — AMLODIPINE BESYLATE 10 MG: 10 TABLET ORAL at 09:10

## 2023-10-07 RX ADMIN — ATORVASTATIN CALCIUM 80 MG: 40 TABLET, FILM COATED ORAL at 08:10

## 2023-10-07 NOTE — ASSESSMENT & PLAN NOTE
--last A1c on file: 9.1% five days prior to admission  --Home medications include metformin, BID lantus  --holding home PO medications  -- mod dose SSI ordered  --QHS basal insulin at titrated dose- modify as diet restarts  --Accuchecks ACQ  --Fasting AM glucose goal <140  --restart statin medication    10/7/23  HgbA1c 9.1 up from previous.   Continue sliding scale  Add Levemir 10 units nightly

## 2023-10-07 NOTE — HPI
Patient is a 68-year-old gentleman with a past medical history significant for coronary artery disease, history of myocardial infarction with prior percutaneous transluminal coronary angioplasty, obesity, obstructive sleep apnea requiring CPAP, hyperlipidemia, hypertension, and type 2 diabetes mellitus with associated circulatory disorder (coronary artery disease), but not requiring long-term insulin. He presents to the Emergency Department after experiencing symptoms reminiscent of his previous myocardial infarction. While in a [employer] parking lot today, he felt a sudden rise in his blood pressure and facial flushing. He took nitroglycerin, which alleviated his symptoms. The patient associated these feelings with a similar episode he had years back when he had a myocardial infarction, prompting his visit to the ER. At present, he denies fever, chills, chest pain, shortness of breath, leg swelling, and other associated symptoms.    On arrival, his initial vital signs revealed a blood pressure of 189/99, which subsequently trended to 143/78, 140/76, and 165/72. The rest of his vital signs remained within normal parameters. Laboratory findings indicated hyperglycemia and glucosuria, though his troponin levels were negative twice. A chest X-ray was unremarkable. His electrocardiogram demonstrated a rate of 94 BPM with a normal sinus rhythm and revealed an old inferior infarct with a possible anterolateral infarct, though no ST-elevation myocardial infarction was identified. During his stay in the ER, he received medication including clonidine 0.1 mg, acetaminophen 1,000 mg, aspirin 325 mg, and a 1-inch topical application of 2% nitroglycerin ointment. Hospital Medicine was called for admission.

## 2023-10-07 NOTE — ASSESSMENT & PLAN NOTE
--home medications include: norvasc, hctz, imdur, lopressor, & an -tala   --restart as tolerated   --PRN IV hydralazine 10mg Q6H for sBP > 175 mmHg  --Q4HVS

## 2023-10-07 NOTE — SUBJECTIVE & OBJECTIVE
Interval History: denies episodes of chest pain today. Discussed upward trends of HgbA1c over the last 5 months. He says he has a dietician appt next week. Reports having [facility] several years ago.     Review of Systems   Constitutional:  Negative for chills, diaphoresis and fever.   HENT:  Negative for congestion, postnasal drip, rhinorrhea, sore throat and trouble swallowing.    Eyes:  Negative for pain, redness, itching and visual disturbance.   Respiratory:  Positive for chest tightness. Negative for cough, shortness of breath and wheezing.    Cardiovascular:  Positive for chest pain. Negative for palpitations.   Gastrointestinal:  Negative for abdominal distention, abdominal pain, constipation, diarrhea, nausea and vomiting.   Genitourinary:  Negative for difficulty urinating, dysuria and frequency.   Musculoskeletal:  Negative for arthralgias, back pain and joint swelling.   Neurological:  Negative for dizziness, seizures, syncope, weakness, light-headedness and headaches.   Psychiatric/Behavioral:  Negative for agitation, behavioral problems, confusion and suicidal ideas.      Objective:     Vital Signs (Most Recent):  Temp: 97.7 °F (36.5 °C) (10/07/23 1342)  Pulse: 71 (10/07/23 1342)  Resp: 16 (10/07/23 1342)  BP: 121/67 (10/07/23 1342)  SpO2: 97 % (10/07/23 1342) Vital Signs (24h Range):  Temp:  [97.5 °F (36.4 °C)-97.9 °F (36.6 °C)] 97.7 °F (36.5 °C)  Pulse:  [] 71  Resp:  [12-27] 16  SpO2:  [92 %-100 %] 97 %  BP: (104-185)/(57-92) 121/67     Weight: 133.4 kg (294 lb)  Body mass index is 37.75 kg/m².  No intake or output data in the 24 hours ending 10/07/23 1346      Physical Exam  Constitutional:       General: He is awake.      Appearance: Normal appearance. He is well-developed. He is obese. He is not ill-appearing, toxic-appearing or diaphoretic.   HENT:      Head: Normocephalic and atraumatic.      Nose: Nose normal.      Mouth/Throat:      Mouth: Mucous membranes are moist.   Eyes:      General: No  scleral icterus.     Extraocular Movements: Extraocular movements intact.      Conjunctiva/sclera: Conjunctivae normal.      Pupils: Pupils are equal, round, and reactive to light.   Cardiovascular:      Rate and Rhythm: Normal rate.      Pulses: Normal pulses.      Heart sounds: Normal heart sounds. No murmur heard.  Pulmonary:      Effort: Pulmonary effort is normal. No respiratory distress.      Breath sounds: No wheezing.   Abdominal:      General: Abdomen is flat. There is no distension.      Palpations: Abdomen is soft.      Tenderness: There is no abdominal tenderness.   Musculoskeletal:         General: No swelling, tenderness or deformity.      Cervical back: Normal range of motion and neck supple.   Neurological:      General: No focal deficit present.      Mental Status: He is alert and oriented to person, place, and time. Mental status is at baseline.   Psychiatric:         Behavior: Behavior is cooperative.             Significant Labs: All pertinent labs within the past 24 hours have been reviewed.  CBC:   Recent Labs   Lab 10/06/23  1332 10/07/23  0429 10/07/23  0809   WBC 7.54 9.90 8.28   HGB 16.7 15.1 15.3   HCT 48.4 45.4 45.9    244 231     CMP:   Recent Labs   Lab 10/06/23  1332 10/07/23  0429    138   K 4.3 4.0    106   CO2 24 22*   * 191*   BUN 21 22   CREATININE 1.3 1.0   CALCIUM 9.4 8.6*   PROT 7.5  --    ALBUMIN 3.8  --    BILITOT 0.8  --    ALKPHOS 86  --    AST 15  --    ALT 26  --    ANIONGAP 14 10       Significant Imaging: I have reviewed all pertinent imaging results/findings within the past 24 hours.

## 2023-10-07 NOTE — H&P
"[facility]  History & Physical    Patient Name: Isaiah Harrison  MRN: [id number]  Patient Class: OP- Observation  Admission Date: 10/6/2023  Attending Physician: Binu Fuentes MD   Primary Care Provider: LACI Dow MD         Patient information was obtained from patient, spouse/SO, past medical records and ER records.     Subjective:     Principal Problem:Chest pain    Chief Complaint:   Chief Complaint   Patient presents with    Hypertension     Pt states he could "feel his blood pressure going up." Pt states he took one nitro tablet en route and he was flushed before coming. Pt denies chest pain currently.             HPI: Patient is a 68-year-old gentleman with a past medical history significant for coronary artery disease, history of myocardial infarction with stent placement, prior percutaneous transluminal coronary angioplasty, obesity, obstructive sleep apnea requiring CPAP, hyperlipidemia, hypertension, and type 2 diabetes mellitus with associated circulatory disorder (coronary artery disease), but not requiring long-term insulin. He presents to the Emergency Department after experiencing symptoms reminiscent of his previous myocardial infarction. While in a [employer] parking lot today, he felt a sudden rise in his blood pressure and facial flushing. He took nitroglycerin, which alleviated his symptoms. The patient associated these feelings with a similar episode he had years back when he had a myocardial infarction, prompting his visit to the ER. At present, he denies fever, chills, chest pain, shortness of breath, leg swelling, and other associated symptoms.    On arrival, his initial vital signs revealed a blood pressure of 189/99, which subsequently trended to 143/78, 140/76, and 165/72. The rest of his vital signs remained within normal parameters. Laboratory findings indicated hyperglycemia and glucosuria, though his troponin levels were negative " twice. A chest X-ray was unremarkable. His electrocardiogram demonstrated a rate of 94 BPM with a normal sinus rhythm and revealed an old inferior infarct with a possible anterolateral infarct, though no ST-elevation myocardial infarction was identified. During his stay in the ER, he received medication including clonidine 0.1 mg, acetaminophen 1,000 mg, aspirin 325 mg, and a 1-inch topical application of 2% nitroglycerin ointment. Hospital Medicine was called for admission.      Past Medical History:   Diagnosis Date    Acute coronary syndrome     Anticoagulant long-term use     Back pain     CAD (coronary artery disease) 10/17/2013    Class 2 severe obesity due to excess calories with serious comorbidity and body mass index (BMI) of 38.0 to 38.9 in adult 01/12/2015    Coronary artery disease     Diabetes mellitus, type 2 2010    BS didn't check 04/19/2023 Insulin 2 years    Gastric polyps     Hyperlipemia     Hypertension     NSTEMI (non-ST elevated myocardial infarction) 10/23/2014    Obesity 10/21/2014    DANTE on CPAP 02/19/2015    S/P PTCA (percutaneous transluminal coronary angioplasty) 10/17/2013    Sleep apnea 01/07/2015    Type 2 diabetes mellitus with circulatory disorder (coronary artery disease), without long-term current use of insulin 07/14/2015       Past Surgical History:   Procedure Laterality Date    APPENDECTOMY      BACK SURGERY      CATARACT EXTRACTION      CATARACT EXTRACTION, BILATERAL      COLONOSCOPY      COLONOSCOPY N/A 3/9/2023    Procedure: COLONOSCOPY;  Surgeon: Lisa Auguste MD;  Location: [facility];  Service: Endoscopy;  Laterality: N/A;    CORONARY ANGIOPLASTY WITH STENT PLACEMENT      ESOPHAGOGASTRODUODENOSCOPY      EYE SURGERY      FRACTURE SURGERY      HERNIA REPAIR      SPINE SURGERY         Review of patient's allergies indicates:   Allergen Reactions    Pcn [penicillins] Hives       No current facility-administered medications on file prior to  "encounter.     Current Outpatient Medications on File Prior to Encounter   Medication Sig    amlodipine-valsartan (EXFORGE)  mg per tablet TAKE 1 TABLET BY MOUTH EVERY DAY    aspirin (ECOTRIN) 81 MG EC tablet Take 81 mg by mouth once daily.    atorvastatin (LIPITOR) 80 MG tablet TAKE 1 TABLET(80 MG) BY MOUTH EVERY EVENING    blood sugar diagnostic Strp 1 strip by Misc.(Non-Drug; Combo Route) route 4 (four) times daily.    empagliflozin (JARDIANCE) 10 mg tablet Take 1 tablet (10 mg total) by mouth once daily.    hydroCHLOROthiazide (HYDRODIURIL) 25 MG tablet TAKE 1 TABLET(25 MG) BY MOUTH EVERY DAY    hydrOXYzine HCL (ATARAX) 10 MG Tab TAKE 1 TABLET(10 MG) BY MOUTH TWICE DAILY AS NEEDED. MAY CAUSE DROWSINESS. DO NOT DRIVE OR OPERATE HEAVY MACHINERY    insulin (LANTUS SOLOSTAR U-100 INSULIN) glargine 100 units/mL SubQ pen Inject 40 Units into the skin 2 (two) times a day.    isosorbide mononitrate (IMDUR) 30 MG 24 hr tablet TAKE 3 TABLETS(90 MG) BY MOUTH EVERY DAY    ketoconazole (NIZORAL) 2 % cream Apply topically once daily.    metFORMIN (GLUCOPHAGE-XR) 500 MG ER 24hr tablet Take 1 tablet (500 mg total) by mouth daily with breakfast.    metoprolol tartrate (LOPRESSOR) 50 MG tablet TAKE 1 TABLET BY MOUTH TWICE DAILY    niacin (SLO-NIACIN) 500 mg tablet TAKE 1 TABLET(500 MG) BY MOUTH EVERY EVENING    nitroGLYCERIN (NITROSTAT) 0.4 MG SL tablet Place 1 tablet (0.4 mg total) under the tongue every 5 (five) minutes as needed.    pen needle, diabetic (BD ULTRA-FINE SHORT PEN NEEDLE) 31 gauge x 5/16" Ndle USE WITH LANTUS PEN AS DIRECTED    pneumoc 20-miranda conj-dip cr,PF, (PREVNAR-20, PF,) 0.5 mL Syrg injection Inject 0.5 mLs into the muscle.    ranolazine (RANEXA) 1,000 mg Tb12 Take 1 tablet (1,000 mg total) by mouth 2 (two) times daily.    tirzepatide 7.5 mg/0.5 mL PnIj Inject 7.5 mg into the skin every 7 days.     Family History       Problem Relation (Age of Onset)    Diabetes Mother, Sister    " Heart disease Maternal Grandfather    Hypertension Mother, Father, Sister          Tobacco Use    Smoking status: Never    Smokeless tobacco: Never   Substance and Sexual Activity    Alcohol use: Yes     Alcohol/week: 0.0 standard drinks of alcohol     Comment: socially    Drug use: No    Sexual activity: Yes     Partners: Female     Review of Systems   Constitutional:  Negative for chills, diaphoresis and fever.   HENT:  Negative for congestion, postnasal drip, rhinorrhea, sore throat and trouble swallowing.    Eyes:  Negative for pain, redness, itching and visual disturbance.   Respiratory:  Positive for chest tightness. Negative for cough, shortness of breath and wheezing.    Cardiovascular:  Positive for chest pain. Negative for palpitations.   Gastrointestinal:  Negative for abdominal distention, abdominal pain, constipation, diarrhea, nausea and vomiting.   Genitourinary:  Negative for difficulty urinating, dysuria and frequency.   Musculoskeletal:  Negative for arthralgias, back pain and joint swelling.   Neurological:  Negative for dizziness, seizures, syncope, weakness, light-headedness and headaches.   Psychiatric/Behavioral:  Negative for agitation, behavioral problems, confusion and suicidal ideas.      Objective:     Vital Signs (Most Recent):  Temp: 97.5 °F (36.4 °C) (10/07/23 0015)  Pulse: 101 (10/07/23 0015)  Resp: 20 (10/07/23 0015)  BP: 137/84 (10/07/23 0015)  SpO2: 97 % (10/07/23 0015) Vital Signs (24h Range):  Temp:  [97.5 °F (36.4 °C)-98.8 °F (37.1 °C)] 97.5 °F (36.4 °C)  Pulse:  [] 101  Resp:  [12-27] 20  SpO2:  [92 %-100 %] 97 %  BP: (104-189)/(57-99) 137/84     Weight: 133.4 kg (294 lb 3.3 oz)  Body mass index is 37.77 kg/m².     Physical Exam  Constitutional:       General: He is awake.      Appearance: Normal appearance. He is well-developed. He is obese. He is diaphoretic. He is not ill-appearing or toxic-appearing.   HENT:      Head: Normocephalic and atraumatic.      Nose:  "Nose normal.      Mouth/Throat:      Mouth: Mucous membranes are moist.   Eyes:      General: No scleral icterus.     Extraocular Movements: Extraocular movements intact.      Conjunctiva/sclera: Conjunctivae normal.      Pupils: Pupils are equal, round, and reactive to light.   Cardiovascular:      Rate and Rhythm: Normal rate.      Pulses: Normal pulses.      Heart sounds: Normal heart sounds. No murmur heard.  Pulmonary:      Effort: Pulmonary effort is normal. No respiratory distress.      Breath sounds: No wheezing.   Abdominal:      General: Abdomen is flat. There is no distension.      Palpations: Abdomen is soft.      Tenderness: There is no abdominal tenderness.   Musculoskeletal:         General: No swelling, tenderness or deformity.      Cervical back: Normal range of motion and neck supple.   Neurological:      General: No focal deficit present.      Mental Status: He is alert and oriented to person, place, and time. Mental status is at baseline.   Psychiatric:         Behavior: Behavior is cooperative.              CRANIAL NERVES     CN III, IV, VI   Pupils are equal, round, and reactive to light.       Significant Labs: BMP:   Recent Labs   Lab 10/06/23  1332   *      K 4.3      CO2 24   BUN 21   CREATININE 1.3   CALCIUM 9.4     CBC:   Recent Labs   Lab 10/06/23  1332   WBC 7.54   HGB 16.7   HCT 48.4        CMP:   Recent Labs   Lab 10/06/23  1332      K 4.3      CO2 24   *   BUN 21   CREATININE 1.3   CALCIUM 9.4   PROT 7.5   ALBUMIN 3.8   BILITOT 0.8   ALKPHOS 86   AST 15   ALT 26   ANIONGAP 14     Cardiac Markers: No results for input(s): "CKMB", "MYOGLOBIN", "BNP", "TROPISTAT" in the last 48 hours.  Coagulation:   Recent Labs   Lab 10/06/23  2229   INR 1.0   APTT 24.8     Lactic Acid: No results for input(s): "LACTATE" in the last 48 hours.  Magnesium: No results for input(s): "MG" in the last 48 hours.  Troponin:   Recent Labs   Lab 10/06/23  1733 " "10/06/23  2229 10/07/23  0022   TROPONINI <0.006 0.376* 1.528*     TSH: No results for input(s): "TSH" in the last 4320 hours.  Urine Studies:   Recent Labs   Lab 10/06/23  1336   COLORU Yellow   APPEARANCEUA Clear   PHUR 6.0   SPECGRAV >1.030*   PROTEINUA Negative   GLUCUA 4+*   KETONESU Negative   BILIRUBINUA Negative   OCCULTUA Negative   NITRITE Negative   UROBILINOGEN Negative   LEUKOCYTESUR Negative   BACTERIA None         Significant Imaging:   Imaging Results              X-Ray Chest AP Portable (Final result)  Result time 10/06/23 13:33:44      Final result by MOHAMUD Sanchez Sr., MD (10/06/23 13:33:44)                   Impression:      Normal study.      Electronically signed by: Jose M Sanchez MD  Date:    10/06/2023  Time:    13:33               Narrative:    EXAMINATION:  XR CHEST AP PORTABLE    CLINICAL HISTORY:  Essential (primary) hypertension    COMPARISON:  04/01/2019    FINDINGS:  The size of the heart is normal. The lungs are clear. There is no pneumothorax.  The costophrenic angles are sharp.                                        Assessment/Plan:     * Chest pain  --s/p  and nitro dose x 1 in the ER  --asymptomatic, no active chest pain at this time  --EKG: negative for STEMI  --Trop NEG x 2- continue to trend   --PRN nitro for chest pain  --statin and antiplatelet therapy   --given high risk, Cardiology consulted, stress test in the AM  --cardiac diet, NPO at midnight   --admit to telemetry-OBS with cardiac monitoring      Essential hypertension  --home medications include: norvasc, hctz, imdur, lopressor, & an -tala   --restart as tolerated   --PRN IV hydralazine 10mg Q6H for sBP > 175 mmHg  --Q4HVS      DANTE on CPAP  --confirms use of CPAP at home  --CPAP ordered  --monitor clinically    S/P PTCA (percutaneous transluminal coronary angioplasty)         Coronary artery disease involving native coronary artery of native heart without angina pectoris  See above      Hyperlipidemia " associated with type 2 diabetes mellitus  --last A1c on file: 9.1% five days prior to admission  --Home medications include metformin, BID lantus  --holding home PO medications  -- mod dose SSI ordered  --QHS basal insulin at titrated dose- modify as diet restarts  --Accuchecks ACQHS  --Fasting AM glucose goal <140  --restart statin medication          VTE Risk Mitigation (From admission, onward)         Ordered     enoxaparin injection 40 mg  Daily         10/06/23 2149     IP VTE HIGH RISK PATIENT  Once         10/06/23 2149     Place sequential compression device  Until discontinued         10/06/23 2149                   On 10/07/2023, patient should be placed in hospital observation services under my care.        Binu Fuentes MD  Department of Hospital Medicine  [place] - Telemetry ([facility])

## 2023-10-07 NOTE — ASSESSMENT & PLAN NOTE
--last A1c on file: 9.1% five days prior to admission  --Home medications include metformin, BID lantus  --holding home PO medications  -- mod dose SSI ordered  --QHS basal insulin at titrated dose- modify as diet restarts  --Accuchecks ACQ  --Fasting AM glucose goal <140  --restart statin medication

## 2023-10-07 NOTE — CONSULTS
[facility] ([facility])  Cardiology  Consult Note    Patient Name: Isaiah Harrison  MRN: [id number]  Admission Date: 10/6/2023  Hospital Length of Stay: 0 days  Code Status: Full Code   Attending Provider: Romeo Boss MD   Consulting Provider: Chidi Cancino MD  Primary Care Physician: LACI Dow MD  Principal Problem:Chest pain    Patient information was obtained from patient, past medical records and ER records.     Inpatient consult to Cardiology  Consult performed by: Chidi Cancino MD  Consult ordered by: Binu Fuentes MD  Reason for consult: nstemi    Inpatient consult to Cardiology  Consult performed by: Chidi Cancino MD  Consult ordered by: Binu Fuentes MD  Reason for consult: nstemi        Subjective:     Chief Complaint:  nstemi     HPI:   68-year-old gentleman with a past medical history significant for coronary artery disease, history of myocardial infarction with stent placement, prior percutaneous transluminal coronary angioplasty, obesity, obstructive sleep apnea requiring CPAP, hyperlipidemia, hypertension, and type 2 diabetes mellitus with associated circulatory disorder (coronary artery disease), but not requiring long-term insulin. He presents to the Emergency Department after experiencing symptoms reminiscent of his previous myocardial infarction. While in a [employer] parking lot today, he felt a sudden rise in his blood pressure and facial flushing. He took nitroglycerin, which alleviated his symptoms. The patient associated these feelings with a similar episode he had years back when he had a myocardial infarction, prompting his visit to the ER. At present, he denies fever, chills, chest pain, shortness of breath, leg swelling, and other associated symptoms.     On arrival, his initial vital signs revealed a blood pressure of 189/99, which subsequently trended to 143/78, 140/76, and 165/72. The rest of his vital signs remained within normal parameters.  Laboratory findings indicated hyperglycemia and glucosuria, though his troponin levels were negative twice. A chest X-ray was unremarkable. His electrocardiogram demonstrated a rate of 94 BPM with a normal sinus rhythm and revealed an old inferior infarct with a possible anterolateral infarct, though no ST-elevation myocardial infarction was identified. During his stay in the ER, he received medication including clonidine 0.1 mg, acetaminophen 1,000 mg, aspirin 325 mg, and a 1-inch topical application of 2% nitroglycerin ointment.      On my interview he states that he has had waxing waning chest pain since yesterday however right now he feels well since his last episode of chest pain in the emergency room last night.    His troponin has started to rise.    He has history of remote stents to his LAD.    He follows with Dr. GARCIA as outpatient.  Last seen however was 1 year ago.  With notable noncompliance with diet.          Past Medical History:   Diagnosis Date    Acute coronary syndrome     Anticoagulant long-term use     Back pain     CAD (coronary artery disease) 10/17/2013    Class 2 severe obesity due to excess calories with serious comorbidity and body mass index (BMI) of 38.0 to 38.9 in adult 01/12/2015    Coronary artery disease     Diabetes mellitus, type 2 2010    BS didn't check 04/19/2023 Insulin 2 years    Gastric polyps     Hyperlipemia     Hypertension     NSTEMI (non-ST elevated myocardial infarction) 10/23/2014    Obesity 10/21/2014    DANTE on CPAP 02/19/2015    S/P PTCA (percutaneous transluminal coronary angioplasty) 10/17/2013    Sleep apnea 01/07/2015    Type 2 diabetes mellitus with circulatory disorder (coronary artery disease), without long-term current use of insulin 07/14/2015       Past Surgical History:   Procedure Laterality Date    APPENDECTOMY      BACK SURGERY      CATARACT EXTRACTION      CATARACT EXTRACTION, BILATERAL      COLONOSCOPY      COLONOSCOPY N/A 3/9/2023     Procedure: COLONOSCOPY;  Surgeon: Lisa Auguste MD;  Location: [facility];  Service: Endoscopy;  Laterality: N/A;    CORONARY ANGIOPLASTY WITH STENT PLACEMENT      ESOPHAGOGASTRODUODENOSCOPY      EYE SURGERY      FRACTURE SURGERY      HERNIA REPAIR      SPINE SURGERY         Review of patient's allergies indicates:   Allergen Reactions    Pcn [penicillins] Hives       No current facility-administered medications on file prior to encounter.     Current Outpatient Medications on File Prior to Encounter   Medication Sig    amlodipine-valsartan (EXFORGE)  mg per tablet TAKE 1 TABLET BY MOUTH EVERY DAY    aspirin (ECOTRIN) 81 MG EC tablet Take 81 mg by mouth once daily.    atorvastatin (LIPITOR) 80 MG tablet TAKE 1 TABLET(80 MG) BY MOUTH EVERY EVENING    blood sugar diagnostic Strp 1 strip by Misc.(Non-Drug; Combo Route) route 4 (four) times daily.    empagliflozin (JARDIANCE) 10 mg tablet Take 1 tablet (10 mg total) by mouth once daily.    hydroCHLOROthiazide (HYDRODIURIL) 25 MG tablet TAKE 1 TABLET(25 MG) BY MOUTH EVERY DAY    hydrOXYzine HCL (ATARAX) 10 MG Tab TAKE 1 TABLET(10 MG) BY MOUTH TWICE DAILY AS NEEDED. MAY CAUSE DROWSINESS. DO NOT DRIVE OR OPERATE HEAVY MACHINERY    insulin (LANTUS SOLOSTAR U-100 INSULIN) glargine 100 units/mL SubQ pen Inject 40 Units into the skin 2 (two) times a day.    isosorbide mononitrate (IMDUR) 30 MG 24 hr tablet TAKE 3 TABLETS(90 MG) BY MOUTH EVERY DAY    ketoconazole (NIZORAL) 2 % cream Apply topically once daily.    metFORMIN (GLUCOPHAGE-XR) 500 MG ER 24hr tablet Take 1 tablet (500 mg total) by mouth daily with breakfast.    metoprolol tartrate (LOPRESSOR) 50 MG tablet TAKE 1 TABLET BY MOUTH TWICE DAILY    niacin (SLO-NIACIN) 500 mg tablet TAKE 1 TABLET(500 MG) BY MOUTH EVERY EVENING    nitroGLYCERIN (NITROSTAT) 0.4 MG SL tablet Place 1 tablet (0.4 mg total) under the tongue every 5 (five) minutes as needed.    pen needle, diabetic (BD ULTRA-FINE  "SHORT PEN NEEDLE) 31 gauge x 5/16" Ndle USE WITH LANTUS PEN AS DIRECTED    pneumoc 20-miranda conj-dip cr,PF, (PREVNAR-20, PF,) 0.5 mL Syrg injection Inject 0.5 mLs into the muscle.    ranolazine (RANEXA) 1,000 mg Tb12 Take 1 tablet (1,000 mg total) by mouth 2 (two) times daily.    tirzepatide 7.5 mg/0.5 mL PnIj Inject 7.5 mg into the skin every 7 days.     Family History       Problem Relation (Age of Onset)    Diabetes Mother, Sister    Heart disease Maternal Grandfather    Hypertension Mother, Father, Sister          Tobacco Use    Smoking status: Never    Smokeless tobacco: Never   Substance and Sexual Activity    Alcohol use: Yes     Alcohol/week: 0.0 standard drinks of alcohol     Comment: socially    Drug use: No    Sexual activity: Yes     Partners: Female     Review of Systems   Cardiovascular:  Positive for chest pain and leg swelling. Negative for dyspnea on exertion, palpitations and syncope.   Genitourinary: Negative.    Neurological: Negative.      Objective:     Vital Signs (Most Recent):  Temp: 97.9 °F (36.6 °C) (10/07/23 0738)  Pulse: 73 (10/07/23 0738)  Resp: 17 (10/07/23 0738)  BP: 124/73 (10/07/23 0738)  SpO2: 95 % (10/07/23 0738) Vital Signs (24h Range):  Temp:  [97.5 °F (36.4 °C)-98.8 °F (37.1 °C)] 97.9 °F (36.6 °C)  Pulse:  [] 73  Resp:  [12-27] 17  SpO2:  [92 %-100 %] 95 %  BP: (104-189)/(57-99) 124/73     Weight: 133.4 kg (294 lb 3.3 oz)  Body mass index is 37.77 kg/m².    SpO2: 95 %       No intake or output data in the 24 hours ending 10/07/23 1058    Lines/Drains/Airways       Peripheral Intravenous Line  Duration                  Peripheral IV - Single Lumen 10/06/23 1330 20 G Left Antecubital <1 day                     Physical Exam  Vitals reviewed.   Constitutional:       Appearance: He is well-developed.   Neck:      Vascular: No carotid bruit.   Cardiovascular:      Rate and Rhythm: Normal rate and regular rhythm.      Pulses: Intact distal pulses.      Heart sounds: " Normal heart sounds. No murmur heard.  Pulmonary:      Breath sounds: Normal breath sounds.   Musculoskeletal:         General: Swelling present.   Neurological:      Mental Status: He is oriented to person, place, and time.          Significant Labs: Troponin   Recent Labs   Lab 10/06/23  2229 10/07/23  0022 10/07/23  0429   TROPONINI 0.376* 1.528* 3.129*   , All pertinent lab results from the last 24 hours have been reviewed., and   Recent Lab Results  (Last 5 results in the past 24 hours)        10/07/23  0809   10/07/23  0603   10/07/23  0429   10/07/23  0022   10/06/23  2229        Anion Gap     10           aPTT 26.5  Comment: Refer to local heparin nomogram for intensity/dose specific   therapeutic   range.           24.8  Comment: Refer to local heparin nomogram for intensity/dose specific   therapeutic   range.         Baso # 0.04     0.05           Basophil % 0.5     0.5           BUN     22           Calcium     8.6           Chloride     106           Cholesterol Total         156  Comment: The National Cholesterol Education Program (NCEP) has set the  following guidelines (reference ranges) for Cholesterol:  Optimal.....................<200 mg/dL  Borderline High.............200-239 mg/dL  High........................> or = 240 mg/dL         CO2     22           Creatinine     1.0           Differential Method Automated     Automated           eGFR     >60           Eos # 0.1     0.1           Eosinophil % 0.8     0.9           Glucose     191           Gran # (ANC) 5.4     6.2           Gran % 64.7     62.2           HDL         34  Comment: The National Cholesterol Education Program (NCEP) has set the  following guidelines (reference values) for HDL Cholesterol:  Low...............<40 mg/dL  Optimal...........>60 mg/dL         HDL/Cholesterol Ratio         21.8       Hematocrit 45.9     45.4           Hemoglobin 15.3     15.1           Immature Grans (Abs) 0.04  Comment: Mild elevation in immature  granulocytes is non specific and   can be seen in a variety of conditions including stress response,   acute inflammation, trauma and pregnancy. Correlation with other   laboratory and clinical findings is essential.       0.08  Comment: Mild elevation in immature granulocytes is non specific and   can be seen in a variety of conditions including stress response,   acute inflammation, trauma and pregnancy. Correlation with other   laboratory and clinical findings is essential.             Immature Granulocytes 0.5     0.8           INR 1.0  Comment: Coumadin Therapy:  2.0 - 3.0 for INR for all indicators except mechanical heart valves  and antiphospholipid syndromes which should use 2.5 - 3.5.           1.0  Comment: Coumadin Therapy:  2.0 - 3.0 for INR for all indicators except mechanical heart valves  and antiphospholipid syndromes which should use 2.5 - 3.5.         LDL Cholesterol External         103.0  Comment: The National Cholesterol Education Program (NCEP) has set the  following guidelines (reference values) for LDL Cholesterol:  Optimal.......................<130 mg/dL  Borderline High...............130-159 mg/dL  High..........................160-189 mg/dL  Very High.....................>190 mg/dL         Lymph # 2.0     2.7           Lymph % 24.4     26.9           Magnesium      2.0           MCH 31.9     31.5           MCHC 33.3     33.3           MCV 96     95           Mono # 0.8     0.9           Mono % 9.1     8.7           MPV 10.2     10.4           Non-HDL Cholesterol         122  Comment: Risk category and Non-HDL cholesterol goals:  Coronary heart disease (CHD)or equivalent (10-year risk of CHD >20%):  Non-HDL cholesterol goal     <130 mg/dL  Two or more CHD risk factors and 10-year risk of CHD <= 20%:  Non-HDL cholesterol goal     <160 mg/dL  0 to 1 CHD risk factor:  Non-HDL cholesterol goal     <190 mg/dL         nRBC 0     0           Platelet Count 231     244           POCT Glucose    178             Potassium     4.0           Protime 10.6         10.5       RBC 4.80     4.79           RDW 11.9     11.9           Sodium     138           Total Cholesterol/HDL Ratio         4.6       Triglycerides         95  Comment: The National Cholesterol Education Program (NCEP) has set the  following guidelines (reference values) for triglycerides:  Normal......................<150 mg/dL  Borderline High.............150-199 mg/dL  High........................200-499 mg/dL         Troponin I     3.129  Comment: The reference interval for Troponin I represents the 99th percentile   cutoff   for our facility and is consistent with 3rd generation assay   performance.     1.528  Comment: The reference interval for Troponin I represents the 99th percentile   cutoff   for our facility and is consistent with 3rd generation assay   performance.     0.376  Comment: The reference interval for Troponin I represents the 99th percentile   cutoff   for our facility and is consistent with 3rd generation assay   performance.         WBC 8.28     9.90                                  Significant Imaging: EKG: nsr    Assessment and Plan:     * Chest pain  See NSTEMI    Type 2 diabetes mellitus with circulatory disorder (coronary artery disease), with long-term current use of insulin  Not controlled.  As per primary team management    NSTEMI (non-ST elevated myocardial infarction)  Ordered IV heparin.  Continue with nitroglycerin  Ordered echocardiogram.    Currently angina free.  Continue with medical treatment for now.  Check BNP  Possible catheterization on Monday unless symptoms get uncontrolled.    S/P PTCA (percutaneous transluminal coronary angioplasty)  Continue with aspirin, statin, Imdur, beta-blocker.          VTE Risk Mitigation (From admission, onward)         Ordered     heparin 25,000 units in dextrose 5% (100 units/ml) IV bolus from bag - ADDITIONAL PRN BOLUS - 60 units/kg (max bolus 4000 units)  As needed  (PRN)        Question:  Heparin Infusion Adjustment (DO NOT MODIFY ANSWER)  Answer:  [email or web address]\epic\Images\Pharmacy\HeparinInfusions\heparin LOW INTENSITY nomogram for [facility] [id number]    10/07/23 0805     heparin 25,000 units in dextrose 5% (100 units/ml) IV bolus from bag - ADDITIONAL PRN BOLUS - 30 units/kg (max bolus 4000 units)  As needed (PRN)        Question:  Heparin Infusion Adjustment (DO NOT MODIFY ANSWER)  Answer:  [email or web address]\epic\Images\Pharmacy\HeparinInfusions\heparin LOW INTENSITY nomogram for [facility] [id number].pdf    10/07/23 0805     heparin 25,000 units in dextrose 5% 250 mL (100 units/mL) infusion LOW INTENSITY nomogram - [facility]  Continuous        Question:  Begin at (units/kg/hr)  Answer:  12    10/07/23 0805     IP VTE HIGH RISK PATIENT  Once         10/06/23 2149     Place sequential compression device  Until discontinued         10/06/23 2149                Thank you for your consult. I will follow-up with patient. Please contact us if you have any additional questions.    Chidi Cancino MD  Cardiology   [facility] - Tele[facility] ([facility])

## 2023-10-07 NOTE — ASSESSMENT & PLAN NOTE
--s/p  and nitro dose x 1 in the ER  --asymptomatic, no active chest pain at this time  --EKG: negative for STEMI  --Trop NEG x 2- continue to trend   --PRN nitro for chest pain  --statin and antiplatelet therapy   --given high risk, Cardiology consulted, stress test in the AM  --cardiac diet, NPO at midnight   --admit to telemetry-OBS with cardiac monitoring    10/7/23  See plan for NSTEMI

## 2023-10-07 NOTE — SUBJECTIVE & OBJECTIVE
Past Medical History:   Diagnosis Date    Acute coronary syndrome     Anticoagulant long-term use     Back pain     CAD (coronary artery disease) 10/17/2013    Class 2 severe obesity due to excess calories with serious comorbidity and body mass index (BMI) of 38.0 to 38.9 in adult 01/12/2015    Coronary artery disease     Diabetes mellitus, type 2 2010    BS didn't check 04/19/2023 Insulin 2 years    Gastric polyps     Hyperlipemia     Hypertension     NSTEMI (non-ST elevated myocardial infarction) 10/23/2014    Obesity 10/21/2014    DANTE on CPAP 02/19/2015    S/P PTCA (percutaneous transluminal coronary angioplasty) 10/17/2013    Sleep apnea 01/07/2015    Type 2 diabetes mellitus with circulatory disorder (coronary artery disease), without long-term current use of insulin 07/14/2015       Past Surgical History:   Procedure Laterality Date    APPENDECTOMY      BACK SURGERY      CATARACT EXTRACTION      CATARACT EXTRACTION, BILATERAL      COLONOSCOPY      COLONOSCOPY N/A 3/9/2023    Procedure: COLONOSCOPY;  Surgeon: Lisa Auguste MD;  Location: [facility];  Service: Endoscopy;  Laterality: N/A;    CORONARY ANGIOPLASTY WITH STENT PLACEMENT      ESOPHAGOGASTRODUODENOSCOPY      EYE SURGERY      FRACTURE SURGERY      HERNIA REPAIR      SPINE SURGERY         Review of patient's allergies indicates:   Allergen Reactions    Pcn [penicillins] Hives       No current facility-administered medications on file prior to encounter.     Current Outpatient Medications on File Prior to Encounter   Medication Sig    amlodipine-valsartan (EXFORGE)  mg per tablet TAKE 1 TABLET BY MOUTH EVERY DAY    aspirin (ECOTRIN) 81 MG EC tablet Take 81 mg by mouth once daily.    atorvastatin (LIPITOR) 80 MG tablet TAKE 1 TABLET(80 MG) BY MOUTH EVERY EVENING    blood sugar diagnostic Strp 1 strip by Misc.(Non-Drug; Combo Route) route 4 (four) times daily.    empagliflozin (JARDIANCE) 10 mg tablet Take 1 tablet (10 mg total) by mouth once  "daily.    hydroCHLOROthiazide (HYDRODIURIL) 25 MG tablet TAKE 1 TABLET(25 MG) BY MOUTH EVERY DAY    hydrOXYzine HCL (ATARAX) 10 MG Tab TAKE 1 TABLET(10 MG) BY MOUTH TWICE DAILY AS NEEDED. MAY CAUSE DROWSINESS. DO NOT DRIVE OR OPERATE HEAVY MACHINERY    insulin (LANTUS SOLOSTAR U-100 INSULIN) glargine 100 units/mL SubQ pen Inject 40 Units into the skin 2 (two) times a day.    isosorbide mononitrate (IMDUR) 30 MG 24 hr tablet TAKE 3 TABLETS(90 MG) BY MOUTH EVERY DAY    ketoconazole (NIZORAL) 2 % cream Apply topically once daily.    metFORMIN (GLUCOPHAGE-XR) 500 MG ER 24hr tablet Take 1 tablet (500 mg total) by mouth daily with breakfast.    metoprolol tartrate (LOPRESSOR) 50 MG tablet TAKE 1 TABLET BY MOUTH TWICE DAILY    niacin (SLO-NIACIN) 500 mg tablet TAKE 1 TABLET(500 MG) BY MOUTH EVERY EVENING    nitroGLYCERIN (NITROSTAT) 0.4 MG SL tablet Place 1 tablet (0.4 mg total) under the tongue every 5 (five) minutes as needed.    pen needle, diabetic (BD ULTRA-FINE SHORT PEN NEEDLE) 31 gauge x 5/16" Ndle USE WITH LANTUS PEN AS DIRECTED    pneumoc 20-miranda conj-dip cr,PF, (PREVNAR-20, PF,) 0.5 mL Syrg injection Inject 0.5 mLs into the muscle.    ranolazine (RANEXA) 1,000 mg Tb12 Take 1 tablet (1,000 mg total) by mouth 2 (two) times daily.    tirzepatide 7.5 mg/0.5 mL PnIj Inject 7.5 mg into the skin every 7 days.     Family History       Problem Relation (Age of Onset)    Diabetes Mother, Sister    Heart disease Maternal Grandfather    Hypertension Mother, Father, Sister          Tobacco Use    Smoking status: Never    Smokeless tobacco: Never   Substance and Sexual Activity    Alcohol use: Yes     Alcohol/week: 0.0 standard drinks of alcohol     Comment: socially    Drug use: No    Sexual activity: Yes     Partners: Female     Review of Systems   Constitutional:  Negative for chills, diaphoresis and fever.   HENT:  Negative for congestion, postnasal drip, rhinorrhea, sore throat and trouble swallowing.    Eyes:  Negative " for pain, redness, itching and visual disturbance.   Respiratory:  Positive for chest tightness. Negative for cough, shortness of breath and wheezing.    Cardiovascular:  Positive for chest pain. Negative for palpitations.   Gastrointestinal:  Negative for abdominal distention, abdominal pain, constipation, diarrhea, nausea and vomiting.   Genitourinary:  Negative for difficulty urinating, dysuria and frequency.   Musculoskeletal:  Negative for arthralgias, back pain and joint swelling.   Neurological:  Negative for dizziness, seizures, syncope, weakness, light-headedness and headaches.   Psychiatric/Behavioral:  Negative for agitation, behavioral problems, confusion and suicidal ideas.      Objective:     Vital Signs (Most Recent):  Temp: 97.5 °F (36.4 °C) (10/07/23 0015)  Pulse: 101 (10/07/23 0015)  Resp: 20 (10/07/23 0015)  BP: 137/84 (10/07/23 0015)  SpO2: 97 % (10/07/23 0015) Vital Signs (24h Range):  Temp:  [97.5 °F (36.4 °C)-98.8 °F (37.1 °C)] 97.5 °F (36.4 °C)  Pulse:  [] 101  Resp:  [12-27] 20  SpO2:  [92 %-100 %] 97 %  BP: (104-189)/(57-99) 137/84     Weight: 133.4 kg (294 lb 3.3 oz)  Body mass index is 37.77 kg/m².     Physical Exam  Constitutional:       General: He is awake.      Appearance: Normal appearance. He is well-developed. He is obese. He is diaphoretic. He is not ill-appearing or toxic-appearing.   HENT:      Head: Normocephalic and atraumatic.      Nose: Nose normal.      Mouth/Throat:      Mouth: Mucous membranes are moist.   Eyes:      General: No scleral icterus.     Extraocular Movements: Extraocular movements intact.      Conjunctiva/sclera: Conjunctivae normal.      Pupils: Pupils are equal, round, and reactive to light.   Cardiovascular:      Rate and Rhythm: Normal rate.      Pulses: Normal pulses.      Heart sounds: Normal heart sounds. No murmur heard.  Pulmonary:      Effort: Pulmonary effort is normal. No respiratory distress.      Breath sounds: No wheezing.   Abdominal:     "  General: Abdomen is flat. There is no distension.      Palpations: Abdomen is soft.      Tenderness: There is no abdominal tenderness.   Musculoskeletal:         General: No swelling, tenderness or deformity.      Cervical back: Normal range of motion and neck supple.   Neurological:      General: No focal deficit present.      Mental Status: He is alert and oriented to person, place, and time. Mental status is at baseline.   Psychiatric:         Behavior: Behavior is cooperative.              CRANIAL NERVES     CN III, IV, VI   Pupils are equal, round, and reactive to light.       Significant Labs: BMP:   Recent Labs   Lab 10/06/23  1332   *      K 4.3      CO2 24   BUN 21   CREATININE 1.3   CALCIUM 9.4     CBC:   Recent Labs   Lab 10/06/23  1332   WBC 7.54   HGB 16.7   HCT 48.4        CMP:   Recent Labs   Lab 10/06/23  1332      K 4.3      CO2 24   *   BUN 21   CREATININE 1.3   CALCIUM 9.4   PROT 7.5   ALBUMIN 3.8   BILITOT 0.8   ALKPHOS 86   AST 15   ALT 26   ANIONGAP 14     Cardiac Markers: No results for input(s): "CKMB", "MYOGLOBIN", "BNP", "TROPISTAT" in the last 48 hours.  Coagulation:   Recent Labs   Lab 10/06/23  2229   INR 1.0   APTT 24.8     Lactic Acid: No results for input(s): "LACTATE" in the last 48 hours.  Magnesium: No results for input(s): "MG" in the last 48 hours.  Troponin:   Recent Labs   Lab 10/06/23  1733 10/06/23  2229 10/07/23  0022   TROPONINI <0.006 0.376* 1.528*     TSH: No results for input(s): "TSH" in the last 4320 hours.  Urine Studies:   Recent Labs   Lab 10/06/23  1336   COLORU Yellow   APPEARANCEUA Clear   PHUR 6.0   SPECGRAV >1.030*   PROTEINUA Negative   GLUCUA 4+*   KETONESU Negative   BILIRUBINUA Negative   OCCULTUA Negative   NITRITE Negative   UROBILINOGEN Negative   LEUKOCYTESUR Negative   BACTERIA None         Significant Imaging:   Imaging Results              X-Ray Chest AP Portable (Final result)  Result time 10/06/23 " 13:33:44      Final result by MOHAMUD Sanchez Sr., MD (10/06/23 13:33:44)                   Impression:      Normal study.      Electronically signed by: Jose M Sanchez MD  Date:    10/06/2023  Time:    13:33               Narrative:    EXAMINATION:  XR CHEST AP PORTABLE    CLINICAL HISTORY:  Essential (primary) hypertension    COMPARISON:  04/01/2019    FINDINGS:  The size of the heart is normal. The lungs are clear. There is no pneumothorax.  The costophrenic angles are sharp.

## 2023-10-07 NOTE — PROGRESS NOTES
[facility] Medicine  Progress Note    Patient Name: Isaiah Harrison  MRN: [id number]  Patient Class: IP- Inpatient   Admission Date: 10/6/2023  Length of Stay: 0 days  Attending Physician: Romeo Boss MD  Primary Care Provider: LACI Dow MD    Subjective:     Principal Problem:Chest pain        HPI:  Patient is a 68-year-old gentleman with a past medical history significant for coronary artery disease, history of myocardial infarction with prior percutaneous transluminal coronary angioplasty, obesity, obstructive sleep apnea requiring CPAP, hyperlipidemia, hypertension, and type 2 diabetes mellitus with associated circulatory disorder (coronary artery disease), but not requiring long-term insulin. He presents to the Emergency Department after experiencing symptoms reminiscent of his previous myocardial infarction. While in a [employer] parking lot today, he felt a sudden rise in his blood pressure and facial flushing. He took nitroglycerin, which alleviated his symptoms. The patient associated these feelings with a similar episode he had years back when he had a myocardial infarction, prompting his visit to the ER. At present, he denies fever, chills, chest pain, shortness of breath, leg swelling, and other associated symptoms.    On arrival, his initial vital signs revealed a blood pressure of 189/99, which subsequently trended to 143/78, 140/76, and 165/72. The rest of his vital signs remained within normal parameters. Laboratory findings indicated hyperglycemia and glucosuria, though his troponin levels were negative twice. A chest X-ray was unremarkable. His electrocardiogram demonstrated a rate of 94 BPM with a normal sinus rhythm and revealed an old inferior infarct with a possible anterolateral infarct, though no ST-elevation myocardial infarction was identified. During his stay in the ER, he received medication including clonidine 0.1 mg, acetaminophen 1,000 mg, aspirin  325 mg, and a 1-inch topical application of 2% nitroglycerin ointment. Hospital Medicine was called for admission.      Overview/Hospital Course:  Isaiah Harrison is 68 year old male who was admitted to [facility] for NSTEMI. He presented to ED for chest pain with associated flushing and dyspnea. Troponin 0.376>1.52>3.129 consistent with NSTEMI. He his currently on heparin infusion.  Plans are for [facility] 10/9/23. Continue long acting vasodilator, BB, STATIN, and ASA. OeeiwihbfqH3e has increased to 9.1 over the last five months. Risks associated with uncontrolled glucose levels discussed. He reports have an appt with dietician later this month and was urged to keep the appt.       Interval History: denies episodes of chest pain today. Discussed upward trends of HgbA1c over the last 5 months. He says he has a dietician appt next week. Reports having [facility] several years ago.     Review of Systems   Constitutional:  Negative for chills, diaphoresis and fever.   HENT:  Negative for congestion, postnasal drip, rhinorrhea, sore throat and trouble swallowing.    Eyes:  Negative for pain, redness, itching and visual disturbance.   Respiratory:  Positive for chest tightness. Negative for cough, shortness of breath and wheezing.    Cardiovascular:  Positive for chest pain. Negative for palpitations.   Gastrointestinal:  Negative for abdominal distention, abdominal pain, constipation, diarrhea, nausea and vomiting.   Genitourinary:  Negative for difficulty urinating, dysuria and frequency.   Musculoskeletal:  Negative for arthralgias, back pain and joint swelling.   Neurological:  Negative for dizziness, seizures, syncope, weakness, light-headedness and headaches.   Psychiatric/Behavioral:  Negative for agitation, behavioral problems, confusion and suicidal ideas.      Objective:     Vital Signs (Most Recent):  Temp: 97.7 °F (36.5 °C) (10/07/23 1342)  Pulse: 71 (10/07/23 1342)  Resp: 16 (10/07/23 1342)  BP: 121/67 (10/07/23  1342)  SpO2: 97 % (10/07/23 1342) Vital Signs (24h Range):  Temp:  [97.5 °F (36.4 °C)-97.9 °F (36.6 °C)] 97.7 °F (36.5 °C)  Pulse:  [] 71  Resp:  [12-27] 16  SpO2:  [92 %-100 %] 97 %  BP: (104-185)/(57-92) 121/67     Weight: 133.4 kg (294 lb)  Body mass index is 37.75 kg/m².  No intake or output data in the 24 hours ending 10/07/23 1346      Physical Exam  Constitutional:       General: He is awake.      Appearance: Normal appearance. He is well-developed. He is obese. He is not ill-appearing, toxic-appearing or diaphoretic.   HENT:      Head: Normocephalic and atraumatic.      Nose: Nose normal.      Mouth/Throat:      Mouth: Mucous membranes are moist.   Eyes:      General: No scleral icterus.     Extraocular Movements: Extraocular movements intact.      Conjunctiva/sclera: Conjunctivae normal.      Pupils: Pupils are equal, round, and reactive to light.   Cardiovascular:      Rate and Rhythm: Normal rate.      Pulses: Normal pulses.      Heart sounds: Normal heart sounds. No murmur heard.  Pulmonary:      Effort: Pulmonary effort is normal. No respiratory distress.      Breath sounds: No wheezing.   Abdominal:      General: Abdomen is flat. There is no distension.      Palpations: Abdomen is soft.      Tenderness: There is no abdominal tenderness.   Musculoskeletal:         General: No swelling, tenderness or deformity.      Cervical back: Normal range of motion and neck supple.   Neurological:      General: No focal deficit present.      Mental Status: He is alert and oriented to person, place, and time. Mental status is at baseline.   Psychiatric:         Behavior: Behavior is cooperative.             Significant Labs: All pertinent labs within the past 24 hours have been reviewed.  CBC:   Recent Labs   Lab 10/06/23  1332 10/07/23  0429 10/07/23  0809   WBC 7.54 9.90 8.28   HGB 16.7 15.1 15.3   HCT 48.4 45.4 45.9    244 231     CMP:   Recent Labs   Lab 10/06/23  1332 10/07/23  0429    138   K  4.3 4.0    106   CO2 24 22*   * 191*   BUN 21 22   CREATININE 1.3 1.0   CALCIUM 9.4 8.6*   PROT 7.5  --    ALBUMIN 3.8  --    BILITOT 0.8  --    ALKPHOS 86  --    AST 15  --    ALT 26  --    ANIONGAP 14 10       Significant Imaging: I have reviewed all pertinent imaging results/findings within the past 24 hours.      Assessment/Plan:      * Chest pain  --s/p  and nitro dose x 1 in the ER  --asymptomatic, no active chest pain at this time  --EKG: negative for STEMI  --Trop NEG x 2- continue to trend   --PRN nitro for chest pain  --statin and antiplatelet therapy   --given high risk, Cardiology consulted, stress test in the AM  --cardiac diet, NPO at midnight   --admit to telemetry-OBS with cardiac monitoring    10/7/23  See plan for NSTEMI      Essential hypertension  --home medications include: norvasc, hctz, imdur, lopressor, & an -tala   --restart as tolerated   --PRN IV hydralazine 10mg Q6H for sBP > 175 mmHg  --Q4HVS    10/7/23  Blood pressure at goal    DANTE on CPAP  --confirms use of CPAP at home  --CPAP ordered  --monitor clinically    NSTEMI (non-ST elevated myocardial infarction)  Upward trend of troponin. Peak 3.129.   Heparin infusing  Continue vasodilator, BB, STATIN, and ASA  Hx of PCI by Dr. Lyles several years ago.    S/P PTCA (percutaneous transluminal coronary angioplasty)         Coronary artery disease involving native coronary artery of native heart without angina pectoris  See above      Hyperlipidemia associated with type 2 diabetes mellitus  --last A1c on file: 9.1% five days prior to admission  --Home medications include metformin, BID lantus  --holding home PO medications  -- mod dose SSI ordered  --QHS basal insulin at titrated dose- modify as diet restarts  --Accuchecks ACQHS  --Fasting AM glucose goal <140  --restart statin medication    10/7/23  HgbA1c 9.1 up from previous.   Continue sliding scale  Add Levemir 10 units nightly          VTE Risk Mitigation (From  admission, onward)         Ordered     heparin 25,000 units in dextrose 5% (100 units/ml) IV bolus from bag - ADDITIONAL PRN BOLUS - 60 units/kg (max bolus 4000 units)  As needed (PRN)        Question:  Heparin Infusion Adjustment (DO NOT MODIFY ANSWER)  Answer:  [email or web address]\epic\Images\Pharmacy\HeparinInfusions\heparin LOW INTENSITY nomogram for [facility] [id number]    10/07/23 0805     heparin 25,000 units in dextrose 5% (100 units/ml) IV bolus from bag - ADDITIONAL PRN BOLUS - 30 units/kg (max bolus 4000 units)  As needed (PRN)        Question:  Heparin Infusion Adjustment (DO NOT MODIFY ANSWER)  Answer:  [email or web address]\epic\Images\Pharmacy\HeparinInfusions\heparin LOW INTENSITY nomogram for [facility] [id number].pdf    10/07/23 0805     heparin 25,000 units in dextrose 5% 250 mL (100 units/mL) infusion LOW INTENSITY nomogram - [facility]  Continuous        Question:  Begin at (units/kg/hr)  Answer:  12    10/07/23 0805     IP VTE HIGH RISK PATIENT  Once         10/06/23 2149     Place sequential compression device  Until discontinued         10/06/23 2149                Discharge Planning   RAVIN:      Code Status: Full Code   Is the patient medically ready for discharge?:     Reason for patient still in hospital (select all that apply): Treatment           Percy Mcmullen NP  Department of Hospital Medicine   '[place] - Telemetry ([facility])

## 2023-10-07 NOTE — ASSESSMENT & PLAN NOTE
--s/p  and nitro dose x 1 in the ER  --asymptomatic, no active chest pain at this time  --EKG: negative for STEMI  --Trop NEG x 2- continue to trend   --PRN nitro for chest pain  --statin and antiplatelet therapy   --given high risk, Cardiology consulted, stress test in the AM  --cardiac diet, NPO at midnight   --admit to telemetry-OBS with cardiac monitoring

## 2023-10-07 NOTE — HOSPITAL COURSE
Isaiah Harrison is 68 year old male who was admitted to [facility] for NSTEMI. He presented to ED for chest pain with associated flushing and dyspnea. Troponin 0.376>1.52>3.129 consistent with NSTEMI. He his currently on heparin infusion.  Echocardiogram shows low normal systolic 50-55% and normal diastolic function. Plans are for [facility] 10/9/23. Continue long acting vasodilator, BB, STATIN, and ASA. ZrxhaskkqaE4c has increased to 9.1 over the last five months. Risks associated with uncontrolled glucose levels discussed. He reports have an appt with dietician later this month and was urged to keep the appt.       Cardiac catheterization by Dr. Hernandez 10/09/2023 revealed LAD InStent 40% stenosis otherwise unchanged stent placed.  He is to follow up outpatient to rule out AFib with embolic event.    Patient seen examined on day of discharge and stable for discharge home.

## 2023-10-07 NOTE — PLAN OF CARE
Problem: Adult Inpatient Plan of Care  Goal: Plan of Care Review  Outcome: Ongoing, Progressing  Goal: Patient-Specific Goal (Individualized)  Outcome: Ongoing, Progressing  Goal: Absence of Hospital-Acquired Illness or Injury  Outcome: Ongoing, Progressing  Goal: Optimal Comfort and Wellbeing  Outcome: Ongoing, Progressing  Goal: Readiness for Transition of Care  Outcome: Ongoing, Progressing     Problem: Adjustment to Illness (Acute Coronary Syndrome)  Goal: Optimal Adaptation to Illness  Outcome: Ongoing, Progressing     Problem: Dysrhythmia (Acute Coronary Syndrome)  Goal: Normalized Cardiac Rhythm  Outcome: Ongoing, Progressing     Problem: Cardiac-Related Pain (Acute Coronary Syndrome)  Goal: Absence of Cardiac-Related Pain  Outcome: Ongoing, Progressing     Problem: Hemodynamic Instability (Acute Coronary Syndrome)  Goal: Effective Cardiac Pump Function  Outcome: Ongoing, Progressing     Problem: Tissue Perfusion (Acute Coronary Syndrome)  Goal: Adequate Tissue Perfusion  Outcome: Ongoing, Progressing     Problem: Fall Injury Risk  Goal: Absence of Fall and Fall-Related Injury  Outcome: Ongoing, Progressing

## 2023-10-07 NOTE — ASSESSMENT & PLAN NOTE
--home medications include: norvasc, hctz, imdur, lopressor, & an -tala   --restart as tolerated   --PRN IV hydralazine 10mg Q6H for sBP > 175 mmHg  --Q4HVS    10/7/23  Blood pressure at goal

## 2023-10-07 NOTE — ASSESSMENT & PLAN NOTE
Ordered IV heparin.  Continue with nitroglycerin  Ordered echocardiogram.    Currently angina free.  Continue with medical treatment for now.  Check BNP  Possible catheterization on Monday unless symptoms get uncontrolled.

## 2023-10-07 NOTE — ASSESSMENT & PLAN NOTE
Upward trend of troponin. Peak 3.129.   Heparin infusing  Continue vasodilator, BB, STATIN, and ASA  Hx of PCI by Dr. Lyles several years ago.

## 2023-10-07 NOTE — HPI
68-year-old gentleman with a past medical history significant for coronary artery disease, history of myocardial infarction with stent placement, prior percutaneous transluminal coronary angioplasty, obesity, obstructive sleep apnea requiring CPAP, hyperlipidemia, hypertension, and type 2 diabetes mellitus with associated circulatory disorder (coronary artery disease), but not requiring long-term insulin. He presents to the Emergency Department after experiencing symptoms reminiscent of his previous myocardial infarction. While in a [employer] parking lot today, he felt a sudden rise in his blood pressure and facial flushing. He took nitroglycerin, which alleviated his symptoms. The patient associated these feelings with a similar episode he had years back when he had a myocardial infarction, prompting his visit to the ER. At present, he denies fever, chills, chest pain, shortness of breath, leg swelling, and other associated symptoms.     On arrival, his initial vital signs revealed a blood pressure of 189/99, which subsequently trended to 143/78, 140/76, and 165/72. The rest of his vital signs remained within normal parameters. Laboratory findings indicated hyperglycemia and glucosuria, though his troponin levels were negative twice. A chest X-ray was unremarkable. His electrocardiogram demonstrated a rate of 94 BPM with a normal sinus rhythm and revealed an old inferior infarct with a possible anterolateral infarct, though no ST-elevation myocardial infarction was identified. During his stay in the ER, he received medication including clonidine 0.1 mg, acetaminophen 1,000 mg, aspirin 325 mg, and a 1-inch topical application of 2% nitroglycerin ointment.      On my interview he states that he has had waxing waning chest pain since yesterday however right now he feels well since his last episode of chest pain in the emergency room last night.    His troponin has started to rise.    He has history of remote stents to  his LAD.    He follows with Dr. GARCIA as outpatient.  Last seen however was 1 year ago.  With notable noncompliance with diet.

## 2023-10-07 NOTE — H&P (VIEW-ONLY)
[facility] Telemetry ([facility])  Cardiology  Consult Note    Patient Name: Isaiah Harrison  MRN: [id number]  Admission Date: 10/6/2023  Hospital Length of Stay: 0 days  Code Status: Full Code   Attending Provider: Romeo Boss MD   Consulting Provider: Chidi Cancino MD  Primary Care Physician: LACI Dow MD  Principal Problem:Chest pain    Patient information was obtained from patient, past medical records and ER records.     Inpatient consult to Cardiology  Consult performed by: Chidi Cancino MD  Consult ordered by: Binu Fuentes MD  Reason for consult: nstemi    Inpatient consult to Cardiology  Consult performed by: Chidi Cancino MD  Consult ordered by: Binu Fuentes MD  Reason for consult: nstemi        Subjective:     Chief Complaint:  nstemi     HPI:   68-year-old gentleman with a past medical history significant for coronary artery disease, history of myocardial infarction with stent placement, prior percutaneous transluminal coronary angioplasty, obesity, obstructive sleep apnea requiring CPAP, hyperlipidemia, hypertension, and type 2 diabetes mellitus with associated circulatory disorder (coronary artery disease), but not requiring long-term insulin. He presents to the Emergency Department after experiencing symptoms reminiscent of his previous myocardial infarction. While in a [employer] parking lot today, he felt a sudden rise in his blood pressure and facial flushing. He took nitroglycerin, which alleviated his symptoms. The patient associated these feelings with a similar episode he had years back when he had a myocardial infarction, prompting his visit to the ER. At present, he denies fever, chills, chest pain, shortness of breath, leg swelling, and other associated symptoms.     On arrival, his initial vital signs revealed a blood pressure of 189/99, which subsequently trended to 143/78, 140/76, and 165/72. The rest of his vital signs remained within normal parameters.  Laboratory findings indicated hyperglycemia and glucosuria, though his troponin levels were negative twice. A chest X-ray was unremarkable. His electrocardiogram demonstrated a rate of 94 BPM with a normal sinus rhythm and revealed an old inferior infarct with a possible anterolateral infarct, though no ST-elevation myocardial infarction was identified. During his stay in the ER, he received medication including clonidine 0.1 mg, acetaminophen 1,000 mg, aspirin 325 mg, and a 1-inch topical application of 2% nitroglycerin ointment.      On my interview he states that he has had waxing waning chest pain since yesterday however right now he feels well since his last episode of chest pain in the emergency room last night.    His troponin has started to rise.    He has history of remote stents to his LAD.    He follows with Dr. GARCIA as outpatient.  Last seen however was 1 year ago.  With notable noncompliance with diet.          Past Medical History:   Diagnosis Date    Acute coronary syndrome     Anticoagulant long-term use     Back pain     CAD (coronary artery disease) 10/17/2013    Class 2 severe obesity due to excess calories with serious comorbidity and body mass index (BMI) of 38.0 to 38.9 in adult 01/12/2015    Coronary artery disease     Diabetes mellitus, type 2 2010    BS didn't check 04/19/2023 Insulin 2 years    Gastric polyps     Hyperlipemia     Hypertension     NSTEMI (non-ST elevated myocardial infarction) 10/23/2014    Obesity 10/21/2014    DANTE on CPAP 02/19/2015    S/P PTCA (percutaneous transluminal coronary angioplasty) 10/17/2013    Sleep apnea 01/07/2015    Type 2 diabetes mellitus with circulatory disorder (coronary artery disease), without long-term current use of insulin 07/14/2015       Past Surgical History:   Procedure Laterality Date    APPENDECTOMY      BACK SURGERY      CATARACT EXTRACTION      CATARACT EXTRACTION, BILATERAL      COLONOSCOPY      COLONOSCOPY N/A 3/9/2023     Procedure: COLONOSCOPY;  Surgeon: Lisa Auguste MD;  [facility];  Service: Endoscopy;  Laterality: N/A;    CORONARY ANGIOPLASTY WITH STENT PLACEMENT      ESOPHAGOGASTRODUODENOSCOPY      EYE SURGERY      FRACTURE SURGERY      HERNIA REPAIR      SPINE SURGERY         Review of patient's allergies indicates:   Allergen Reactions    Pcn [penicillins] Hives       No current facility-administered medications on file prior to encounter.     Current Outpatient Medications on File Prior to Encounter   Medication Sig    amlodipine-valsartan (EXFORGE)  mg per tablet TAKE 1 TABLET BY MOUTH EVERY DAY    aspirin (ECOTRIN) 81 MG EC tablet Take 81 mg by mouth once daily.    atorvastatin (LIPITOR) 80 MG tablet TAKE 1 TABLET(80 MG) BY MOUTH EVERY EVENING    blood sugar diagnostic Strp 1 strip by Misc.(Non-Drug; Combo Route) route 4 (four) times daily.    empagliflozin (JARDIANCE) 10 mg tablet Take 1 tablet (10 mg total) by mouth once daily.    hydroCHLOROthiazide (HYDRODIURIL) 25 MG tablet TAKE 1 TABLET(25 MG) BY MOUTH EVERY DAY    hydrOXYzine HCL (ATARAX) 10 MG Tab TAKE 1 TABLET(10 MG) BY MOUTH TWICE DAILY AS NEEDED. MAY CAUSE DROWSINESS. DO NOT DRIVE OR OPERATE HEAVY MACHINERY    insulin (LANTUS SOLOSTAR U-100 INSULIN) glargine 100 units/mL SubQ pen Inject 40 Units into the skin 2 (two) times a day.    isosorbide mononitrate (IMDUR) 30 MG 24 hr tablet TAKE 3 TABLETS(90 MG) BY MOUTH EVERY DAY    ketoconazole (NIZORAL) 2 % cream Apply topically once daily.    metFORMIN (GLUCOPHAGE-XR) 500 MG ER 24hr tablet Take 1 tablet (500 mg total) by mouth daily with breakfast.    metoprolol tartrate (LOPRESSOR) 50 MG tablet TAKE 1 TABLET BY MOUTH TWICE DAILY    niacin (SLO-NIACIN) 500 mg tablet TAKE 1 TABLET(500 MG) BY MOUTH EVERY EVENING    nitroGLYCERIN (NITROSTAT) 0.4 MG SL tablet Place 1 tablet (0.4 mg total) under the tongue every 5 (five) minutes as needed.    pen needle, diabetic (BD ULTRA-FINE  "SHORT PEN NEEDLE) 31 gauge x 5/16" Ndle USE WITH LANTUS PEN AS DIRECTED    pneumoc 20-miranda conj-dip cr,PF, (PREVNAR-20, PF,) 0.5 mL Syrg injection Inject 0.5 mLs into the muscle.    ranolazine (RANEXA) 1,000 mg Tb12 Take 1 tablet (1,000 mg total) by mouth 2 (two) times daily.    tirzepatide 7.5 mg/0.5 mL PnIj Inject 7.5 mg into the skin every 7 days.     Family History       Problem Relation (Age of Onset)    Diabetes Mother, Sister    Heart disease Maternal Grandfather    Hypertension Mother, Father, Sister          Tobacco Use    Smoking status: Never    Smokeless tobacco: Never   Substance and Sexual Activity    Alcohol use: Yes     Alcohol/week: 0.0 standard drinks of alcohol     Comment: socially    Drug use: No    Sexual activity: Yes     Partners: Female     Review of Systems   Cardiovascular:  Positive for chest pain and leg swelling. Negative for dyspnea on exertion, palpitations and syncope.   Genitourinary: Negative.    Neurological: Negative.      Objective:     Vital Signs (Most Recent):  Temp: 97.9 °F (36.6 °C) (10/07/23 0738)  Pulse: 73 (10/07/23 0738)  Resp: 17 (10/07/23 0738)  BP: 124/73 (10/07/23 0738)  SpO2: 95 % (10/07/23 0738) Vital Signs (24h Range):  Temp:  [97.5 °F (36.4 °C)-98.8 °F (37.1 °C)] 97.9 °F (36.6 °C)  Pulse:  [] 73  Resp:  [12-27] 17  SpO2:  [92 %-100 %] 95 %  BP: (104-189)/(57-99) 124/73     Weight: 133.4 kg (294 lb 3.3 oz)  Body mass index is 37.77 kg/m².    SpO2: 95 %       No intake or output data in the 24 hours ending 10/07/23 1058    Lines/Drains/Airways       Peripheral Intravenous Line  Duration                  Peripheral IV - Single Lumen 10/06/23 1330 20 G Left Antecubital <1 day                     Physical Exam  Vitals reviewed.   Constitutional:       Appearance: He is well-developed.   Neck:      Vascular: No carotid bruit.   Cardiovascular:      Rate and Rhythm: Normal rate and regular rhythm.      Pulses: Intact distal pulses.      Heart sounds: " Normal heart sounds. No murmur heard.  Pulmonary:      Breath sounds: Normal breath sounds.   Musculoskeletal:         General: Swelling present.   Neurological:      Mental Status: He is oriented to person, place, and time.          Significant Labs: Troponin   Recent Labs   Lab 10/06/23  2229 10/07/23  0022 10/07/23  0429   TROPONINI 0.376* 1.528* 3.129*   , All pertinent lab results from the last 24 hours have been reviewed., and   Recent Lab Results  (Last 5 results in the past 24 hours)        10/07/23  0809   10/07/23  0603   10/07/23  0429   10/07/23  0022   10/06/23  2229        Anion Gap     10           aPTT 26.5  Comment: Refer to local heparin nomogram for intensity/dose specific   therapeutic   range.           24.8  Comment: Refer to local heparin nomogram for intensity/dose specific   therapeutic   range.         Baso # 0.04     0.05           Basophil % 0.5     0.5           BUN     22           Calcium     8.6           Chloride     106           Cholesterol Total         156  Comment: The National Cholesterol Education Program (NCEP) has set the  following guidelines (reference ranges) for Cholesterol:  Optimal.....................<200 mg/dL  Borderline High.............200-239 mg/dL  High........................> or = 240 mg/dL         CO2     22           Creatinine     1.0           Differential Method Automated     Automated           eGFR     >60           Eos # 0.1     0.1           Eosinophil % 0.8     0.9           Glucose     191           Gran # (ANC) 5.4     6.2           Gran % 64.7     62.2           HDL         34  Comment: The National Cholesterol Education Program (NCEP) has set the  following guidelines (reference values) for HDL Cholesterol:  Low...............<40 mg/dL  Optimal...........>60 mg/dL         HDL/Cholesterol Ratio         21.8       Hematocrit 45.9     45.4           Hemoglobin 15.3     15.1           Immature Grans (Abs) 0.04  Comment: Mild elevation in immature  granulocytes is non specific and   can be seen in a variety of conditions including stress response,   acute inflammation, trauma and pregnancy. Correlation with other   laboratory and clinical findings is essential.       0.08  Comment: Mild elevation in immature granulocytes is non specific and   can be seen in a variety of conditions including stress response,   acute inflammation, trauma and pregnancy. Correlation with other   laboratory and clinical findings is essential.             Immature Granulocytes 0.5     0.8           INR 1.0  Comment: Coumadin Therapy:  2.0 - 3.0 for INR for all indicators except mechanical heart valves  and antiphospholipid syndromes which should use 2.5 - 3.5.           1.0  Comment: Coumadin Therapy:  2.0 - 3.0 for INR for all indicators except mechanical heart valves  and antiphospholipid syndromes which should use 2.5 - 3.5.         LDL Cholesterol External         103.0  Comment: The National Cholesterol Education Program (NCEP) has set the  following guidelines (reference values) for LDL Cholesterol:  Optimal.......................<130 mg/dL  Borderline High...............130-159 mg/dL  High..........................160-189 mg/dL  Very High.....................>190 mg/dL         Lymph # 2.0     2.7           Lymph % 24.4     26.9           Magnesium      2.0           MCH 31.9     31.5           MCHC 33.3     33.3           MCV 96     95           Mono # 0.8     0.9           Mono % 9.1     8.7           MPV 10.2     10.4           Non-HDL Cholesterol         122  Comment: Risk category and Non-HDL cholesterol goals:  Coronary heart disease (CHD)or equivalent (10-year risk of CHD >20%):  Non-HDL cholesterol goal     <130 mg/dL  Two or more CHD risk factors and 10-year risk of CHD <= 20%:  Non-HDL cholesterol goal     <160 mg/dL  0 to 1 CHD risk factor:  Non-HDL cholesterol goal     <190 mg/dL         nRBC 0     0           Platelet Count 231     244           POCT Glucose    178             Potassium     4.0           Protime 10.6         10.5       RBC 4.80     4.79           RDW 11.9     11.9           Sodium     138           Total Cholesterol/HDL Ratio         4.6       Triglycerides         95  Comment: The National Cholesterol Education Program (NCEP) has set the  following guidelines (reference values) for triglycerides:  Normal......................<150 mg/dL  Borderline High.............150-199 mg/dL  High........................200-499 mg/dL         Troponin I     3.129  Comment: The reference interval for Troponin I represents the 99th percentile   cutoff   for our facility and is consistent with 3rd generation assay   performance.     1.528  Comment: The reference interval for Troponin I represents the 99th percentile   cutoff   for our facility and is consistent with 3rd generation assay   performance.     0.376  Comment: The reference interval for Troponin I represents the 99th percentile   cutoff   for our facility and is consistent with 3rd generation assay   performance.         WBC 8.28     9.90                                  Significant Imaging: EKG: nsr    Assessment and Plan:     * Chest pain  See NSTEMI    Type 2 diabetes mellitus with circulatory disorder (coronary artery disease), with long-term current use of insulin  Not controlled.  As per primary team management    NSTEMI (non-ST elevated myocardial infarction)  Ordered IV heparin.  Continue with nitroglycerin  Ordered echocardiogram.    Currently angina free.  Continue with medical treatment for now.  Check BNP  Possible catheterization on Monday unless symptoms get uncontrolled.    S/P PTCA (percutaneous transluminal coronary angioplasty)  Continue with aspirin, statin, Imdur, beta-blocker.          VTE Risk Mitigation (From admission, onward)         Ordered     heparin 25,000 units in dextrose 5% (100 units/ml) IV bolus from bag - ADDITIONAL PRN BOLUS - 60 units/kg (max bolus 4000 units)  As needed  (PRN)        Question:  Heparin Infusion Adjustment (DO NOT MODIFY ANSWER)  Answer:  [email or web address]\epic\Images\Pharmacy\HeparinInfusions\heparin LOW INTENSITY nomogram for [facility] [id number]    10/07/23 0805     heparin 25,000 units in dextrose 5% (100 units/ml) IV bolus from bag - ADDITIONAL PRN BOLUS - 30 units/kg (max bolus 4000 units)  As needed (PRN)        Question:  Heparin Infusion Adjustment (DO NOT MODIFY ANSWER)  Answer:  [email or web address]\epic\Images\Pharmacy\HeparinInfusions\heparin LOW INTENSITY nomogram for [facility] [id number].pdf    10/07/23 0805     heparin 25,000 units in dextrose 5% 250 mL (100 units/mL) infusion LOW INTENSITY nomogram - [facility]  Continuous        Question:  Begin at (units/kg/hr)  Answer:  12    10/07/23 0805     IP VTE HIGH RISK PATIENT  Once         10/06/23 2149     Place sequential compression device  Until discontinued         10/06/23 2149                Thank you for your consult. I will follow-up with patient. Please contact us if you have any additional questions.    Chidi Cancino MD  Cardiology   [facility] - Telemetry ([facility])

## 2023-10-07 NOTE — CONSULTS
Food & Nutrition Education     Diet Education: Cardiac Nutrition Therapy  Time Spent: 15 minutes  Learners: Patient, Spouse     Nutrition Education provided with handouts:  Cardiac-TLC Nutrition Therapy, Low-Sodium Nutrition Therapy ([email or web address])     Comments:  67 y/o male with significant medical history of coronary artery disease, history of myocardial infarction with prior percutaneous transluminal coronary angioplasty, obesity, obstructive sleep apnea requiring CPAP, hyperlipidemia, hypertension, and type 2 diabetes mellitus with associated circulatory disorder (coronary artery disease), but not requiring long-term insulin. He presents to the Emergency Department after experiencing symptoms reminiscent of his previous myocardial infarction. While in a [employer] parking lot today, he felt a sudden rise in his blood pressure and facial flushing. He took nitroglycerin, which alleviated his symptoms. The patient associated these feelings with a similar episode he had years back when he had a myocardial infarction, prompting his visit to the ER. At present, he denies fever, chills, chest pain, shortness of breath, leg swelling, and other associated symptoms.    Educated patient on low sodium, general healthful diet r/t recent hospital diagnosis. Recommended a well-balanced diet with a variety of fresh foods, fruits, and vegetables (5 cups/day), whole grains (3 oz/day), and fat-free or low-fat dairy. Discussed reading food packages, food labels, and nutrition facts labels to identify nutrient content of foods.     Discussed the importance of limiting sodium to less than 2,000 mg per day. Recommended salt free seasonings and other herbs and spices in meals to enhance flavor without additional sodium.     Discussed dietary sources of cholesterol, the importance of incorporating healthy fats into the diet, and avoiding saturated and trans fats for heart health. For a generally healthy diet, aim for total fat  less than 25-35% of calories. Discussed alternative vegetable protein options for some of her meal throughout the week (Beans, peas, and lentils)     Discussed the importance of fiber (especially soluble fiber), dietary sources, and a goal intake of >20-30g/day.    The pt remained engaged throughout entire nutrition education. He states he currently has a good appetite and has not further concerns. RD will continue to follow.     NFPE not performed, pt appears well nourished.  All questions and concerns answered.  Provided handout with dietitian's contact information.  *Please re-consult as needed.  Thank You!  Ankur Rodriguez, Registration Eligible, Provisional LDN

## 2023-10-08 DIAGNOSIS — E11.69 HYPERLIPIDEMIA ASSOCIATED WITH TYPE 2 DIABETES MELLITUS: ICD-10-CM

## 2023-10-08 DIAGNOSIS — I25.10 CORONARY ARTERY DISEASE INVOLVING NATIVE CORONARY ARTERY OF NATIVE HEART WITHOUT ANGINA PECTORIS: ICD-10-CM

## 2023-10-08 DIAGNOSIS — E78.5 HYPERLIPIDEMIA ASSOCIATED WITH TYPE 2 DIABETES MELLITUS: ICD-10-CM

## 2023-10-08 LAB
APTT PPP: 37.6 SEC (ref 21–32)
APTT PPP: 50.4 SEC (ref 21–32)
APTT PPP: 58.9 SEC (ref 21–32)
BASOPHILS # BLD AUTO: 0.05 K/UL (ref 0–0.2)
BASOPHILS NFR BLD: 0.6 % (ref 0–1.9)
DIFFERENTIAL METHOD: ABNORMAL
EOSINOPHIL # BLD AUTO: 0.1 K/UL (ref 0–0.5)
EOSINOPHIL NFR BLD: 0.8 % (ref 0–8)
ERYTHROCYTE [DISTWIDTH] IN BLOOD BY AUTOMATED COUNT: 12 % (ref 11.5–14.5)
HCT VFR BLD AUTO: 46.4 % (ref 40–54)
HGB BLD-MCNC: 15.5 G/DL (ref 14–18)
IMM GRANULOCYTES # BLD AUTO: 0.03 K/UL (ref 0–0.04)
IMM GRANULOCYTES NFR BLD AUTO: 0.4 % (ref 0–0.5)
LYMPHOCYTES # BLD AUTO: 2.8 K/UL (ref 1–4.8)
LYMPHOCYTES NFR BLD: 33.1 % (ref 18–48)
MAGNESIUM SERPL-MCNC: 2 MG/DL (ref 1.6–2.6)
MCH RBC QN AUTO: 31.8 PG (ref 27–31)
MCHC RBC AUTO-ENTMCNC: 33.4 G/DL (ref 32–36)
MCV RBC AUTO: 95 FL (ref 82–98)
MONOCYTES # BLD AUTO: 0.7 K/UL (ref 0.3–1)
MONOCYTES NFR BLD: 8.4 % (ref 4–15)
NEUTROPHILS # BLD AUTO: 4.7 K/UL (ref 1.8–7.7)
NEUTROPHILS NFR BLD: 56.7 % (ref 38–73)
NRBC BLD-RTO: 0 /100 WBC
PLATELET # BLD AUTO: 226 K/UL (ref 150–450)
PMV BLD AUTO: 10.3 FL (ref 9.2–12.9)
POCT GLUCOSE: 211 MG/DL (ref 70–110)
RBC # BLD AUTO: 4.87 M/UL (ref 4.6–6.2)
WBC # BLD AUTO: 8.3 K/UL (ref 3.9–12.7)

## 2023-10-08 PROCEDURE — 93005 ELECTROCARDIOGRAM TRACING: CPT

## 2023-10-08 PROCEDURE — 93010 ELECTROCARDIOGRAM REPORT: CPT | Mod: ,,, | Performed by: INTERNAL MEDICINE

## 2023-10-08 PROCEDURE — 93010 EKG 12-LEAD: ICD-10-PCS | Mod: ,,, | Performed by: INTERNAL MEDICINE

## 2023-10-08 PROCEDURE — 85025 COMPLETE CBC W/AUTO DIFF WBC: CPT | Performed by: INTERNAL MEDICINE

## 2023-10-08 PROCEDURE — 21400001 HC TELEMETRY ROOM

## 2023-10-08 PROCEDURE — 99900035 HC TECH TIME PER 15 MIN (STAT)

## 2023-10-08 PROCEDURE — 99233 SBSQ HOSP IP/OBS HIGH 50: CPT | Mod: ,,, | Performed by: INTERNAL MEDICINE

## 2023-10-08 PROCEDURE — 99233 PR SUBSEQUENT HOSPITAL CARE,LEVL III: ICD-10-PCS | Mod: ,,, | Performed by: INTERNAL MEDICINE

## 2023-10-08 PROCEDURE — 83735 ASSAY OF MAGNESIUM: CPT | Performed by: STUDENT IN AN ORGANIZED HEALTH CARE EDUCATION/TRAINING PROGRAM

## 2023-10-08 PROCEDURE — 36415 COLL VENOUS BLD VENIPUNCTURE: CPT | Performed by: HOSPITALIST

## 2023-10-08 PROCEDURE — 25000003 PHARM REV CODE 250: Performed by: STUDENT IN AN ORGANIZED HEALTH CARE EDUCATION/TRAINING PROGRAM

## 2023-10-08 PROCEDURE — 63600175 PHARM REV CODE 636 W HCPCS: Performed by: INTERNAL MEDICINE

## 2023-10-08 PROCEDURE — 85730 THROMBOPLASTIN TIME PARTIAL: CPT | Performed by: HOSPITALIST

## 2023-10-08 RX ADMIN — ASPIRIN 81 MG: 81 TABLET, COATED ORAL at 08:10

## 2023-10-08 RX ADMIN — RANOLAZINE 1000 MG: 500 TABLET, EXTENDED RELEASE ORAL at 08:10

## 2023-10-08 RX ADMIN — INSULIN DETEMIR 10 UNITS: 100 INJECTION, SOLUTION SUBCUTANEOUS at 09:10

## 2023-10-08 RX ADMIN — VALSARTAN 320 MG: 160 TABLET, FILM COATED ORAL at 08:10

## 2023-10-08 RX ADMIN — HEPARIN SODIUM 14 UNITS/KG/HR: 10000 INJECTION, SOLUTION INTRAVENOUS at 12:10

## 2023-10-08 RX ADMIN — AMLODIPINE BESYLATE 10 MG: 10 TABLET ORAL at 08:10

## 2023-10-08 RX ADMIN — METOPROLOL TARTRATE 50 MG: 50 TABLET, FILM COATED ORAL at 08:10

## 2023-10-08 RX ADMIN — HYDROCHLOROTHIAZIDE 25 MG: 25 TABLET ORAL at 08:10

## 2023-10-08 RX ADMIN — INSULIN ASPART 4 UNITS: 100 INJECTION, SOLUTION INTRAVENOUS; SUBCUTANEOUS at 04:10

## 2023-10-08 RX ADMIN — ATORVASTATIN CALCIUM 80 MG: 40 TABLET, FILM COATED ORAL at 08:10

## 2023-10-08 RX ADMIN — HEPARIN SODIUM 16 UNITS/KG/HR: 10000 INJECTION, SOLUTION INTRAVENOUS at 02:10

## 2023-10-08 RX ADMIN — ISOSORBIDE MONONITRATE 30 MG: 30 TABLET, EXTENDED RELEASE ORAL at 08:10

## 2023-10-08 NOTE — ASSESSMENT & PLAN NOTE
Hemoglobin A1C   Date Value Ref Range Status   10/02/2023 9.1 (H) 4.0 - 5.6 % Final     Comment:     ADA Screening Guidelines:  5.7-6.4%  Consistent with prediabetes  >or=6.5%  Consistent with diabetes    High levels of fetal hemoglobin interfere with the HbA1C  assay. Heterozygous hemoglobin variants (HbS, HgC, etc)do  not significantly interfere with this assay.   However, presence of multiple variants may affect accuracy.     04/27/2023 7.7 (H) 4.0 - 5.6 % Final     Comment:     ADA Screening Guidelines:  5.7-6.4%  Consistent with prediabetes  >or=6.5%  Consistent with diabetes    High levels of fetal hemoglobin interfere with the HbA1C  assay. Heterozygous hemoglobin variants (HbS, HgC, etc)do  not significantly interfere with this assay.   However, presence of multiple variants may affect accuracy.     09/13/2022 7.8 (H) 4.0 - 5.6 % Final     Comment:     ADA Screening Guidelines:  5.7-6.4%  Consistent with prediabetes  >or=6.5%  Consistent with diabetes    High levels of fetal hemoglobin interfere with the HbA1C  assay. Heterozygous hemoglobin variants (HbS, HgC, etc)do  not significantly interfere with this assay.   However, presence of multiple variants may affect accuracy.     insulin sliding scale

## 2023-10-08 NOTE — TELEPHONE ENCOUNTER
No care due was identified.  [facility] Embedded Care Due Messages. Reference number: [id number].   10/08/2023 6:12:38 AM CDT

## 2023-10-08 NOTE — PLAN OF CARE
Problem: Adult Inpatient Plan of Care  Goal: Plan of Care Review  Outcome: Ongoing, Progressing  Goal: Patient-Specific Goal (Individualized)  Outcome: Ongoing, Progressing  Goal: Absence of Hospital-Acquired Illness or Injury  Outcome: Ongoing, Progressing  Goal: Optimal Comfort and Wellbeing  Outcome: Ongoing, Progressing  Goal: Readiness for Transition of Care  Outcome: Ongoing, Progressing     Problem: Adjustment to Illness (Acute Coronary Syndrome)  Goal: Optimal Adaptation to Illness  Outcome: Ongoing, Progressing     Problem: Dysrhythmia (Acute Coronary Syndrome)  Goal: Normalized Cardiac Rhythm  Outcome: Ongoing, Progressing     Problem: Cardiac-Related Pain (Acute Coronary Syndrome)  Goal: Absence of Cardiac-Related Pain  Outcome: Ongoing, Progressing     Problem: Hemodynamic Instability (Acute Coronary Syndrome)  Goal: Effective Cardiac Pump Function  Outcome: Ongoing, Progressing     Problem: Tissue Perfusion (Acute Coronary Syndrome)  Goal: Adequate Tissue Perfusion  Outcome: Ongoing, Progressing     Problem: Arrhythmia/Dysrhythmia (Cardiac Catheterization)  Goal: Stable Heart Rate and Rhythm  Outcome: Ongoing, Progressing      This was a shared visit with the FRANCES. I reviewed and verified the documentation and independently performed the documented:

## 2023-10-08 NOTE — PROGRESS NOTES
[facility] Medicine  Progress Note    Patient Name: Isaiah Harrison  MRN: [id number]  Patient Class: IP- Inpatient   Admission Date: 10/6/2023  Length of Stay: 1 days  Attending Physician: Romeo Boss MD  Primary Care Provider: LACI Dow MD    Subjective:     Principal Problem:NSTEMI (non-ST elevated myocardial infarction)        HPI:  Patient is a 68-year-old gentleman with a past medical history significant for coronary artery disease, history of myocardial infarction with prior percutaneous transluminal coronary angioplasty, obesity, obstructive sleep apnea requiring CPAP, hyperlipidemia, hypertension, and type 2 diabetes mellitus with associated circulatory disorder (coronary artery disease), but not requiring long-term insulin. He presents to the Emergency Department after experiencing symptoms reminiscent of his previous myocardial infarction. While in a [employer] parking lot today, he felt a sudden rise in his blood pressure and facial flushing. He took nitroglycerin, which alleviated his symptoms. The patient associated these feelings with a similar episode he had years back when he had a myocardial infarction, prompting his visit to the ER. At present, he denies fever, chills, chest pain, shortness of breath, leg swelling, and other associated symptoms.    On arrival, his initial vital signs revealed a blood pressure of 189/99, which subsequently trended to 143/78, 140/76, and 165/72. The rest of his vital signs remained within normal parameters. Laboratory findings indicated hyperglycemia and glucosuria, though his troponin levels were negative twice. A chest X-ray was unremarkable. His electrocardiogram demonstrated a rate of 94 BPM with a normal sinus rhythm and revealed an old inferior infarct with a possible anterolateral infarct, though no ST-elevation myocardial infarction was identified. During his stay in the ER, he received medication including clonidine 0.1  mg, acetaminophen 1,000 mg, aspirin 325 mg, and a 1-inch topical application of 2% nitroglycerin ointment. Hospital Medicine was called for admission.      Overview/Hospital Course:  Isaiah Harrison is 68 year old male who was admitted to [facility] for NSTEMI. He presented to ED for chest pain with associated flushing and dyspnea. Troponin 0.376>1.52>3.129 consistent with NSTEMI. He his currently on heparin infusion.  Echocardiogram shows low normal systolic 50-55% and normal diastolic function. Plans are for LHC 10/9/23. Continue long acting vasodilator, BB, STATIN, and ASA. RoirzsaduyR2j has increased to 9.1 over the last five months. Risks associated with uncontrolled glucose levels discussed. He reports have an appt with dietician later this month and was urged to keep the appt.         Interval History: denies pain overnight. Plans for LHC in AM.     Review of Systems   Constitutional:  Negative for chills, diaphoresis and fever.   HENT:  Negative for congestion, postnasal drip, rhinorrhea, sore throat and trouble swallowing.    Eyes:  Negative for pain, redness, itching and visual disturbance.   Respiratory:  Positive for chest tightness. Negative for cough, shortness of breath and wheezing.    Cardiovascular:  Positive for chest pain (improved). Negative for palpitations.   Gastrointestinal:  Negative for abdominal distention, abdominal pain, constipation, diarrhea, nausea and vomiting.   Genitourinary:  Negative for difficulty urinating, dysuria and frequency.   Musculoskeletal:  Negative for arthralgias, back pain and joint swelling.   Neurological:  Negative for dizziness, seizures, syncope, weakness, light-headedness and headaches.   Psychiatric/Behavioral:  Negative for agitation, behavioral problems, confusion and suicidal ideas.      Objective:     Vital Signs (Most Recent):  Temp: 97.9 °F (36.6 °C) (10/08/23 0704)  Pulse: 71 (10/08/23 0704)  Resp: 16 (10/08/23 0704)  BP: (!) 119/59 (10/08/23  0828)  SpO2: 96 % (10/08/23 0704) Vital Signs (24h Range):  Temp:  [97.4 °F (36.3 °C)-98 °F (36.7 °C)] 97.9 °F (36.6 °C)  Pulse:  [64-76] 71  Resp:  [16-18] 16  SpO2:  [96 %-97 %] 96 %  BP: (104-135)/(59-78) 119/59     Weight: 130.8 kg (288 lb 5.8 oz)  Body mass index is 37.02 kg/m².  No intake or output data in the 24 hours ending 10/08/23 1208      Physical Exam  Constitutional:       General: He is awake.      Appearance: Normal appearance. He is well-developed. He is obese. He is not ill-appearing, toxic-appearing or diaphoretic.   HENT:      Head: Normocephalic and atraumatic.      Nose: Nose normal.      Mouth/Throat:      Mouth: Mucous membranes are moist.   Eyes:      General: No scleral icterus.     Extraocular Movements: Extraocular movements intact.      Conjunctiva/sclera: Conjunctivae normal.      Pupils: Pupils are equal, round, and reactive to light.   Cardiovascular:      Rate and Rhythm: Normal rate.      Pulses: Normal pulses.      Heart sounds: Normal heart sounds. No murmur heard.  Pulmonary:      Effort: Pulmonary effort is normal. No respiratory distress.      Breath sounds: No wheezing.   Abdominal:      General: Abdomen is flat. There is no distension.      Palpations: Abdomen is soft.      Tenderness: There is no abdominal tenderness.   Musculoskeletal:         General: No swelling, tenderness or deformity.      Cervical back: Normal range of motion and neck supple.   Neurological:      General: No focal deficit present.      Mental Status: He is alert and oriented to person, place, and time. Mental status is at baseline.   Psychiatric:         Behavior: Behavior is cooperative.         Significant Labs: All pertinent labs within the past 24 hours have been reviewed.  CBC:   Recent Labs   Lab 10/07/23  0429 10/07/23  0809 10/08/23  0419   WBC 9.90 8.28 8.30   HGB 15.1 15.3 15.5   HCT 45.4 45.9 46.4    231 226       Significant Imaging: I have reviewed all pertinent imaging  results/findings within the past 24 hours.      Assessment/Plan:      * NSTEMI (non-ST elevated myocardial infarction)  Upward trend of troponin. Peak 3.129.   Heparin infusing  Continue vasodilator, BB, STATIN, and ASA  Hx of PCI by Dr. Lyles several years ago.    10/8/23  Troponin trending down   Continue med management as above  Plans for LHC in AM    Chest pain  --s/p  and nitro dose x 1 in the ER  --asymptomatic, no active chest pain at this time  --EKG: negative for STEMI  --Trop NEG x 2- continue to trend   --PRN nitro for chest pain  --statin and antiplatelet therapy   --given high risk, Cardiology consulted, stress test in the AM  --cardiac diet, NPO at midnight   --admit to telemetry-OBS with cardiac monitoring    10/7/23  See plan for NSTEMI      Essential hypertension  --home medications include: norvasc, hctz, imdur, lopressor, & an -tala   --restart as tolerated   --PRN IV hydralazine 10mg Q6H for sBP > 175 mmHg  --Q4HVS    10/7/23  Blood pressure at goal    Type 2 diabetes mellitus with circulatory disorder (coronary artery disease), with long-term current use of insulin  Hemoglobin A1C   Date Value Ref Range Status   10/02/2023 9.1 (H) 4.0 - 5.6 % Final     Comment:     ADA Screening Guidelines:  5.7-6.4%  Consistent with prediabetes  >or=6.5%  Consistent with diabetes    High levels of fetal hemoglobin interfere with the HbA1C  assay. Heterozygous hemoglobin variants (HbS, HgC, etc)do  not significantly interfere with this assay.   However, presence of multiple variants may affect accuracy.     04/27/2023 7.7 (H) 4.0 - 5.6 % Final     Comment:     ADA Screening Guidelines:  5.7-6.4%  Consistent with prediabetes  >or=6.5%  Consistent with diabetes    High levels of fetal hemoglobin interfere with the HbA1C  assay. Heterozygous hemoglobin variants (HbS, HgC, etc)do  not significantly interfere with this assay.   However, presence of multiple variants may affect accuracy.     09/13/2022 7.8 (H)  4.0 - 5.6 % Final     Comment:     ADA Screening Guidelines:  5.7-6.4%  Consistent with prediabetes  >or=6.5%  Consistent with diabetes    High levels of fetal hemoglobin interfere with the HbA1C  assay. Heterozygous hemoglobin variants (HbS, HgC, etc)do  not significantly interfere with this assay.   However, presence of multiple variants may affect accuracy.     insulin sliding scale    DANTE on CPAP  --confirms use of CPAP at home  --CPAP ordered  --monitor clinically    S/P PTCA (percutaneous transluminal coronary angioplasty)         Coronary artery disease involving native coronary artery of native heart without angina pectoris  See above      Hyperlipidemia associated with type 2 diabetes mellitus  --last A1c on file: 9.1% five days prior to admission  --Home medications include metformin, BID lantus  --holding home PO medications  -- mod dose SSI ordered  --QHS basal insulin at titrated dose- modify as diet restarts  --Accuchecks ACQHS  --Fasting AM glucose goal <140  --restart statin medication    10/7/23  HgbA1c 9.1 up from previous.   Continue sliding scale  Add Levemir 10 units nightly    10/8/23  Glucose better controlled        VTE Risk Mitigation (From admission, onward)         Ordered     heparin 25,000 units in dextrose 5% (100 units/ml) IV bolus from bag - ADDITIONAL PRN BOLUS - 60 units/kg (max bolus 4000 units)  As needed (PRN)        Question:  Heparin Infusion Adjustment (DO NOT MODIFY ANSWER)  Answer:  [facility]\ep[facility]\Images\Pharmacy\HeparinInfusions\heparin LOW INTENSITY nomogram for [facility] [id number]    10/07/23 0805     heparin 25,000 units in dextrose 5% (100 units/ml) IV bolus from bag - ADDITIONAL PRN BOLUS - 30 units/kg (max bolus 4000 units)  As needed (PRN)        Question:  Heparin Infusion Adjustment (DO NOT MODIFY ANSWER)  Answer:  \\[facility]\ep[employer]\Images\Pharmacy\HeparinInfusions\heparin LOW INTENSITY nomogram for [facility] [id number].pdf    10/07/23 0805     heparin 25,000 units in  dextrose 5% 250 mL (100 units/mL) infusion LOW INTENSITY nomogram - [facility]  Continuous        Question:  Begin at (units/kg/hr)  Answer:  12    10/07/23 0805     IP VTE HIGH RISK PATIENT  Once         10/06/23 2149     Place sequential compression device  Until discontinued         10/06/23 2149                Discharge Planning   RAVIN:      Code Status: Full Code   Is the patient medically ready for discharge?:     Reason for patient still in hospital (select all that apply): Treatment           Percy Mcmullen NP  Department of Hospital Medicine   [facility] - Telemetry ([facility])

## 2023-10-08 NOTE — SUBJECTIVE & OBJECTIVE
Interval History: denies pain overnight. Plans for [facility] in AM.     Review of Systems   Constitutional:  Negative for chills, diaphoresis and fever.   HENT:  Negative for congestion, postnasal drip, rhinorrhea, sore throat and trouble swallowing.    Eyes:  Negative for pain, redness, itching and visual disturbance.   Respiratory:  Positive for chest tightness. Negative for cough, shortness of breath and wheezing.    Cardiovascular:  Positive for chest pain (improved). Negative for palpitations.   Gastrointestinal:  Negative for abdominal distention, abdominal pain, constipation, diarrhea, nausea and vomiting.   Genitourinary:  Negative for difficulty urinating, dysuria and frequency.   Musculoskeletal:  Negative for arthralgias, back pain and joint swelling.   Neurological:  Negative for dizziness, seizures, syncope, weakness, light-headedness and headaches.   Psychiatric/Behavioral:  Negative for agitation, behavioral problems, confusion and suicidal ideas.      Objective:     Vital Signs (Most Recent):  Temp: 97.9 °F (36.6 °C) (10/08/23 0704)  Pulse: 71 (10/08/23 0704)  Resp: 16 (10/08/23 0704)  BP: (!) 119/59 (10/08/23 0828)  SpO2: 96 % (10/08/23 0704) Vital Signs (24h Range):  Temp:  [97.4 °F (36.3 °C)-98 °F (36.7 °C)] 97.9 °F (36.6 °C)  Pulse:  [64-76] 71  Resp:  [16-18] 16  SpO2:  [96 %-97 %] 96 %  BP: (104-135)/(59-78) 119/59     Weight: 130.8 kg (288 lb 5.8 oz)  Body mass index is 37.02 kg/m².  No intake or output data in the 24 hours ending 10/08/23 1208      Physical Exam  Constitutional:       General: He is awake.      Appearance: Normal appearance. He is well-developed. He is obese. He is not ill-appearing, toxic-appearing or diaphoretic.   HENT:      Head: Normocephalic and atraumatic.      Nose: Nose normal.      Mouth/Throat:      Mouth: Mucous membranes are moist.   Eyes:      General: No scleral icterus.     Extraocular Movements: Extraocular movements intact.      Conjunctiva/sclera: Conjunctivae  normal.      Pupils: Pupils are equal, round, and reactive to light.   Cardiovascular:      Rate and Rhythm: Normal rate.      Pulses: Normal pulses.      Heart sounds: Normal heart sounds. No murmur heard.  Pulmonary:      Effort: Pulmonary effort is normal. No respiratory distress.      Breath sounds: No wheezing.   Abdominal:      General: Abdomen is flat. There is no distension.      Palpations: Abdomen is soft.      Tenderness: There is no abdominal tenderness.   Musculoskeletal:         General: No swelling, tenderness or deformity.      Cervical back: Normal range of motion and neck supple.   Neurological:      General: No focal deficit present.      Mental Status: He is alert and oriented to person, place, and time. Mental status is at baseline.   Psychiatric:         Behavior: Behavior is cooperative.         Significant Labs: All pertinent labs within the past 24 hours have been reviewed.  CBC:   Recent Labs   Lab 10/07/23  0429 10/07/23  0809 10/08/23  0419   WBC 9.90 8.28 8.30   HGB 15.1 15.3 15.5   HCT 45.4 45.9 46.4    231 226       Significant Imaging: I have reviewed all pertinent imaging results/findings within the past 24 hours.

## 2023-10-08 NOTE — PROGRESS NOTES
[facility])  Cardiology  Progress Note    Patient Name: Isaiah Harrison  MRN: [id number]  Admission Date: 10/6/2023  Hospital Length of Stay: 1 days  Code Status: Full Code   Attending Physician: Romeo Boss MD   Primary Care Physician: LACI Dow MD  Expected Discharge Date:   Principal Problem:NSTEMI (non-ST elevated myocardial infarction)    Subjective:     Hospital Course:   68-year-old gentleman with a past medical history significant for coronary artery disease, history of myocardial infarction with stent placement, prior percutaneous transluminal coronary angioplasty, obesity, obstructive sleep apnea requiring CPAP, hyperlipidemia, hypertension, and type 2 diabetes mellitus with associated circulatory disorder (coronary artery disease), but not requiring long-term insulin. He presents to the Emergency Department after experiencing symptoms reminiscent of his previous myocardial infarction. While in a [employer] parking lot today, he felt a sudden rise in his blood pressure and facial flushing. He took nitroglycerin, which alleviated his symptoms. The patient associated these feelings with a similar episode he had years back when he had a myocardial infarction, prompting his visit to the ER. At present, he denies fever, chills, chest pain, shortness of breath, leg swelling, and other associated symptoms.     On arrival, his initial vital signs revealed a blood pressure of 189/99, which subsequently trended to 143/78, 140/76, and 165/72. The rest of his vital signs remained within normal parameters. Laboratory findings indicated hyperglycemia and glucosuria, though his troponin levels were negative twice. A chest X-ray was unremarkable. His electrocardiogram demonstrated a rate of 94 BPM with a normal sinus rhythm and revealed an old inferior infarct with a possible anterolateral infarct, though no ST-elevation myocardial infarction was identified. During his stay in the ER, he  received medication including clonidine 0.1 mg, acetaminophen 1,000 mg, aspirin 325 mg, and a 1-inch topical application of 2% nitroglycerin ointment.    10.7.2023  On my interview he states that he has had waxing waning chest pain since yesterday however right now he feels well since his last episode of chest pain in the emergency room last night.    His troponin has started to rise.    He has history of remote stents to his LAD.    He follows with Dr. GARCIA as outpatient.  Last seen however was 1 year ago.  With notable noncompliance with diet.    10.8.2023  Troponin peaked at 3.5.  Chest pain-free.    On IV heparin .  P.o. Imdur.          Review of Systems   Cardiovascular:  Negative for chest pain.     Objective:     Vital Signs (Most Recent):  Temp: 97.9 °F (36.6 °C) (10/08/23 0704)  Pulse: 71 (10/08/23 0704)  Resp: 16 (10/08/23 0704)  BP: (!) 119/59 (10/08/23 0828)  SpO2: 96 % (10/08/23 0704) Vital Signs (24h Range):  Temp:  [97.4 °F (36.3 °C)-98 °F (36.7 °C)] 97.9 °F (36.6 °C)  Pulse:  [64-76] 71  Resp:  [16-18] 16  SpO2:  [96 %-97 %] 96 %  BP: (104-135)/(59-78) 119/59     Weight: 130.8 kg (288 lb 5.8 oz)  Body mass index is 37.02 kg/m².     SpO2: 96 %       No intake or output data in the 24 hours ending 10/08/23 1205    Lines/Drains/Airways       Peripheral Intravenous Line  Duration                  Peripheral IV - Single Lumen 10/08/23 0015 22 G Left;Posterior Hand <1 day                       Physical Exam  Vitals reviewed.   Constitutional:       Appearance: He is well-developed.   Neck:      Vascular: No carotid bruit.   Cardiovascular:      Rate and Rhythm: Normal rate and regular rhythm.      Pulses: Intact distal pulses.      Heart sounds: Normal heart sounds. No murmur heard.  Pulmonary:      Breath sounds: Normal breath sounds.   Neurological:      Mental Status: He is oriented to person, place, and time.            Significant Labs: Troponin   Recent Labs   Lab 10/07/23  0022 10/07/23  0426  10/07/23  1525   TROPONINI 1.528* 3.129* 2.429*       Significant Imaging: Echocardiogram: Transthoracic echo (TTE) complete ([facility] Only):   Results for orders placed or performed during the hospital encounter of 10/06/23   Echo   Result Value Ref Range    BSA 2.64 m2    LVIDd 4.56 3.5 - 6.0 cm    LV Systolic Volume 24.51 mL    LV Systolic Volume Index 9.6 mL/m2    LVIDs 2.60 2.1 - 4.0 cm    LV Diastolic Volume 95.45 mL    LV Diastolic Volume Index 37.29 mL/m2    IVS 1.05 0.6 - 1.1 cm    FS 43 28 - 44 %    Left Ventricle Relative Wall Thickness 0.52 cm    Posterior Wall 1.18 (A) 0.6 - 1.1 cm    LV mass 182.17 g    LV Mass Index 71 g/m2    MV Peak E Brandon 0.68 m/s    TDI LATERAL 0.11 m/s    TDI SEPTAL 0.08 m/s    E/E' ratio 7.16 m/s    MV Peak A Brandon 0.58 m/s    E/A ratio 1.17     IVRT 99.90 msec    E wave deceleration time 205.75 msec    LV SEPTAL E/E' RATIO 8.50 m/s    LV LATERAL E/E' RATIO 6.18 m/s    LVOT peak brandon 0.88 m/s    Left Ventricular Outflow Tract Mean Velocity 0.61 cm/s    Left Ventricular Outflow Tract Mean Gradient 1.72 mmHg    LA size 3.91 cm    Left Atrium Minor Axis 4.95 cm    Left Atrium Major Axis 5.71 cm    LA volume (mod) 56.17 cm3    LA Volume Index (Mod) 21.9 mL/m2    RVDD 3.38 cm    RVOT peak VTI 15.7 cm    TAPSE 2.34 cm    RA Major Axis 4.89 cm    AV mean gradient 2 mmHg    AV peak gradient 4 mmHg    Ao peak brandon 1.03 m/s    Ao VTI 24.60 cm    LVOT peak VTI 19.40 cm    AV Velocity Ratio 0.85     AV index (prosthetic) 0.79     PV mean gradient 1 mmHg    PV PEAK VELOCITY 0.86 m/s    PV peak gradient 3 mmHg    Pulmonary Valve Mean Velocity 0.65 m/s    RVOT peak brandon 0.71 m/s    Ao root annulus 3.44 cm    Mean e' 0.10 m/s    ZLVIDS -10.39     ZLVIDD -12.81     LA Volume Index 29.6 mL/m2    LA volume 75.78 cm3    LA WIDTH 4.3 cm    RA Width 3.6 cm    Est. RA pres 8 mmHg    Narrative      Left Ventricle: The left ventricle is normal in size. Normal wall   thickness. There is mild concentric  hypertrophy. Normal wall motion. There   is low normal systolic function with a visually estimated ejection   fraction of 50 - 55%. There is normal diastolic function.    Right Ventricle: Normal right ventricular cavity size. Wall thickness   is normal. Right ventricle wall motion  is normal. Systolic function is   normal.    Tricuspid Valve: There is mild regurgitation.    IVC/SVC: Intermediate venous pressure at 8 mmHg.       Assessment and Plan:     Brief HPI:     * NSTEMI (non-ST elevated myocardial infarction)  Ordered IV heparin.  Continue with nitroglycerin  Ordered echocardiogram.    Currently angina free.  Continue with medical treatment for now.  Check BNP  Possible catheterization on Monday unless symptoms get uncontrolled.    10.8.2023  Continue with IV heparin.  Monitor PTT.    NPO after midnight catheterization in a.m. with Dr. Lyles    Chest pain  See NSTEMI    Type 2 diabetes mellitus with circulatory disorder (coronary artery disease), with long-term current use of insulin  Not controlled.  As per primary team management    S/P PTCA (percutaneous transluminal coronary angioplasty)  Continue with aspirin, statin, Imdur, beta-blocker.          VTE Risk Mitigation (From admission, onward)         Ordered     heparin 25,000 units in dextrose 5% (100 units/ml) IV bolus from bag - ADDITIONAL PRN BOLUS - 60 units/kg (max bolus 4000 units)  As needed (PRN)        Question:  Heparin Infusion Adjustment (DO NOT MODIFY ANSWER)  Answer:  [facility]\Images\Pharmacy\HeparinInfusions\heparin LOW INTENSITY nomogram for [facility] [id number].pdf    10/07/23 0805     heparin 25,000 units in dextrose 5% (100 units/ml) IV bolus from bag - ADDITIONAL PRN BOLUS - 30 units/kg (max bolus 4000 units)  As needed (PRN)        Question:  Heparin Infusion Adjustment (DO NOT MODIFY ANSWER)  Answer:  [facility]\Images\Pharmacy\HeparinInfusions\heparin LOW INTENSITY nomogram for [facility] [id number].pdf    10/07/23 0805     heparin  25,000 units in dextrose 5% 250 mL (100 units/mL) infusion LOW INTENSITY nomogram - [facility]  Continuous        Question:  Begin at (units/kg/hr)  Answer:  12    10/07/23 0805     IP VTE HIGH RISK PATIENT  Once         10/06/23 2149     Place sequential compression device  Until discontinued         10/06/23 2149                Chidi Cancino MD  Cardiology  [facility] - Telemetry ([facility])

## 2023-10-08 NOTE — ASSESSMENT & PLAN NOTE
--last A1c on file: 9.1% five days prior to admission  --Home medications include metformin, BID lantus  --holding home PO medications  -- mod dose SSI ordered  --QHS basal insulin at titrated dose- modify as diet restarts  --Accuchecks ACQ  --Fasting AM glucose goal <140  --restart statin medication    10/7/23  HgbA1c 9.1 up from previous.   Continue sliding scale  Add Levemir 10 units nightly    10/8/23  Glucose better controlled

## 2023-10-08 NOTE — SUBJECTIVE & OBJECTIVE
Review of Systems   Cardiovascular:  Negative for chest pain.     Objective:     Vital Signs (Most Recent):  Temp: 97.9 °F (36.6 °C) (10/08/23 0704)  Pulse: 71 (10/08/23 0704)  Resp: 16 (10/08/23 0704)  BP: (!) 119/59 (10/08/23 0828)  SpO2: 96 % (10/08/23 0704) Vital Signs (24h Range):  Temp:  [97.4 °F (36.3 °C)-98 °F (36.7 °C)] 97.9 °F (36.6 °C)  Pulse:  [64-76] 71  Resp:  [16-18] 16  SpO2:  [96 %-97 %] 96 %  BP: (104-135)/(59-78) 119/59     Weight: 130.8 kg (288 lb 5.8 oz)  Body mass index is 37.02 kg/m².     SpO2: 96 %       No intake or output data in the 24 hours ending 10/08/23 1205    Lines/Drains/Airways       Peripheral Intravenous Line  Duration                  Peripheral IV - Single Lumen 10/08/23 0015 22 G Left;Posterior Hand <1 day                       Physical Exam  Vitals reviewed.   Constitutional:       Appearance: He is well-developed.   Neck:      Vascular: No carotid bruit.   Cardiovascular:      Rate and Rhythm: Normal rate and regular rhythm.      Pulses: Intact distal pulses.      Heart sounds: Normal heart sounds. No murmur heard.  Pulmonary:      Breath sounds: Normal breath sounds.   Neurological:      Mental Status: He is oriented to person, place, and time.            Significant Labs: Troponin   Recent Labs   Lab 10/07/23  0022 10/07/23  0429 10/07/23  1525   TROPONINI 1.528* 3.129* 2.429*       Significant Imaging: Echocardiogram: Transthoracic echo (TTE) complete (Cupid Only):   Results for orders placed or performed during the hospital encounter of 10/06/23   Echo   Result Value Ref Range    BSA 2.64 m2    LVIDd 4.56 3.5 - 6.0 cm    LV Systolic Volume 24.51 mL    LV Systolic Volume Index 9.6 mL/m2    LVIDs 2.60 2.1 - 4.0 cm    LV Diastolic Volume 95.45 mL    LV Diastolic Volume Index 37.29 mL/m2    IVS 1.05 0.6 - 1.1 cm    FS 43 28 - 44 %    Left Ventricle Relative Wall Thickness 0.52 cm    Posterior Wall 1.18 (A) 0.6 - 1.1 cm    LV mass 182.17 g    LV Mass Index 71 g/m2    MV  Peak E Brandon 0.68 m/s    TDI LATERAL 0.11 m/s    TDI SEPTAL 0.08 m/s    E/E' ratio 7.16 m/s    MV Peak A Brandon 0.58 m/s    E/A ratio 1.17     IVRT 99.90 msec    E wave deceleration time 205.75 msec    LV SEPTAL E/E' RATIO 8.50 m/s    LV LATERAL E/E' RATIO 6.18 m/s    LVOT peak brandon 0.88 m/s    Left Ventricular Outflow Tract Mean Velocity 0.61 cm/s    Left Ventricular Outflow Tract Mean Gradient 1.72 mmHg    LA size 3.91 cm    Left Atrium Minor Axis 4.95 cm    Left Atrium Major Axis 5.71 cm    LA volume (mod) 56.17 cm3    LA Volume Index (Mod) 21.9 mL/m2    RVDD 3.38 cm    RVOT peak VTI 15.7 cm    TAPSE 2.34 cm    RA Major Axis 4.89 cm    AV mean gradient 2 mmHg    AV peak gradient 4 mmHg    Ao peak brandon 1.03 m/s    Ao VTI 24.60 cm    LVOT peak VTI 19.40 cm    AV Velocity Ratio 0.85     AV index (prosthetic) 0.79     PV mean gradient 1 mmHg    PV PEAK VELOCITY 0.86 m/s    PV peak gradient 3 mmHg    Pulmonary Valve Mean Velocity 0.65 m/s    RVOT peak brandon 0.71 m/s    Ao root annulus 3.44 cm    Mean e' 0.10 m/s    ZLVIDS -10.39     ZLVIDD -12.81     LA Volume Index 29.6 mL/m2    LA volume 75.78 cm3    LA WIDTH 4.3 cm    RA Width 3.6 cm    Est. RA pres 8 mmHg    Narrative      Left Ventricle: The left ventricle is normal in size. Normal wall   thickness. There is mild concentric hypertrophy. Normal wall motion. There   is low normal systolic function with a visually estimated ejection   fraction of 50 - 55%. There is normal diastolic function.    Right Ventricle: Normal right ventricular cavity size. Wall thickness   is normal. Right ventricle wall motion  is normal. Systolic function is   normal.    Tricuspid Valve: There is mild regurgitation.    IVC/SVC: Intermediate venous pressure at 8 mmHg.

## 2023-10-08 NOTE — ASSESSMENT & PLAN NOTE
Ordered IV heparin.  Continue with nitroglycerin  Ordered echocardiogram.    Currently angina free.  Continue with medical treatment for now.  Check BNP  Possible catheterization on Monday unless symptoms get uncontrolled.    10.8.2023  Continue with IV heparin.  Monitor PTT.    NPO after midnight catheterization in a.m. with Dr. Lyles

## 2023-10-08 NOTE — HOSPITAL COURSE
68-year-old gentleman with a past medical history significant for coronary artery disease, history of myocardial infarction with stent placement, prior percutaneous transluminal coronary angioplasty, obesity, obstructive sleep apnea requiring CPAP, hyperlipidemia, hypertension, and type 2 diabetes mellitus with associated circulatory disorder (coronary artery disease), but not requiring long-term insulin. He presents to the Emergency Department after experiencing symptoms reminiscent of his previous myocardial infarction. While in a [employer] parking lot today, he felt a sudden rise in his blood pressure and facial flushing. He took nitroglycerin, which alleviated his symptoms. The patient associated these feelings with a similar episode he had years back when he had a myocardial infarction, prompting his visit to the ER. At present, he denies fever, chills, chest pain, shortness of breath, leg swelling, and other associated symptoms.     On arrival, his initial vital signs revealed a blood pressure of 189/99, which subsequently trended to 143/78, 140/76, and 165/72. The rest of his vital signs remained within normal parameters. Laboratory findings indicated hyperglycemia and glucosuria, though his troponin levels were negative twice. A chest X-ray was unremarkable. His electrocardiogram demonstrated a rate of 94 BPM with a normal sinus rhythm and revealed an old inferior infarct with a possible anterolateral infarct, though no ST-elevation myocardial infarction was identified. During his stay in the ER, he received medication including clonidine 0.1 mg, acetaminophen 1,000 mg, aspirin 325 mg, and a 1-inch topical application of 2% nitroglycerin ointment.    10.7.2023  On my interview he states that he has had waxing waning chest pain since yesterday however right now he feels well since his last episode of chest pain in the emergency room last night.    His troponin has started to rise.    He has history of remote  stents to his LAD.    He follows with Dr. GARCIA as outpatient.  Last seen however was 1 year ago.  With notable noncompliance with diet.    10.8.2023  Troponin peaked at 3.5.  Chest pain-free.    On IV heparin .  P.o. Imdur.    10/9/23-Patient seen and examined today, resting in bed. Feels well. No AEON. No recurrent CP. Labs stable. C today by Dr. Lyles.  10/8

## 2023-10-08 NOTE — ASSESSMENT & PLAN NOTE
Upward trend of troponin. Peak 3.129.   Heparin infusing  Continue vasodilator, BB, STATIN, and ASA  Hx of PCI by Dr. Lyles several years ago.    10/8/23  Troponin trending down   Continue med management as above  Plans for [facility] in AM

## 2023-10-09 VITALS
WEIGHT: 288.38 LBS | HEART RATE: 66 BPM | DIASTOLIC BLOOD PRESSURE: 82 MMHG | RESPIRATION RATE: 18 BRPM | HEIGHT: 74 IN | OXYGEN SATURATION: 94 % | SYSTOLIC BLOOD PRESSURE: 112 MMHG | TEMPERATURE: 98 F | BODY MASS INDEX: 37.01 KG/M2

## 2023-10-09 LAB
ANION GAP SERPL CALC-SCNC: 13 MMOL/L (ref 8–16)
APTT PPP: 51 SEC (ref 21–32)
BASOPHILS # BLD AUTO: 0.06 K/UL (ref 0–0.2)
BASOPHILS NFR BLD: 0.7 % (ref 0–1.9)
BUN SERPL-MCNC: 21 MG/DL (ref 8–23)
CALCIUM SERPL-MCNC: 9 MG/DL (ref 8.7–10.5)
CATH EF QUANTITATIVE: 60 %
CHLORIDE SERPL-SCNC: 102 MMOL/L (ref 95–110)
CO2 SERPL-SCNC: 21 MMOL/L (ref 23–29)
CREAT SERPL-MCNC: 1.2 MG/DL (ref 0.5–1.4)
DIFFERENTIAL METHOD: ABNORMAL
EOSINOPHIL # BLD AUTO: 0.1 K/UL (ref 0–0.5)
EOSINOPHIL NFR BLD: 1.1 % (ref 0–8)
ERYTHROCYTE [DISTWIDTH] IN BLOOD BY AUTOMATED COUNT: 11.9 % (ref 11.5–14.5)
EST. GFR  (NO RACE VARIABLE): >60 ML/MIN/1.73 M^2
GLUCOSE SERPL-MCNC: 196 MG/DL (ref 70–110)
HCT VFR BLD AUTO: 47.6 % (ref 40–54)
HGB BLD-MCNC: 15.8 G/DL (ref 14–18)
IMM GRANULOCYTES # BLD AUTO: 0.14 K/UL (ref 0–0.04)
IMM GRANULOCYTES NFR BLD AUTO: 1.7 % (ref 0–0.5)
LYMPHOCYTES # BLD AUTO: 2.8 K/UL (ref 1–4.8)
LYMPHOCYTES NFR BLD: 33.5 % (ref 18–48)
MAGNESIUM SERPL-MCNC: 1.9 MG/DL (ref 1.6–2.6)
MCH RBC QN AUTO: 31.5 PG (ref 27–31)
MCHC RBC AUTO-ENTMCNC: 33.2 G/DL (ref 32–36)
MCV RBC AUTO: 95 FL (ref 82–98)
MONOCYTES # BLD AUTO: 0.9 K/UL (ref 0.3–1)
MONOCYTES NFR BLD: 10.9 % (ref 4–15)
NEUTROPHILS # BLD AUTO: 4.4 K/UL (ref 1.8–7.7)
NEUTROPHILS NFR BLD: 52.1 % (ref 38–73)
NRBC BLD-RTO: 0 /100 WBC
PLATELET # BLD AUTO: 250 K/UL (ref 150–450)
PMV BLD AUTO: 10.5 FL (ref 9.2–12.9)
POC ACTIVATED CLOTTING TIME K: 251 SEC (ref 74–137)
POCT GLUCOSE: 176 MG/DL (ref 70–110)
POCT GLUCOSE: 180 MG/DL (ref 70–110)
POCT GLUCOSE: 193 MG/DL (ref 70–110)
POTASSIUM SERPL-SCNC: 4 MMOL/L (ref 3.5–5.1)
RBC # BLD AUTO: 5.01 M/UL (ref 4.6–6.2)
SAMPLE: ABNORMAL
SODIUM SERPL-SCNC: 136 MMOL/L (ref 136–145)
WBC # BLD AUTO: 8.43 K/UL (ref 3.9–12.7)

## 2023-10-09 PROCEDURE — C1769 GUIDE WIRE: HCPCS | Performed by: INTERNAL MEDICINE

## 2023-10-09 PROCEDURE — 63600175 PHARM REV CODE 636 W HCPCS: Performed by: INTERNAL MEDICINE

## 2023-10-09 PROCEDURE — 99232 SBSQ HOSP IP/OBS MODERATE 35: CPT | Mod: 25,,, | Performed by: PHYSICIAN ASSISTANT

## 2023-10-09 PROCEDURE — 93571 IV DOP VEL&/PRESS C FLO 1ST: CPT | Mod: LD | Performed by: INTERNAL MEDICINE

## 2023-10-09 PROCEDURE — 36415 COLL VENOUS BLD VENIPUNCTURE: CPT | Performed by: PHYSICIAN ASSISTANT

## 2023-10-09 PROCEDURE — 27201423 OPTIME MED/SURG SUP & DEVICES STERILE SUPPLY: Performed by: INTERNAL MEDICINE

## 2023-10-09 PROCEDURE — 93458 PR CATH PLACE/CORON ANGIO, IMG SUPER/INTERP,W LEFT HEART VENTRICULOGRAPHY: ICD-10-PCS | Mod: 26,,, | Performed by: INTERNAL MEDICINE

## 2023-10-09 PROCEDURE — 99152 MOD SED SAME PHYS/QHP 5/>YRS: CPT | Mod: ,,, | Performed by: INTERNAL MEDICINE

## 2023-10-09 PROCEDURE — 25000003 PHARM REV CODE 250: Performed by: STUDENT IN AN ORGANIZED HEALTH CARE EDUCATION/TRAINING PROGRAM

## 2023-10-09 PROCEDURE — 99153 MOD SED SAME PHYS/QHP EA: CPT | Performed by: INTERNAL MEDICINE

## 2023-10-09 PROCEDURE — C1894 INTRO/SHEATH, NON-LASER: HCPCS | Performed by: INTERNAL MEDICINE

## 2023-10-09 PROCEDURE — 99152 MOD SED SAME PHYS/QHP 5/>YRS: CPT | Performed by: INTERNAL MEDICINE

## 2023-10-09 PROCEDURE — 85730 THROMBOPLASTIN TIME PARTIAL: CPT | Performed by: HOSPITALIST

## 2023-10-09 PROCEDURE — 93005 ELECTROCARDIOGRAM TRACING: CPT

## 2023-10-09 PROCEDURE — 93458 L HRT ARTERY/VENTRICLE ANGIO: CPT | Performed by: INTERNAL MEDICINE

## 2023-10-09 PROCEDURE — 83735 ASSAY OF MAGNESIUM: CPT | Performed by: STUDENT IN AN ORGANIZED HEALTH CARE EDUCATION/TRAINING PROGRAM

## 2023-10-09 PROCEDURE — 93010 EKG 12-LEAD: ICD-10-PCS | Mod: ,,, | Performed by: INTERNAL MEDICINE

## 2023-10-09 PROCEDURE — C1887 CATHETER, GUIDING: HCPCS | Performed by: INTERNAL MEDICINE

## 2023-10-09 PROCEDURE — 63600175 PHARM REV CODE 636 W HCPCS

## 2023-10-09 PROCEDURE — 85025 COMPLETE CBC W/AUTO DIFF WBC: CPT | Performed by: INTERNAL MEDICINE

## 2023-10-09 PROCEDURE — 99152 PR MOD CONSCIOUS SEDATION, SAME PHYS, 5+ YRS, FIRST 15 MIN: ICD-10-PCS | Mod: ,,, | Performed by: INTERNAL MEDICINE

## 2023-10-09 PROCEDURE — 25500020 PHARM REV CODE 255: Performed by: INTERNAL MEDICINE

## 2023-10-09 PROCEDURE — 25000003 PHARM REV CODE 250: Performed by: INTERNAL MEDICINE

## 2023-10-09 PROCEDURE — 93571 PR HEART FLOW RESERV MEASURE,INIT VESSL: ICD-10-PCS | Mod: 26,LD,, | Performed by: INTERNAL MEDICINE

## 2023-10-09 PROCEDURE — 93458 L HRT ARTERY/VENTRICLE ANGIO: CPT | Mod: 26,,, | Performed by: INTERNAL MEDICINE

## 2023-10-09 PROCEDURE — 93571 IV DOP VEL&/PRESS C FLO 1ST: CPT | Mod: 26,LD,, | Performed by: INTERNAL MEDICINE

## 2023-10-09 PROCEDURE — 93010 ELECTROCARDIOGRAM REPORT: CPT | Mod: ,,, | Performed by: INTERNAL MEDICINE

## 2023-10-09 PROCEDURE — 25000003 PHARM REV CODE 250: Performed by: PHYSICIAN ASSISTANT

## 2023-10-09 PROCEDURE — 99232 PR SUBSEQUENT HOSPITAL CARE,LEVL II: ICD-10-PCS | Mod: 25,,, | Performed by: PHYSICIAN ASSISTANT

## 2023-10-09 PROCEDURE — 80048 BASIC METABOLIC PNL TOTAL CA: CPT | Performed by: PHYSICIAN ASSISTANT

## 2023-10-09 PROCEDURE — 36415 COLL VENOUS BLD VENIPUNCTURE: CPT | Performed by: STUDENT IN AN ORGANIZED HEALTH CARE EDUCATION/TRAINING PROGRAM

## 2023-10-09 RX ORDER — SODIUM CHLORIDE 9 MG/ML
INJECTION, SOLUTION INTRAVENOUS CONTINUOUS
Status: ACTIVE | OUTPATIENT
Start: 2023-10-09 | End: 2023-10-09

## 2023-10-09 RX ORDER — HEPARIN SODIUM 1000 [USP'U]/ML
INJECTION, SOLUTION INTRAVENOUS; SUBCUTANEOUS
Status: DISCONTINUED | OUTPATIENT
Start: 2023-10-09 | End: 2023-10-09 | Stop reason: HOSPADM

## 2023-10-09 RX ORDER — MIDAZOLAM HYDROCHLORIDE 1 MG/ML
INJECTION, SOLUTION INTRAMUSCULAR; INTRAVENOUS
Status: DISCONTINUED | OUTPATIENT
Start: 2023-10-09 | End: 2023-10-09 | Stop reason: HOSPADM

## 2023-10-09 RX ORDER — VERAPAMIL HYDROCHLORIDE 2.5 MG/ML
INJECTION, SOLUTION INTRAVENOUS
Status: DISCONTINUED | OUTPATIENT
Start: 2023-10-09 | End: 2023-10-09 | Stop reason: HOSPADM

## 2023-10-09 RX ORDER — ADENOSINE 3 MG/ML
INJECTION, SOLUTION INTRAVENOUS
Status: DISCONTINUED | OUTPATIENT
Start: 2023-10-09 | End: 2023-10-09 | Stop reason: HOSPADM

## 2023-10-09 RX ORDER — FENTANYL CITRATE 50 UG/ML
INJECTION, SOLUTION INTRAMUSCULAR; INTRAVENOUS
Status: DISCONTINUED | OUTPATIENT
Start: 2023-10-09 | End: 2023-10-09 | Stop reason: HOSPADM

## 2023-10-09 RX ORDER — ACETAMINOPHEN 325 MG/1
650 TABLET ORAL EVERY 4 HOURS PRN
Status: DISCONTINUED | OUTPATIENT
Start: 2023-10-09 | End: 2023-10-09 | Stop reason: HOSPADM

## 2023-10-09 RX ORDER — DIPHENHYDRAMINE HYDROCHLORIDE 50 MG/ML
INJECTION INTRAMUSCULAR; INTRAVENOUS
Status: DISCONTINUED | OUTPATIENT
Start: 2023-10-09 | End: 2023-10-09 | Stop reason: HOSPADM

## 2023-10-09 RX ORDER — SODIUM CHLORIDE 9 MG/ML
INJECTION, SOLUTION INTRAVENOUS CONTINUOUS
Status: DISCONTINUED | OUTPATIENT
Start: 2023-10-09 | End: 2023-10-09

## 2023-10-09 RX ORDER — NITROGLYCERIN 5 MG/ML
INJECTION, SOLUTION INTRAVENOUS
Status: DISCONTINUED | OUTPATIENT
Start: 2023-10-09 | End: 2023-10-09 | Stop reason: HOSPADM

## 2023-10-09 RX ORDER — ONDANSETRON 8 MG/1
8 TABLET, ORALLY DISINTEGRATING ORAL EVERY 8 HOURS PRN
Status: DISCONTINUED | OUTPATIENT
Start: 2023-10-09 | End: 2023-10-09 | Stop reason: HOSPADM

## 2023-10-09 RX ORDER — LIDOCAINE HYDROCHLORIDE 20 MG/ML
INJECTION, SOLUTION INFILTRATION; PERINEURAL
Status: DISCONTINUED | OUTPATIENT
Start: 2023-10-09 | End: 2023-10-09 | Stop reason: HOSPADM

## 2023-10-09 RX ADMIN — ASPIRIN 81 MG: 81 TABLET, COATED ORAL at 09:10

## 2023-10-09 RX ADMIN — SODIUM CHLORIDE: 9 INJECTION, SOLUTION INTRAVENOUS at 09:10

## 2023-10-09 RX ADMIN — HYDROCHLOROTHIAZIDE 25 MG: 25 TABLET ORAL at 09:10

## 2023-10-09 RX ADMIN — METOPROLOL TARTRATE 50 MG: 50 TABLET, FILM COATED ORAL at 09:10

## 2023-10-09 RX ADMIN — AMLODIPINE BESYLATE 10 MG: 10 TABLET ORAL at 09:10

## 2023-10-09 RX ADMIN — ISOSORBIDE MONONITRATE 30 MG: 30 TABLET, EXTENDED RELEASE ORAL at 09:10

## 2023-10-09 RX ADMIN — HEPARIN SODIUM 16 UNITS/KG/HR: 10000 INJECTION, SOLUTION INTRAVENOUS at 05:10

## 2023-10-09 RX ADMIN — VALSARTAN 320 MG: 160 TABLET, FILM COATED ORAL at 09:10

## 2023-10-09 RX ADMIN — RANOLAZINE 1000 MG: 500 TABLET, EXTENDED RELEASE ORAL at 09:10

## 2023-10-09 NOTE — PLAN OF CARE
[facility] - Telemetry (Hospital)  Initial Discharge Assessment       Primary Care Provider: LACI Dow MD    Admission Diagnosis: HTN (hypertension) [I10]  Non-ST elevation myocardial infarction (NSTEMI) [I21.4]  Chest pain [R07.9]  NSTEMI (non-ST elevated myocardial infarction) [I21.4]    Admission Date: 10/6/2023  Expected Discharge Date: per attending         Payor: BLUE CROSS BLUE SHIELD / Plan: [place] / Product Type: HMO /     Extended Emergency Contact Information  Primary Emergency Contact: Turner Harrisona  Address: [address] [place] [address] [address]  Home Phone: [phone]  Mobile Phone: [phone]  Relation: Spouse    Discharge Plan A: Home with family         [facility] [place] [address] [place] - [facility] [facility]  [address] [facility] [address]  [place] [address]  Phone: [phone] Fax: [phone]    [facility]-[address] [place] - [address]  [address]  [place] [address]  Phone: [phone] Fax: [phone]    [facility] [address] [place] - [facility]  [address] [facility]  [place] [address]  Phone: [phone] Fax: [phone]    [id number] - [place] - [address] AT [facility]  [address]  [place] [address]  Phone: [phone] Fax: [phone]    [facility] - [place], [place] - [facility] [facility]  [address] [facility] [address]  Phone: [phone] Fax: [phone]      Initial Assessment (most recent)       Adult Discharge Assessment - 10/09/23 1109          Discharge Assessment    Assessment Type Discharge Planning Assessment     Confirmed/corrected address, phone number and insurance Yes     Confirmed Demographics Correct on Facesheet     Source of Information patient     When was your last doctors appointment? --   6 months     Communicated RAVIN with patient/caregiver Date not available/Unable to determine     Reason For Admission htn     People in Home spouse     Do you expect to return to your current living situation? Yes     Do you have help at home or someone to help you manage your care at home? Yes     Who are your caregiver(s) and their phone number(s)? spouse     Prior to hospitilization cognitive status: Alert/Oriented     Current cognitive status: Alert/Oriented     Equipment Currently Used at Home CPAP     Readmission within 30 days? No     Patient currently being followed by outpatient case management? No     Do you currently have service(s) that help you manage your care at home? No     Do you take prescription medications? Yes     Do you have prescription coverage? Yes     Coverage bcbs     Do you have any problems affording any of your prescribed medications? No     Is the patient taking medications as prescribed? yes     Who is going to help you get home at discharge? spouse     How do you get to doctors appointments? car, drives self;family or friend will provide     Are you on dialysis? No     Do you take coumadin? No     Discharge Plan A Home with family

## 2023-10-09 NOTE — INTERVAL H&P NOTE
The patient has been examined and the H&P has been reviewed:    I concur with the findings and no changes have occurred since H&P was written.    Procedure risks, benefits and alternative options discussed and understood by patient/family.          Active Hospital Problems    Diagnosis  POA    *NSTEMI (non-ST elevated myocardial infarction) [I21.4]  Yes    Chest pain [R07.9]  Yes    Essential hypertension [I10]  Yes     Chronic    Type 2 diabetes mellitus with circulatory disorder (coronary artery disease), with long-term current use of insulin [E11.59, Z79.4]  Not Applicable     Chronic    DANTE on CPAP [G47.33]  Yes     PSG 2015 mild obstructive sleep apnea AHI 10.0.  On CPAP 9.5 cm with Full face mask.   AHI 0.5   HME: [facility]       Coronary artery disease involving native coronary artery of native heart without angina pectoris [I25.10]  Yes     Chronic    S/P PTCA (percutaneous transluminal coronary angioplasty) [Z98.61]  Not Applicable    Hyperlipidemia associated with type 2 diabetes mellitus [E11.69, E78.5]  Yes     Chronic      Resolved Hospital Problems   No resolved problems to display.

## 2023-10-09 NOTE — ASSESSMENT & PLAN NOTE
Ordered IV heparin.  Continue with nitroglycerin  Ordered echocardiogram.    Currently angina free.  Continue with medical treatment for now.  Check BNP  Possible catheterization on Monday unless symptoms get uncontrolled.    10.8.2023  Continue with IV heparin.  Monitor PTT.    NPO after midnight catheterization in a.m. with Dr. Lyles    10/9/23  -Stable overnight, no recurrent CP  -Continue OMT-ASA, amlodipine, BB, statin, Imdur, heparin gtt  -LHC today by Dr. Lyles. All risks, benefits and treatment alternatives explained to patient in detail. All questions answered. He has agreed to proceed.

## 2023-10-09 NOTE — SUBJECTIVE & OBJECTIVE
Review of Systems   Constitutional: Negative.   HENT: Negative.     Eyes: Negative.    Cardiovascular: Negative.    Respiratory: Negative.     Endocrine: Negative.    Skin: Negative.    Musculoskeletal: Negative.    Gastrointestinal: Negative.    Genitourinary: Negative.    Neurological: Negative.    Psychiatric/Behavioral: Negative.     Allergic/Immunologic: Negative.      Objective:     Vital Signs (Most Recent):  Temp: 98.1 °F (36.7 °C) (10/09/23 1154)  Pulse: 61 (10/09/23 1154)  Resp: 18 (10/09/23 1154)  BP: (!) 99/57 (10/09/23 1154)  SpO2: 95 % (10/09/23 1154) Vital Signs (24h Range):  Temp:  [97.3 °F (36.3 °C)-98.4 °F (36.9 °C)] 98.1 °F (36.7 °C)  Pulse:  [61-73] 61  Resp:  [16-18] 18  SpO2:  [95 %-97 %] 95 %  BP: ()/(57-76) 99/57     Weight: 130.8 kg (288 lb 5.8 oz)  Body mass index is 37.02 kg/m².     SpO2: 95 %       No intake or output data in the 24 hours ending 10/09/23 1311    Lines/Drains/Airways       Peripheral Intravenous Line  Duration                  Peripheral IV - Single Lumen 10/08/23 0015 22 G Left;Posterior Hand 1 day                       Physical Exam  Vitals and nursing note reviewed.   Constitutional:       General: He is not in acute distress.     Appearance: Normal appearance. He is well-developed. He is not diaphoretic.   HENT:      Head: Normocephalic and atraumatic.   Eyes:      General:         Right eye: No discharge.         Left eye: No discharge.      Pupils: Pupils are equal, round, and reactive to light.   Neck:      Thyroid: No thyromegaly.      Vascular: No JVD.      Trachea: No tracheal deviation.   Cardiovascular:      Rate and Rhythm: Normal rate and regular rhythm.      Heart sounds: Normal heart sounds, S1 normal and S2 normal. No murmur heard.  Pulmonary:      Effort: Pulmonary effort is normal. No respiratory distress.      Breath sounds: Normal breath sounds. No wheezing or rales.   Abdominal:      General: There is no distension.      Tenderness: There is  no rebound.   Musculoskeletal:      Cervical back: Neck supple.      Right lower leg: No edema.      Left lower leg: No edema.   Skin:     General: Skin is warm and dry.      Findings: No erythema.   Neurological:      Mental Status: He is alert and oriented to person, place, and time.   Psychiatric:         Mood and Affect: Mood normal.         Behavior: Behavior normal.         Thought Content: Thought content normal.            Significant Labs:   CMP   Recent Labs   Lab 10/09/23  0520      K 4.0      CO2 21*   *   BUN 21   CREATININE 1.2   CALCIUM 9.0   ANIONGAP 13   , CBC   Recent Labs   Lab 10/08/23  0419 10/09/23  0521   WBC 8.30 8.43   HGB 15.5 15.8   HCT 46.4 47.6    250   , Troponin   Recent Labs   Lab 10/07/23  1525   TROPONINI 2.429*   , and All pertinent lab results from the last 24 hours have been reviewed.  Significant Imaging: Echocardiogram: Transthoracic echo (TTE) complete (Cupid Only):   Results for orders placed or performed during the hospital encounter of 10/06/23   Echo   Result Value Ref Range    BSA 2.64 m2    LVIDd 4.56 3.5 - 6.0 cm    LV Systolic Volume 24.51 mL    LV Systolic Volume Index 9.6 mL/m2    LVIDs 2.60 2.1 - 4.0 cm    LV Diastolic Volume 95.45 mL    LV Diastolic Volume Index 37.29 mL/m2    IVS 1.05 0.6 - 1.1 cm    FS 43 28 - 44 %    Left Ventricle Relative Wall Thickness 0.52 cm    Posterior Wall 1.18 (A) 0.6 - 1.1 cm    LV mass 182.17 g    LV Mass Index 71 g/m2    MV Peak E Brandon 0.68 m/s    TDI LATERAL 0.11 m/s    TDI SEPTAL 0.08 m/s    E/E' ratio 7.16 m/s    MV Peak A Brandon 0.58 m/s    E/A ratio 1.17     IVRT 99.90 msec    E wave deceleration time 205.75 msec    LV SEPTAL E/E' RATIO 8.50 m/s    LV LATERAL E/E' RATIO 6.18 m/s    LVOT peak brandon 0.88 m/s    Left Ventricular Outflow Tract Mean Velocity 0.61 cm/s    Left Ventricular Outflow Tract Mean Gradient 1.72 mmHg    LA size 3.91 cm    Left Atrium Minor Axis 4.95 cm    Left Atrium Major Axis 5.71 cm     LA volume (mod) 56.17 cm3    LA Volume Index (Mod) 21.9 mL/m2    RVDD 3.38 cm    RVOT peak VTI 15.7 cm    TAPSE 2.34 cm    RA Major Axis 4.89 cm    AV mean gradient 2 mmHg    AV peak gradient 4 mmHg    Ao peak karol 1.03 m/s    Ao VTI 24.60 cm    LVOT peak VTI 19.40 cm    AV Velocity Ratio 0.85     AV index (prosthetic) 0.79     PV mean gradient 1 mmHg    PV PEAK VELOCITY 0.86 m/s    PV peak gradient 3 mmHg    Pulmonary Valve Mean Velocity 0.65 m/s    RVOT peak karol 0.71 m/s    Ao root annulus 3.44 cm    Mean e' 0.10 m/s    ZLVIDS -10.39     ZLVIDD -12.81     LA Volume Index 29.6 mL/m2    LA volume 75.78 cm3    LA WIDTH 4.3 cm    RA Width 3.6 cm    Est. RA pres 8 mmHg    Narrative      Left Ventricle: The left ventricle is normal in size. Normal wall   thickness. There is mild concentric hypertrophy. Normal wall motion. There   is low normal systolic function with a visually estimated ejection   fraction of 50 - 55%. There is normal diastolic function.    Right Ventricle: Normal right ventricular cavity size. Wall thickness   is normal. Right ventricle wall motion  is normal. Systolic function is   normal.    Tricuspid Valve: There is mild regurgitation.    IVC/SVC: Intermediate venous pressure at 8 mmHg.     , EKG: reviewed, and X-Ray: CXR: X-Ray Chest 1 View (CXR): No results found for this visit on 10/06/23. and X-Ray Chest PA and Lateral (CXR): No results found for this visit on 10/06/23.

## 2023-10-09 NOTE — TELEPHONE ENCOUNTER
Refill Routing Note     Refill Routing Note   Medication(s) are not appropriate for processing by [facility] for the following reason(s):      Patient seen in ED/Hospital since LOV with provider - patient currently admitted    [facility] action(s):  Defer Care Due:  None identified     Medication Therapy Plan: patient currently admitted; deferred to pcp      Appointments  past 12m or future 3m with PCP    Date Provider   Last Visit   4/27/2023 LACI Dow MD   Next Visit   Visit date not found LACI Dow MD   ED visits in past 90 days: 0        Note composed:8:44 AM 10/09/2023

## 2023-10-09 NOTE — NURSING TRANSFER
Nursing Transfer Note      10/9/2023   1450    Transferred to room 233 on telemetry w/monitor in place per hospital bed accompanied by RN X 2.    S/P Heart cath   No fix needed  NS at 100/hr till 1600.    A & O X 3  cooperative/communicative  See flowsheet for 1445 vital signs.    L wrist w/hand splint in place and site of old TR Band c,d,I.

## 2023-10-09 NOTE — PROGRESS NOTES
[facility] ([facility])  Cardiology  Progress Note    Patient Name: Isaiah Harrison  MRN: [id number]  Admission Date: 10/6/2023  Hospital Length of Stay: 2 days  Code Status: Full Code   Attending Physician: Teressa Lowe MD   Primary Care Physician: LACI Dow MD  Expected Discharge Date:   Principal Problem:NSTEMI (non-ST elevated myocardial infarction)    Subjective:     Hospital Course:   68-year-old gentleman with a past medical history significant for coronary artery disease, history of myocardial infarction with stent placement, prior percutaneous transluminal coronary angioplasty, obesity, obstructive sleep apnea requiring CPAP, hyperlipidemia, hypertension, and type 2 diabetes mellitus with associated circulatory disorder (coronary artery disease), but not requiring long-term insulin. He presents to the Emergency Department after experiencing symptoms reminiscent of his previous myocardial infarction. While in a [employer] parking lot today, he felt a sudden rise in his blood pressure and facial flushing. He took nitroglycerin, which alleviated his symptoms. The patient associated these feelings with a similar episode he had years back when he had a myocardial infarction, prompting his visit to the ER. At present, he denies fever, chills, chest pain, shortness of breath, leg swelling, and other associated symptoms.     On arrival, his initial vital signs revealed a blood pressure of 189/99, which subsequently trended to 143/78, 140/76, and 165/72. The rest of his vital signs remained within normal parameters. Laboratory findings indicated hyperglycemia and glucosuria, though his troponin levels were negative twice. A chest X-ray was unremarkable. His electrocardiogram demonstrated a rate of 94 BPM with a normal sinus rhythm and revealed an old inferior infarct with a possible anterolateral infarct, though no ST-elevation myocardial infarction was identified. During his stay in the ER, he  received medication including clonidine 0.1 mg, acetaminophen 1,000 mg, aspirin 325 mg, and a 1-inch topical application of 2% nitroglycerin ointment.    10.7.2023  On my interview he states that he has had waxing waning chest pain since yesterday however right now he feels well since his last episode of chest pain in the emergency room last night.    His troponin has started to rise.    He has history of remote stents to his LAD.    He follows with Dr. GARCIA as outpatient.  Last seen however was 1 year ago.  With notable noncompliance with diet.    10.8.2023  Troponin peaked at 3.5.  Chest pain-free.    On IV heparin .  P.o. Imdur.    10/9/23-Patient seen and examined today, resting in bed. Feels well. No AEON. No recurrent CP. Labs stable. LHC today by Dr. Lyles.  10/8          Review of Systems   Constitutional: Negative.   HENT: Negative.     Eyes: Negative.    Cardiovascular: Negative.    Respiratory: Negative.     Endocrine: Negative.    Skin: Negative.    Musculoskeletal: Negative.    Gastrointestinal: Negative.    Genitourinary: Negative.    Neurological: Negative.    Psychiatric/Behavioral: Negative.     Allergic/Immunologic: Negative.      Objective:     Vital Signs (Most Recent):  Temp: 98.1 °F (36.7 °C) (10/09/23 1154)  Pulse: 61 (10/09/23 1154)  Resp: 18 (10/09/23 1154)  BP: (!) 99/57 (10/09/23 1154)  SpO2: 95 % (10/09/23 1154) Vital Signs (24h Range):  Temp:  [97.3 °F (36.3 °C)-98.4 °F (36.9 °C)] 98.1 °F (36.7 °C)  Pulse:  [61-73] 61  Resp:  [16-18] 18  SpO2:  [95 %-97 %] 95 %  BP: ()/(57-76) 99/57     Weight: 130.8 kg (288 lb 5.8 oz)  Body mass index is 37.02 kg/m².     SpO2: 95 %       No intake or output data in the 24 hours ending 10/09/23 1311    Lines/Drains/Airways       Peripheral Intravenous Line  Duration                  Peripheral IV - Single Lumen 10/08/23 0015 22 G Left;Posterior Hand 1 day                       Physical Exam  Vitals and nursing note reviewed.   Constitutional:        General: He is not in acute distress.     Appearance: Normal appearance. He is well-developed. He is not diaphoretic.   HENT:      Head: Normocephalic and atraumatic.   Eyes:      General:         Right eye: No discharge.         Left eye: No discharge.      Pupils: Pupils are equal, round, and reactive to light.   Neck:      Thyroid: No thyromegaly.      Vascular: No JVD.      Trachea: No tracheal deviation.   Cardiovascular:      Rate and Rhythm: Normal rate and regular rhythm.      Heart sounds: Normal heart sounds, S1 normal and S2 normal. No murmur heard.  Pulmonary:      Effort: Pulmonary effort is normal. No respiratory distress.      Breath sounds: Normal breath sounds. No wheezing or rales.   Abdominal:      General: There is no distension.      Tenderness: There is no rebound.   Musculoskeletal:      Cervical back: Neck supple.      Right lower leg: No edema.      Left lower leg: No edema.   Skin:     General: Skin is warm and dry.      Findings: No erythema.   Neurological:      Mental Status: He is alert and oriented to person, place, and time.   Psychiatric:         Mood and Affect: Mood normal.         Behavior: Behavior normal.         Thought Content: Thought content normal.            Significant Labs:   CMP   Recent Labs   Lab 10/09/23  0520      K 4.0      CO2 21*   *   BUN 21   CREATININE 1.2   CALCIUM 9.0   ANIONGAP 13   , CBC   Recent Labs   Lab 10/08/23  0419 10/09/23  0521   WBC 8.30 8.43   HGB 15.5 15.8   HCT 46.4 47.6    250   , Troponin   Recent Labs   Lab 10/07/23  1525   TROPONINI 2.429*   , and All pertinent lab results from the last 24 hours have been reviewed.  Significant Imaging: Echocardiogram: Transthoracic echo (TTE) complete (Cupid Only):   Results for orders placed or performed during the hospital encounter of 10/06/23   Echo   Result Value Ref Range    BSA 2.64 m2    LVIDd 4.56 3.5 - 6.0 cm    LV Systolic Volume 24.51 mL    LV Systolic Volume Index  9.6 mL/m2    LVIDs 2.60 2.1 - 4.0 cm    LV Diastolic Volume 95.45 mL    LV Diastolic Volume Index 37.29 mL/m2    IVS 1.05 0.6 - 1.1 cm    FS 43 28 - 44 %    Left Ventricle Relative Wall Thickness 0.52 cm    Posterior Wall 1.18 (A) 0.6 - 1.1 cm    LV mass 182.17 g    LV Mass Index 71 g/m2    MV Peak E Brandon 0.68 m/s    TDI LATERAL 0.11 m/s    TDI SEPTAL 0.08 m/s    E/E' ratio 7.16 m/s    MV Peak A Brandon 0.58 m/s    E/A ratio 1.17     IVRT 99.90 msec    E wave deceleration time 205.75 msec    LV SEPTAL E/E' RATIO 8.50 m/s    LV LATERAL E/E' RATIO 6.18 m/s    LVOT peak brandon 0.88 m/s    Left Ventricular Outflow Tract Mean Velocity 0.61 cm/s    Left Ventricular Outflow Tract Mean Gradient 1.72 mmHg    LA size 3.91 cm    Left Atrium Minor Axis 4.95 cm    Left Atrium Major Axis 5.71 cm    LA volume (mod) 56.17 cm3    LA Volume Index (Mod) 21.9 mL/m2    RVDD 3.38 cm    RVOT peak VTI 15.7 cm    TAPSE 2.34 cm    RA Major Axis 4.89 cm    AV mean gradient 2 mmHg    AV peak gradient 4 mmHg    Ao peak brandon 1.03 m/s    Ao VTI 24.60 cm    LVOT peak VTI 19.40 cm    AV Velocity Ratio 0.85     AV index (prosthetic) 0.79     PV mean gradient 1 mmHg    PV PEAK VELOCITY 0.86 m/s    PV peak gradient 3 mmHg    Pulmonary Valve Mean Velocity 0.65 m/s    RVOT peak brandon 0.71 m/s    Ao root annulus 3.44 cm    Mean e' 0.10 m/s    ZLVIDS -10.39     ZLVIDD -12.81     LA Volume Index 29.6 mL/m2    LA volume 75.78 cm3    LA WIDTH 4.3 cm    RA Width 3.6 cm    Est. RA pres 8 mmHg    Narrative      Left Ventricle: The left ventricle is normal in size. Normal wall   thickness. There is mild concentric hypertrophy. Normal wall motion. There   is low normal systolic function with a visually estimated ejection   fraction of 50 - 55%. There is normal diastolic function.    Right Ventricle: Normal right ventricular cavity size. Wall thickness   is normal. Right ventricle wall motion  is normal. Systolic function is   normal.    Tricuspid Valve: There is mild  regurgitation.    IVC/SVC: Intermediate venous pressure at 8 mmHg.     , EKG: reviewed, and X-Ray: CXR: X-Ray Chest 1 View (CXR): No results found for this visit on 10/06/23. and X-Ray Chest PA and Lateral (CXR): No results found for this visit on 10/06/23.    Assessment and Plan:   Patient who presents with CP/NSTEMI. Stable overnight. LHC today, further rec's to follow.    * NSTEMI (non-ST elevated myocardial infarction)  Ordered IV heparin.  Continue with nitroglycerin  Ordered echocardiogram.    Currently angina free.  Continue with medical treatment for now.  Check BNP  Possible catheterization on Monday unless symptoms get uncontrolled.    10.8.2023  Continue with IV heparin.  Monitor PTT.    NPO after midnight catheterization in a.m. with Dr. Lyles    10/9/23  -Stable overnight, no recurrent CP  -Continue OMT-ASA, amlodipine, BB, statin, Imdur, heparin gtt  -LHC today by Dr. Lyles. All risks, benefits and treatment alternatives explained to patient in detail. All questions answered. He has agreed to proceed.    Chest pain  See NSTEMI    Type 2 diabetes mellitus with circulatory disorder (coronary artery disease), with long-term current use of insulin  Not controlled.  As per primary team management    S/P PTCA (percutaneous transluminal coronary angioplasty)  Continue with aspirin, statin, Imdur, beta-blocker.      Hyperlipidemia associated with type 2 diabetes mellitus  -Statin        VTE Risk Mitigation (From admission, onward)         Ordered     heparin (porcine) injection  As needed (PRN)         10/09/23 1245     heparin 25,000 units in dextrose 5% (100 units/ml) IV bolus from bag - ADDITIONAL PRN BOLUS - 60 units/kg (max bolus 4000 units)  As needed (PRN)        Question:  Heparin Infusion Adjustment (DO NOT MODIFY ANSWER)  Answer:  [email or web address]\epic\Images\Pharmacy\HeparinInfusions\heparin LOW INTENSITY nomogram for [facility] [id number].pdf    10/07/23 0805     heparin 25,000 units in dextrose 5% (100  units/ml) IV bolus from bag - ADDITIONAL PRN BOLUS - 30 units/kg (max bolus 4000 units)  As needed (PRN)        Question:  Heparin Infusion Adjustment (DO NOT MODIFY ANSWER)  Answer:  \\[email or web address]\epic\Images\Pharmacy\HeparinInfusions\heparin LOW INTENSITY nomogram for [facility] [id number].pdf    10/07/23 0805     heparin 25,000 units in dextrose 5% 250 mL (100 units/mL) infusion LOW INTENSITY nomogram - [facility]  Continuous        Question:  Begin at (units/kg/hr)  Answer:  12    10/07/23 0805     IP VTE HIGH RISK PATIENT  Once         10/06/23 2149     Place sequential compression device  Until discontinued         10/06/23 2149                Colette Hastings PA-C  Cardiology  [facility] ([facility])

## 2023-10-09 NOTE — TELEPHONE ENCOUNTER
Refill request routed to [facility] Review Pool for Pharmacist Review.

## 2023-10-09 NOTE — BRIEF OP NOTE
INPATIENT Operative Note         SUMMARY     Surgery Date: 10/9/2023     Surgeon(s) and Role:     * Kirsty Lyles MD - Primary    ASSISTANT:none    Pre-op Diagnosis:  NSTEMI (non-ST elevated myocardial infarction) [I21.4]  CAD S/P percutaneous coronary angioplasty [I25.10, Z98.61]      Post-op Diagnosis:  NSTEMI (non-ST elevated myocardial infarction) [I21.4]  CAD S/P percutaneous coronary angioplasty [I25.10, Z98.61]    Procedure(s) (LRB):  Left heart cath (Left)    COMPLICATION:none    Anesthesia: RN IV Sedation    Findings/Key Components:  Lad instent 40% otherwise unchanged from 2105  Ifr 0.91 ffr 0.84    Estimated Blood Loss: < 50 ML.         SPECIMEN: NONE    Devices/Prostetics: None    PLAN:   Reassure    R/o afib with embolic event.as outpatient

## 2023-10-09 NOTE — NURSING
1336   Rec'd to RR  NAD   A & O X 3  Cooperative/Communicative  Denies pain  L TR  Band c,d,I  Site benign,

## 2023-10-09 NOTE — NURSING
Discharge instructions received and reviewed with pt and family at bedside.  Pt voiced understanding and all questions answered to satisfaction.  Stressed importance to making and keeping all follow up appointments.  Tele monitor removed and brought to monitor tech.  IV d/c'd with tip intact, pressure dressing applied.  Pt transported to front of hospital via w/c to be discharged home.

## 2023-10-10 ENCOUNTER — PATIENT MESSAGE (OUTPATIENT)
Dept: ADMINISTRATIVE | Facility: CLINIC | Age: 68
End: 2023-10-10
Payer: COMMERCIAL

## 2023-10-10 ENCOUNTER — PES CALL (OUTPATIENT)
Dept: HOME HEALTH SERVICES | Facility: CLINIC | Age: 68
End: 2023-10-10
Payer: COMMERCIAL

## 2023-10-10 ENCOUNTER — PATIENT OUTREACH (OUTPATIENT)
Dept: ADMINISTRATIVE | Facility: CLINIC | Age: 68
End: 2023-10-10
Payer: COMMERCIAL

## 2023-10-10 NOTE — PROGRESS NOTES
C3 nurse attempted to contact Isaiah Harrison for a TCC post hospital discharge follow up call. No answer. Left voicemail with callback information. The patient has a scheduled HOS appointment with Milton Su NP on 10/11/2023 @ 0800.

## 2023-10-11 ENCOUNTER — CLINICAL SUPPORT (OUTPATIENT)
Dept: DIABETES | Facility: CLINIC | Age: 68
End: 2023-10-11
Payer: COMMERCIAL

## 2023-10-11 ENCOUNTER — PATIENT MESSAGE (OUTPATIENT)
Dept: DIABETES | Facility: CLINIC | Age: 68
End: 2023-10-11

## 2023-10-11 DIAGNOSIS — Z79.4 TYPE 2 DIABETES MELLITUS WITH HYPERGLYCEMIA, WITH LONG-TERM CURRENT USE OF INSULIN: Chronic | ICD-10-CM

## 2023-10-11 DIAGNOSIS — E11.65 TYPE 2 DIABETES MELLITUS WITH HYPERGLYCEMIA, WITH LONG-TERM CURRENT USE OF INSULIN: Chronic | ICD-10-CM

## 2023-10-11 DIAGNOSIS — Z79.4 TYPE 2 DIABETES MELLITUS WITH OTHER CIRCULATORY COMPLICATION, WITH LONG-TERM CURRENT USE OF INSULIN: Primary | ICD-10-CM

## 2023-10-11 DIAGNOSIS — E11.59 TYPE 2 DIABETES MELLITUS WITH OTHER CIRCULATORY COMPLICATION, WITH LONG-TERM CURRENT USE OF INSULIN: Primary | ICD-10-CM

## 2023-10-11 PROCEDURE — G0108 DIAB MANAGE TRN  PER INDIV: HCPCS | Mod: S$GLB,,, | Performed by: DIETITIAN, REGISTERED

## 2023-10-11 PROCEDURE — 99999 PR PBB SHADOW E&M-EST. PATIENT-LVL III: ICD-10-PCS | Mod: PBBFAC,,, | Performed by: DIETITIAN, REGISTERED

## 2023-10-11 PROCEDURE — G0108 PR DIAB MANAGE TRN  PER INDIV: ICD-10-PCS | Mod: S$GLB,,, | Performed by: DIETITIAN, REGISTERED

## 2023-10-11 PROCEDURE — 99999 PR PBB SHADOW E&M-EST. PATIENT-LVL III: CPT | Mod: PBBFAC,,, | Performed by: DIETITIAN, REGISTERED

## 2023-10-11 NOTE — PROGRESS NOTES
C3 nurse spoke with Isaiah Harrison for a TCC post hospital discharge follow up call. The patient has a scheduled [facility] appointment with Wilda Phoenix on 10/12/2023 @ 0961.

## 2023-10-11 NOTE — PROGRESS NOTES
Diabetes Care Specialist Progress Note  Author: Ximena Blunt RD, CDE  Date: 10/11/2023    Program Intake  Reason for Diabetes Program Visit:: Initial Diabetes Assessment  Current diabetes risk level:: high  In the last 12 months, have you:: been admitted to a hospital  Was the ER or hospital admission related to diabetes?: No  Permission to speak with others about care:: yes (wife, Gladys)    Lab Results   Component Value Date    HGBA1C 9.1 (H) 10/02/2023       Clinical        Problem Review  Reviewed Problem List with Patient: yes  Active comorbidities affecting diabetes self-care.: yes  Comorbidities: Cardiovascular Disease, Hypertension  Reviewed health maintenance: yes    Clinical Assessment  Current Diabetes Treatment: Oral Medication, Diet, Injectable, Insulin  Have you ever experienced hypoglycemia (low blood sugar)?: no  Have you ever experienced hyperglycemia (high blood sugar)?: no    Labs  Do you have regular lab work to monitor your medications?: Yes  Type of Regular Lab Work: A1c, Cholesterol, Microalbumin, CBC  Where do you get your labs drawn?: [facility]  Lab Compliance Barriers: No    Nutritional Status  Diet: Regular  Meal Plan 24 Hour Recall: Breakfast, Lunch, Dinner  Meal Plan 24 Hour Recall - Breakfast: coffee with sugar free creamer  Meal Plan 24 Hour Recall - Lunch: Baked Catfish, 1/2 cup of potato salad, 1 cup of red beans and rice, coke zero  Meal Plan 24 Hour Recall - Dinner: [employer]: cheeseburger, 10 french fries, coke zero  Change in appetite?: No  Dentation:: Intact  Recent Changes in Weight: No Recent Weight Change  Current nutritional status an area of need that is impacting patient's ability to self-manage diabetes?: No    Additional Social History    Support  Does anyone support you with your diabetes care?: yes  Who supports you?: self, spouse  Who takes you to your medical appointments?: self  Does the current support meet the patient's needs?: Yes  Is Support an area impacting  ability to self-manage diabetes?: No    Access to Mass Media & Technology  Does the patient have access to any of the following devices or technologies?: Smart phone  Media or technology needs impacting ability to self-manage diabetes?: No    Cognitive/Behavioral Health  Alert and Oriented: Yes  Difficulty Thinking: No  Requires Prompting: No  Requires assistance for routine expression?: No  Cognitive or behavioral barriers impacting ability to self-manage diabetes?: No    Culture/Latter day  Culture or Oriental orthodox beliefs that may impact ability to access healthcare: No    Communication  Language preference: English  Hearing Problems: No  Vision Problems: Yes  Vision problem type:: Decreased Vision  Vision Assistance: Glasses  Communication needs impacting ability to self-manage diabetes?: No    Health Literacy  Preferred Learning Method: Face to Face, Reading Materials  How often do you need to have someone help you read instructions, pamphlets, or written material from your doctor or pharmacy?: Never  Health literacy needs impacting ability to self-manage diabetes?: No      Diabetes Self-Management Skills Assessment    Diabetes Disease Process/Treatment Options  Patient/caregiver able to state what happens when someone has diabetes.: yes  Patient/caregiver knows what type of diabetes they have.: yes  Diabetes Type : Type II  Patient/caregiver able to identify at least three signs and symptoms of diabetes.: yes  Identified signs and symptoms:: fatigue, frequent urination, increased thirst  Patient able to identify at least three risk factors for diabetes.: no  Diabetes Disease Process/Treatment Options: Skills Assessment Completed: Yes  Assessment indicates:: Adequate understanding  Area of need?: No    Nutrition/Healthy Eating  Challenges to healthy eating:: portion control  Method of carbohydrate measurement:: carb counting/reading labels  Patient can identify foods that impact blood sugar.: yes  Patient-identified  foods:: sweets, soda  Nutrition/Healthy Eating Skills Assessment Completed:: Yes  Assessment indicates:: Instruction Needed  Area of need?: Yes      Medications  Patient is able to describe current diabetes management routine.: yes  Diabetes management routine:: injectable medications, insulin, oral medications  Patient is able to identify current diabetes medications, dosages, and appropriate timing of medications.: yes (Jardiance 10mg QD // Lantus 40u BID, but only taking QD // Mounjaro 7.5mg q7d // Metformin 500mg, not taking)  Patient understands the purpose of the medications taken for diabetes.: yes  Patient reports problems or concerns with current medication regimen.: yes  Medication regimen problems/concerns:: concerned about side effects  Medication Skills Assessment Completed:: Yes  Assessment indicates:: Instruction Needed  Area of need?: No    Home Blood Glucose Monitoring  Patient states that blood sugar is checked at home daily.: no  Reasons for not monitoring:: other (see comments) (lack of motivation)  Home Blood Glucose Monitoring Skills Assessment Completed: : Yes  Assessment indicates:: Instruction Needed  Area of need?: Yes    Acute Complications  Patient is able to identify types of acute complications: No  Acute Complications Skills Assessment Completed: : Yes  Assessment indicates:: Instruction Needed  Area of need?: No    Chronic Complications  Patient can identify major chronic complications of diabetes.: yes  Stated chronic complications:: kidney disease, neuropathy/nerve damage, heart disease/heart attack  Patient can identify ways to prevent or delay diabetes complications.: yes  Stated ways to prevent complications:: controlling blood sugar  Patient is aware that having diabetes increases risk of heart disease?: Yes  Patient is taking statin?: Yes  Chronic Complications Skills Assessment Completed: : Yes  Assessment indicates:: Adequate understanding  Area of need?: No    Assessment  Summary and Plan    Based on today's diabetes care assessment, the following areas of need were identified:          10/11/2023    12:02 AM   Social   Support No   Access to Mass Media/Tech No   Cognitive/Behavioral Health No   Culture/Protestant No   Communication No   Health Literacy No            10/11/2023    12:02 AM   Clinical   Lab Compliance No   Nutritional Status No            10/11/2023    12:02 AM   Diabetes Self-Management Skills   Diabetes Disease Process/Treatment Options Reviewed pathophysiology of type 2 diabetes, complications related to the disease, importance of annual dilated eye exam, and daily foot exam.     Nutrition/Healthy Eating Yes- see care plan.    Medication Educated on MOA of current diabetes medications.    Home Blood Glucose Monitoring Yes- see care plan.    Acute Complications Reviewed blood glucose goals, prevention, detection, signs and symptoms, and treatment of hypoglycemia and hyperglycemia, and when to contact the clinic.     Chronic Complications Discussed importance of A1c less than 7 to reduce risk of micro and macro complications, including nephropathy, neuropathy, retinopathy, heart attack and stroke. Reviewed the importance of controlled Blood Pressure and Cholesterol Lab Values in preventing disease.   Health maintenance reviewed            Today's interventions were provided through individual discussion, instruction, and written materials were provided.      Patient verbalized understanding of instruction and written materials.  Pt was able to return back demonstration of instructions today. Patient understood key points, needs reinforcement and further instruction.     Diabetes Self-Management Care Plan:    Today's Diabetes Self-Management Care Plan was developed with Isaiah's input. Isaiah has agreed to work toward the following goal(s) to improve his/her overall diabetes control.      Care Plan: Diabetes Management   Updates made since 9/11/2023 12:00 AM        Problem:  Healthy Eating         Goal: Eat 3 meals daily with 45-60g/3-4 servings of Carbohydrate per meal and 15-20 grams per snack    Start Date: 10/11/2023   Expected End Date: 1/11/2024   Priority: High   Barriers: Other (comments)   Note:    Work schedule       Task: Reviewed the sources and role of Carbohydrate, Protein, and Fat and how each nutrient impacts blood sugar. Completed 10/11/2023        Task: Reviewed Blood Sugar Goals after eating and explained how carbohydrate sources, nonstarchy vegetables, protein and fat affects blood sugar control. Completed 10/11/2023        Task: Reviewed sources of Carbohydrate: Starch, Milk, Fruit, Sugar, and nonstarchy vegetables Completed 10/11/2023        Task: Discussed the importance of balancing carbohydrates with each meal using portion control techniques to count carbohydrate grams or servings, label reading to identify servings size and amount of total carbohydrate per serving, the plate method, other Completed 10/11/2023        Task: Provided visual examples using dry measuring cups, food models, and other familiar objects such as computer mouse, deck or cards, tennis ball etc. to help with visualization of portions. Completed 10/11/2023        Task: Recommended carbohydrates per snack and provided samples of snack options. Completed 10/11/2023        Problem: Blood Glucose Self-Monitoring         Goal: Patient agrees to check and record blood sugars 2 times per day - fasting and pp    Start Date: 10/11/2023   Expected End Date: 1/11/2024   Priority: Medium   Barriers: No Barriers Identified        Task: Reviewed the importance of self-monitoring blood glucose and keeping logs. Completed 10/11/2023        Task: Patient will discuss CGM at hospital follow-up visit with PCP tomorrow. Completed 10/11/2023        Task: Provided patient with blood glucose logs, reviewed appropriate timing and frequency to SMBG, education on parameters on when to notify provider and advised  patient to bring logs to all appts with PCP/Endocrinologist/Diabetes Care Specialist. Completed 10/11/2023          Follow Up Plan     Follow up in about 1 month (around 11/11/2023) for E11.59.    Today's care plan and follow up schedule was discussed with patient.  Isaiah verbalized understanding of the care plan, goals, and agrees to follow up plan.        The patient was encouraged to communicate with his/her health care provider/physician and care team regarding his/her condition(s) and treatment.  I provided the patient with my contact information today and encouraged to contact me via phone or [facility] Patient Portal as needed.     Length of Visit   Total Time: 60 Minutes

## 2023-10-12 ENCOUNTER — HOSPITAL ENCOUNTER (OUTPATIENT)
Dept: CARDIOLOGY | Facility: HOSPITAL | Age: 68
Discharge: HOME | End: 2023-10-12
Attending: NURSE PRACTITIONER
Payer: COMMERCIAL

## 2023-10-12 ENCOUNTER — OFFICE VISIT (OUTPATIENT)
Dept: INTERNAL MEDICINE | Facility: CLINIC | Age: 68
End: 2023-10-12
Payer: COMMERCIAL

## 2023-10-12 VITALS
HEIGHT: 74 IN | WEIGHT: 290.56 LBS | DIASTOLIC BLOOD PRESSURE: 64 MMHG | SYSTOLIC BLOOD PRESSURE: 100 MMHG | BODY MASS INDEX: 37.29 KG/M2 | TEMPERATURE: 96 F | HEART RATE: 78 BPM | OXYGEN SATURATION: 95 %

## 2023-10-12 DIAGNOSIS — I10 ESSENTIAL HYPERTENSION: Chronic | ICD-10-CM

## 2023-10-12 DIAGNOSIS — E11.65 TYPE 2 DIABETES MELLITUS WITH HYPERGLYCEMIA, WITH LONG-TERM CURRENT USE OF INSULIN: ICD-10-CM

## 2023-10-12 DIAGNOSIS — Z79.4 TYPE 2 DIABETES MELLITUS WITH OTHER CIRCULATORY COMPLICATION, WITH LONG-TERM CURRENT USE OF INSULIN: Chronic | ICD-10-CM

## 2023-10-12 DIAGNOSIS — E11.59 TYPE 2 DIABETES MELLITUS WITH OTHER CIRCULATORY COMPLICATION, WITH LONG-TERM CURRENT USE OF INSULIN: Chronic | ICD-10-CM

## 2023-10-12 DIAGNOSIS — I25.10 CORONARY ARTERY DISEASE INVOLVING NATIVE CORONARY ARTERY OF NATIVE HEART WITHOUT ANGINA PECTORIS: Chronic | ICD-10-CM

## 2023-10-12 DIAGNOSIS — Z09 FOLLOW-UP EXAM: Primary | ICD-10-CM

## 2023-10-12 DIAGNOSIS — I21.4 NSTEMI (NON-ST ELEVATED MYOCARDIAL INFARCTION): ICD-10-CM

## 2023-10-12 DIAGNOSIS — Z79.4 TYPE 2 DIABETES MELLITUS WITH HYPERGLYCEMIA, WITH LONG-TERM CURRENT USE OF INSULIN: ICD-10-CM

## 2023-10-12 PROCEDURE — 3008F BODY MASS INDEX DOCD: CPT | Mod: CPTII,S$GLB,, | Performed by: NURSE PRACTITIONER

## 2023-10-12 PROCEDURE — 1159F MED LIST DOCD IN RCRD: CPT | Mod: CPTII,S$GLB,, | Performed by: NURSE PRACTITIONER

## 2023-10-12 PROCEDURE — 99214 PR OFFICE/OUTPT VISIT, EST, LEVL IV, 30-39 MIN: ICD-10-PCS | Mod: S$GLB,,, | Performed by: NURSE PRACTITIONER

## 2023-10-12 PROCEDURE — 1101F PT FALLS ASSESS-DOCD LE1/YR: CPT | Mod: CPTII,S$GLB,, | Performed by: NURSE PRACTITIONER

## 2023-10-12 PROCEDURE — 3060F POS MICROALBUMINURIA REV: CPT | Mod: CPTII,S$GLB,, | Performed by: NURSE PRACTITIONER

## 2023-10-12 PROCEDURE — 1111F DSCHRG MED/CURRENT MED MERGE: CPT | Mod: CPTII,S$GLB,, | Performed by: NURSE PRACTITIONER

## 2023-10-12 PROCEDURE — 3066F NEPHROPATHY DOC TX: CPT | Mod: CPTII,S$GLB,, | Performed by: NURSE PRACTITIONER

## 2023-10-12 PROCEDURE — 1126F PR PAIN SEVERITY QUANTIFIED, NO PAIN PRESENT: ICD-10-PCS | Mod: CPTII,S$GLB,, | Performed by: NURSE PRACTITIONER

## 2023-10-12 PROCEDURE — 1111F PR DISCHARGE MEDS RECONCILED W/ CURRENT OUTPATIENT MED LIST: ICD-10-PCS | Mod: CPTII,S$GLB,, | Performed by: NURSE PRACTITIONER

## 2023-10-12 PROCEDURE — 4010F PR ACE/ARB THEARPY RXD/TAKEN: ICD-10-PCS | Mod: CPTII,S$GLB,, | Performed by: NURSE PRACTITIONER

## 2023-10-12 PROCEDURE — 99214 OFFICE O/P EST MOD 30 MIN: CPT | Mod: S$GLB,,, | Performed by: NURSE PRACTITIONER

## 2023-10-12 PROCEDURE — 93227 HOLTER MONITOR - 48 HOUR (CUPID ONLY): ICD-10-PCS | Mod: ,,, | Performed by: INTERNAL MEDICINE

## 2023-10-12 PROCEDURE — 3288F FALL RISK ASSESSMENT DOCD: CPT | Mod: CPTII,S$GLB,, | Performed by: NURSE PRACTITIONER

## 2023-10-12 PROCEDURE — 3074F PR MOST RECENT SYSTOLIC BLOOD PRESSURE < 130 MM HG: ICD-10-PCS | Mod: CPTII,S$GLB,, | Performed by: NURSE PRACTITIONER

## 2023-10-12 PROCEDURE — 3066F PR DOCUMENTATION OF TREATMENT FOR NEPHROPATHY: ICD-10-PCS | Mod: CPTII,S$GLB,, | Performed by: NURSE PRACTITIONER

## 2023-10-12 PROCEDURE — 3046F PR MOST RECENT HEMOGLOBIN A1C LEVEL > 9.0%: ICD-10-PCS | Mod: CPTII,S$GLB,, | Performed by: NURSE PRACTITIONER

## 2023-10-12 PROCEDURE — 1101F PR PT FALLS ASSESS DOC 0-1 FALLS W/OUT INJ PAST YR: ICD-10-PCS | Mod: CPTII,S$GLB,, | Performed by: NURSE PRACTITIONER

## 2023-10-12 PROCEDURE — 3288F PR FALLS RISK ASSESSMENT DOCUMENTED: ICD-10-PCS | Mod: CPTII,S$GLB,, | Performed by: NURSE PRACTITIONER

## 2023-10-12 PROCEDURE — 4010F ACE/ARB THERAPY RXD/TAKEN: CPT | Mod: CPTII,S$GLB,, | Performed by: NURSE PRACTITIONER

## 2023-10-12 PROCEDURE — 3046F HEMOGLOBIN A1C LEVEL >9.0%: CPT | Mod: CPTII,S$GLB,, | Performed by: NURSE PRACTITIONER

## 2023-10-12 PROCEDURE — 3078F PR MOST RECENT DIASTOLIC BLOOD PRESSURE < 80 MM HG: ICD-10-PCS | Mod: CPTII,S$GLB,, | Performed by: NURSE PRACTITIONER

## 2023-10-12 PROCEDURE — 3008F PR BODY MASS INDEX (BMI) DOCUMENTED: ICD-10-PCS | Mod: CPTII,S$GLB,, | Performed by: NURSE PRACTITIONER

## 2023-10-12 PROCEDURE — 3074F SYST BP LT 130 MM HG: CPT | Mod: CPTII,S$GLB,, | Performed by: NURSE PRACTITIONER

## 2023-10-12 PROCEDURE — 1159F PR MEDICATION LIST DOCUMENTED IN MEDICAL RECORD: ICD-10-PCS | Mod: CPTII,S$GLB,, | Performed by: NURSE PRACTITIONER

## 2023-10-12 PROCEDURE — 93227 XTRNL ECG REC<48 HR R&I: CPT | Mod: ,,, | Performed by: INTERNAL MEDICINE

## 2023-10-12 PROCEDURE — 3060F PR POS MICROALBUMINURIA RESULT DOCUMENTED/REVIEW: ICD-10-PCS | Mod: CPTII,S$GLB,, | Performed by: NURSE PRACTITIONER

## 2023-10-12 PROCEDURE — 99999 PR PBB SHADOW E&M-EST. PATIENT-LVL V: CPT | Mod: PBBFAC,,, | Performed by: NURSE PRACTITIONER

## 2023-10-12 PROCEDURE — 99999 PR PBB SHADOW E&M-EST. PATIENT-LVL V: ICD-10-PCS | Mod: PBBFAC,,, | Performed by: NURSE PRACTITIONER

## 2023-10-12 PROCEDURE — 3078F DIAST BP <80 MM HG: CPT | Mod: CPTII,S$GLB,, | Performed by: NURSE PRACTITIONER

## 2023-10-12 PROCEDURE — 1126F AMNT PAIN NOTED NONE PRSNT: CPT | Mod: CPTII,S$GLB,, | Performed by: NURSE PRACTITIONER

## 2023-10-12 PROCEDURE — 93225 XTRNL ECG REC<48 HRS REC: CPT

## 2023-10-12 RX ORDER — TIRZEPATIDE 5 MG/.5ML
INJECTION, SOLUTION SUBCUTANEOUS
COMMUNITY
Start: 2023-07-15 | End: 2024-01-26 | Stop reason: DRUGHIGH

## 2023-10-12 RX ORDER — INSULIN GLARGINE 300 U/ML
40 INJECTION, SOLUTION SUBCUTANEOUS 2 TIMES DAILY
Qty: 8 ML | Refills: 11 | Status: SHIPPED | OUTPATIENT
Start: 2023-10-12 | End: 2024-10-11

## 2023-10-12 NOTE — PROGRESS NOTES
Subjective:       Patient ID: Isaiah Harrison is a 68 y.o. male.    Chief Complaint: hosp follow up  HPI    Hospital Course:   68-year-old gentleman with a past medical history significant for coronary artery disease, history of myocardial infarction with stent placement, prior percutaneous transluminal coronary angioplasty, obesity, obstructive sleep apnea requiring CPAP, hyperlipidemia, hypertension, and type 2 diabetes mellitus with associated circulatory disorder (coronary artery disease), but not requiring long-term insulin. He presents to the Emergency Department after experiencing symptoms reminiscent of his previous myocardial infarction. While in a [employer] parking lot today, he felt a sudden rise in his blood pressure and facial flushing. He took nitroglycerin, which alleviated his symptoms. The patient associated these feelings with a similar episode he had years back when he had a myocardial infarction, prompting his visit to the ER. At present, he denies fever, chills, chest pain, shortness of breath, leg swelling, and other associated symptoms.     On arrival, his initial vital signs revealed a blood pressure of 189/99, which subsequently trended to 143/78, 140/76, and 165/72. The rest of his vital signs remained within normal parameters. Laboratory findings indicated hyperglycemia and glucosuria, though his troponin levels were negative twice. A chest X-ray was unremarkable. His electrocardiogram demonstrated a rate of 94 BPM with a normal sinus rhythm and revealed an old inferior infarct with a possible anterolateral infarct, though no ST-elevation myocardial infarction was identified. During his stay in the ER, he received medication including clonidine 0.1 mg, acetaminophen 1,000 mg, aspirin 325 mg, and a 1-inch topical application of 2% nitroglycerin ointment.     10.7.2023  On my interview he states that he has had waxing waning chest pain since yesterday however right now he feels well  since his last episode of chest pain in the emergency room last night.    His troponin has started to rise.    He has history of remote stents to his LAD.    He follows with Dr. GARCIA as outpatient.  Last seen however was 1 year ago.  With notable noncompliance with diet.    10.8.2023  Troponin peaked at 3.5.  Chest pain-free.    On IV heparin .  P.o. Imdur.  Had heart cath on 10/9  Findings/Key Components:  Lad instent 40% otherwise unchanged from 2105  Ifr 0.91 ffr 0.84     Estimated Blood Loss: < 50 ML.         SPECIMEN: NONE     Devices/Prostetics: None     PLAN:   Reassure    R/o afib with embolic event.as outpatient    No med changes in hospital   Adjusted lantus himself decreasing it from 40 bid to 40 daily , not taking metformin due to GI issues  Saw dm education on yesterday    No further cardiac episodes. No focal neuro symptoms. No resp complaints      Past Medical History:   Diagnosis Date    Acute coronary syndrome     Anticoagulant long-term use     Back pain     CAD (coronary artery disease) 10/17/2013    Class 2 severe obesity due to excess calories with serious comorbidity and body mass index (BMI) of 38.0 to 38.9 in adult 01/12/2015    Coronary artery disease     Diabetes mellitus, type 2 2010    BS didn't check 04/19/2023 Insulin 2 years    Gastric polyps     Hyperlipemia     Hypertension     NSTEMI (non-ST elevated myocardial infarction) 10/23/2014    Obesity 10/21/2014    DANTE on CPAP 02/19/2015    S/P PTCA (percutaneous transluminal coronary angioplasty) 10/17/2013    Sleep apnea 01/07/2015    Type 2 diabetes mellitus with circulatory disorder (coronary artery disease), without long-term current use of insulin 07/14/2015     Past Surgical History:   Procedure Laterality Date    APPENDECTOMY      BACK SURGERY      CATARACT EXTRACTION      CATARACT EXTRACTION, BILATERAL      COLONOSCOPY      COLONOSCOPY N/A 3/9/2023    Procedure: COLONOSCOPY;  Surgeon: Lisa Auguste MD;  Location: [facility];   Service: Endoscopy;  Laterality: N/A;    CORONARY ANGIOPLASTY WITH STENT PLACEMENT      ESOPHAGOGASTRODUODENOSCOPY      EYE SURGERY      FRACTIONAL FLOW RESERVE (FFR), CORONARY  10/9/2023    Procedure: Fractional Flow [place] (FFR), Coronary;  Surgeon: Kirsty Lyles MD;  Location: [facility] CATH LAB;  Service: Cardiology;;    FRACTURE SURGERY      HERNIA REPAIR      INSTANTANEOUS WAVE-FREE RATIO (IFR) N/A 10/9/2023    Procedure: Instantaneous Wave-Free Ratio (IFR);  Surgeon: Kirsty Lyles MD;  Location: [facility] CATH LAB;  Service: Cardiology;  Laterality: N/A;    LEFT HEART CATHETERIZATION Left 10/9/2023    Procedure: Left heart cath;  Surgeon: Kirsty Lyles MD;  Location: [facility] CATH LAB;  Service: Cardiology;  Laterality: Left;    SPINE SURGERY       Social History     Socioeconomic History    Marital status:    Tobacco Use    Smoking status: Never    Smokeless tobacco: Never   Substance and Sexual Activity    Alcohol use: Yes     Alcohol/week: 0.0 standard drinks of alcohol     Comment: socially    Drug use: No    Sexual activity: Yes     Partners: Female     Social Determinants of Health     Financial Resource Strain: Low Risk  (10/12/2023)    Overall Financial Resource Strain (CARDIA)     Difficulty of Paying Living Expenses: Not hard at all   Food Insecurity: No Food Insecurity (10/12/2023)    Hunger Vital Sign     Worried About Running Out of Food in the Last Year: Never true     Ran Out of Food in the Last Year: Never true   Transportation Needs: No Transportation Needs (10/12/2023)    PRAPARE - Transportation     Lack of Transportation (Medical): No     Lack of Transportation (Non-Medical): No   Physical Activity: Inactive (10/12/2023)    Exercise Vital Sign     Days of Exercise per Week: 0 days     Minutes of Exercise per Session: 0 min   Stress: No Stress Concern Present (10/12/2023)    [employer] [place] [employer] - Occupational Stress Questionnaire     Feeling of Stress : Not at all  "  Social Connections: Unknown (10/12/2023)    Social Connection and Isolation Panel [NHANES]     Frequency of Communication with Friends and Family: Never     Frequency of Social Gatherings with Friends and Family: Never     Active Member of Clubs or Organizations: Patient refused     Attends Club or Organization Meetings: Never     Marital Status:    Housing Stability: Low Risk  (10/12/2023)    Housing Stability Vital Sign     Unable to Pay for Housing in the Last Year: No     Number of Places Lived in the Last Year: 1     Unstable Housing in the Last Year: No     Review of patient's allergies indicates:   Allergen Reactions    Pcn [penicillins] Hives     Current Outpatient Medications   Medication Sig    amlodipine-valsartan (EXFORGE)  mg per tablet TAKE 1 TABLET BY MOUTH EVERY DAY    aspirin (ECOTRIN) 81 MG EC tablet Take 81 mg by mouth once daily.    atorvastatin (LIPITOR) 80 MG tablet TAKE 1 TABLET(80 MG) BY MOUTH EVERY EVENING    blood sugar diagnostic Strp 1 strip by Misc.(Non-Drug; Combo Route) route 4 (four) times daily.    hydroCHLOROthiazide (HYDRODIURIL) 25 MG tablet TAKE 1 TABLET(25 MG) BY MOUTH EVERY DAY    hydrOXYzine HCL (ATARAX) 10 MG Tab TAKE 1 TABLET(10 MG) BY MOUTH TWICE DAILY AS NEEDED. MAY CAUSE DROWSINESS. DO NOT DRIVE OR OPERATE HEAVY MACHINERY    isosorbide mononitrate (IMDUR) 30 MG 24 hr tablet TAKE 3 TABLETS(90 MG) BY MOUTH EVERY DAY    ketoconazole (NIZORAL) 2 % cream Apply topically once daily.    metoprolol tartrate (LOPRESSOR) 50 MG tablet TAKE 1 TABLET BY MOUTH TWICE DAILY    MOUNJARO 5 mg/0.5 mL PnIj ADMINISTER 5MG UNDER THE SKIN EVERY 7 DAYS    niacin (SLO-NIACIN) 500 mg tablet TAKE 1 TABLET(500 MG) BY MOUTH EVERY EVENING    nitroGLYCERIN (NITROSTAT) 0.4 MG SL tablet Place 1 tablet (0.4 mg total) under the tongue every 5 (five) minutes as needed.    pen needle, diabetic (BD ULTRA-FINE SHORT PEN NEEDLE) 31 gauge x 5/16" Ndle USE WITH LANTUS PEN AS DIRECTED    pneumoc " 20-miranda conj-dip cr,PF, (PREVNAR-20, PF,) 0.5 mL Syrg injection Inject 0.5 mLs into the muscle.    ranolazine (RANEXA) 1,000 mg Tb12 Take 1 tablet (1,000 mg total) by mouth 2 (two) times daily.    tirzepatide 7.5 mg/0.5 mL PnIj Inject 7.5 mg into the skin every 7 days.    empagliflozin (JARDIANCE) 25 mg tablet Take 1 tablet (25 mg total) by mouth once daily.    insulin glargine, TOUJEO, (TOUJEO SOLOSTAR U-300 INSULIN) 300 unit/mL (1.5 mL) InPn pen Inject 40 Units into the skin 2 (two) times a day.     No current facility-administered medications for this visit.           Review of Systems   Constitutional:  Negative for activity change, appetite change, chills, diaphoresis, fatigue, fever and unexpected weight change.   HENT:  Negative for congestion, ear pain, postnasal drip, rhinorrhea, sinus pressure, sinus pain, sneezing, sore throat, tinnitus, trouble swallowing and voice change.    Eyes:  Negative for photophobia, pain, discharge and visual disturbance.   Respiratory:  Negative for cough, shortness of breath and wheezing.    Cardiovascular:  Negative for palpitations and leg swelling.   Gastrointestinal:  Negative for abdominal distention, abdominal pain, blood in stool, constipation, diarrhea, nausea and vomiting.   Endocrine: Negative for polydipsia and polyuria.   Genitourinary:  Negative for decreased urine volume, difficulty urinating, dysuria, flank pain, frequency, hematuria and urgency.   Musculoskeletal:  Negative for arthralgias, back pain, joint swelling, neck pain and neck stiffness.   Allergic/Immunologic: Negative for immunocompromised state.   Neurological:  Negative for dizziness, tremors, seizures, syncope, facial asymmetry, speech difficulty, weakness, light-headedness and numbness.   Hematological:  Negative for adenopathy. Does not bruise/bleed easily.   Psychiatric/Behavioral:  Negative for confusion, dysphoric mood and sleep disturbance.        Objective:      Physical Exam  Vitals  reviewed.   Constitutional:       Appearance: He is obese.   Cardiovascular:      Rate and Rhythm: Normal rate and regular rhythm.      Heart sounds: Normal heart sounds.   Pulmonary:      Effort: Pulmonary effort is normal.      Breath sounds: Normal breath sounds.   Skin:     General: Skin is warm and dry.   Neurological:      Mental Status: He is alert and oriented to person, place, and time.         Assessment:     Vitals:    10/12/23 1340   BP: 100/64   Pulse: 78   Temp: (!) 95.9 °F (35.5 °C)         1. Follow-up exam    2. NSTEMI (non-ST elevated myocardial infarction)    3. Essential hypertension    4. Coronary artery disease involving native coronary artery of native heart without angina pectoris    5. Type 2 diabetes mellitus with other circulatory complication, with long-term current use of insulin    6. Type 2 diabetes mellitus with hyperglycemia, with long-term current use of insulin        Plan:   Follow-up exam    NSTEMI (non-ST elevated myocardial infarction)  -     Holter monitor - 48 hour; Future    Essential hypertension  -     Holter monitor - 48 hour; Future    Coronary artery disease involving native coronary artery of native heart without angina pectoris    Type 2 diabetes mellitus with other circulatory complication, with long-term current use of insulin    Type 2 diabetes mellitus with hyperglycemia, with long-term current use of insulin    Other orders  -     empagliflozin (JARDIANCE) 25 mg tablet; Take 1 tablet (25 mg total) by mouth once daily.  Dispense: 90 tablet; Refill: 1  -     insulin glargine, TOUJEO, (TOUJEO SOLOSTAR U-300 INSULIN) 300 unit/mL (1.5 mL) InPn pen; Inject 40 Units into the skin 2 (two) times a day.  Dispense: 8 mL; Refill: 11        Holter  Increase jardiance and toujeo  DM diet  Fu cards/pcp  ER for return of cp or any other acute symptoms      Transitional Care Note    Family and/or Caretaker present at visit?  Yes.  Diagnostic tests reviewed/disposition: No  diagnosic tests pending after this hospitalization.  Disease/illness education: yes   Home health/community services discussion/referrals: Patient does not have home health established from hospital visit.  They do not need home health.  If needed, we will set up home health for the patient.   Establishment or re-establishment of referral orders for community resources: No other necessary community resources.   Discussion with other health care providers: No discussion with other health care providers necessary.

## 2023-10-13 ENCOUNTER — OFFICE VISIT (OUTPATIENT)
Dept: CARDIOLOGY | Facility: CLINIC | Age: 68
End: 2023-10-13
Payer: COMMERCIAL

## 2023-10-13 VITALS
SYSTOLIC BLOOD PRESSURE: 100 MMHG | BODY MASS INDEX: 37.16 KG/M2 | WEIGHT: 289.44 LBS | DIASTOLIC BLOOD PRESSURE: 60 MMHG

## 2023-10-13 DIAGNOSIS — E66.01 CLASS 2 SEVERE OBESITY DUE TO EXCESS CALORIES WITH SERIOUS COMORBIDITY AND BODY MASS INDEX (BMI) OF 39.0 TO 39.9 IN ADULT: Chronic | ICD-10-CM

## 2023-10-13 DIAGNOSIS — E78.5 HYPERLIPIDEMIA ASSOCIATED WITH TYPE 2 DIABETES MELLITUS: Chronic | ICD-10-CM

## 2023-10-13 DIAGNOSIS — E11.65 TYPE 2 DIABETES MELLITUS WITH HYPERGLYCEMIA, WITH LONG-TERM CURRENT USE OF INSULIN: Chronic | ICD-10-CM

## 2023-10-13 DIAGNOSIS — I21.4 NSTEMI (NON-ST ELEVATED MYOCARDIAL INFARCTION): Primary | ICD-10-CM

## 2023-10-13 DIAGNOSIS — I25.10 CORONARY ARTERY DISEASE INVOLVING NATIVE CORONARY ARTERY OF NATIVE HEART WITHOUT ANGINA PECTORIS: Chronic | ICD-10-CM

## 2023-10-13 DIAGNOSIS — Z98.61 S/P PTCA (PERCUTANEOUS TRANSLUMINAL CORONARY ANGIOPLASTY): ICD-10-CM

## 2023-10-13 DIAGNOSIS — I15.2 HYPERTENSION ASSOCIATED WITH DIABETES: Chronic | ICD-10-CM

## 2023-10-13 DIAGNOSIS — I87.2 VENOUS INSUFFICIENCY: ICD-10-CM

## 2023-10-13 DIAGNOSIS — E11.59 HYPERTENSION ASSOCIATED WITH DIABETES: Chronic | ICD-10-CM

## 2023-10-13 DIAGNOSIS — E11.69 HYPERLIPIDEMIA ASSOCIATED WITH TYPE 2 DIABETES MELLITUS: Chronic | ICD-10-CM

## 2023-10-13 DIAGNOSIS — Z79.4 TYPE 2 DIABETES MELLITUS WITH HYPERGLYCEMIA, WITH LONG-TERM CURRENT USE OF INSULIN: Chronic | ICD-10-CM

## 2023-10-13 DIAGNOSIS — E78.2 MIXED HYPERLIPIDEMIA: ICD-10-CM

## 2023-10-13 DIAGNOSIS — I10 ESSENTIAL HYPERTENSION: Chronic | ICD-10-CM

## 2023-10-13 PROCEDURE — 1101F PR PT FALLS ASSESS DOC 0-1 FALLS W/OUT INJ PAST YR: ICD-10-PCS | Mod: CPTII,S$GLB,, | Performed by: INTERNAL MEDICINE

## 2023-10-13 PROCEDURE — 3046F PR MOST RECENT HEMOGLOBIN A1C LEVEL > 9.0%: ICD-10-PCS | Mod: CPTII,S$GLB,, | Performed by: INTERNAL MEDICINE

## 2023-10-13 PROCEDURE — 3288F FALL RISK ASSESSMENT DOCD: CPT | Mod: CPTII,S$GLB,, | Performed by: INTERNAL MEDICINE

## 2023-10-13 PROCEDURE — 3066F NEPHROPATHY DOC TX: CPT | Mod: CPTII,S$GLB,, | Performed by: INTERNAL MEDICINE

## 2023-10-13 PROCEDURE — 4010F PR ACE/ARB THEARPY RXD/TAKEN: ICD-10-PCS | Mod: CPTII,S$GLB,, | Performed by: INTERNAL MEDICINE

## 2023-10-13 PROCEDURE — 1101F PT FALLS ASSESS-DOCD LE1/YR: CPT | Mod: CPTII,S$GLB,, | Performed by: INTERNAL MEDICINE

## 2023-10-13 PROCEDURE — 3046F HEMOGLOBIN A1C LEVEL >9.0%: CPT | Mod: CPTII,S$GLB,, | Performed by: INTERNAL MEDICINE

## 2023-10-13 PROCEDURE — 3078F PR MOST RECENT DIASTOLIC BLOOD PRESSURE < 80 MM HG: ICD-10-PCS | Mod: CPTII,S$GLB,, | Performed by: INTERNAL MEDICINE

## 2023-10-13 PROCEDURE — 1111F DSCHRG MED/CURRENT MED MERGE: CPT | Mod: CPTII,S$GLB,, | Performed by: INTERNAL MEDICINE

## 2023-10-13 PROCEDURE — 4010F ACE/ARB THERAPY RXD/TAKEN: CPT | Mod: CPTII,S$GLB,, | Performed by: INTERNAL MEDICINE

## 2023-10-13 PROCEDURE — 3288F PR FALLS RISK ASSESSMENT DOCUMENTED: ICD-10-PCS | Mod: CPTII,S$GLB,, | Performed by: INTERNAL MEDICINE

## 2023-10-13 PROCEDURE — 1159F MED LIST DOCD IN RCRD: CPT | Mod: CPTII,S$GLB,, | Performed by: INTERNAL MEDICINE

## 2023-10-13 PROCEDURE — 3066F PR DOCUMENTATION OF TREATMENT FOR NEPHROPATHY: ICD-10-PCS | Mod: CPTII,S$GLB,, | Performed by: INTERNAL MEDICINE

## 2023-10-13 PROCEDURE — 99214 PR OFFICE/OUTPT VISIT, EST, LEVL IV, 30-39 MIN: ICD-10-PCS | Mod: S$GLB,,, | Performed by: INTERNAL MEDICINE

## 2023-10-13 PROCEDURE — 99214 OFFICE O/P EST MOD 30 MIN: CPT | Mod: S$GLB,,, | Performed by: INTERNAL MEDICINE

## 2023-10-13 PROCEDURE — 3060F POS MICROALBUMINURIA REV: CPT | Mod: CPTII,S$GLB,, | Performed by: INTERNAL MEDICINE

## 2023-10-13 PROCEDURE — 1160F PR REVIEW ALL MEDS BY PRESCRIBER/CLIN PHARMACIST DOCUMENTED: ICD-10-PCS | Mod: CPTII,S$GLB,, | Performed by: INTERNAL MEDICINE

## 2023-10-13 PROCEDURE — 1160F RVW MEDS BY RX/DR IN RCRD: CPT | Mod: CPTII,S$GLB,, | Performed by: INTERNAL MEDICINE

## 2023-10-13 PROCEDURE — 1111F PR DISCHARGE MEDS RECONCILED W/ CURRENT OUTPATIENT MED LIST: ICD-10-PCS | Mod: CPTII,S$GLB,, | Performed by: INTERNAL MEDICINE

## 2023-10-13 PROCEDURE — 3060F PR POS MICROALBUMINURIA RESULT DOCUMENTED/REVIEW: ICD-10-PCS | Mod: CPTII,S$GLB,, | Performed by: INTERNAL MEDICINE

## 2023-10-13 PROCEDURE — 3008F PR BODY MASS INDEX (BMI) DOCUMENTED: ICD-10-PCS | Mod: CPTII,S$GLB,, | Performed by: INTERNAL MEDICINE

## 2023-10-13 PROCEDURE — 3078F DIAST BP <80 MM HG: CPT | Mod: CPTII,S$GLB,, | Performed by: INTERNAL MEDICINE

## 2023-10-13 PROCEDURE — 99999 PR PBB SHADOW E&M-EST. PATIENT-LVL IV: ICD-10-PCS | Mod: PBBFAC,,, | Performed by: INTERNAL MEDICINE

## 2023-10-13 PROCEDURE — 99999 PR PBB SHADOW E&M-EST. PATIENT-LVL IV: CPT | Mod: PBBFAC,,, | Performed by: INTERNAL MEDICINE

## 2023-10-13 PROCEDURE — 3008F BODY MASS INDEX DOCD: CPT | Mod: CPTII,S$GLB,, | Performed by: INTERNAL MEDICINE

## 2023-10-13 PROCEDURE — 1159F PR MEDICATION LIST DOCUMENTED IN MEDICAL RECORD: ICD-10-PCS | Mod: CPTII,S$GLB,, | Performed by: INTERNAL MEDICINE

## 2023-10-13 PROCEDURE — 3074F PR MOST RECENT SYSTOLIC BLOOD PRESSURE < 130 MM HG: ICD-10-PCS | Mod: CPTII,S$GLB,, | Performed by: INTERNAL MEDICINE

## 2023-10-13 PROCEDURE — 3074F SYST BP LT 130 MM HG: CPT | Mod: CPTII,S$GLB,, | Performed by: INTERNAL MEDICINE

## 2023-10-13 RX ORDER — ISOSORBIDE MONONITRATE 30 MG/1
TABLET, EXTENDED RELEASE ORAL
Qty: 270 TABLET | Refills: 1 | Status: SHIPPED | OUTPATIENT
Start: 2023-10-13

## 2023-10-13 RX ORDER — ATORVASTATIN CALCIUM 80 MG/1
TABLET, FILM COATED ORAL
Qty: 90 TABLET | Refills: 1 | Status: SHIPPED | OUTPATIENT
Start: 2023-10-13 | End: 2023-10-13 | Stop reason: SDUPTHER

## 2023-10-13 RX ORDER — CLOPIDOGREL BISULFATE 75 MG/1
75 TABLET ORAL DAILY
Qty: 30 TABLET | Refills: 11 | Status: SHIPPED | OUTPATIENT
Start: 2023-10-13 | End: 2024-10-12

## 2023-10-13 RX ORDER — EVOLOCUMAB 140 MG/ML
140 INJECTION, SOLUTION SUBCUTANEOUS
Qty: 2 ML | Refills: 0 | Status: ACTIVE | OUTPATIENT
Start: 2023-10-13 | End: 2023-11-01 | Stop reason: SDUPTHER

## 2023-10-13 NOTE — PROGRESS NOTES
Subjective:   Patient ID:  Isaiah Harrison is a 68 y.o. male who presents for follow up of No chief complaint on file.      68 yM. Post NSTEI f/u  PMH CAD h/o NSTEMI and LHC showed D2 90% amd patent midLAD stent 40% lesion, EF 60% DM HTN hLD  Admitted to [facility] in  for NSTEMI ACS, troponin peak 3 and urgent cath showed D2 90% lesion by Dr. Lyles  Yesterday N/v no recurrent chest pain   Left arm radial access normal   A1c 9 and . BP controlled         Past Medical History:   Diagnosis Date    Acute coronary syndrome     Anticoagulant long-term use     Back pain     CAD (coronary artery disease) 10/17/2013    Class 2 severe obesity due to excess calories with serious comorbidity and body mass index (BMI) of 38.0 to 38.9 in adult 01/12/2015    Coronary artery disease     Diabetes mellitus, type 2 2010    BS didn't check 04/19/2023 Insulin 2 years    Gastric polyps     Hyperlipemia     Hypertension     NSTEMI (non-ST elevated myocardial infarction) 10/23/2014    Obesity 10/21/2014    DANTE on CPAP 02/19/2015    S/P PTCA (percutaneous transluminal coronary angioplasty) 10/17/2013    Sleep apnea 01/07/2015    Type 2 diabetes mellitus with circulatory disorder (coronary artery disease), without long-term current use of insulin 07/14/2015       Past Surgical History:   Procedure Laterality Date    APPENDECTOMY      BACK SURGERY      CATARACT EXTRACTION      CATARACT EXTRACTION, BILATERAL      COLONOSCOPY      COLONOSCOPY N/A 3/9/2023    Procedure: COLONOSCOPY;  Surgeon: Lisa Auguste MD;  Location: [facility] ENDO;  Service: Endoscopy;  Laterality: N/A;    CORONARY ANGIOPLASTY WITH STENT PLACEMENT      ESOPHAGOGASTRODUODENOSCOPY      EYE SURGERY      FRACTIONAL FLOW RESERVE (FFR), CORONARY  10/9/2023    Procedure: Fractional Flow [place] (FFR), Coronary;  Surgeon: Kirsty Lyles MD;  Location: [facility] CATH LAB;  Service: Cardiology;;    FRACTURE SURGERY      HERNIA REPAIR      INSTANTANEOUS WAVE-FREE RATIO (IFR)  N/A 10/9/2023    Procedure: Instantaneous Wave-Free Ratio (IFR);  Surgeon: Kirsty Lyles MD;  Location: [facility] CATH LAB;  Service: Cardiology;  Laterality: N/A;    LEFT HEART CATHETERIZATION Left 10/9/2023    Procedure: Left heart cath;  Surgeon: Kirsty Lyles MD;  Location: [facility] CATH LAB;  Service: Cardiology;  Laterality: Left;    SPINE SURGERY         Social History     Tobacco Use    Smoking status: Never    Smokeless tobacco: Never   Substance Use Topics    Alcohol use: Yes     Alcohol/week: 0.0 standard drinks of alcohol     Comment: socially    Drug use: No       Family History   Problem Relation Age of Onset    Hypertension Mother     Diabetes Mother     Hypertension Father     Diabetes Sister     Hypertension Sister     Heart disease Maternal Grandfather          Review of Systems   Constitutional: Negative for decreased appetite, diaphoresis, fever, malaise/fatigue and night sweats.   HENT:  Negative for nosebleeds.    Eyes:  Negative for blurred vision and double vision.   Cardiovascular:  Negative for chest pain, claudication, dyspnea on exertion, irregular heartbeat, leg swelling, near-syncope, orthopnea, palpitations, paroxysmal nocturnal dyspnea and syncope.   Respiratory:  Negative for cough, shortness of breath, sleep disturbances due to breathing, snoring, sputum production and wheezing.    Endocrine: Negative for cold intolerance and polyuria.   Hematologic/Lymphatic: Does not bruise/bleed easily.   Skin:  Negative for rash.   Musculoskeletal:  Negative for back pain, falls, joint pain, joint swelling and neck pain.   Gastrointestinal:  Negative for abdominal pain, heartburn, nausea and vomiting.   Genitourinary:  Negative for dysuria, frequency and hematuria.   Neurological:  Negative for difficulty with concentration, dizziness, focal weakness, headaches, light-headedness, numbness, seizures and weakness.   Psychiatric/Behavioral:  Negative for depression, memory loss and substance abuse.  The patient does not have insomnia.    Allergic/Immunologic: Negative for HIV exposure and hives.       Objective:   Physical Exam  HENT:      Head: Normocephalic.   Eyes:      Pupils: Pupils are equal, round, and reactive to light.   Neck:      Thyroid: No thyromegaly.      Vascular: Normal carotid pulses. No carotid bruit or JVD.   Cardiovascular:      Rate and Rhythm: Normal rate and regular rhythm. No extrasystoles are present.     Chest Wall: PMI is not displaced.      Pulses: Normal pulses.      Heart sounds: Normal heart sounds. No murmur heard.     No gallop. No S3 sounds.   Pulmonary:      Effort: No respiratory distress.      Breath sounds: Normal breath sounds. No stridor.   Abdominal:      General: Bowel sounds are normal.      Palpations: Abdomen is soft.      Tenderness: There is no abdominal tenderness. There is no rebound.   Musculoskeletal:         General: Normal range of motion.   Skin:     Findings: No rash.   Neurological:      Mental Status: He is alert and oriented to person, place, and time.   Psychiatric:         Behavior: Behavior normal.         Lab Results   Component Value Date    CHOL 156 10/06/2023    CHOL 159 04/27/2023    CHOL 174 09/13/2022     Lab Results   Component Value Date    HDL 34 (L) 10/06/2023    HDL 38 (L) 04/27/2023    HDL 40 09/13/2022     Lab Results   Component Value Date    LDLCALC 103.0 10/06/2023    LDLCALC 106.4 04/27/2023    LDLCALC 113.4 09/13/2022     Lab Results   Component Value Date    TRIG 95 10/06/2023    TRIG 73 04/27/2023    TRIG 103 09/13/2022     Lab Results   Component Value Date    CHOLHDL 21.8 10/06/2023    CHOLHDL 23.9 04/27/2023    CHOLHDL 23.0 09/13/2022       Chemistry        Component Value Date/Time     10/09/2023 0520    K 4.0 10/09/2023 0520     10/09/2023 0520    CO2 21 (L) 10/09/2023 0520    BUN 21 10/09/2023 0520    CREATININE 1.2 10/09/2023 0520     (H) 10/09/2023 0520        Component Value Date/Time    CALCIUM 9.0  10/09/2023 0520    ALKPHOS 86 10/06/2023 1332    AST 15 10/06/2023 1332    ALT 26 10/06/2023 1332    BILITOT 0.8 10/06/2023 1332    ESTGFRAFRICA >60.0 10/27/2021 0842    EGFRNONAA >60.0 10/27/2021 0842          Lab Results   Component Value Date    HGBA1C 9.1 (H) 10/02/2023     Lab Results   Component Value Date    TSH 1.188 06/30/2015     Lab Results   Component Value Date    INR 1.0 10/07/2023    INR 1.0 10/06/2023    INR 1.0 12/16/2015     Lab Results   Component Value Date    WBC 8.43 10/09/2023    HGB 15.8 10/09/2023    HCT 47.6 10/09/2023    MCV 95 10/09/2023     10/09/2023     BMP  Sodium   Date Value Ref Range Status   10/09/2023 136 136 - 145 mmol/L Final     Potassium   Date Value Ref Range Status   10/09/2023 4.0 3.5 - 5.1 mmol/L Final     Chloride   Date Value Ref Range Status   10/09/2023 102 95 - 110 mmol/L Final     CO2   Date Value Ref Range Status   10/09/2023 21 (L) 23 - 29 mmol/L Final     BUN   Date Value Ref Range Status   10/09/2023 21 8 - 23 mg/dL Final     Creatinine   Date Value Ref Range Status   10/09/2023 1.2 0.5 - 1.4 mg/dL Final     Calcium   Date Value Ref Range Status   10/09/2023 9.0 8.7 - 10.5 mg/dL Final     Anion Gap   Date Value Ref Range Status   10/09/2023 13 8 - 16 mmol/L Final     eGFR if    Date Value Ref Range Status   10/27/2021 >60.0 >60 mL/min/1.73 m^2 Final     eGFR if non    Date Value Ref Range Status   10/27/2021 >60.0 >60 mL/min/1.73 m^2 Final     Comment:     Calculation used to obtain the estimated glomerular filtration  rate (eGFR) is the CKD-EPI equation.        BNP  @LABRCNTIP(BNP,BNPTRIAGEBLO)@  @LABRCNTIP(troponini)@  Estimated Creatinine Clearance: 87.2 mL/min (based on SCr of 1.2 mg/dL).  No results found in the last 24 hours.  No results found in the last 24 hours.  No results found in the last 24 hours.    Assessment:      1. NSTEMI (non-ST elevated myocardial infarction)    2. Mixed hyperlipidemia    3. Essential  hypertension    4. Hyperlipidemia associated with type 2 diabetes mellitus    5. Coronary artery disease involving native coronary artery of native heart without angina pectoris    6. S/P PTCA (percutaneous transluminal coronary angioplasty)    7. Hypertension associated with diabetes    8. Venous insufficiency    9. Class 2 severe obesity due to excess calories with serious comorbidity and body mass index (BMI) of 39.0 to 39.9 in adult    10. Type 2 diabetes mellitus with hyperglycemia, with long-term current use of insulin        Plan:   Add Plavix 75 mg   Add repatha for HLD  COntinue ASA statin Amlodipine arb BB HCTZ imdur and Ranexa  DM Rx per PCP  Counseled DASH  Check Lipid profile with PCP in 6 months  Recommend heart-healthy diet, weight control and regular exercise.  [place]. Risk modification.   I have reviewed all pertinent labs and cardiac studies independently. Plans and recommendations have been formulated under my direct supervision. All questions answered and patient voiced understanding.   If symptoms persist go to the ED  RTC in 3 months Vero Lyles

## 2023-10-13 NOTE — TELEPHONE ENCOUNTER
REFILL REQUEST APPROVED  Requested Prescriptions   Pending Prescriptions Disp Refills    atorvastatin (LIPITOR) 80 MG tablet [Pharmacy Med Name: ATORVASTATIN 80MG TABLETS] 90 tablet 2     Sig: TAKE 1 TABLET(80 MG) BY MOUTH EVERY EVENING    isosorbide mononitrate (IMDUR) 30 MG 24 hr tablet [Pharmacy Med Name: ISOSORBIDE MONONITRATE 30MG ER TABS] 270 tablet 3     Sig: TAKE 3 TABLETS(90 MG) BY MOUTH EVERY DAY

## 2023-10-18 LAB
OHS CV EVENT MONITOR DAY: 0
OHS CV HOLTER LENGTH DECIMAL HOURS: 47.98
OHS CV HOLTER LENGTH HOURS: 47
OHS CV HOLTER LENGTH MINUTES: 59
OHS CV HOLTER SINUS AVERAGE HR: 79
OHS CV HOLTER SINUS MAX HR: 107
OHS CV HOLTER SINUS MIN HR: 53

## 2023-10-27 NOTE — ASSESSMENT & PLAN NOTE
Upward trend of troponin. Peak 3.129.   Heparin infusing  Continue vasodilator, BB, STATIN, and ASA  Hx of PCI by Dr. Lyles several years ago.    10/8/23  Troponin trending down   Continue med management as above  Plans for LHC in AM    In stent stenosis status post angioplasty

## 2023-10-27 NOTE — DISCHARGE SUMMARY
[facility] Medicine  Discharge Summary      Patient Name: Isaiah Harrison  MRN: [id number]  [facility]: [id number]  Patient Class: IP- Inpatient  Admission Date: 10/6/2023  Hospital Length of Stay: 2 days  Discharge Date and Time: 10/9/2023  6:45 PM  Attending Physician: No att. providers found   Discharging Provider: Teressa Cartwright MD  Primary Care Provider: LACI Dwo MD    Primary Care Team: Networked reference to record PCT     HPI:   Patient is a 68-year-old gentleman with a past medical history significant for coronary artery disease, history of myocardial infarction with prior percutaneous transluminal coronary angioplasty, obesity, obstructive sleep apnea requiring CPAP, hyperlipidemia, hypertension, and type 2 diabetes mellitus with associated circulatory disorder (coronary artery disease), but not requiring long-term insulin. He presents to the Emergency Department after experiencing symptoms reminiscent of his previous myocardial infarction. While in a [employer] parking lot today, he felt a sudden rise in his blood pressure and facial flushing. He took nitroglycerin, which alleviated his symptoms. The patient associated these feelings with a similar episode he had years back when he had a myocardial infarction, prompting his visit to the ER. At present, he denies fever, chills, chest pain, shortness of breath, leg swelling, and other associated symptoms.    On arrival, his initial vital signs revealed a blood pressure of 189/99, which subsequently trended to 143/78, 140/76, and 165/72. The rest of his vital signs remained within normal parameters. Laboratory findings indicated hyperglycemia and glucosuria, though his troponin levels were negative twice. A chest X-ray was unremarkable. His electrocardiogram demonstrated a rate of 94 BPM with a normal sinus rhythm and revealed an old inferior infarct with a possible anterolateral infarct, though no ST-elevation myocardial  infarction was identified. During his stay in the ER, he received medication including clonidine 0.1 mg, acetaminophen 1,000 mg, aspirin 325 mg, and a 1-inch topical application of 2% nitroglycerin ointment. Hospital Medicine was called for admission.      Procedure(s) (LRB):  Left heart cath (Left)  Fractional Flow [place] (FFR), Coronary  Instantaneous Wave-Free Ratio (IFR) (N/A)      Hospital Course:   Isaiah Harrison is 68 year old male who was admitted to [facility] for NSTEMI. He presented to ED for chest pain with associated flushing and dyspnea. Troponin 0.376>1.52>3.129 consistent with NSTEMI. He his currently on heparin infusion.  Echocardiogram shows low normal systolic 50-55% and normal diastolic function. Plans are for [facility] 10/9/23. Continue long acting vasodilator, BB, STATIN, and ASA. [id number] has increased to 9.1 over the last five months. Risks associated with uncontrolled glucose levels discussed. He reports have an appt with dietician later this month and was urged to keep the appt.       Cardiac catheterization by Dr. Hernandez 10/09/2023 revealed LAD InStent 40% stenosis otherwise unchanged stent placed.  He is to follow up outpatient to rule out AFib with embolic event.    Patient seen examined on day of discharge and stable for discharge home.       Goals of Care Treatment Preferences:  Code Status: Full Code      Consults:   Consults (From admission, onward)        Status Ordering Provider     Inpatient consult to Registered Dietitian/Nutritionist  Once        Provider:  (Not yet assigned)    Completed KALYN MOTT     Inpatient consult to Social Work/Case Management  Once        Provider:  (Not yet assigned)    Completed KALYN MOTT     Inpatient consult to Cardiology  Once        Provider:  Chidi Cancino MD    Completed KALYN MOTT     Inpatient consult to Cardiology  Once        Provider:  Chidi Cancino MD    Completed KALYN MOTT           Cardiac/Vascular  * NSTEMI (non-ST elevated myocardial infarction)  Upward trend of troponin. Peak 3.129.   Heparin infusing  Continue vasodilator, BB, STATIN, and ASA  Hx of PCI by Dr. Lyles several years ago.    10/8/23  Troponin trending down   Continue med management as above  Plans for [facility] in AM    In stent stenosis status post angioplasty    Chest pain  --s/p  and nitro dose x 1 in the ER  --asymptomatic, no active chest pain at this time  --EKG: negative for STEMI  --Trop NEG x 2- continue to trend   --PRN nitro for chest pain  --statin and antiplatelet therapy   --given high risk, Cardiology consulted, stress test in the AM  --cardiac diet, NPO at midnight   --admit to telemetry-OBS with cardiac monitoring    10/7/23  See plan for NSTEMI      S/P PTCA (percutaneous transluminal coronary angioplasty)         Coronary artery disease involving native coronary artery of native heart without angina pectoris  See above    Cardiac catheterization revealing LAD InStent 40% stenosis with angioplasty      Hyperlipidemia associated with type 2 diabetes mellitus  --last A1c on file: 9.1% five days prior to admission  --Home medications include metformin, BID lantus  --holding home PO medications  -- mod dose SSI ordered  --QHS basal insulin at titrated dose- modify as diet restarts  --Accuchecks ACQ  --Fasting AM glucose goal <140  --restart statin medication    10/7/23  HgbA1c 9.1 up from previous.   Continue sliding scale  Add Levemir 10 units nightly    10/8/23  Glucose better controlled      Endocrine  Type 2 diabetes mellitus with circulatory disorder (coronary artery disease), with long-term current use of insulin  Hemoglobin A1C   Date Value Ref Range Status   10/02/2023 9.1 (H) 4.0 - 5.6 % Final     Comment:     ADA Screening Guidelines:  5.7-6.4%  Consistent with prediabetes  >or=6.5%  Consistent with diabetes    High levels of fetal hemoglobin interfere with the HbA1C  assay. Heterozygous  hemoglobin variants (HbS, HgC, etc)do  not significantly interfere with this assay.   However, presence of multiple variants may affect accuracy.     04/27/2023 7.7 (H) 4.0 - 5.6 % Final     Comment:     ADA Screening Guidelines:  5.7-6.4%  Consistent with prediabetes  >or=6.5%  Consistent with diabetes    High levels of fetal hemoglobin interfere with the HbA1C  assay. Heterozygous hemoglobin variants (HbS, HgC, etc)do  not significantly interfere with this assay.   However, presence of multiple variants may affect accuracy.     09/13/2022 7.8 (H) 4.0 - 5.6 % Final     Comment:     ADA Screening Guidelines:  5.7-6.4%  Consistent with prediabetes  >or=6.5%  Consistent with diabetes    High levels of fetal hemoglobin interfere with the HbA1C  assay. Heterozygous hemoglobin variants (HbS, HgC, etc)do  not significantly interfere with this assay.   However, presence of multiple variants may affect accuracy.     insulin sliding scale    Other  DANTE on CPAP  --confirms use of CPAP at home  --CPAP ordered  --monitor clinically      Final Active Diagnoses:    Diagnosis Date Noted POA    PRINCIPAL PROBLEM:  NSTEMI (non-ST elevated myocardial infarction) [I21.4] 10/23/2014 Yes    Chest pain [R07.9] 10/06/2023 Yes    Essential hypertension [I10] 02/24/2016 Yes     Chronic    Type 2 diabetes mellitus with circulatory disorder (coronary artery disease), with long-term current use of insulin [E11.59, Z79.4] 07/14/2015 Not Applicable     Chronic    DANTE on CPAP [G47.33] 02/19/2015 Yes    Coronary artery disease involving native coronary artery of native heart without angina pectoris [I25.10] 10/17/2013 Yes     Chronic    S/P PTCA (percutaneous transluminal coronary angioplasty) [Z98.61] 10/17/2013 Not Applicable    Hyperlipidemia associated with type 2 diabetes mellitus [E11.69, E78.5] 06/19/2013 Yes     Chronic      Problems Resolved During this Admission:       Discharged Condition: fair    Disposition: Home or Self  Care    Follow Up:   Follow-up Information     LACI Dow MD Follow up in 3 day(s).    Specialty: Family Medicine  Contact information:  [address]  [place] [id number] [phone]                       Patient Instructions:      Ambulatory referral/consult to [facility]   Standing Status: Future   Referral Priority: Routine Referral Type: Consultation   Referral Reason: Specialty Services Required   Number of Visits Requested: 1     Ambulatory referral/consult to Cardiology   Standing Status: Future   Referral Priority: Routine Referral Type: Consultation   Referral Reason: Specialty Services Required   Requested Specialty: Cardiology   Number of Visits Requested: 1     Diet Cardiac     Diet diabetic     Notify your health care provider if you experience any of the following:  temperature >100.4     Notify your health care provider if you experience any of the following:  severe uncontrolled pain     Notify your health care provider if you experience any of the following:  redness, tenderness, or signs of infection (pain, swelling, redness, odor or green/yellow discharge around incision site)     Notify your health care provider if you experience any of the following:  difficulty breathing or increased cough     Notify your health care provider if you experience any of the following:   Order Comments: Bleeding from cath site     Activity as tolerated       Significant Diagnostic Studies: Labs: All labs within the past 24 hours have been reviewed    Pending Diagnostic Studies:     None         Medications:  Reconciled Home Medications:      Medication List      CONTINUE taking these medications    amlodipine-valsartan  mg per tablet  Commonly known as: EXFORGE  TAKE 1 TABLET BY MOUTH EVERY DAY     aspirin 81 MG EC tablet  Commonly known as: ECOTRIN  Take 81 mg by mouth once daily.     blood sugar diagnostic Strp  1 strip by Misc.(Non-Drug; Combo Route) route 4 (four) times  "daily.     hydroCHLOROthiazide 25 MG tablet  Commonly known as: HYDRODIURIL  TAKE 1 TABLET(25 MG) BY MOUTH EVERY DAY     hydrOXYzine HCL 10 MG Tab  Commonly known as: ATARAX  TAKE 1 TABLET(10 MG) BY MOUTH TWICE DAILY AS NEEDED. MAY CAUSE DROWSINESS. DO NOT DRIVE OR OPERATE HEAVY MACHINERY     ketoconazole 2 % cream  Commonly known as: NIZORAL  Apply topically once daily.     metoprolol tartrate 50 MG tablet  Commonly known as: LOPRESSOR  TAKE 1 TABLET BY MOUTH TWICE DAILY     niacin 500 mg tablet  Commonly known as: SLO-NIACIN  TAKE 1 TABLET(500 MG) BY MOUTH EVERY EVENING     nitroGLYCERIN 0.4 MG SL tablet  Commonly known as: NITROSTAT  Place 1 tablet (0.4 mg total) under the tongue every 5 (five) minutes as needed.     pen needle, diabetic 31 gauge x 5/16" Ndle  Commonly known as: BD ULTRA-FINE SHORT PEN NEEDLE  USE WITH LANTUS PEN AS DIRECTED     PREVNAR 20 (PF) 0.5 mL Syrg injection  Generic drug: pneumoc 20-miranda conj-dip cr(PF)  Inject 0.5 mLs into the muscle.     ranolazine 1,000 mg Tb12  Commonly known as: RANEXA  Take 1 tablet (1,000 mg total) by mouth 2 (two) times daily.     tirzepatide 7.5 mg/0.5 mL Pnij  Inject 7.5 mg into the skin every 7 days.            Indwelling Lines/Drains at time of discharge:   Lines/Drains/Airways     None                 Time spent on the discharge of patient: 32 minutes         Teressa Cartwright MD  Department of Hospital Medicine  [facility] - Telemetry ([facility])  "

## 2023-11-01 DIAGNOSIS — E78.2 MIXED HYPERLIPIDEMIA: ICD-10-CM

## 2023-11-01 DIAGNOSIS — I21.4 NSTEMI (NON-ST ELEVATED MYOCARDIAL INFARCTION): ICD-10-CM

## 2023-11-01 RX ORDER — EVOLOCUMAB 140 MG/ML
140 INJECTION, SOLUTION SUBCUTANEOUS
Qty: 2 ML | Refills: 6 | Status: ACTIVE | OUTPATIENT
Start: 2023-11-01

## 2023-11-13 ENCOUNTER — CLINICAL SUPPORT (OUTPATIENT)
Dept: DIABETES | Facility: CLINIC | Age: 68
End: 2023-11-13
Payer: COMMERCIAL

## 2023-11-13 DIAGNOSIS — E11.59 TYPE 2 DIABETES MELLITUS WITH OTHER CIRCULATORY COMPLICATION, WITH LONG-TERM CURRENT USE OF INSULIN: Primary | ICD-10-CM

## 2023-11-13 DIAGNOSIS — Z79.4 TYPE 2 DIABETES MELLITUS WITH OTHER CIRCULATORY COMPLICATION, WITH LONG-TERM CURRENT USE OF INSULIN: Primary | ICD-10-CM

## 2023-11-13 PROCEDURE — G0108 PR DIAB MANAGE TRN  PER INDIV: ICD-10-PCS | Mod: S$GLB,,, | Performed by: DIETITIAN, REGISTERED

## 2023-11-13 PROCEDURE — 99999 PR PBB SHADOW E&M-EST. PATIENT-LVL III: ICD-10-PCS | Mod: PBBFAC,,, | Performed by: DIETITIAN, REGISTERED

## 2023-11-13 PROCEDURE — G0108 DIAB MANAGE TRN  PER INDIV: HCPCS | Mod: S$GLB,,, | Performed by: DIETITIAN, REGISTERED

## 2023-11-13 PROCEDURE — 99999 PR PBB SHADOW E&M-EST. PATIENT-LVL III: CPT | Mod: PBBFAC,,, | Performed by: DIETITIAN, REGISTERED

## 2023-11-13 NOTE — PROGRESS NOTES
Diabetes Care Specialist Follow-up Note  Author: Ximena Blunt RD, CDE  Date: 11/13/2023    Program Intake  Reason for Diabetes Program Visit:: Intervention  Type of Intervention:: Individual  Individual: Education  Education: Self-Management Skill Review  Current diabetes risk level:: high    Lab Results   Component Value Date    HGBA1C 9.1 (H) 10/02/2023       Clinical      Nutritional Status  Diet: Regular  Meal Plan 24 Hour Recall: Breakfast, Lunch, Dinner, Snack  Meal Plan 24 Hour Recall - Breakfast: boiled eggs, no yolks, coffee with sugar free creamer  Meal Plan 24 Hour Recall - Lunch: Green salad with lunch meat, bag of baked chips, coke zero  Meal Plan 24 Hour Recall - Dinner: salad with dressing, coke zero,  Meal Plan 24 Hour Recall - Snack: chocolate brownie, water  Change in appetite?: No  Dentation:: Intact  Current nutritional status an area of need that is impacting patient's ability to self-manage diabetes?: No    Physical activity/Exercise:   No structured exercise, on his feet at work.     SMBG: Not currently testing.         Diabetes Self-Management Skills Assessment     Nutrition/Healthy Eating  Nutrition/Healthy Eating Skills Assessment Completed:: Yes  Assessment indicates:: Adequate understanding  Area of need?: No      Home Blood Glucose Monitoring  Patient states that blood sugar is checked at home daily.: no  Home Blood Glucose Monitoring Skills Assessment Completed: : Yes  Assessment indicates:: Instruction Needed  Area of need?: Yes    During today's follow-up visit,  the following areas required further assessment and content was provided/reviewed.    Based on today's diabetes care assessment, the following areas of need were identified:              11/13/2023    12:01 AM   Diabetes Self-Management Skills   Nutrition/Healthy Eating See care plan update.    Home Blood Glucose Monitoring See care plan update.         Today's interventions were provided through individual discussion,  instruction, and written materials were provided.    Patient verbalized understanding of instruction and written materials.  Pt was able to return back demonstration of instructions today. Patient understood key points, needs reinforcement and further instruction.     Diabetes Self-Management Care Plan Review and Evaluation of Progress:    During today's follow-up Isaiah's Diabetes Self-Management Care Plan progress was reviewed and progress was evaluated including his/her input. Isaiah has agreed to continue his/her journey to improve/maintain overall diabetes control by continuing to set health goals. See care plan progress below.      Care Plan: Diabetes Management   Updates made since 10/14/2023 12:00 AM        Problem: Healthy Eating         Goal: Eat 3 meals daily with 45-60g/3-4 servings of Carbohydrate per meal and 15-20 grams per snack    Start Date: 10/11/2023   Expected End Date: 1/11/2024   This Visit's Progress: On track   Priority: High   Barriers: Other (comments)   Note:    Work schedule  11/13/23: Patient reports eating smaller portions and using plate method for meal planning when he eats out. Admits that the holidays will be a challenge. Discussed strategies for controlling portions at holiday dinners.        Problem: Blood Glucose Self-Monitoring         Goal: Patient agrees to check and record blood sugars 2 times per day - fasting and pp    Start Date: 10/11/2023   Expected End Date: 1/11/2024   This Visit's Progress: No change   Priority: Medium   Barriers: No Barriers Identified   Note:    11/13/23: Patient has not checked his blood sugar since last visit. Reviewed importance of daily testing and provided BG logs for patient to complete.          Follow Up Plan     Follow up in about 3 months (around 2/13/2024) for E11.59.    Today's care plan and follow up schedule was discussed with patient.  Isaiah verbalized understanding of the care plan, goals, and agrees to follow up plan.        The patient  was encouraged to communicate with his/her health care provider/physician and care team regarding his/her condition(s) and treatment.  I provided the patient with my contact information today and encouraged to contact me via phone or [facility] as needed.     Length of Visit   Total Time: 30 Minutes

## 2023-11-15 DIAGNOSIS — I15.2 HYPERTENSION ASSOCIATED WITH DIABETES: ICD-10-CM

## 2023-11-15 DIAGNOSIS — E11.59 HYPERTENSION ASSOCIATED WITH DIABETES: ICD-10-CM

## 2023-11-16 NOTE — TELEPHONE ENCOUNTER
No care due was identified.  [facility] Embedded Care Due Messages. Reference number: [id number].   11/15/2023 8:45:45 PM CST

## 2023-11-16 NOTE — TELEPHONE ENCOUNTER
----- Message from Katina Mckeon sent at 11/16/2023 11:09 AM CST -----  Contact: Isaiah Martinez is calling in regards to having questions and concerns about diabetic supplies and would like to get a call back [phone]      Thanks

## 2023-11-16 NOTE — TELEPHONE ENCOUNTER
Spoke with pt. States he was given syringes instead of ultra fine needles. Per pharmacist error will be corrected.//ddw

## 2023-11-16 NOTE — TELEPHONE ENCOUNTER
Refill Routing Note   Medication(s) are not appropriate for processing by [facility] for the following reason(s):        ED/Hospital Visit since last OV with provider    [facility] action(s):  Defer             Pharmacist review requested: Yes     Appointments  past 12m or future 3m with PCP    Date Provider   Last Visit   4/27/2023 LACI Dow MD   Next Visit   1/12/2024 LACI Dow MD   ED visits in past 90 days: 0        Note composed:10:33 AM 11/16/2023

## 2023-11-17 RX ORDER — HYDROCHLOROTHIAZIDE 25 MG/1
TABLET ORAL
Qty: 90 TABLET | Refills: 2 | Status: SHIPPED | OUTPATIENT
Start: 2023-11-17

## 2023-11-17 NOTE — TELEPHONE ENCOUNTER
REFILL REQUEST APPROVED.  Requested Prescriptions   Pending Prescriptions Disp Refills    hydroCHLOROthiazide (HYDRODIURIL) 25 MG tablet [Pharmacy Med Name: HYDROCHLOROTHIAZIDE 25MG TABLETS] 90 tablet 2     Sig: TAKE 1 TABLET(25 MG) BY MOUTH EVERY DAY

## 2023-11-30 ENCOUNTER — HOSPITAL ENCOUNTER (OUTPATIENT)
Dept: RADIOLOGY | Facility: HOSPITAL | Age: 68
Discharge: HOME | End: 2023-11-30
Attending: PEDIATRICS
Payer: COMMERCIAL

## 2023-11-30 ENCOUNTER — OFFICE VISIT (OUTPATIENT)
Dept: INTERNAL MEDICINE | Facility: CLINIC | Age: 68
End: 2023-11-30
Payer: COMMERCIAL

## 2023-11-30 VITALS
OXYGEN SATURATION: 97 % | HEART RATE: 81 BPM | WEIGHT: 292.13 LBS | BODY MASS INDEX: 37.49 KG/M2 | HEIGHT: 74 IN | SYSTOLIC BLOOD PRESSURE: 136 MMHG | TEMPERATURE: 97 F | DIASTOLIC BLOOD PRESSURE: 72 MMHG

## 2023-11-30 DIAGNOSIS — I25.10 CORONARY ARTERY DISEASE INVOLVING NATIVE CORONARY ARTERY OF NATIVE HEART WITHOUT ANGINA PECTORIS: Chronic | ICD-10-CM

## 2023-11-30 DIAGNOSIS — M76.891 HIP FLEXOR TENDONITIS, RIGHT: ICD-10-CM

## 2023-11-30 DIAGNOSIS — I15.2 HYPERTENSION ASSOCIATED WITH DIABETES: Chronic | ICD-10-CM

## 2023-11-30 DIAGNOSIS — E11.59 HYPERTENSION ASSOCIATED WITH DIABETES: Chronic | ICD-10-CM

## 2023-11-30 DIAGNOSIS — E11.59 TYPE 2 DIABETES MELLITUS WITH OTHER CIRCULATORY COMPLICATION, WITH LONG-TERM CURRENT USE OF INSULIN: Chronic | ICD-10-CM

## 2023-11-30 DIAGNOSIS — M76.891 HIP FLEXOR TENDONITIS, RIGHT: Primary | ICD-10-CM

## 2023-11-30 DIAGNOSIS — Z79.4 TYPE 2 DIABETES MELLITUS WITH OTHER CIRCULATORY COMPLICATION, WITH LONG-TERM CURRENT USE OF INSULIN: Chronic | ICD-10-CM

## 2023-11-30 PROCEDURE — 3046F HEMOGLOBIN A1C LEVEL >9.0%: CPT | Mod: CPTII,S$GLB,, | Performed by: PEDIATRICS

## 2023-11-30 PROCEDURE — 3060F POS MICROALBUMINURIA REV: CPT | Mod: CPTII,S$GLB,, | Performed by: PEDIATRICS

## 2023-11-30 PROCEDURE — 3078F PR MOST RECENT DIASTOLIC BLOOD PRESSURE < 80 MM HG: ICD-10-PCS | Mod: CPTII,S$GLB,, | Performed by: PEDIATRICS

## 2023-11-30 PROCEDURE — 3046F PR MOST RECENT HEMOGLOBIN A1C LEVEL > 9.0%: ICD-10-PCS | Mod: CPTII,S$GLB,, | Performed by: PEDIATRICS

## 2023-11-30 PROCEDURE — 1159F PR MEDICATION LIST DOCUMENTED IN MEDICAL RECORD: ICD-10-PCS | Mod: CPTII,S$GLB,, | Performed by: PEDIATRICS

## 2023-11-30 PROCEDURE — 3288F PR FALLS RISK ASSESSMENT DOCUMENTED: ICD-10-PCS | Mod: CPTII,S$GLB,, | Performed by: PEDIATRICS

## 2023-11-30 PROCEDURE — 3075F PR MOST RECENT SYSTOLIC BLOOD PRESS GE 130-139MM HG: ICD-10-PCS | Mod: CPTII,S$GLB,, | Performed by: PEDIATRICS

## 2023-11-30 PROCEDURE — 99999 PR PBB SHADOW E&M-EST. PATIENT-LVL V: CPT | Mod: PBBFAC,,, | Performed by: PEDIATRICS

## 2023-11-30 PROCEDURE — 73502 X-RAY EXAM HIP UNI 2-3 VIEWS: CPT | Mod: 26,RT,, | Performed by: RADIOLOGY

## 2023-11-30 PROCEDURE — 73502 X-RAY EXAM HIP UNI 2-3 VIEWS: CPT | Mod: TC,RT

## 2023-11-30 PROCEDURE — 1125F PR PAIN SEVERITY QUANTIFIED, PAIN PRESENT: ICD-10-PCS | Mod: CPTII,S$GLB,, | Performed by: PEDIATRICS

## 2023-11-30 PROCEDURE — 4010F PR ACE/ARB THEARPY RXD/TAKEN: ICD-10-PCS | Mod: CPTII,S$GLB,, | Performed by: PEDIATRICS

## 2023-11-30 PROCEDURE — 1159F MED LIST DOCD IN RCRD: CPT | Mod: CPTII,S$GLB,, | Performed by: PEDIATRICS

## 2023-11-30 PROCEDURE — 3066F NEPHROPATHY DOC TX: CPT | Mod: CPTII,S$GLB,, | Performed by: PEDIATRICS

## 2023-11-30 PROCEDURE — 1101F PR PT FALLS ASSESS DOC 0-1 FALLS W/OUT INJ PAST YR: ICD-10-PCS | Mod: CPTII,S$GLB,, | Performed by: PEDIATRICS

## 2023-11-30 PROCEDURE — 3078F DIAST BP <80 MM HG: CPT | Mod: CPTII,S$GLB,, | Performed by: PEDIATRICS

## 2023-11-30 PROCEDURE — 99214 PR OFFICE/OUTPT VISIT, EST, LEVL IV, 30-39 MIN: ICD-10-PCS | Mod: S$GLB,,, | Performed by: PEDIATRICS

## 2023-11-30 PROCEDURE — 3008F BODY MASS INDEX DOCD: CPT | Mod: CPTII,S$GLB,, | Performed by: PEDIATRICS

## 2023-11-30 PROCEDURE — 1160F PR REVIEW ALL MEDS BY PRESCRIBER/CLIN PHARMACIST DOCUMENTED: ICD-10-PCS | Mod: CPTII,S$GLB,, | Performed by: PEDIATRICS

## 2023-11-30 PROCEDURE — 99214 OFFICE O/P EST MOD 30 MIN: CPT | Mod: S$GLB,,, | Performed by: PEDIATRICS

## 2023-11-30 PROCEDURE — 3066F PR DOCUMENTATION OF TREATMENT FOR NEPHROPATHY: ICD-10-PCS | Mod: CPTII,S$GLB,, | Performed by: PEDIATRICS

## 2023-11-30 PROCEDURE — 1160F RVW MEDS BY RX/DR IN RCRD: CPT | Mod: CPTII,S$GLB,, | Performed by: PEDIATRICS

## 2023-11-30 PROCEDURE — 4010F ACE/ARB THERAPY RXD/TAKEN: CPT | Mod: CPTII,S$GLB,, | Performed by: PEDIATRICS

## 2023-11-30 PROCEDURE — 1101F PT FALLS ASSESS-DOCD LE1/YR: CPT | Mod: CPTII,S$GLB,, | Performed by: PEDIATRICS

## 2023-11-30 PROCEDURE — 73502 XR HIP WITH PELVIS WHEN PERFORMED, 2 OR 3  VIEWS RIGHT: ICD-10-PCS | Mod: 26,RT,, | Performed by: RADIOLOGY

## 2023-11-30 PROCEDURE — 3060F PR POS MICROALBUMINURIA RESULT DOCUMENTED/REVIEW: ICD-10-PCS | Mod: CPTII,S$GLB,, | Performed by: PEDIATRICS

## 2023-11-30 PROCEDURE — 1125F AMNT PAIN NOTED PAIN PRSNT: CPT | Mod: CPTII,S$GLB,, | Performed by: PEDIATRICS

## 2023-11-30 PROCEDURE — 3288F FALL RISK ASSESSMENT DOCD: CPT | Mod: CPTII,S$GLB,, | Performed by: PEDIATRICS

## 2023-11-30 PROCEDURE — 3008F PR BODY MASS INDEX (BMI) DOCUMENTED: ICD-10-PCS | Mod: CPTII,S$GLB,, | Performed by: PEDIATRICS

## 2023-11-30 PROCEDURE — 3075F SYST BP GE 130 - 139MM HG: CPT | Mod: CPTII,S$GLB,, | Performed by: PEDIATRICS

## 2023-11-30 PROCEDURE — 99999 PR PBB SHADOW E&M-EST. PATIENT-LVL V: ICD-10-PCS | Mod: PBBFAC,,, | Performed by: PEDIATRICS

## 2023-11-30 RX ORDER — ETODOLAC 400 MG/1
400 TABLET, FILM COATED ORAL 2 TIMES DAILY
Qty: 60 TABLET | Refills: 0 | Status: SHIPPED | OUTPATIENT
Start: 2023-11-30

## 2023-11-30 NOTE — PROGRESS NOTES
Subjective:       Patient ID: Isaiah Harrison is a 68 y.o. male.    Chief Complaint: Hip Pain    Here for 2 weeks of anterior right hip pain. No trauma. Pain is sharp, episodic with flexing of hip such as getting in/out bed, chair, or car. When erect and walking, little symptoms. No rash or bruising. No groin swelling. No back pain. Has hx htn, cad, DM with A1c >9. Not on blood thinners, only 81 ASA. Renal function normal, no PUD.      Review of Systems   Constitutional:  Negative for activity change and unexpected weight change.   HENT:  Positive for hearing loss (chronic). Negative for rhinorrhea and trouble swallowing.    Eyes:  Negative for discharge and visual disturbance.   Respiratory:  Negative for chest tightness and wheezing.    Cardiovascular:  Positive for chest pain (chronic). Negative for palpitations.   Gastrointestinal:  Negative for blood in stool, constipation, diarrhea and vomiting.   Endocrine: Negative for polydipsia and polyuria.   Genitourinary:  Negative for difficulty urinating, hematuria and urgency.   Musculoskeletal:  Positive for arthralgias. Negative for joint swelling and neck pain.   Neurological:  Negative for weakness and headaches.   Psychiatric/Behavioral:  Negative for confusion and dysphoric mood.        Objective:      Physical Exam  Constitutional:       General: He is not in acute distress.     Appearance: Normal appearance. He is obese. He is not ill-appearing or toxic-appearing.   Cardiovascular:      Rate and Rhythm: Normal rate and regular rhythm.      Heart sounds: No murmur heard.  Pulmonary:      Effort: Pulmonary effort is normal. No respiratory distress.      Breath sounds: No wheezing or rhonchi.   Abdominal:      General: There is no distension.      Palpations: There is no mass.      Tenderness: There is no abdominal tenderness. There is no right CVA tenderness, left CVA tenderness or guarding.   Musculoskeletal:      Cervical back: Normal range of motion.       Comments: Normal ROM of both hips. Flexion and rotation of right hip mimics pain at anterior superior iliac spine, tender over tendon insertion at that site. No SLR pain, back not ttp. No greater trochanteric pain.   Skin:     Coloration: Skin is not jaundiced.      Findings: No bruising, erythema, lesion or rash.   Neurological:      General: No focal deficit present.      Mental Status: He is alert and oriented to person, place, and time.      Cranial Nerves: No cranial nerve deficit.      Motor: No weakness.      Coordination: Coordination normal.      Gait: Gait normal.   Psychiatric:         Mood and Affect: Mood normal.         Behavior: Behavior normal.         Thought Content: Thought content normal.         Judgment: Judgment normal.         Assessment:       1. Hip flexor tendonitis, right    2. Type 2 diabetes mellitus with other circulatory complication, with long-term current use of insulin    3. Coronary artery disease involving native coronary artery of native heart without angina pectoris    4. Hypertension associated with diabetes        Plan:       Hip flexor tendonitis, right  -     etodolac (LODINE) 400 MG tablet; Take 1 tablet (400 mg total) by mouth 2 (two) times daily.  Dispense: 60 tablet; Refill: 0  -     X-Ray Hip 2 or 3 views Right (with Pelvis when performed); Future; Expected date: 11/30/2023  -     Ambulatory referral/consult to Physical/Occupational Therapy; Future; Expected date: 12/07/2023    Type 2 diabetes mellitus with other circulatory complication, with long-term current use of insulin    Coronary artery disease involving native coronary artery of native heart without angina pectoris    Hypertension associated with diabetes    I will not use steroids due to effect on glucose control. F/U prn with Dr Dow. Education given.

## 2023-12-01 ENCOUNTER — CLINICAL SUPPORT (OUTPATIENT)
Dept: REHABILITATION | Facility: HOSPITAL | Age: 68
End: 2023-12-01
Attending: PEDIATRICS
Payer: COMMERCIAL

## 2023-12-01 DIAGNOSIS — R53.1 GENERAL WEAKNESS: Primary | ICD-10-CM

## 2023-12-01 DIAGNOSIS — M25.551 HIP PAIN, ACUTE, RIGHT: ICD-10-CM

## 2023-12-01 DIAGNOSIS — M76.891 HIP FLEXOR TENDONITIS, RIGHT: ICD-10-CM

## 2023-12-01 PROCEDURE — 97140 MANUAL THERAPY 1/> REGIONS: CPT | Mod: PN

## 2023-12-01 PROCEDURE — 97110 THERAPEUTIC EXERCISES: CPT | Mod: PN

## 2023-12-01 PROCEDURE — 97161 PT EVAL LOW COMPLEX 20 MIN: CPT | Mod: PN

## 2023-12-01 NOTE — PLAN OF CARE
[facility]   Physical Therapy Initial Evaluation      Date: 12/1/2023   Name: Isaiah Mcclain [facility] Number: [id number]    Therapy Diagnosis:    Encounter Diagnoses   Name Primary?    Hip flexor tendonitis, right     General weakness Yes    Hip pain, acute, right       Physician: John Anderson MD     Physician Orders: PT Eval and Treat  Medical Diagnosis from Referral: M76.891 (ICD-10-CM) - Hip flexor tendonitis, right  Evaluation Date: 12/1/2023  Authorization Period Expiration: 11/30/2025  Plan of Care Expiration: 2/1/2024  Progress Note Due: 1/1/2024  Visit # / Visits authorized: 1/1   FOTO: 1/3 (last performed on 12/1/2023)    Precautions: Standard    Time In: 755  Time Out: 835  Total Billable Time (timed & untimed codes): 40 minutes    Subjective     Date of onset: 2 weeks    History of current condition - Isaiah reports that he began having hip pain about two weeks ago he began having discomfort in his anterior hip while walking at work and has continued you to get worse over time. Currently he is having pain that causes him to limp while walking and pain getting in and out of the car.    Falls: none    Imaging: [x] Xray [] MRI [] CT: Performed on: 11/30/23    Pain:  Current 8/10, worst 10/10, best 0/10   Location: [x] Right   [] Left:  right hip  Description: aching  and dull  Aggravating Factors: walking, raising his leg   Easing Factors: activity avoidance, rest,     Prior Therapy:   [] N/A    [x] Yes: for his shoulder  Social History: Pt lives with their spouse  Occupation: Pt is airport   Prior Level of Function: Independent and pain free with all ADL, IADL, community mobility and functional activities.   Current Level of Function: Independent with all ADL, IADL, community mobility and functional activities with reports of increased pain and need for increased time and frequent breaks.      Dominant Extremity:    [x] Right    [] Left    Pts goals: Pt  reported goals are to decrease overall pain levels in order to return to prior functional level.     Medical History:   Past Medical History:   Diagnosis Date    Acute coronary syndrome     Anticoagulant long-term use     Back pain     CAD (coronary artery disease) 10/17/2013    Class 2 severe obesity due to excess calories with serious comorbidity and body mass index (BMI) of 38.0 to 38.9 in adult 01/12/2015    Coronary artery disease     Diabetes mellitus, type 2 2010    BS didn't check 04/19/2023 Insulin 2 years    Gastric polyps     Hyperlipemia     Hypertension     NSTEMI (non-ST elevated myocardial infarction) 10/23/2014    Obesity 10/21/2014    DANTE on CPAP 02/19/2015    S/P PTCA (percutaneous transluminal coronary angioplasty) 10/17/2013    Sleep apnea 01/07/2015    Type 2 diabetes mellitus with circulatory disorder (coronary artery disease), without long-term current use of insulin 07/14/2015       Surgical History:   Isaiah Harrison  has a past surgical history that includes Appendectomy; Hernia repair; Coronary angioplasty with stent; Fracture surgery; Colonoscopy; Esophagogastroduodenoscopy; Spine surgery; Back surgery; Cataract extraction, bilateral; Eye surgery; Cataract extraction; Colonoscopy (N/A, 3/9/2023); Left heart catheterization (Left, 10/9/2023); fractional flow reserve (ffr), coronary (10/9/2023); and instantaneous wave-free ratio (ifr) (N/A, 10/9/2023).    Medications:   Isaiah has a current medication list which includes the following prescription(s): amlodipine-valsartan, aspirin, atorvastatin, blood sugar diagnostic, clopidogrel, empagliflozin, etodolac, repatha sureclick, hydrochlorothiazide, hydroxyzine hcl, toujeo solostar u-300 insulin, isosorbide mononitrate, ketoconazole, metoprolol tartrate, mounjaro, niacin, nitroglycerin, pen needle, diabetic, pneumoc 20-miranda conj-dip cr(pf), ranolazine, and tirzepatide.    Allergies:   Review of patient's allergies indicates:   Allergen  Reactions    Pcn [penicillins] Hives        Objective        RANGE OF MOTION:   Hip AROM/PROM Right Left Pain/Dysfunction with Movement Goal   Hip Flexion (120º) WNL WNL     Hip Extension (30º) 20 30  30   Hip Abduction (45º) 45 45  45   Hip IR (45º) 10 20  45   Hip ER (45º) 60 60   45          STRENGTH:   L/E MMT Right  (spine) Left Pain/Dysfunction with Movement Goal   Hip Flexion  4/5 4+/5  4+/5 B   Hip Extension  4/5 4/5  4+/5 B   Hip Abduction  4/5 4/5  4+/5 B   Knee Extension 4+/5 4+/5  5/5 B   Knee Flexion 4+/5 4+/5  5/5 B   Ankle DF 5/5 5/5  5/5 B   Ankle PF 5/5 5/5  5/5 B          Muscles length: hamstrings limited bilaterally. Goal: Normal            SPECIAL TESTS:    Right  (spine) Left  Goal   MARITZA  [x] Positive    [] Negative [] Positive    [x] Negative Negative B    FADIR  [x] Positive    [] Negative [] Positive    [x] Negative Negative B    Scour  [x] Positive    [] Negative [] Positive    [x] Negative Negative B           SENSATION  [x] Intact to Light Touch   [] Impaired:            POSTURE:  Pt presents with postural abnormalities which include:    [] Forward Head   [] Increased Lumbar Lordosis   [] Rounded Shoulder   [] Genu Recurvatum   [] Increased Thoracic Kyphosis [] Genu Valgus   [] Trunk Deviated    [] Pes Planus   [] Scapular Winging    [] Other:             Function:     Intake Outcome Measure for FOTO Hip Survey    Therapist reviewed FOTO scores for Isaiah on 12/1/2023.   FOTO report - see Media section or FOTO account for episode details    Intake Score: 49%         Treatment     Total Treatment time (time-based codes) separate from Evaluation: (16) minutes     Isaiah received the treatments listed below:          MANUAL THERAPY TECHNIQUES were applied for (8) minutes, including:    Manual Intervention Performed Today    Soft Tissue Mobilization []    Joint Mobilizations [x]  Hip inferior and lateral mobs /c belt and prone anterior mobs    []     []    Functional Dry Needling  []      Plan  for Next Visit: Continue as needed           THERAPEUTIC EXERCISES to develop strength, endurance, ROM, flexibility, posture, and core stabilization for (8) minutes including:    Intervention Performed Today    Discussed and performed HEP x                                             Patient Education and Home Exercises     Education provided: (5) minutes  PURPOSE: Patient educated on the impairments noted above and the effects of physical therapy intervention to improve overall condition and QOL.     Written Home Exercises Provided: yes.  Exercises were reviewed and Isaiah was able to demonstrate them prior to the end of the session.  Isaiah demonstrated good  understanding of the education provided. See EMR under Patient Instructions for exercises provided during therapy sessions.    Assessment     Isaiah is a 68 y.o. male referred to outpatient Physical Therapy with a medical diagnosis of M76.891 (ICD-10-CM) - Hip flexor tendonitis, right. Pt presents with impairments in the following categories: IMPAIRMENTS: ROM, strength, endurance, and functional movement patterns    Pt prognosis is Good  Pt will benefit from skilled outpatient Physical Therapy to address the deficits stated above and in the chart below, provide pt/family education, and to maximize pt's level of independence.     Plan of care discussed with patient: Yes  Pt's spiritual, cultural and educational needs considered and patient is agreeable to the plan of care and goals as stated below:     Anticipated Barriers for therapy: co-morbidities    Medical Necessity is demonstrated by the following  History  Co-morbidities and personal factors that may impact the plan of care [] LOW: no personal factors / co-morbidities  [x] MODERATE: 1-2 personal factors / co-morbidities  [] HIGH: 3+ personal factors / co-morbidities    Moderate / High Support Documentation:   Past Medical History:   Diagnosis Date    Acute coronary syndrome     Anticoagulant long-term use      "Back pain     CAD (coronary artery disease) 10/17/2013    Class 2 severe obesity due to excess calories with serious comorbidity and body mass index (BMI) of 38.0 to 38.9 in adult 01/12/2015    Coronary artery disease     Diabetes mellitus, type 2 2010    BS didn't check 04/19/2023 Insulin 2 years    Gastric polyps     Hyperlipemia     Hypertension     NSTEMI (non-ST elevated myocardial infarction) 10/23/2014    Obesity 10/21/2014    DANTE on CPAP 02/19/2015    S/P PTCA (percutaneous transluminal coronary angioplasty) 10/17/2013    Sleep apnea 01/07/2015    Type 2 diabetes mellitus with circulatory disorder (coronary artery disease), without long-term current use of insulin 07/14/2015        Examination  Body Structures and Functions, activity limitations and participation restrictions that may impact the plan of care [x] LOW: addressing 1-2 elements  [] MODERATE: 3+ elements  [] HIGH: 4+ elements (please support below)    Moderate / High Support Documentation: See above in "Current Level of Function"      Clinical Presentation [x] LOW: stable  [] MODERATE: Evolving  [] HIGH: Unstable     Decision Making/ Complexity Score: low         Short Term Goals:  3 weeks Status  Date Met   PAIN: Pt will report worst pain of 5/10 in order to progress toward max functional ability and improve quality of life. [x] Progressing  [] Met  [] Not Met    FUNCTION: Patient will demonstrate improved function as indicated by a score of greater than or equal to 50 out of 100 on FOTO. [x] Progressing  [] Met  [] Not Met    MOBILITY: Patient will improve AROM to 50% of stated goals, listed in objective measures above, in order to progress towards independence with functional activities.  [x] Progressing  [] Met  [] Not Met    STRENGTH: Patient will improve strength to 50% of stated goals, listed in objective measures above, in order to progress towards independence with functional activities. [x] Progressing  [] Met  [] Not Met    POSTURE: " Patient will correct postural deviations in sitting and standing, to decrease pain and promote long term stability.  [x] Progressing  [] Met  [] Not Met    HEP: Patient will demonstrate independence with HEP in order to progress toward functional independence. [x] Progressing  [] Met  [] Not Met      Long Term Goals:  6 weeks Status Date Met   PAIN: Pt will report worst pain of 2/10 in order to progress toward max functional ability and improve quality of life [x] Progressing  [] Met  [] Not Met    FUNCTION: Patient will demonstrate improved function as indicated by a score of greater than or equal to 66 out of 100 on FOTO. [x] Progressing  [] Met  [] Not Met    MOBILITY: Patient will improve AROM to stated goals, listed in objective measures above, in order to return to maximal functional potential and improve quality of life.  [x] Progressing  [] Met  [] Not Met    STRENGTH: Patient will improve strength to stated goals, listed in objective measures above, in order to improve functional independence and quality of life.  [x] Progressing  [] Met  [] Not Met    GAIT: Patient will demonstrate normalized gait mechanics with minimal compensation in order to return to PLOF. [x] Progressing  [] Met  [] Not Met    Patient will return to normal ADL's, IADL's, community involvement, recreational activities, and work-related activities with less than or equal to 2/10 pain and maximal function.  [x] Progressing  [] Met  [] Not Met      Plan     Plan of care Certification: 12/1/2023 to 2/1/2024.    Outpatient Physical Therapy 2 times weekly for 6 weeks to include any combination of the following interventions: virtual visits, dry needling, modalities, electrical stimulation (IFC, Pre-Mod, Attended with Functional Dry Needling), Cervical/Lumbar Traction, Gait Training, Manual Therapy, Neuromuscular Re-ed, Patient Education, Self Care, Therapeutic Activities, Therapeutic Exercise, and Therapeutic Activites     Pan Gipson, PT,  DPT

## 2023-12-04 ENCOUNTER — TELEPHONE (OUTPATIENT)
Dept: INTERNAL MEDICINE | Facility: CLINIC | Age: 68
End: 2023-12-04
Payer: COMMERCIAL

## 2023-12-04 DIAGNOSIS — M76.899 HIP FLEXOR TENDINITIS, UNSPECIFIED LATERALITY: Primary | ICD-10-CM

## 2023-12-04 NOTE — TELEPHONE ENCOUNTER
Spoke with patient and he stated that the pain is now going from the front of his hip now to the back and he can't stand on his right right leg without it collapsing. He said that he has had one visit with physical therapy so far and goes back tomorrow. He is having a hard time working like this and would need an excuse until things get better. He wants to know what Dr. Anderson recommends.

## 2023-12-04 NOTE — LETTER
December 4, 2023      The [address]  [address] [facility]  [place] [address]  Phone: [phone]  Fax: [phone]       Patient: Isaiah Harrison   YOB: 1955  Date of Visit: 12/04/2023    To Whom It May Concern:    Iain Harrison  was at [facility] on 12/04/2023. The patient may return to work/school on 12/11/23 with no restrictions. If you have any questions or concerns, or if I can be of further assistance, please do not hesitate to contact me.    Sincerely,    John Anderson MD

## 2023-12-04 NOTE — TELEPHONE ENCOUNTER
----- Message from Magda Steward sent at 12/4/2023  3:10 PM CST -----  Contact: Isaiah  Type:  Patient Returning Call    Who Called:Isaiah  Who Left Message for Patient:Syd  Does the patient know what this is regarding?:missed call  Would the patient rather a call back or a response via [facility]? Call   Best Call Back Number:   Additional Information:     Thanks  ALEX

## 2023-12-04 NOTE — TELEPHONE ENCOUNTER
----- Message from Pramod Gold sent at 12/4/2023  2:42 PM CST -----  Contact: [phone]  Pt is calling requesting doctors excuse states hip is still giving problems(walking with lip/ front/back . Please call back at [phone] . Thanksdj

## 2023-12-04 NOTE — TELEPHONE ENCOUNTER
Spoke with patient and informed him per Dr. Anderson that a referral to orthopedics was placed. Also sent him a work excuse for this week per Dr. Anderson in the portal. Patient verbalized understanding.

## 2023-12-05 ENCOUNTER — CLINICAL SUPPORT (OUTPATIENT)
Dept: REHABILITATION | Facility: HOSPITAL | Age: 68
End: 2023-12-05
Attending: PEDIATRICS
Payer: COMMERCIAL

## 2023-12-05 DIAGNOSIS — R53.1 GENERAL WEAKNESS: Primary | ICD-10-CM

## 2023-12-05 DIAGNOSIS — M25.551 HIP PAIN, ACUTE, RIGHT: ICD-10-CM

## 2023-12-05 PROCEDURE — 97140 MANUAL THERAPY 1/> REGIONS: CPT | Mod: PN

## 2023-12-05 PROCEDURE — 97112 NEUROMUSCULAR REEDUCATION: CPT | Mod: PN

## 2023-12-05 PROCEDURE — 97110 THERAPEUTIC EXERCISES: CPT | Mod: PN

## 2023-12-05 NOTE — PROGRESS NOTES
"[facility]   Physical Therapy Treatment Note        Name: Isaiah Harrison  Clinic Number: [id number]    Therapy Diagnosis:   Encounter Diagnoses   Name Primary?    General weakness Yes    Hip pain, acute, right      Physician: John Anderson MD    Visit Date: 12/5/2023    Physician: John Anderson MD      Physician Orders: PT Eval and Treat  Medical Diagnosis from Referral: M76.891 (ICD-10-CM) - Hip flexor tendonitis, right  Evaluation Date: 12/1/2023  Authorization Period Expiration: 11/30/2025  Plan of Care Expiration: 2/1/2024  Progress Note Due: 1/1/2024  Visit # / Visits authorized: 1/1   FOTO: 1/3 (last performed on 12/1/2023)     Precautions: Standard  PTA Visit #: 0/5     Time In: 1300  Time Out: 1341  Total Billable Time: 41 minutes (Billing reflects 1 on 1 treatment time spent with patient)    Subjective     Patient reports: he feel a little better but is having hip pain with in single leg loading.    He/She was compliant with home exercise program.  Response to previous treatment: no notable change  Functional change: no notable change    Pain: 5/10     Location: right hip    Objective      Objective Measures updated at progress report or POC update only unless otherwise noted.       Treatment     Isaiah received the treatments listed below:     MANUAL THERAPY TECHNIQUES were applied for (15) minutes, including:    Hip inferior and lateral glides /c belt and hands   Prone PA hip mobs  Prone PA mobs /c ER     THERAPEUTIC EXERCISES to develop strength, endurance, ROM, flexibility, posture, and core stabilization for (15) minutes including:    Bike 6'  SL shuttle press 5 cords 3' /c GTB  Matrix extension 15# 3x10 SL    NEUROMUSCULAR RE-EDUCATION ACTIVITIES to improve Balance, Coordination, Kinesthetic, Sense, Proprioception, and Posture for (10) minutes.  The following were included:    SL Abduction 45" hold 5x  Bridges 3x10      Patient Education and Home Exercises  "      Home Exercises Provided and Patient Education Provided     Education provided: (5) minutes  PURPOSE: Patient educated on the impairments noted above and the effects of physical therapy intervention to improve overall condition and QOL.     Written Home Exercises Provided: yes.  Exercises were reviewed and Isaiah was able to demonstrate them prior to the end of the session.  Isaiah demonstrated good  understanding of the education provided. See EMR under Patient Instructions for exercises provided during therapy sessions.    Assessment     Pt tolerated today's session well without adverse events. Pt has less pain /c hip ER and IR following manual therapy. Pt shows abduction weakness combined with quad weakness and will continue to benefit from care.    Isaiah is progressing well towards his goals.   Pt prognosis is Excellent.     Pt will continue to benefit from skilled outpatient physical therapy to address the deficits listed in the problem list box on initial evaluation, provide pt/family education and to maximize pt's level of independence in the home and community environment.     Pt's spiritual, cultural and educational needs considered and pt agreeable to plan of care and goals.     Anticipated Barriers for therapy: co-morbidities    Short Term Goals:  3 weeks Status  Date Met   PAIN: Pt will report worst pain of 5/10 in order to progress toward max functional ability and improve quality of life. [x] Progressing  [] Met  [] Not Met     FUNCTION: Patient will demonstrate improved function as indicated by a score of greater than or equal to 50 out of 100 on FOTO. [x] Progressing  [] Met  [] Not Met     MOBILITY: Patient will improve AROM to 50% of stated goals, listed in objective measures above, in order to progress towards independence with functional activities.  [x] Progressing  [] Met  [] Not Met     STRENGTH: Patient will improve strength to 50% of stated goals, listed in objective measures above, in order  to progress towards independence with functional activities. [x] Progressing  [] Met  [] Not Met     POSTURE: Patient will correct postural deviations in sitting and standing, to decrease pain and promote long term stability.  [x] Progressing  [] Met  [] Not Met     HEP: Patient will demonstrate independence with HEP in order to progress toward functional independence. [x] Progressing  [] Met  [] Not Met        Long Term Goals:  6 weeks Status Date Met   PAIN: Pt will report worst pain of 2/10 in order to progress toward max functional ability and improve quality of life [x] Progressing  [] Met  [] Not Met     FUNCTION: Patient will demonstrate improved function as indicated by a score of greater than or equal to 66 out of 100 on FOTO. [x] Progressing  [] Met  [] Not Met     MOBILITY: Patient will improve AROM to stated goals, listed in objective measures above, in order to return to maximal functional potential and improve quality of life.  [x] Progressing  [] Met  [] Not Met     STRENGTH: Patient will improve strength to stated goals, listed in objective measures above, in order to improve functional independence and quality of life.  [x] Progressing  [] Met  [] Not Met     GAIT: Patient will demonstrate normalized gait mechanics with minimal compensation in order to return to PLOF. [x] Progressing  [] Met  [] Not Met     Patient will return to normal ADL's, IADL's, community involvement, recreational activities, and work-related activities with less than or equal to 2/10 pain and maximal function.  [x] Progressing  [] Met  [] Not Met        Plan     Continue Plan of Care (POC) and progress per patient tolerance. See treatment section for details on planned progressions next session.      Pan Gipson, PT

## 2023-12-08 ENCOUNTER — CLINICAL SUPPORT (OUTPATIENT)
Dept: REHABILITATION | Facility: HOSPITAL | Age: 68
End: 2023-12-08
Payer: COMMERCIAL

## 2023-12-08 DIAGNOSIS — R53.1 GENERAL WEAKNESS: Primary | ICD-10-CM

## 2023-12-08 DIAGNOSIS — M25.551 HIP PAIN, ACUTE, RIGHT: ICD-10-CM

## 2023-12-08 PROCEDURE — 97110 THERAPEUTIC EXERCISES: CPT | Mod: PN

## 2023-12-08 PROCEDURE — 97112 NEUROMUSCULAR REEDUCATION: CPT | Mod: PN

## 2023-12-08 NOTE — PROGRESS NOTES
"[facility]   Physical Therapy Treatment Note        Name: Isaiah Harrison  Clinic Number: [id number]    Therapy Diagnosis:   Encounter Diagnoses   Name Primary?    General weakness Yes    Hip pain, acute, right        Physician: John Anderson MD    Visit Date: 12/8/2023     Physician Orders: PT Eval and Treat  Medical Diagnosis from Referral: M76.891 (ICD-10-CM) - Hip flexor tendonitis, right  Evaluation Date: 12/1/2023  Authorization Period Expiration: 11/30/2025  Plan of Care Expiration: 2/1/2024  Progress Note Due: 1/1/2024  Visit # / Visits authorized: 2/8   FOTO: 1/3 (last performed on 12/1/2023)     Precautions: Standard  PTA Visit #: 0/5     Time In: 8:20 am   Time Out: 9:05 am  Total Billable Time: 45 minutes (Billing reflects 1 on 1 treatment time spent with patient)    Subjective     Patient reports: pain remains with single leg stance, but is getting less intense at this point 5-6/10 at worst.     He  was compliant with home exercise program.  Response to previous treatment: no notable change  Functional change: no notable change    Pain: 5/10     Location: right hip    Objective      Objective Measures updated at progress report or POC update only unless otherwise noted.       Treatment     Isaiah received the treatments listed below:     MANUAL THERAPY TECHNIQUES were applied for (00) minutes, including:      THERAPEUTIC EXERCISES to develop strength, endurance, ROM, flexibility, posture, and core stabilization for (22) minutes including:    Bike 6'  SL shuttle press 3 cords 3 x 10  Matrix extension 15# 3x10 SL  Supine quad stretch 2 x 20"  Standing dynamic hip flexor stretch     NEUROMUSCULAR RE-EDUCATION ACTIVITIES to improve Balance, Coordination, Kinesthetic, Sense, Proprioception, and Posture for (23) minutes.  The following were included:    SL Abduction 45" hold 5x  Bridges 3 x 10  Prone Hip Extension x 20  SLR Flexion 2 x 10   Hip flexion manually resisted " "isometrics x 10 10" holds       Patient Education and Home Exercises       Home Exercises Provided and Patient Education Provided     Education provided: () minutes  PURPOSE: Patient educated on the impairments noted above and the effects of physical therapy intervention to improve overall condition and QOL.     Written Home Exercises Provided: yes.  Exercises were reviewed and Isaiah was able to demonstrate them prior to the end of the session.  Isaiah demonstrated good  understanding of the education provided. See EMR under Patient Instructions for exercises provided during therapy sessions.    Assessment   Patient presents with decreased pain intensity today. Progressions continued to be made to further address hip flexor and abductor weakness. Stretches added in throughout treatment to prevent discomfort and tension development between exercises. Will continue to benefit from care.     Isaiah is progressing well towards his goals.   Pt prognosis is Excellent.     Pt will continue to benefit from skilled outpatient physical therapy to address the deficits listed in the problem list box on initial evaluation, provide pt/family education and to maximize pt's level of independence in the home and community environment.     Pt's spiritual, cultural and educational needs considered and pt agreeable to plan of care and goals.     Anticipated Barriers for therapy: co-morbidities    Short Term Goals:  3 weeks Status  Date Met   PAIN: Pt will report worst pain of 5/10 in order to progress toward max functional ability and improve quality of life. [x] Progressing  [] Met  [] Not Met     FUNCTION: Patient will demonstrate improved function as indicated by a score of greater than or equal to 50 out of 100 on FOTO. [x] Progressing  [] Met  [] Not Met     MOBILITY: Patient will improve AROM to 50% of stated goals, listed in objective measures above, in order to progress towards independence with functional activities.  [x] " Progressing  [] Met  [] Not Met     STRENGTH: Patient will improve strength to 50% of stated goals, listed in objective measures above, in order to progress towards independence with functional activities. [x] Progressing  [] Met  [] Not Met     POSTURE: Patient will correct postural deviations in sitting and standing, to decrease pain and promote long term stability.  [x] Progressing  [] Met  [] Not Met     HEP: Patient will demonstrate independence with HEP in order to progress toward functional independence. [x] Progressing  [] Met  [] Not Met        Long Term Goals:  6 weeks Status Date Met   PAIN: Pt will report worst pain of 2/10 in order to progress toward max functional ability and improve quality of life [x] Progressing  [] Met  [] Not Met     FUNCTION: Patient will demonstrate improved function as indicated by a score of greater than or equal to 66 out of 100 on FOTO. [x] Progressing  [] Met  [] Not Met     MOBILITY: Patient will improve AROM to stated goals, listed in objective measures above, in order to return to maximal functional potential and improve quality of life.  [x] Progressing  [] Met  [] Not Met     STRENGTH: Patient will improve strength to stated goals, listed in objective measures above, in order to improve functional independence and quality of life.  [x] Progressing  [] Met  [] Not Met     GAIT: Patient will demonstrate normalized gait mechanics with minimal compensation in order to return to PLOF. [x] Progressing  [] Met  [] Not Met     Patient will return to normal ADL's, IADL's, community involvement, recreational activities, and work-related activities with less than or equal to 2/10 pain and maximal function.  [x] Progressing  [] Met  [] Not Met        Plan     Continue Plan of Care (POC) and progress per patient tolerance. See treatment section for details on planned progressions next session.      Ragini Fontanez, PT

## 2023-12-14 ENCOUNTER — CLINICAL SUPPORT (OUTPATIENT)
Dept: REHABILITATION | Facility: HOSPITAL | Age: 68
End: 2023-12-14
Payer: COMMERCIAL

## 2023-12-14 DIAGNOSIS — R53.1 GENERAL WEAKNESS: Primary | ICD-10-CM

## 2023-12-14 DIAGNOSIS — M25.551 HIP PAIN, ACUTE, RIGHT: ICD-10-CM

## 2023-12-14 PROCEDURE — 97140 MANUAL THERAPY 1/> REGIONS: CPT | Mod: PN

## 2023-12-14 PROCEDURE — 97112 NEUROMUSCULAR REEDUCATION: CPT | Mod: PN

## 2023-12-14 PROCEDURE — 97110 THERAPEUTIC EXERCISES: CPT | Mod: PN

## 2023-12-14 NOTE — PROGRESS NOTES
"[facility]   Physical Therapy Treatment Note        Name: Isaiah Harrison  Clinic Number: [id number]    Therapy Diagnosis:   Encounter Diagnoses   Name Primary?    General weakness Yes    Hip pain, acute, right      Physician: John Anderson MD    Visit Date: 12/14/2023    Physician: John Anderson MD      Physician Orders: PT Eval and Treat  Medical Diagnosis from Referral: M76.891 (ICD-10-CM) - Hip flexor tendonitis, right  Evaluation Date: 12/1/2023  Authorization Period Expiration: 11/30/2025  Plan of Care Expiration: 2/1/2024  Progress Note Due: 1/1/2024  Visit # / Visits authorized: 4/8  FOTO: 1/3 (last performed on 12/1/2023)     Precautions: Standard  PTA Visit #: 0/5     Time In: 1345  Time Out: 1430  Total Billable Time: 45 minutes (Billing reflects 1 on 1 treatment time spent with patient)    Subjective     Patient reports: he hip feels better but is having knee pain.    He/She was compliant with home exercise program.  Response to previous treatment: no notable change  Functional change: no notable change    Pain: 5/10     Location: right hip    Objective      Objective Measures updated at progress report or POC update only unless otherwise noted.       Treatment     Isaiah received the treatments listed below:     MANUAL THERAPY TECHNIQUES were applied for (8) minutes, including:    Hip inferior and lateral glides /c MWM      THERAPEUTIC EXERCISES to develop strength, endurance, ROM, flexibility, posture, and core stabilization for (10) minutes including:    Bike 6'  Hip hike 3x15      NEUROMUSCULAR RE-EDUCATION ACTIVITIES to improve Balance, Coordination, Kinesthetic, Sense, Proprioception, and Posture for (27) minutes.  The following were included:    SL Abduction 45" hold 5x  SL Bridges 1x10, 15#DB 3x10  TRX mini lunges 2x10 R leg   SS RDL 20#KB 3x12 R leg        Patient Education and Home Exercises       Home Exercises Provided and Patient Education Provided "     Education provided: (5) minutes  PURPOSE: Patient educated on the impairments noted above and the effects of physical therapy intervention to improve overall condition and QOL.     Written Home Exercises Provided: yes.  Exercises were reviewed and Isaiah was able to demonstrate them prior to the end of the session.  Isaiah demonstrated good  understanding of the education provided. See EMR under Patient Instructions for exercises provided during therapy sessions.    Assessment     Pt tolerated today's session well without adverse events. Pt displayed minimal pain with FADIR and was progress to split squats to increase load on the leg to improve hip abduction motor activation. Pt struggle with step downs due to quad weakness and will continue to benefit from care.  Isaiah is progressing well towards his goals.   Pt prognosis is Excellent.     Pt will continue to benefit from skilled outpatient physical therapy to address the deficits listed in the problem list box on initial evaluation, provide pt/family education and to maximize pt's level of independence in the home and community environment.     Pt's spiritual, cultural and educational needs considered and pt agreeable to plan of care and goals.     Anticipated Barriers for therapy: co-morbidities    Short Term Goals:  3 weeks Status  Date Met   PAIN: Pt will report worst pain of 5/10 in order to progress toward max functional ability and improve quality of life. [x] Progressing  [] Met  [] Not Met     FUNCTION: Patient will demonstrate improved function as indicated by a score of greater than or equal to 50 out of 100 on FOTO. [x] Progressing  [] Met  [] Not Met     MOBILITY: Patient will improve AROM to 50% of stated goals, listed in objective measures above, in order to progress towards independence with functional activities.  [x] Progressing  [] Met  [] Not Met     STRENGTH: Patient will improve strength to 50% of stated goals, listed in objective measures  above, in order to progress towards independence with functional activities. [x] Progressing  [] Met  [] Not Met     POSTURE: Patient will correct postural deviations in sitting and standing, to decrease pain and promote long term stability.  [x] Progressing  [] Met  [] Not Met     HEP: Patient will demonstrate independence with HEP in order to progress toward functional independence. [x] Progressing  [] Met  [] Not Met        Long Term Goals:  6 weeks Status Date Met   PAIN: Pt will report worst pain of 2/10 in order to progress toward max functional ability and improve quality of life [x] Progressing  [] Met  [] Not Met     FUNCTION: Patient will demonstrate improved function as indicated by a score of greater than or equal to 66 out of 100 on FOTO. [x] Progressing  [] Met  [] Not Met     MOBILITY: Patient will improve AROM to stated goals, listed in objective measures above, in order to return to maximal functional potential and improve quality of life.  [x] Progressing  [] Met  [] Not Met     STRENGTH: Patient will improve strength to stated goals, listed in objective measures above, in order to improve functional independence and quality of life.  [x] Progressing  [] Met  [] Not Met     GAIT: Patient will demonstrate normalized gait mechanics with minimal compensation in order to return to PLOF. [x] Progressing  [] Met  [] Not Met     Patient will return to normal ADL's, IADL's, community involvement, recreational activities, and work-related activities with less than or equal to 2/10 pain and maximal function.  [x] Progressing  [] Met  [] Not Met        Plan     Continue Plan of Care (POC) and progress per patient tolerance. See treatment section for details on planned progressions next session.      Pan Gipson, PT

## 2023-12-18 ENCOUNTER — CLINICAL SUPPORT (OUTPATIENT)
Dept: REHABILITATION | Facility: HOSPITAL | Age: 68
End: 2023-12-18
Payer: COMMERCIAL

## 2023-12-18 DIAGNOSIS — M25.551 HIP PAIN, ACUTE, RIGHT: ICD-10-CM

## 2023-12-18 DIAGNOSIS — R53.1 GENERAL WEAKNESS: Primary | ICD-10-CM

## 2023-12-18 PROCEDURE — 97112 NEUROMUSCULAR REEDUCATION: CPT | Mod: PN,CQ

## 2023-12-18 PROCEDURE — 97110 THERAPEUTIC EXERCISES: CPT | Mod: PN,CQ

## 2023-12-18 NOTE — PROGRESS NOTES
"[facility]   Physical Therapy Treatment Note        Name: Isaiah Harrison  Clinic Number: [id number]    Therapy Diagnosis:   Encounter Diagnoses   Name Primary?    General weakness Yes    Hip pain, acute, right        Physician: John Anderson MD    Visit Date: 12/18/2023    Physician: John Anderson MD      Physician Orders: PT Eval and Treat  Medical Diagnosis from Referral: M76.891 (ICD-10-CM) - Hip flexor tendonitis, right  Evaluation Date: 12/1/2023  Authorization Period Expiration: 11/30/2025  Plan of Care Expiration: 2/1/2024  Progress Note Due: 1/1/2024  Visit # / Visits authorized: 5/8  FOTO: 2/3        Precautions: Standard  PTA Visit #: 1/5     Time In: 1:40  Time Out: 2:30  Total Billable Time: 50 minutes (Billing reflects 1 on 1 treatment time spent with patient)    Subjective     Patient reports: his hip is improving.    He/She was compliant with home exercise program.  Response to previous treatment: no notable change  Functional change: no notable change    Pain: 9/10   Post session: 3/10  Location: right hip    Objective      Objective Measures updated at progress report or POC update only unless otherwise noted.       Treatment     Isaiah received the treatments listed below:     MANUAL THERAPY TECHNIQUES were applied for (0) minutes, including:    Hip inferior and lateral glides /c MWM    THERAPEUTIC EXERCISES to develop strength, endurance, ROM, flexibility, posture, and core stabilization for (25) minutes including:    Bike 6'  Clamshells 2x10 RLE  Hamstring stretch  Gastroc stretch  Shuttle squats 2x10 RLE 3 bands  Heel raises on edge of stairs 2x10    NEUROMUSCULAR RE-EDUCATION ACTIVITIES to improve Balance, Coordination, Kinesthetic, Sense, Proprioception, and Posture for (25) minutes.  The following were included:    SL Abduction 45" hold 5x  SL Bridges 1x10 knee flexed, 1x10 knee extended  Hip hike 3x15 BLE  Splits squats 2x10 RLE  SS RDL 20#KB 3x12 " R leg      Patient Education and Home Exercises       Home Exercises Provided and Patient Education Provided     Education provided: (5) minutes  PURPOSE: Patient educated on the impairments noted above and the effects of physical therapy intervention to improve overall condition and QOL.     Written Home Exercises Provided: yes.  Exercises were reviewed and Isaiah was able to demonstrate them prior to the end of the session.  Isaiah demonstrated good  understanding of the education provided. See EMR under Patient Instructions for exercises provided during therapy sessions.    Assessment     Patient presents to therapy with pain in right hip with standing at work. Patient has tendency to put excessive pressure on right hip without activating glute med so exercises focused on glute med loading and facilitation of proper muscle recruitment with moderate cues for proper technique during additional split squats today. Patient has most difficulty performing single leg squats due to glute weakness. Additional stretches performed to address tightness in hamstrings and calves on right side.    Isaiah is progressing well towards his goals.   Pt prognosis is Excellent.     Pt will continue to benefit from skilled outpatient physical therapy to address the deficits listed in the problem list box on initial evaluation, provide pt/family education and to maximize pt's level of independence in the home and community environment.     Pt's spiritual, cultural and educational needs considered and pt agreeable to plan of care and goals.     Anticipated Barriers for therapy: co-morbidities    Short Term Goals:  3 weeks Status  Date Met   PAIN: Pt will report worst pain of 5/10 in order to progress toward max functional ability and improve quality of life. [x] Progressing  [] Met  [] Not Met     FUNCTION: Patient will demonstrate improved function as indicated by a score of greater than or equal to 50 out of 100 on FOTO. [x] Progressing  []  Met  [] Not Met     MOBILITY: Patient will improve AROM to 50% of stated goals, listed in objective measures above, in order to progress towards independence with functional activities.  [x] Progressing  [] Met  [] Not Met     STRENGTH: Patient will improve strength to 50% of stated goals, listed in objective measures above, in order to progress towards independence with functional activities. [x] Progressing  [] Met  [] Not Met     POSTURE: Patient will correct postural deviations in sitting and standing, to decrease pain and promote long term stability.  [x] Progressing  [] Met  [] Not Met     HEP: Patient will demonstrate independence with HEP in order to progress toward functional independence. [x] Progressing  [] Met  [] Not Met        Long Term Goals:  6 weeks Status Date Met   PAIN: Pt will report worst pain of 2/10 in order to progress toward max functional ability and improve quality of life [x] Progressing  [] Met  [] Not Met     FUNCTION: Patient will demonstrate improved function as indicated by a score of greater than or equal to 66 out of 100 on FOTO. [x] Progressing  [] Met  [] Not Met     MOBILITY: Patient will improve AROM to stated goals, listed in objective measures above, in order to return to maximal functional potential and improve quality of life.  [x] Progressing  [] Met  [] Not Met     STRENGTH: Patient will improve strength to stated goals, listed in objective measures above, in order to improve functional independence and quality of life.  [x] Progressing  [] Met  [] Not Met     GAIT: Patient will demonstrate normalized gait mechanics with minimal compensation in order to return to PLOF. [x] Progressing  [] Met  [] Not Met     Patient will return to normal ADL's, IADL's, community involvement, recreational activities, and work-related activities with less than or equal to 2/10 pain and maximal function.  [x] Progressing  [] Met  [] Not Met        Plan     Continue Plan of Care (POC) and  progress per patient tolerance. See treatment section for details on planned progressions next session.      Carmen Barlow, PTA

## 2023-12-22 ENCOUNTER — TELEPHONE (OUTPATIENT)
Dept: REHABILITATION | Facility: HOSPITAL | Age: 68
End: 2023-12-22
Payer: COMMERCIAL

## 2023-12-22 NOTE — TELEPHONE ENCOUNTER
Called patient secondary to no show today. Spouse answered the phone and reports patient forgot to reschedule as he is working today.

## 2024-01-02 ENCOUNTER — TELEPHONE (OUTPATIENT)
Dept: INTERNAL MEDICINE | Facility: CLINIC | Age: 69
End: 2024-01-02
Payer: COMMERCIAL

## 2024-01-02 ENCOUNTER — CLINICAL SUPPORT (OUTPATIENT)
Dept: REHABILITATION | Facility: HOSPITAL | Age: 69
End: 2024-01-02
Payer: COMMERCIAL

## 2024-01-02 DIAGNOSIS — R53.1 GENERAL WEAKNESS: Primary | ICD-10-CM

## 2024-01-02 DIAGNOSIS — M25.551 HIP PAIN, ACUTE, RIGHT: ICD-10-CM

## 2024-01-02 PROCEDURE — 97140 MANUAL THERAPY 1/> REGIONS: CPT | Mod: PN

## 2024-01-02 PROCEDURE — 97110 THERAPEUTIC EXERCISES: CPT | Mod: PN

## 2024-01-02 PROCEDURE — 97112 NEUROMUSCULAR REEDUCATION: CPT | Mod: PN

## 2024-01-02 NOTE — PROGRESS NOTES
"[facility]   Physical Therapy Treatment Note        Name: Isaiah Harrison  Clinic Number: [id number]    Therapy Diagnosis:   Encounter Diagnoses   Name Primary?    General weakness Yes    Hip pain, acute, right      Physician: John Anderson MD    Visit Date: 1/2/2024    Physician: John Anderson MD      Physician Orders: PT Eval and Treat  Medical Diagnosis from Referral: M76.891 (ICD-10-CM) - Hip flexor tendonitis, right  Evaluation Date: 12/1/2023  Authorization Period Expiration: 11/30/2025  Plan of Care Expiration: 2/1/2024  Progress Note Due: 1/1/2024  Visit # / Visits authorized: 1/20  FOTO: 1/3 (last performed on 12/1/2023)     Precautions: Standard  PTA Visit #: 0/5     Time In: 1000  Time Out: 1056  Total Billable Time: 56 minutes (Billing reflects 1 on 1 treatment time spent with patient)    Subjective     Patient reports: he continues to have pain in his hip and knee getting in his car.    He/She was compliant with home exercise program.  Response to previous treatment: no notable change  Functional change: no notable change    Pain: 5/10     Location: right hip    Objective      Objective Measures updated at progress report or POC update only unless otherwise noted.       Treatment     Isaiah received the treatments listed below:     MANUAL THERAPY TECHNIQUES were applied for (8) minutes, including:    Hip inferior and lateral glides /c MWM      THERAPEUTIC EXERCISES to develop strength, endurance, ROM, flexibility, posture, and core stabilization for (30) minutes including:    Bike 6'  Hip hike 3x15  Matrix extension SL 15# 3x10  SL shuttle press 4 cords 3x12    NEUROMUSCULAR RE-EDUCATION ACTIVITIES to improve Balance, Coordination, Kinesthetic, Sense, Proprioception, and Posture for (17) minutes.  The following were included:    SL Abduction 45" hold 5x  SL Bridges 15#DB 3x10  SS RDL 20#KB 3x12 R leg        Patient Education and Home Exercises       Home " Exercises Provided and Patient Education Provided     Education provided: (5) minutes  PURPOSE: Patient educated on the impairments noted above and the effects of physical therapy intervention to improve overall condition and QOL.     Written Home Exercises Provided: yes.  Exercises were reviewed and Isaiah was able to demonstrate them prior to the end of the session.  Isaiah demonstrated good  understanding of the education provided. See EMR under Patient Instructions for exercises provided during therapy sessions.    Assessment     Pt tolerated today's session well without adverse events. Today we added knee extension to improve quad strength and single leg stability. Pt will continue to benefit from care.  Isaiah is progressing well towards his goals.   Pt prognosis is Excellent.     Pt will continue to benefit from skilled outpatient physical therapy to address the deficits listed in the problem list box on initial evaluation, provide pt/family education and to maximize pt's level of independence in the home and community environment.     Pt's spiritual, cultural and educational needs considered and pt agreeable to plan of care and goals.     Anticipated Barriers for therapy: co-morbidities    Short Term Goals:  3 weeks Status  Date Met   PAIN: Pt will report worst pain of 5/10 in order to progress toward max functional ability and improve quality of life. [x] Progressing  [] Met  [] Not Met     FUNCTION: Patient will demonstrate improved function as indicated by a score of greater than or equal to 50 out of 100 on FOTO. [x] Progressing  [] Met  [] Not Met     MOBILITY: Patient will improve AROM to 50% of stated goals, listed in objective measures above, in order to progress towards independence with functional activities.  [x] Progressing  [] Met  [] Not Met     STRENGTH: Patient will improve strength to 50% of stated goals, listed in objective measures above, in order to progress towards independence with functional  activities. [x] Progressing  [] Met  [] Not Met     POSTURE: Patient will correct postural deviations in sitting and standing, to decrease pain and promote long term stability.  [x] Progressing  [] Met  [] Not Met     HEP: Patient will demonstrate independence with HEP in order to progress toward functional independence. [x] Progressing  [] Met  [] Not Met        Long Term Goals:  6 weeks Status Date Met   PAIN: Pt will report worst pain of 2/10 in order to progress toward max functional ability and improve quality of life [x] Progressing  [] Met  [] Not Met     FUNCTION: Patient will demonstrate improved function as indicated by a score of greater than or equal to 66 out of 100 on FOTO. [x] Progressing  [] Met  [] Not Met     MOBILITY: Patient will improve AROM to stated goals, listed in objective measures above, in order to return to maximal functional potential and improve quality of life.  [x] Progressing  [] Met  [] Not Met     STRENGTH: Patient will improve strength to stated goals, listed in objective measures above, in order to improve functional independence and quality of life.  [x] Progressing  [] Met  [] Not Met     GAIT: Patient will demonstrate normalized gait mechanics with minimal compensation in order to return to PLOF. [x] Progressing  [] Met  [] Not Met     Patient will return to normal ADL's, IADL's, community involvement, recreational activities, and work-related activities with less than or equal to 2/10 pain and maximal function.  [x] Progressing  [] Met  [] Not Met        Plan     Continue Plan of Care (POC) and progress per patient tolerance. See treatment section for details on planned progressions next session.      Pan Gipson, PT

## 2024-01-03 DIAGNOSIS — I25.10 CORONARY ARTERY DISEASE INVOLVING NATIVE CORONARY ARTERY WITHOUT ANGINA PECTORIS, UNSPECIFIED WHETHER NATIVE OR TRANSPLANTED HEART: ICD-10-CM

## 2024-01-03 DIAGNOSIS — I10 ESSENTIAL HYPERTENSION: ICD-10-CM

## 2024-01-03 RX ORDER — METOPROLOL TARTRATE 50 MG/1
TABLET ORAL
Qty: 180 TABLET | Refills: 3 | Status: SHIPPED | OUTPATIENT
Start: 2024-01-03

## 2024-01-12 ENCOUNTER — CLINICAL SUPPORT (OUTPATIENT)
Dept: REHABILITATION | Facility: HOSPITAL | Age: 69
End: 2024-01-12
Payer: COMMERCIAL

## 2024-01-12 DIAGNOSIS — M25.551 HIP PAIN, ACUTE, RIGHT: ICD-10-CM

## 2024-01-12 DIAGNOSIS — R53.1 GENERAL WEAKNESS: Primary | ICD-10-CM

## 2024-01-12 PROCEDURE — 97530 THERAPEUTIC ACTIVITIES: CPT | Mod: PN

## 2024-01-12 PROCEDURE — 97110 THERAPEUTIC EXERCISES: CPT | Mod: PN

## 2024-01-12 PROCEDURE — 97112 NEUROMUSCULAR REEDUCATION: CPT | Mod: PN

## 2024-01-12 NOTE — PROGRESS NOTES
[facility]   Physical Therapy Treatment Note        Name: Isaiah Harrison  [facility] Number: [id number]    Therapy Diagnosis:   Encounter Diagnoses   Name Primary?    General weakness Yes    Hip pain, acute, right      Physician: John Anderson MD    Visit Date: 1/12/2024    Physician: John Anderson MD      Physician Orders: PT Eval and Treat  Medical Diagnosis from Referral: M76.891 (ICD-10-CM) - Hip flexor tendonitis, right  Evaluation Date: 12/1/2023  Authorization Period Expiration: 11/30/2025  Plan of Care Expiration: 2/1/2024  Progress Note Due: 1/1/2024  Visit # / Visits authorized: 1/20  FOTO: 1/3 (last performed on 12/1/2023)     Precautions: Standard  PTA Visit #: 0/5     Time In: 1100  Time Out: 1158  Total Billable Time: 55 minutes (Billing reflects 1 on 1 treatment time spent with patient)    Subjective     Patient reports: his knee and hip are feeling well with no pain    He/She was compliant with home exercise program.  Response to previous treatment: no notable change  Functional change: no notable change    Pain: 5/10     Location: right hip    Objective      Objective Measures updated at progress report or POC update only unless otherwise noted.     RANGE OF MOTION:   Hip AROM/PROM Right Left Pain/Dysfunction with Movement Goal   Hip Flexion (120º) WNL WNL       Hip Extension (30º) 20 30   30   Hip Abduction (45º) 45 45   45   Hip IR (45º) 20 20   45   Hip ER (45º) 60 60    45            STRENGTH:   L/E MMT Right  (spine) Left Pain/Dysfunction with Movement Goal   Hip Flexion  4/5 4+/5   4+/5 B   Hip Extension  4/5 4/5   4+/5 B   Hip Abduction  4+/5 4+/5   4+/5 B   Knee Extension 4+/5 4+/5   5/5 B   Knee Flexion 4+/5 4+/5   5/5 B   Ankle DF 5/5 5/5   5/5 B   Ankle PF 5/5 5/5   5/5 B            Muscles length: hamstrings limited bilaterally. Goal: Normal                 SPECIAL TESTS:     Right  (spine) Left  Goal   MARITZA  [] Positive    [x] Negative []  "Positive    [x] Negative Negative B    FADIR  [] Positive    [x] Negative [] Positive    [x] Negative Negative B    Scour  [] Positive    [x] Negative [] Positive    [x] Negative Negative B                Function:      Intake Outcome Measure for FOTO Hip Survey     Therapist reviewed FOTO scores for Isaiah on 12/1/2023.   FOTO report - see Media section or FOTO account for episode details     Intake Score: 99%           Treatment     Isaiah received the treatments listed below:     MANUAL THERAPY TECHNIQUES were applied for (8) minutes, including:    Hip inferior and lateral glides /c MWM      THERAPEUTIC EXERCISES to develop strength, endurance, ROM, flexibility, posture, and core stabilization for (15) minutes including:    Bike 8'  Hip hike 3x15 - held  Matrix extension SL 15# 3x10  SL shuttle press 4 cords 3x12    NEUROMUSCULAR RE-EDUCATION ACTIVITIES to improve Balance, Coordination, Kinesthetic, Sense, Proprioception, and Posture for (8) minutes.  The following were included:    Lateral band walks Red sports cord 1/2 turf 2 laps  SL Abduction 45" hold 5x - held  SL Bridges 15#DB 3x10 - held  SS RDL 20#KB 3x12 R leg - held     THERAPEUTIC ACTIVITIES to improve dynamic and functional  performance for (30) minutes including:    Anterior step downs 4" 3x10 R   Step ups to 8" box 3x10  Squats to 20" box 20#KB 3x10    Patient Education and Home Exercises       Home Exercises Provided and Patient Education Provided     Education provided: (5) minutes  PURPOSE: Patient educated on the impairments noted above and the effects of physical therapy intervention to improve overall condition and QOL.     Written Home Exercises Provided: yes.  Exercises were reviewed and Isaiah was able to demonstrate them prior to the end of the session.  Isaiah demonstrated good  understanding of the education provided. See EMR under Patient Instructions for exercises provided during therapy sessions.    Assessment     Pt tolerated today's session " well without adverse events. Pt shows improvement in strength, function, and ROM, but still has RLE weakness. Pt will continue to benefit from care.  Isaiah is progressing well towards his goals.   Pt prognosis is Excellent.     Pt will continue to benefit from skilled outpatient physical therapy to address the deficits listed in the problem list box on initial evaluation, provide pt/family education and to maximize pt's level of independence in the home and community environment.     Pt's spiritual, cultural and educational needs considered and pt agreeable to plan of care and goals.     Anticipated Barriers for therapy: co-morbidities    Short Term Goals:  3 weeks Status  Date Met   PAIN: Pt will report worst pain of 5/10 in order to progress toward max functional ability and improve quality of life. [x] Progressing  [] Met  [] Not Met     FUNCTION: Patient will demonstrate improved function as indicated by a score of greater than or equal to 50 out of 100 on FOTO. [x] Progressing  [] Met  [] Not Met     MOBILITY: Patient will improve AROM to 50% of stated goals, listed in objective measures above, in order to progress towards independence with functional activities.  [x] Progressing  [] Met  [] Not Met     STRENGTH: Patient will improve strength to 50% of stated goals, listed in objective measures above, in order to progress towards independence with functional activities. [x] Progressing  [] Met  [] Not Met     POSTURE: Patient will correct postural deviations in sitting and standing, to decrease pain and promote long term stability.  [x] Progressing  [] Met  [] Not Met     HEP: Patient will demonstrate independence with HEP in order to progress toward functional independence. [x] Progressing  [] Met  [] Not Met        Long Term Goals:  6 weeks Status Date Met   PAIN: Pt will report worst pain of 2/10 in order to progress toward max functional ability and improve quality of life [x] Progressing  [] Met  [] Not Met      FUNCTION: Patient will demonstrate improved function as indicated by a score of greater than or equal to 66 out of 100 on FOTO. [x] Progressing  [] Met  [] Not Met     MOBILITY: Patient will improve AROM to stated goals, listed in objective measures above, in order to return to maximal functional potential and improve quality of life.  [x] Progressing  [] Met  [] Not Met     STRENGTH: Patient will improve strength to stated goals, listed in objective measures above, in order to improve functional independence and quality of life.  [x] Progressing  [] Met  [] Not Met     GAIT: Patient will demonstrate normalized gait mechanics with minimal compensation in order to return to PLOF. [x] Progressing  [] Met  [] Not Met     Patient will return to normal ADL's, IADL's, community involvement, recreational activities, and work-related activities with less than or equal to 2/10 pain and maximal function.  [x] Progressing  [] Met  [] Not Met        Plan     Continue Plan of Care (POC) and progress per patient tolerance. See treatment section for details on planned progressions next session.      Pan Gipson, PT

## 2024-01-19 ENCOUNTER — CLINICAL SUPPORT (OUTPATIENT)
Dept: REHABILITATION | Facility: HOSPITAL | Age: 69
End: 2024-01-19
Payer: COMMERCIAL

## 2024-01-19 DIAGNOSIS — R53.1 GENERAL WEAKNESS: Primary | ICD-10-CM

## 2024-01-19 DIAGNOSIS — M25.551 HIP PAIN, ACUTE, RIGHT: ICD-10-CM

## 2024-01-19 PROCEDURE — 97530 THERAPEUTIC ACTIVITIES: CPT | Mod: PN

## 2024-01-19 PROCEDURE — 97112 NEUROMUSCULAR REEDUCATION: CPT | Mod: PN

## 2024-01-19 PROCEDURE — 97110 THERAPEUTIC EXERCISES: CPT | Mod: PN

## 2024-01-19 NOTE — PROGRESS NOTES
"[facility]   Physical Therapy Treatment Note        Name: Isaiah Harrison  Clinic Number: [id number]    Therapy Diagnosis:   No diagnosis found.    Physician: John Anderson MD    Visit Date: 1/19/2024    Physician: John Anderson MD      Physician Orders: PT Eval and Treat  Medical Diagnosis from Referral: M76.891 (ICD-10-CM) - Hip flexor tendonitis, right  Evaluation Date: 12/1/2023  Authorization Period Expiration: 11/30/2025  Plan of Care Expiration: 2/1/2024  Progress Note Due: 2/11/2024  Visit # / Visits authorized: 13/20  FOTO: 1/3 (last performed on 12/1/2023)     Precautions: Standard  PTA Visit #: 0/5     Time In: 1230  Time Out: 1330  Total Billable Time: 55 minutes (Billing reflects 1 on 1 treatment time spent with patient)    Subjective     Patient reports: feels good with no new reports of pain    He/She was compliant with home exercise program.  Response to previous treatment: no notable change  Functional change: no notable change    Pain: 5/10     Location: right hip    Objective      Objective Measures updated at progress report or POC update only unless otherwise noted.     Treatment     Isaiah received the treatments listed below:     MANUAL THERAPY TECHNIQUES were applied for (0) minutes, including:    Hip inferior and lateral glides /c MWM      THERAPEUTIC EXERCISES to develop strength, endurance, ROM, flexibility, posture, and core stabilization for (25) minutes including:    Bike 8'  Hip hike 3x15 - held  Matrix extension SL 15# 3x10  SL shuttle press 4 cords 3x12    NEUROMUSCULAR RE-EDUCATION ACTIVITIES to improve Balance, Coordination, Kinesthetic, Sense, Proprioception, and Posture for (15) minutes.  The following were included:    Lateral band walks Red sports cord 1/2 turf 2 laps  SL Abduction 45" hold 4x   SL Bridges 15#DB 3x10   SS RDL 20#KB 3x12 R leg - held     THERAPEUTIC ACTIVITIES to improve dynamic and functional  performance for (15) " "minutes including:    Anterior step downs 4" 3x10 R - held   Step ups to 8" box 3x10  Squats to 20" box 20#KB 3x10    Patient Education and Home Exercises       Home Exercises Provided and Patient Education Provided     Education provided: (5) minutes  PURPOSE: Patient educated on the impairments noted above and the effects of physical therapy intervention to improve overall condition and QOL.     Written Home Exercises Provided: yes.  Exercises were reviewed and Isaiah was able to demonstrate them prior to the end of the session.  Isaiah demonstrated good  understanding of the education provided. See EMR under Patient Instructions for exercises provided during therapy sessions.    Assessment     Pt tolerated today's session well without adverse events. Pt shows improvement in strength, function, and ROM, but still has RLE weakness. Discussed discharging pt on the following visit.    Isaiah is progressing well towards his goals.   Pt prognosis is Excellent.     Pt will continue to benefit from skilled outpatient physical therapy to address the deficits listed in the problem list box on initial evaluation, provide pt/family education and to maximize pt's level of independence in the home and community environment.     Pt's spiritual, cultural and educational needs considered and pt agreeable to plan of care and goals.     Anticipated Barriers for therapy: co-morbidities    Short Term Goals:  3 weeks Status  Date Met   PAIN: Pt will report worst pain of 5/10 in order to progress toward max functional ability and improve quality of life. [x] Progressing  [] Met  [] Not Met     FUNCTION: Patient will demonstrate improved function as indicated by a score of greater than or equal to 50 out of 100 on FOTO. [x] Progressing  [] Met  [] Not Met     MOBILITY: Patient will improve AROM to 50% of stated goals, listed in objective measures above, in order to progress towards independence with functional activities.  [x] Progressing  [] " Met  [] Not Met     STRENGTH: Patient will improve strength to 50% of stated goals, listed in objective measures above, in order to progress towards independence with functional activities. [x] Progressing  [] Met  [] Not Met     POSTURE: Patient will correct postural deviations in sitting and standing, to decrease pain and promote long term stability.  [x] Progressing  [] Met  [] Not Met     HEP: Patient will demonstrate independence with HEP in order to progress toward functional independence. [x] Progressing  [] Met  [] Not Met        Long Term Goals:  6 weeks Status Date Met   PAIN: Pt will report worst pain of 2/10 in order to progress toward max functional ability and improve quality of life [x] Progressing  [] Met  [] Not Met     FUNCTION: Patient will demonstrate improved function as indicated by a score of greater than or equal to 66 out of 100 on FOTO. [x] Progressing  [] Met  [] Not Met     MOBILITY: Patient will improve AROM to stated goals, listed in objective measures above, in order to return to maximal functional potential and improve quality of life.  [x] Progressing  [] Met  [] Not Met     STRENGTH: Patient will improve strength to stated goals, listed in objective measures above, in order to improve functional independence and quality of life.  [x] Progressing  [] Met  [] Not Met     GAIT: Patient will demonstrate normalized gait mechanics with minimal compensation in order to return to PLOF. [x] Progressing  [] Met  [] Not Met     Patient will return to normal ADL's, IADL's, community involvement, recreational activities, and work-related activities with less than or equal to 2/10 pain and maximal function.  [x] Progressing  [] Met  [] Not Met        Plan     Continue Plan of Care (POC) and progress per patient tolerance. See treatment section for details on planned progressions next session.      Pan Gipson, PT

## 2024-01-24 DIAGNOSIS — E11.65 TYPE 2 DIABETES MELLITUS WITH HYPERGLYCEMIA, WITH LONG-TERM CURRENT USE OF INSULIN: Primary | Chronic | ICD-10-CM

## 2024-01-24 DIAGNOSIS — I10 ESSENTIAL HYPERTENSION: Chronic | ICD-10-CM

## 2024-01-24 DIAGNOSIS — Z79.4 TYPE 2 DIABETES MELLITUS WITH HYPERGLYCEMIA, WITH LONG-TERM CURRENT USE OF INSULIN: Primary | Chronic | ICD-10-CM

## 2024-01-24 RX ORDER — PEN NEEDLE, DIABETIC 30 GX3/16"
NEEDLE, DISPOSABLE MISCELLANEOUS
Qty: 200 EACH | Refills: 3 | OUTPATIENT
Start: 2024-01-24

## 2024-01-24 NOTE — TELEPHONE ENCOUNTER
Refill Routing Note   Medication(s) are not appropriate for processing by [facility] for the following reason(s):        Clarification of medication (Rx) details  ED/Hospital Visit since last OV with provider    [facility] action(s):  Defer     Requires labs : Yes      Medication Therapy Plan: Patient is on toujeo; direction are bid; will pend for that since old directions said use with lantus      Appointments  past 12m or future 3m with PCP    Date Provider   Last Visit   4/27/2023 LACI Dow MD   Next Visit   Visit date not found LACI Dow MD   ED visits in past 90 days: 0        Note composed:2:13 PM 01/24/2024

## 2024-01-24 NOTE — TELEPHONE ENCOUNTER
His diabetes is uncontrolled, and he is overdue for labs and follow up appointment.    TO MY TEAM:    Please order the following by written order guidelines and help patient schedule ASAP:  A1c [LAB90]  CMP [LAB17]  PSA [[id number]]  Schedule him for appointment (VV or OV with me or OV with Deann Bernal PA-C) a couple days after labs.  Schedule him for OV with me in about 3 months.  Please order the following by written order guidelines and help patient schedule them a few days before his appointment with me in 3 months.  A1c [LAB90]  CMP [LAB17]  Lipid Panel [LAB18]  Microalbumin/creatinine urine [[id number]]  Thanks!

## 2024-01-24 NOTE — TELEPHONE ENCOUNTER
Care Due:                  Date            Visit Type   Department     Provider  --------------------------------------------------------------------------------                                MYCHART                              FOLLOWUP/OF  [facility] INTERNAL  Last Visit: 11-      [facility] VISIT   MEDICINE       John Anderson  Next Visit: None Scheduled  None         None Found                                                            Last  Test          Frequency    Reason                     Performed    Due Date  --------------------------------------------------------------------------------    HBA1C.......  6 months...  tirzepatide..............  10-   03-    [employer] Embedded Care Due Messages. Reference number: [id number].   1/24/2024 1:29:42 PM CST

## 2024-01-24 NOTE — ADDENDUM NOTE
Addended by: MYRIAM MACK on: 1/24/2024 03:49 PM     Modules accepted: Orders     [facility] Progress Note    Patient: Blanca Humphreys Date: 2020   : 1964 Attending: Randall Arriaga MD   55 year old female DOA: 2020     S/p removal of lysis catheter  Denies any discomfort  hypertensive    Assessment / Plan: Blanca Humphreys is a 55 year old female with  RLE DVT  S/p heart transplant on immunosuppressants  DM II  Hypertension  Recent Covid infection     - cellcept, tacrolimus  - now s/p removal of lysis catheter and stenting  - lovenox per vascular medicine  - PRN hydralazine  - lantus, SSI  - direusis    Pending transfer to medical floor       =============================================================================  Vitals  Temp:  [97.8 °F (36.6 °C)-99 °F (37.2 °C)] 97.9 °F (36.6 °C)  Heart Rate:  [] 95  Resp:  [11-46] 25  BP: (110-158)/(56-94) 110/69    Physical Exam:   General:  NAD, lying comfortably in bed  HEENT:  EOMI. PERRL. No conjunctival pallor or scleral icterus  CV:  RRR, no m/r/g, S1/S2 present, no JVD, right LE edema 3+, bandages present in popliteal region over access site  Resp:  Non labored. Air entry equal bilaterally. No wheezes rhonchi or rales  Abd:  Soft, ND, +BS throughout, mild generalized tenderness  Ext:  Nml temp and moisture to touch.   Neuro:  A/O x3. Strength and sensation intact throughout    Intake/Output:    Intake/Output Summary (Last 24 hours) at 2020 1406  Last data filed at 2020 0930  Gross per 24 hour   Intake 1540 ml   Output 1650 ml   Net -110 ml       Pertinent Reviewed: Allergies, Medications, Labs, Imaging and Physician and Nursing Notes    Critical Care time (Includes multiple room visit, interpretation of complex information, frequent communication with RN and other medical staff. Exclude any billable procedural time):     Britton Hunt MD

## 2024-01-26 ENCOUNTER — CLINICAL SUPPORT (OUTPATIENT)
Dept: REHABILITATION | Facility: HOSPITAL | Age: 69
End: 2024-01-26
Payer: COMMERCIAL

## 2024-01-26 DIAGNOSIS — R53.1 GENERAL WEAKNESS: Primary | ICD-10-CM

## 2024-01-26 DIAGNOSIS — M25.551 HIP PAIN, ACUTE, RIGHT: ICD-10-CM

## 2024-01-26 PROCEDURE — 97530 THERAPEUTIC ACTIVITIES: CPT | Mod: PN

## 2024-01-26 PROCEDURE — 97110 THERAPEUTIC EXERCISES: CPT | Mod: PN

## 2024-01-26 PROCEDURE — 97112 NEUROMUSCULAR REEDUCATION: CPT | Mod: PN

## 2024-01-26 NOTE — PROGRESS NOTES
"[facility]   Physical Therapy Treatment Note        Name: Isaiah Harrison  Clinic Number: [id number]    Therapy Diagnosis:   Encounter Diagnoses   Name Primary?    General weakness Yes    Hip pain, acute, right        Physician: John Anderson MD    Visit Date: 1/26/2024    Physician: John Anderson MD      Physician Orders: PT Eval and Treat  Medical Diagnosis from Referral: M76.891 (ICD-10-CM) - Hip flexor tendonitis, right  Evaluation Date: 12/1/2023  Authorization Period Expiration: 11/30/2025  Plan of Care Expiration: 2/1/2024  Progress Note Due: 2/11/2024  Visit # / Visits authorized: 13/20  FOTO: 1/3 (last performed on 12/1/2023)     Precautions: Standard  PTA Visit #: 0/5     Time In: 1340  Time Out: 1435  Total Billable Time: 55 minutes (Billing reflects 1 on 1 treatment time spent with patient)    Subjective     Patient reports: doing well with now pain    He/She was compliant with home exercise program.  Response to previous treatment: no notable change  Functional change: no notable change    Pain: 5/10     Location: right hip    Objective      Objective Measures updated at progress report or POC update only unless otherwise noted.     Treatment     Isaiah received the treatments listed below:     MANUAL THERAPY TECHNIQUES were applied for (0) minutes, including:    Hip inferior and lateral glides /c MWM      THERAPEUTIC EXERCISES to develop strength, endurance, ROM, flexibility, posture, and core stabilization for (25) minutes including:    Bike 8'  Hip hike 3x15   Matrix extension SL 20# 3x10  SL shuttle press 4 cords 3x15    NEUROMUSCULAR RE-EDUCATION ACTIVITIES to improve Balance, Coordination, Kinesthetic, Sense, Proprioception, and Posture for (20) minutes.  The following were included:    Lateral band walks Red sports cord 1/2 turf 2 laps  SL Abduction 45" hold 5x  SL Bridges 15#DB 3x10 - held  SS RDL 20#KB 3x10 B    THERAPEUTIC ACTIVITIES to improve " "dynamic and functional  performance for (10) minutes including:    Anterior step downs 4" 3x10 B  Step ups to 8" box 3x10 - held   Squats to 20" box 20#KB 3x10    Patient Education and Home Exercises       Home Exercises Provided and Patient Education Provided     Education provided: (5) minutes  PURPOSE: Patient educated on the impairments noted above and the effects of physical therapy intervention to improve overall condition and QOL.     Written Home Exercises Provided: yes.  Exercises were reviewed and Isaiah was able to demonstrate them prior to the end of the session.  Isaiah demonstrated good  understanding of the education provided. See EMR under Patient Instructions for exercises provided during therapy sessions.    Assessment     Pt tolerated today's session well without pain or adverse events. Pt was discharged upon today's visit.  Isaiah is progressing well towards his goals.   Pt prognosis is Excellent.     Pt will continue to benefit from skilled outpatient physical therapy to address the deficits listed in the problem list box on initial evaluation, provide pt/family education and to maximize pt's level of independence in the home and community environment.     Pt's spiritual, cultural and educational needs considered and pt agreeable to plan of care and goals.     Anticipated Barriers for therapy: co-morbidities    Short Term Goals:  3 weeks Status  Date Met   PAIN: Pt will report worst pain of 5/10 in order to progress toward max functional ability and improve quality of life. [] Progressing  [x] Met  [] Not Met 1/26/24    FUNCTION: Patient will demonstrate improved function as indicated by a score of greater than or equal to 50 out of 100 on FOTO. [] Progressing  [x] Met  [] Not Met 1/26/24    MOBILITY: Patient will improve AROM to 50% of stated goals, listed in objective measures above, in order to progress towards independence with functional activities.  [] Progressing  [x] Met  [] Not Met 1/26/24  "   STRENGTH: Patient will improve strength to 50% of stated goals, listed in objective measures above, in order to progress towards independence with functional activities. [] Progressing  [x] Met  [] Not Met 1/26/24    POSTURE: Patient will correct postural deviations in sitting and standing, to decrease pain and promote long term stability.  [] Progressing  [x] Met  [] Not Met 1/26/24    HEP: Patient will demonstrate independence with HEP in order to progress toward functional independence. [] Progressing  [x] Met  [] Not Met  1/26/24      Long Term Goals:  6 weeks Status Date Met   PAIN: Pt will report worst pain of 2/10 in order to progress toward max functional ability and improve quality of life [] Progressing  [x] Met  [] Not Met  1/26/24   FUNCTION: Patient will demonstrate improved function as indicated by a score of greater than or equal to 66 out of 100 on FOTO. [] Progressing  [x] Met  [] Not Met  1/26/24   MOBILITY: Patient will improve AROM to stated goals, listed in objective measures above, in order to return to maximal functional potential and improve quality of life.  [] Progressing  [x] Met  [] Not Met  1/26/24   STRENGTH: Patient will improve strength to stated goals, listed in objective measures above, in order to improve functional independence and quality of life.  [] Progressing  [x] Met  [] Not Met  1/26/24   GAIT: Patient will demonstrate normalized gait mechanics with minimal compensation in order to return to PLOF. [] Progressing  [x] Met  [] Not Met  1/26/24   Patient will return to normal ADL's, IADL's, community involvement, recreational activities, and work-related activities with less than or equal to 2/10 pain and maximal function.  [] Progressing  [x] Met  [] Not Met 1/26/24       Plan     Continue Plan of Care (POC) and progress per patient tolerance. See treatment section for details on planned progressions next session.      Pan Gipson, PT

## 2024-02-02 ENCOUNTER — OFFICE VISIT (OUTPATIENT)
Dept: CARDIOLOGY | Facility: CLINIC | Age: 69
End: 2024-02-02
Payer: COMMERCIAL

## 2024-02-02 VITALS
WEIGHT: 294.13 LBS | OXYGEN SATURATION: 97 % | HEART RATE: 67 BPM | SYSTOLIC BLOOD PRESSURE: 112 MMHG | HEIGHT: 74 IN | DIASTOLIC BLOOD PRESSURE: 60 MMHG | BODY MASS INDEX: 37.75 KG/M2

## 2024-02-02 DIAGNOSIS — E11.59 HYPERTENSION ASSOCIATED WITH DIABETES: Chronic | ICD-10-CM

## 2024-02-02 DIAGNOSIS — E11.42 TYPE 2 DIABETES MELLITUS WITH DIABETIC POLYNEUROPATHY, WITH LONG-TERM CURRENT USE OF INSULIN: ICD-10-CM

## 2024-02-02 DIAGNOSIS — E66.01 CLASS 2 SEVERE OBESITY DUE TO EXCESS CALORIES WITH SERIOUS COMORBIDITY AND BODY MASS INDEX (BMI) OF 39.0 TO 39.9 IN ADULT: ICD-10-CM

## 2024-02-02 DIAGNOSIS — E11.65 TYPE 2 DIABETES MELLITUS WITH HYPERGLYCEMIA, WITH LONG-TERM CURRENT USE OF INSULIN: Chronic | ICD-10-CM

## 2024-02-02 DIAGNOSIS — E78.5 HYPERLIPIDEMIA ASSOCIATED WITH TYPE 2 DIABETES MELLITUS: Chronic | ICD-10-CM

## 2024-02-02 DIAGNOSIS — Z79.4 TYPE 2 DIABETES MELLITUS WITH HYPERGLYCEMIA, WITH LONG-TERM CURRENT USE OF INSULIN: Chronic | ICD-10-CM

## 2024-02-02 DIAGNOSIS — Z79.4 TYPE 2 DIABETES MELLITUS WITH DIABETIC POLYNEUROPATHY, WITH LONG-TERM CURRENT USE OF INSULIN: ICD-10-CM

## 2024-02-02 DIAGNOSIS — G47.33 OSA ON CPAP: ICD-10-CM

## 2024-02-02 DIAGNOSIS — I87.2 VENOUS INSUFFICIENCY: ICD-10-CM

## 2024-02-02 DIAGNOSIS — I10 ESSENTIAL HYPERTENSION: Chronic | ICD-10-CM

## 2024-02-02 DIAGNOSIS — I25.10 CORONARY ARTERY DISEASE INVOLVING NATIVE CORONARY ARTERY OF NATIVE HEART WITHOUT ANGINA PECTORIS: Primary | Chronic | ICD-10-CM

## 2024-02-02 DIAGNOSIS — Z98.61 S/P PTCA (PERCUTANEOUS TRANSLUMINAL CORONARY ANGIOPLASTY): ICD-10-CM

## 2024-02-02 DIAGNOSIS — E11.59 TYPE 2 DIABETES MELLITUS WITH OTHER CIRCULATORY COMPLICATION, WITH LONG-TERM CURRENT USE OF INSULIN: Chronic | ICD-10-CM

## 2024-02-02 DIAGNOSIS — I15.2 HYPERTENSION ASSOCIATED WITH DIABETES: Chronic | ICD-10-CM

## 2024-02-02 DIAGNOSIS — Z79.4 TYPE 2 DIABETES MELLITUS WITH OTHER CIRCULATORY COMPLICATION, WITH LONG-TERM CURRENT USE OF INSULIN: Chronic | ICD-10-CM

## 2024-02-02 DIAGNOSIS — E11.69 HYPERLIPIDEMIA ASSOCIATED WITH TYPE 2 DIABETES MELLITUS: Chronic | ICD-10-CM

## 2024-02-02 PROCEDURE — 3288F FALL RISK ASSESSMENT DOCD: CPT | Mod: CPTII,S$GLB,, | Performed by: INTERNAL MEDICINE

## 2024-02-02 PROCEDURE — 99999 PR PBB SHADOW E&M-EST. PATIENT-LVL IV: CPT | Mod: PBBFAC,,, | Performed by: INTERNAL MEDICINE

## 2024-02-02 PROCEDURE — 4010F ACE/ARB THERAPY RXD/TAKEN: CPT | Mod: CPTII,S$GLB,, | Performed by: INTERNAL MEDICINE

## 2024-02-02 PROCEDURE — 1101F PT FALLS ASSESS-DOCD LE1/YR: CPT | Mod: CPTII,S$GLB,, | Performed by: INTERNAL MEDICINE

## 2024-02-02 PROCEDURE — 3078F DIAST BP <80 MM HG: CPT | Mod: CPTII,S$GLB,, | Performed by: INTERNAL MEDICINE

## 2024-02-02 PROCEDURE — 1159F MED LIST DOCD IN RCRD: CPT | Mod: CPTII,S$GLB,, | Performed by: INTERNAL MEDICINE

## 2024-02-02 PROCEDURE — 99214 OFFICE O/P EST MOD 30 MIN: CPT | Mod: S$GLB,,, | Performed by: INTERNAL MEDICINE

## 2024-02-02 PROCEDURE — 1160F RVW MEDS BY RX/DR IN RCRD: CPT | Mod: CPTII,S$GLB,, | Performed by: INTERNAL MEDICINE

## 2024-02-02 PROCEDURE — 3008F BODY MASS INDEX DOCD: CPT | Mod: CPTII,S$GLB,, | Performed by: INTERNAL MEDICINE

## 2024-02-02 PROCEDURE — 3074F SYST BP LT 130 MM HG: CPT | Mod: CPTII,S$GLB,, | Performed by: INTERNAL MEDICINE

## 2024-02-02 NOTE — PROGRESS NOTES
Subjective:   Patient ID:  Isaiah Harrison is a 68 y.o. male who presents for follow up of No chief complaint on file.      HPI  4/22/2020  A 64 yo male with cad s/p lad stent htn hlp obesity diabetes s/p back surgery venous insufficency is following up today. He is using cpap regularily compliant with dite he was doing physical therapy till the curfew. He has no chest pain no shortness of breath headcahes tia or claudication. He ahs been out of valsartan due to recall for 2 months. His htn is not well controlled. He is taking all other meds. He is compliant with diet and salt.has been working intermittently no issues clinically.his a1c i9s 6.9% and his ldl has increased to 100 he is probably not compliant despite what he mentions. He is eatingt probably lot of snacks on his work shift he will adjsut that.     1/6/2021  NEEDS EYE SURGERY. BACK FEELS BETTER WALKS FOR EXERCISE NO CHEST PAIN SHORTNESS OF BREATH USES CPAP MACHINE NOT CONSISTENT. HAS NO ISSUES OTHERWISE CLINICALLY CARDIAC WISE. HE HAS TOO MUCH SLEEPINESS TAKES TOO MUCH NAPS. NO LEG SWELLING TIA OR CLAUDICATION SNORING IMPROVING. HIS LDL IS ON TARGET HIS A1C IS 11. HE HAS NEED TO BE MORE COMPLIANT WITH DIET. HE NEEDS YE SURGERY FOR A FLOATER.      3/31/2021  He is here for f/u . He has no new complaints he is not able to lose weight he is eating too much he is not exercising and he is using his cpap . He noticed the difference. Ha sno leg swelling no chest pain.     11/10/2021  Here for f/u he is still non compliant with sweets reflecting on his a1c. His ldl is on target cardiac wise asymptomatic. He is compliant with meds.      9/8/2022   He is here for f/u no new symptoms the issue is still compliance with diet he had a salty breakfast today that is why his bp is up. Review of digital htn showed fluctuation compatible with salt non compliance sugar control is still the issue. Asymptomatic cardiac miramontes.     Dr Ortiz 10/13/2023  68 yM. Post NSTEI  f/u  PMH CAD h/o NSTEMI and LHC showed D2 90% amd patent midLAD stent 40% lesion, EF 60% DM HTN hLD  Admitted to [facility] in  for NSTEMI ACS, troponin peak 3 and urgent cath showed D2 90% lesion by Dr. Lyles  Yesterday N/v no recurrent chest pain   Left arm radial access normal   A1c 9 and . BP controlled      2/2/2024  Retired has been on mounjaro not losing much weight is on repatha now . Has no new complaints cardiovascular wise. Htn controlled.   Past Medical History:   Diagnosis Date    Acute coronary syndrome     Anticoagulant long-term use     Back pain     CAD (coronary artery disease) 10/17/2013    Class 2 severe obesity due to excess calories with serious comorbidity and body mass index (BMI) of 38.0 to 38.9 in adult 01/12/2015    Coronary artery disease     Diabetes mellitus, type 2 2010    BS didn't check 04/19/2023 Insulin 2 years    Gastric polyps     Hyperlipemia     Hypertension     NSTEMI (non-ST elevated myocardial infarction) 10/23/2014    Obesity 10/21/2014    DANTE on CPAP 02/19/2015    S/P PTCA (percutaneous transluminal coronary angioplasty) 10/17/2013    Sleep apnea 01/07/2015    Type 2 diabetes mellitus with circulatory disorder (coronary artery disease), without long-term current use of insulin 07/14/2015       Past Surgical History:   Procedure Laterality Date    APPENDECTOMY      BACK SURGERY      CATARACT EXTRACTION      CATARACT EXTRACTION, BILATERAL      COLONOSCOPY      COLONOSCOPY N/A 3/9/2023    Procedure: COLONOSCOPY;  Surgeon: Lisa Auguste MD;  Location: [facility], [place] ENDO;  Service: Endoscopy;  Laterality: N/A;    CORONARY ANGIOPLASTY WITH STENT PLACEMENT      ESOPHAGOGASTRODUODENOSCOPY      EYE SURGERY      FRACTIONAL FLOW RESERVE (FFR), CORONARY  10/9/2023    Procedure: Fractional Flow Adrian (FFR), Coronary;  Surgeon: Kirsty Lyles MD;  Location: [facility], [place] CATH LAB;  Service: Cardiology;;    FRACTURE SURGERY      HERNIA REPAIR      INSTANTANEOUS WAVE-FREE RATIO (IFR) N/A  10/9/2023    Procedure: Instantaneous Wave-Free Ratio (IFR);  Surgeon: Kirsty Lyles MD;  Location: [facility] CATH LAB;  Service: Cardiology;  Laterality: N/A;    LEFT HEART CATHETERIZATION Left 10/9/2023    Procedure: Left heart cath;  Surgeon: Kirsty Lyles MD;  Location: [facility] CATH LAB;  Service: Cardiology;  Laterality: Left;    SPINE SURGERY         Social History     Tobacco Use    Smoking status: Never    Smokeless tobacco: Never   Substance Use Topics    Alcohol use: Yes     Alcohol/week: 0.0 standard drinks of alcohol     Comment: socially    Drug use: No       Family History   Problem Relation Age of Onset    Hypertension Mother     Diabetes Mother     Hypertension Father     Diabetes Sister     Hypertension Sister     Heart disease Maternal Grandfather        Current Outpatient Medications   Medication Sig    amlodipine-valsartan (EXFORGE)  mg per tablet Take 1 tablet by mouth once daily.    aspirin (ECOTRIN) 81 MG EC tablet Take 81 mg by mouth once daily.    atorvastatin (LIPITOR) 80 MG tablet Take 1 tablet (80 mg total) by mouth every evening.    blood sugar diagnostic Strp 1 strip by Misc.(Non-Drug; Combo Route) route 4 (four) times daily.    empagliflozin (JARDIANCE) 25 mg tablet Take 1 tablet (25 mg total) by mouth once daily.    etodolac (LODINE) 400 MG tablet Take 1 tablet (400 mg total) by mouth 2 (two) times daily.    evolocumab (REPATHA SURECLICK) 140 mg/mL PnIj Inject 1 mL (140 mg total) into the skin every 14 (fourteen) days.    hydroCHLOROthiazide (HYDRODIURIL) 25 MG tablet TAKE 1 TABLET(25 MG) BY MOUTH EVERY DAY    hydrOXYzine HCL (ATARAX) 10 MG Tab TAKE 1 TABLET(10 MG) BY MOUTH TWICE DAILY AS NEEDED. MAY CAUSE DROWSINESS. DO NOT DRIVE OR OPERATE HEAVY MACHINERY    insulin glargine, TOUJEO, (TOUJEO SOLOSTAR U-300 INSULIN) 300 unit/mL (1.5 mL) InPn pen Inject 40 Units into the skin 2 (two) times a day.    isosorbide mononitrate (IMDUR) 30 MG 24 hr tablet TAKE 3 TABLETS(90 MG) BY MOUTH  "EVERY DAY    ketoconazole (NIZORAL) 2 % cream Apply topically once daily.    metoprolol tartrate (LOPRESSOR) 50 MG tablet TAKE 1 TABLET BY MOUTH TWICE DAILY    niacin (SLO-NIACIN) 500 mg tablet TAKE 1 TABLET(500 MG) BY MOUTH EVERY EVENING    nitroGLYCERIN (NITROSTAT) 0.4 MG SL tablet Place 1 tablet (0.4 mg total) under the tongue every 5 (five) minutes as needed.    pen needle, diabetic (BD ULTRA-FINE SHORT PEN NEEDLE) 31 gauge x 5/16" Ndle USE WITH LANTUS PEN AS DIRECTED    pneumoc 20-miranda conj-dip cr,PF, (PREVNAR-20, PF,) 0.5 mL Syrg injection Inject 0.5 mLs into the muscle.    ranolazine (RANEXA) 1,000 mg Tb12 Take 1 tablet (1,000 mg total) by mouth 2 (two) times daily.    tirzepatide 7.5 mg/0.5 mL PnIj Inject 7.5 mg into the skin every 7 days.    clopidogreL (PLAVIX) 75 mg tablet Take 1 tablet (75 mg total) by mouth once daily. (Patient not taking: Reported on 11/30/2023)     No current facility-administered medications for this visit.     Current Outpatient Medications on File Prior to Visit   Medication Sig    amlodipine-valsartan (EXFORGE)  mg per tablet Take 1 tablet by mouth once daily.    aspirin (ECOTRIN) 81 MG EC tablet Take 81 mg by mouth once daily.    atorvastatin (LIPITOR) 80 MG tablet Take 1 tablet (80 mg total) by mouth every evening.    blood sugar diagnostic Strp 1 strip by Misc.(Non-Drug; Combo Route) route 4 (four) times daily.    empagliflozin (JARDIANCE) 25 mg tablet Take 1 tablet (25 mg total) by mouth once daily.    etodolac (LODINE) 400 MG tablet Take 1 tablet (400 mg total) by mouth 2 (two) times daily.    evolocumab (REPATHA SURECLICK) 140 mg/mL PnIj Inject 1 mL (140 mg total) into the skin every 14 (fourteen) days.    hydroCHLOROthiazide (HYDRODIURIL) 25 MG tablet TAKE 1 TABLET(25 MG) BY MOUTH EVERY DAY    hydrOXYzine HCL (ATARAX) 10 MG Tab TAKE 1 TABLET(10 MG) BY MOUTH TWICE DAILY AS NEEDED. MAY CAUSE DROWSINESS. DO NOT DRIVE OR OPERATE HEAVY MACHINERY    insulin glargine, TOUJEO, " "(TOUJEO SOLOSTAR U-300 INSULIN) 300 unit/mL (1.5 mL) InPn pen Inject 40 Units into the skin 2 (two) times a day.    isosorbide mononitrate (IMDUR) 30 MG 24 hr tablet TAKE 3 TABLETS(90 MG) BY MOUTH EVERY DAY    ketoconazole (NIZORAL) 2 % cream Apply topically once daily.    metoprolol tartrate (LOPRESSOR) 50 MG tablet TAKE 1 TABLET BY MOUTH TWICE DAILY    niacin (SLO-NIACIN) 500 mg tablet TAKE 1 TABLET(500 MG) BY MOUTH EVERY EVENING    nitroGLYCERIN (NITROSTAT) 0.4 MG SL tablet Place 1 tablet (0.4 mg total) under the tongue every 5 (five) minutes as needed.    pen needle, diabetic (BD ULTRA-FINE SHORT PEN NEEDLE) 31 gauge x 5/16" Ndle USE WITH LANTUS PEN AS DIRECTED    pneumoc 20-miranda conj-dip cr,PF, (PREVNAR-20, PF,) 0.5 mL Syrg injection Inject 0.5 mLs into the muscle.    ranolazine (RANEXA) 1,000 mg Tb12 Take 1 tablet (1,000 mg total) by mouth 2 (two) times daily.    tirzepatide 7.5 mg/0.5 mL PnIj Inject 7.5 mg into the skin every 7 days.    clopidogreL (PLAVIX) 75 mg tablet Take 1 tablet (75 mg total) by mouth once daily. (Patient not taking: Reported on 11/30/2023)     No current facility-administered medications on file prior to visit.     Review of patient's allergies indicates:   Allergen Reactions    Pcn [penicillins] Hives      Review of Systems   Constitutional: Negative for diaphoresis, malaise/fatigue and weight gain.   HENT:  Negative for hoarse voice.    Eyes:  Negative for blurred vision, double vision and visual disturbance.   Cardiovascular:  Negative for chest pain, claudication, cyanosis, dyspnea on exertion, irregular heartbeat, leg swelling, near-syncope, orthopnea, palpitations, paroxysmal nocturnal dyspnea and syncope.   Respiratory:  Negative for cough, hemoptysis, shortness of breath and snoring.    Hematologic/Lymphatic: Negative for bleeding problem. Does not bruise/bleed easily.   Skin:  Negative for color change, dry skin, itching and poor wound healing.   Musculoskeletal:  Negative for " falls, muscle cramps, muscle weakness and myalgias.   Gastrointestinal:  Negative for bloating, abdominal pain, change in bowel habit, diarrhea, heartburn, hematemesis, hematochezia, melena and nausea.   Genitourinary:  Negative for flank pain and hematuria.   Neurological:  Negative for excessive daytime sleepiness, dizziness, focal weakness, headaches, light-headedness, loss of balance, numbness, paresthesias, seizures and weakness.   Psychiatric/Behavioral:  Negative for altered mental status and memory loss. The patient does not have insomnia and is not nervous/anxious.    Allergic/Immunologic: Negative for hives.       Objective:   Physical Exam  Vitals and nursing note reviewed.   Constitutional:       General: He is not in acute distress.     Appearance: He is well-developed. He is obese. He is not diaphoretic.   HENT:      Head: Normocephalic and atraumatic.   Eyes:      General:         Right eye: No discharge.         Left eye: No discharge.      Pupils: Pupils are equal, round, and reactive to light.   Neck:      Thyroid: No thyromegaly.      Vascular: No JVD.   Cardiovascular:      Rate and Rhythm: Normal rate and regular rhythm.      Pulses: Intact distal pulses.      Heart sounds: Normal heart sounds. No murmur heard.     No friction rub. No gallop.   Pulmonary:      Effort: Pulmonary effort is normal. No respiratory distress.      Breath sounds: Normal breath sounds. No wheezing or rales.   Chest:      Chest wall: No tenderness.   Abdominal:      General: Bowel sounds are normal. There is no distension.      Palpations: Abdomen is soft.      Tenderness: There is no abdominal tenderness.   Musculoskeletal:         General: Normal range of motion.      Cervical back: Neck supple.      Right lower leg: No edema.      Left lower leg: No edema.   Skin:     General: Skin is warm and dry.      Findings: No erythema or rash.   Neurological:      General: No focal deficit present.      Mental Status: He is  "alert and oriented to person, place, and time.      Cranial Nerves: No cranial nerve deficit.   Psychiatric:         Mood and Affect: Mood normal.         Behavior: Behavior normal.       Vitals:    02/02/24 1043 02/02/24 1046   BP: 110/62 112/60   BP Location: Left arm Right arm   Patient Position: Sitting Sitting   BP Method: Large (Manual) Large (Manual)   Pulse: 67 67   SpO2: 97%    Weight: 133.4 kg (294 lb 1.5 oz)    Height: 6' 2" (1.88 m)      Lab Results   Component Value Date    CHOL 156 10/06/2023    CHOL 159 04/27/2023    CHOL 174 09/13/2022      Body mass index is 37.76 kg/m².   Lab Results   Component Value Date    HGBA1C 9.1 (H) 10/02/2023      BMP  Lab Results   Component Value Date     10/09/2023    K 4.0 10/09/2023     10/09/2023    CO2 21 (L) 10/09/2023    BUN 21 10/09/2023    CREATININE 1.2 10/09/2023    CALCIUM 9.0 10/09/2023    ANIONGAP 13 10/09/2023    EGFRNORACEVR >60 10/09/2023      Lab Results   Component Value Date    HDL 34 (L) 10/06/2023    HDL 38 (L) 04/27/2023    HDL 40 09/13/2022     Lab Results   Component Value Date    LDLCALC 103.0 10/06/2023    LDLCALC 106.4 04/27/2023    LDLCALC 113.4 09/13/2022     Lab Results   Component Value Date    TRIG 95 10/06/2023    TRIG 73 04/27/2023    TRIG 103 09/13/2022     Lab Results   Component Value Date    CHOLHDL 21.8 10/06/2023    CHOLHDL 23.9 04/27/2023    CHOLHDL 23.0 09/13/2022       Chemistry        Component Value Date/Time     10/09/2023 0520    K 4.0 10/09/2023 0520     10/09/2023 0520    CO2 21 (L) 10/09/2023 0520    BUN 21 10/09/2023 0520    CREATININE 1.2 10/09/2023 0520     (H) 10/09/2023 0520        Component Value Date/Time    CALCIUM 9.0 10/09/2023 0520    ALKPHOS 86 10/06/2023 1332    AST 15 10/06/2023 1332    ALT 26 10/06/2023 1332    BILITOT 0.8 10/06/2023 1332    ESTGFRAFRICA >60.0 10/27/2021 0842    EGFRNONAA >60.0 10/27/2021 0842          Lab Results   Component Value Date    TSH 1.188 " 06/30/2015     Lab Results   Component Value Date    INR 1.0 10/07/2023    INR 1.0 10/06/2023    INR 1.0 12/16/2015     Lab Results   Component Value Date    WBC 8.43 10/09/2023    HGB 15.8 10/09/2023    HCT 47.6 10/09/2023    MCV 95 10/09/2023     10/09/2023     BMP  Sodium   Date Value Ref Range Status   10/09/2023 136 136 - 145 mmol/L Final     Potassium   Date Value Ref Range Status   10/09/2023 4.0 3.5 - 5.1 mmol/L Final     Chloride   Date Value Ref Range Status   10/09/2023 102 95 - 110 mmol/L Final     CO2   Date Value Ref Range Status   10/09/2023 21 (L) 23 - 29 mmol/L Final     BUN   Date Value Ref Range Status   10/09/2023 21 8 - 23 mg/dL Final     Creatinine   Date Value Ref Range Status   10/09/2023 1.2 0.5 - 1.4 mg/dL Final     Calcium   Date Value Ref Range Status   10/09/2023 9.0 8.7 - 10.5 mg/dL Final     Anion Gap   Date Value Ref Range Status   10/09/2023 13 8 - 16 mmol/L Final     eGFR if    Date Value Ref Range Status   10/27/2021 >60.0 >60 mL/min/1.73 m^2 Final     eGFR if non    Date Value Ref Range Status   10/27/2021 >60.0 >60 mL/min/1.73 m^2 Final     Comment:     Calculation used to obtain the estimated glomerular filtration  rate (eGFR) is the CKD-EPI equation.        CrCl cannot be calculated (Patient's most recent lab result is older than the maximum 7 days allowed.).    Assessment:     1. Coronary artery disease involving native coronary artery of native heart without angina pectoris    2. Class 2 severe obesity due to excess calories with serious comorbidity and body mass index (BMI) of 39.0 to 39.9 in adult    3. Hyperlipidemia associated with type 2 diabetes mellitus    4. Type 2 diabetes mellitus with other circulatory complication, with long-term current use of insulin    5. Essential hypertension    6. Type 2 diabetes mellitus with hyperglycemia, with long-term current use of insulin    7. Hypertension associated with diabetes    8. S/P PTCA  (percutaneous transluminal coronary angioplasty)    9. LORNE on CPAP    10. Venous insufficiency    11. Type 2 diabetes mellitus with diabetic polyneuropathy, with long-term current use of insulin      Cad s/p stent nstemi asymptomatic complaint continue same emds discussed exercise weight loss   Htn controlled low slat diet emphasized continue the same.    Hlp on repatha lipitor continue same repeat labs in 6 months   Diabetes uncontrolled on mounjaro discussed diet compliance exercise weight loss repeat labs on f/u  Obesity discussed weight loss he is on mounjaro  Lorne compliant with cpap stable continue same.   Venous insufficiency stable counseled about leg elevation weight loss compression socks.   Plan:   Continue current therapy  Cardiac low salt diet.  Risk factor modification and excercise program./weight loss  F/u in 6 months with lipid cmp A1c

## 2024-02-05 ENCOUNTER — CLINICAL SUPPORT (OUTPATIENT)
Dept: DIABETES | Facility: CLINIC | Age: 69
End: 2024-02-05
Payer: COMMERCIAL

## 2024-02-05 DIAGNOSIS — E11.59 TYPE 2 DIABETES MELLITUS WITH OTHER CIRCULATORY COMPLICATION, WITH LONG-TERM CURRENT USE OF INSULIN: Primary | ICD-10-CM

## 2024-02-05 DIAGNOSIS — Z79.4 TYPE 2 DIABETES MELLITUS WITH OTHER CIRCULATORY COMPLICATION, WITH LONG-TERM CURRENT USE OF INSULIN: Primary | ICD-10-CM

## 2024-02-05 PROCEDURE — G0108 DIAB MANAGE TRN  PER INDIV: HCPCS | Mod: S$GLB,,, | Performed by: DIETITIAN, REGISTERED

## 2024-02-05 NOTE — PROGRESS NOTES
Diabetes Care Specialist Follow-up Note  Author: Ximena Blunt RD, CDE  Date: 2/5/2024    Program Intake  Reason for Diabetes Program Visit:: Intervention  Type of Intervention:: Individual  Individual: Education  Education: Self-Management Skill Review  Type of Follow-Up: 3 month  Current diabetes risk level:: high  Continuous Glucose Monitoring  Patient has CGM: No    Lab Results   Component Value Date    HGBA1C 9.1 (H) 10/02/2023     Patient is due to have A1c checked.     Clinical    Nutritional Status  Diet: Regular  Meal Plan 24 Hour Recall: Breakfast, Lunch, Dinner  Meal Plan 24 Hour Recall - Breakfast: egg mcmuffin, hashbrown, coffee // this morning had a breakfast burrito, regular pepsi  Meal Plan 24 Hour Recall - Lunch: [employer]: Salad bowl, pepsi  Meal Plan 24 Hour Recall - Dinner: 1 cup of chicken and sausage jambalaya, potato salad, corn  Meal Plan 24 Hour Recall - Snack: 1-2 bitesize candy bars  Change in appetite?: No  Current nutritional status an area of need that is impacting patient's ability to self-manage diabetes?: No    Physical activity/Exercise:   No structured exercise.     SMBG: Not monitoring    Current Diabetes Medications:   Toujeo 40u BID  Jardiance 25mg QD  Mounjaro 7.5mg q7d    Diabetes Self-Management Skills Assessment     Nutrition/Healthy Eating  Nutrition/Healthy Eating Skills Assessment Completed:: Yes  Assessment indicates:: Adequate understanding  Area of need?: No    Medications  Medication Skills Assessment Completed:: Yes  Assessment indicates:: Instruction Needed  Area of need?: No    Home Blood Glucose Monitoring  Home Blood Glucose Monitoring Skills Assessment Completed: : Yes  Assessment indicates:: Instruction Needed  Area of need?: Yes      During today's follow-up visit,  the following areas required further assessment and content was provided/reviewed.    Based on today's diabetes care assessment, the following areas of need were identified:                2/5/2024    12:01 AM   Clinical   Nutritional Status No            2/5/2024    12:01 AM   Diabetes Self-Management Skills   Nutrition/Healthy Eating No change in progress. Patient is eating high carb meals, drinking some regular soda but not everyday. See care plan update.    Medication Patient will discuss with PCP about increasing dose of Mounjaro to 10mg. Plans to make appt with PCP to discuss medication and labs.    Home Blood Glucose Monitoring Yes- see care plan.   Patient will see Diabetes ROSARIO, Dana Flores to discuss CGM.         Today's interventions were provided through individual discussion, instruction, and written materials were provided.    Patient verbalized understanding of instruction and written materials.  Pt was able to return back demonstration of instructions today. Patient understood key points, needs reinforcement and further instruction.     Diabetes Self-Management Care Plan Review and Evaluation of Progress:    During today's follow-up Isaiah's Diabetes Self-Management Care Plan progress was reviewed and progress was evaluated including his/her input. Isaiah has agreed to continue his/her journey to improve/maintain overall diabetes control by continuing to set health goals. See care plan progress below.      Care Plan: Diabetes Management   Updates made since 1/6/2024 12:00 AM        Problem: Healthy Eating         Goal: Eat 3 meals daily with 45-60g/3-4 servings of Carbohydrate per meal and 15-20 grams per snack    Start Date: 10/11/2023   Expected End Date: 6/5/2024   This Visit's Progress: No change   Recent Progress: On track   Priority: High   Barriers: Other (comments)   Note:    Work schedule  11/13/23: Patient reports eating smaller portions and using plate method for meal planning when he eats out. Admits that the holidays will be a challenge.     2/5/24: Patient recently retired, work schedule is no longer a challenge. Wife had surgery which has created a new challenge- she  is not able to cook. He is eating out more and admits to drinking regular sodas. Reviewed plate method for meal planning and need to limit carbohydrates at meals. Provided copy of food lists and Building a Balanced Meal.        Problem: Blood Glucose Self-Monitoring         Goal: Patient agrees to check and record blood sugars 2 times per day - fasting and pp Completed 2/5/2024   Start Date: 10/11/2023   Expected End Date: 1/11/2024   This Visit's Progress: Not met   Recent Progress: No change   Priority: Medium   Barriers: No Barriers Identified   Note:    11/13/23: Patient has not checked his blood sugar since last visit. Reviewed importance of daily testing and provided BG logs for patient to complete.     2/5/24: Patient can't remember the last time he checked his blood sugar. He is interested in Dexcom. Scheduled patient with Diabetes ROSARIO, Dana Flores to discuss and for medication management.          Follow Up Plan     Follow up in about 3 months (around 5/5/2024) for E11.59.    Today's care plan and follow up schedule was discussed with patient.  Isaiah verbalized understanding of the care plan, goals, and agrees to follow up plan.        The patient was encouraged to communicate with his/her health care provider/physician and care team regarding his/her condition(s) and treatment.  I provided the patient with my contact information today and encouraged to contact me via phone or [facility] Patient Portal as needed.     Length of Visit   Total Time: 30 Minutes

## 2024-02-23 ENCOUNTER — TELEPHONE (OUTPATIENT)
Dept: INTERNAL MEDICINE | Facility: CLINIC | Age: 69
End: 2024-02-23
Payer: COMMERCIAL

## 2024-02-23 NOTE — TELEPHONE ENCOUNTER
----- Message from Michell Patterson sent at 2/23/2024  2:17 PM CST -----  Contact: Isaiah  Type:  Patient Returning Call    Who Called:Isaiah  Who Left Message for Patient: not sure  Does the patient know what this is regarding?:medication  Would the patient rather a call back or a response via [facility]? Call back  Best Call Back Number:[phone]  Additional Information:     Thanks  Am

## 2024-03-18 ENCOUNTER — PATIENT MESSAGE (OUTPATIENT)
Dept: ADMINISTRATIVE | Facility: OTHER | Age: 69
End: 2024-03-18
Payer: COMMERCIAL

## 2024-03-18 ENCOUNTER — PATIENT MESSAGE (OUTPATIENT)
Dept: ADMINISTRATIVE | Facility: HOSPITAL | Age: 69
End: 2024-03-18
Payer: COMMERCIAL

## 2024-03-20 ENCOUNTER — LAB VISIT (OUTPATIENT)
Dept: LAB | Facility: HOSPITAL | Age: 69
End: 2024-03-20
Payer: MEDICARE

## 2024-03-20 DIAGNOSIS — Z79.4 TYPE 2 DIABETES MELLITUS WITH HYPERGLYCEMIA, WITH LONG-TERM CURRENT USE OF INSULIN: ICD-10-CM

## 2024-03-20 DIAGNOSIS — E11.65 TYPE 2 DIABETES MELLITUS WITH HYPERGLYCEMIA, WITH LONG-TERM CURRENT USE OF INSULIN: ICD-10-CM

## 2024-03-20 LAB
ESTIMATED AVG GLUCOSE: 203 MG/DL (ref 68–131)
HBA1C MFR BLD: 8.7 % (ref 4–5.6)

## 2024-03-20 PROCEDURE — 36415 COLL VENOUS BLD VENIPUNCTURE: CPT | Performed by: FAMILY MEDICINE

## 2024-03-20 PROCEDURE — 83036 HEMOGLOBIN GLYCOSYLATED A1C: CPT | Performed by: FAMILY MEDICINE

## 2024-04-08 NOTE — TELEPHONE ENCOUNTER
Refill Routing Note   Medication(s) are not appropriate for processing by [facility] for the following reason(s):        ED/Hospital Visit since last OV with provider  No active prescription written by provider    [facility] action(s):  Defer               Appointments  past 12m or future 3m with PCP    Date Provider   Last Visit   10/12/2023 Wilda [place] NP   Next Visit   Visit date not found Wilda [place] NP   ED visits in past 90 days: 0        Note composed:6:28 PM 04/08/2024

## 2024-04-11 DIAGNOSIS — I25.10 CORONARY ARTERY DISEASE INVOLVING NATIVE CORONARY ARTERY WITHOUT ANGINA PECTORIS, UNSPECIFIED WHETHER NATIVE OR TRANSPLANTED HEART: ICD-10-CM

## 2024-04-11 DIAGNOSIS — I20.0 UNSTABLE ANGINA: ICD-10-CM

## 2024-04-11 RX ORDER — INSULIN GLARGINE 300 [IU]/ML
40 INJECTION, SOLUTION SUBCUTANEOUS 2 TIMES DAILY
Qty: 27 ML | Refills: 0 | Status: SHIPPED | OUTPATIENT
Start: 2024-04-11

## 2024-04-12 RX ORDER — NIACIN 500 MG/1
TABLET, EXTENDED RELEASE ORAL
Qty: 30 TABLET | Refills: 6 | Status: SHIPPED | OUTPATIENT
Start: 2024-04-12 | End: 2024-04-27 | Stop reason: SDUPTHER

## 2024-04-12 RX ORDER — RANOLAZINE 1000 MG/1
1000 TABLET, EXTENDED RELEASE ORAL 2 TIMES DAILY
Qty: 60 TABLET | Refills: 6 | Status: SHIPPED | OUTPATIENT
Start: 2024-04-12

## 2024-04-27 DIAGNOSIS — I25.10 CORONARY ARTERY DISEASE INVOLVING NATIVE CORONARY ARTERY WITHOUT ANGINA PECTORIS, UNSPECIFIED WHETHER NATIVE OR TRANSPLANTED HEART: ICD-10-CM

## 2024-04-29 RX ORDER — NIACIN 500 MG/1
TABLET, EXTENDED RELEASE ORAL
Qty: 30 TABLET | Refills: 6 | Status: SHIPPED | OUTPATIENT
Start: 2024-04-29 | End: 2024-05-30 | Stop reason: SDUPTHER

## 2024-05-24 ENCOUNTER — PATIENT OUTREACH (OUTPATIENT)
Dept: ADMINISTRATIVE | Facility: HOSPITAL | Age: 69
End: 2024-05-24
Payer: COMMERCIAL

## 2024-05-24 DIAGNOSIS — Z12.11 SCREEN FOR COLON CANCER: Primary | ICD-10-CM

## 2024-05-24 NOTE — PROGRESS NOTES
VBHM Score: 3     Colon Cancer Screening  Eye Exam  Foot Exam    RSV Vaccine                  Health Maintenance Topic(s) Outreach Outcomes & Actions Taken:    Colorectal Cancer Screening - Outreach Outcomes & Actions Taken  : Colonoscopy due    Eye Exam - Outreach Outcomes & Actions Taken  : Last exam done by Dr Pedro Chowdhury    Diabetic Foot Exam - Outreach Outcomes & Actions Taken  : Overdue, annual due 11.30.24       Additional Notes:  Attempted to contact pt no ans, lvm, will send portal letter.               Care Management, Digital Medicine, and/or Education Referrals    OPCM Risk Score: 41.9         Next Steps - Referral Actions: No ans, sent portal letter

## 2024-05-26 DIAGNOSIS — I25.10 CORONARY ARTERY DISEASE INVOLVING NATIVE CORONARY ARTERY OF NATIVE HEART WITHOUT ANGINA PECTORIS: ICD-10-CM

## 2024-05-30 ENCOUNTER — PATIENT MESSAGE (OUTPATIENT)
Dept: INTERNAL MEDICINE | Facility: CLINIC | Age: 69
End: 2024-05-30
Payer: COMMERCIAL

## 2024-05-30 DIAGNOSIS — I25.10 CORONARY ARTERY DISEASE INVOLVING NATIVE CORONARY ARTERY WITHOUT ANGINA PECTORIS, UNSPECIFIED WHETHER NATIVE OR TRANSPLANTED HEART: ICD-10-CM

## 2024-05-31 RX ORDER — NIACIN 500 MG/1
TABLET, EXTENDED RELEASE ORAL
Qty: 30 TABLET | Refills: 6 | Status: SHIPPED | OUTPATIENT
Start: 2024-05-31

## 2024-06-03 NOTE — PROGRESS NOTES
Pt returned call, he agreed to scheduling DM eye exam and agreed to colonoscopy screen, order placed.

## 2024-06-04 ENCOUNTER — HOSPITAL ENCOUNTER (OUTPATIENT)
Dept: PREADMISSION TESTING | Facility: HOSPITAL | Age: 69
Discharge: HOME | End: 2024-06-04
Attending: FAMILY MEDICINE
Payer: COMMERCIAL

## 2024-06-04 DIAGNOSIS — Z12.11 SCREEN FOR COLON CANCER: ICD-10-CM

## 2024-06-09 RX ORDER — ISOSORBIDE MONONITRATE 30 MG/1
TABLET, EXTENDED RELEASE ORAL
Qty: 90 TABLET | Refills: 0 | Status: SHIPPED | OUTPATIENT
Start: 2024-06-09

## 2024-06-09 NOTE — TELEPHONE ENCOUNTER
Limited refill approved.  Appointment (virtual or in-office) required for more refills.    TO MY TEAM: Please help Isaiah schedule appointment before they will run out of their medicine.  
No care due was identified.  [facility] Embedded Care Due Messages. Reference number: [id number].   5/26/2024 10:56:30 AM CDT  
Please see the attached refill request.  
Refill Routing Note   Medication(s) are not appropriate for processing by [facility] for the following reason(s):        ED/Hospital Visit since last OV with provider    [facility] action(s):  Defer        Medication Therapy Plan: Patient admitted to the ER for Hypertension on 10/6/23; Last office visit 4/23/23    Extended chart review required: Yes     Appointments  past 12m or future 3m with PCP    Date Provider   Last Visit   4/27/2023 LACI Dow MD   Next Visit   Visit date not found LACI Dow MD   ED visits in past 90 days: 0        Note composed:10:20 AM 05/27/2024           
Alert-The patient is alert, awake and responds to voice. The patient is oriented to time, place, and person. The triage nurse is able to obtain subjective information.

## 2024-06-16 ENCOUNTER — E-CONSULT (OUTPATIENT)
Dept: CARDIOLOGY | Facility: CLINIC | Age: 69
End: 2024-06-16
Payer: COMMERCIAL

## 2024-06-16 DIAGNOSIS — E66.01 CLASS 2 SEVERE OBESITY DUE TO EXCESS CALORIES WITH SERIOUS COMORBIDITY AND BODY MASS INDEX (BMI) OF 39.0 TO 39.9 IN ADULT: Chronic | ICD-10-CM

## 2024-06-16 DIAGNOSIS — E11.59 HYPERTENSION ASSOCIATED WITH DIABETES: Chronic | ICD-10-CM

## 2024-06-16 DIAGNOSIS — I10 ESSENTIAL HYPERTENSION: Chronic | ICD-10-CM

## 2024-06-16 DIAGNOSIS — Z79.4 TYPE 2 DIABETES MELLITUS WITH DIABETIC PERIPHERAL ANGIOPATHY WITHOUT GANGRENE, WITH LONG-TERM CURRENT USE OF INSULIN: Chronic | ICD-10-CM

## 2024-06-16 DIAGNOSIS — I87.2 VENOUS INSUFFICIENCY: ICD-10-CM

## 2024-06-16 DIAGNOSIS — E11.65 TYPE 2 DIABETES MELLITUS WITH HYPERGLYCEMIA, WITH LONG-TERM CURRENT USE OF INSULIN: Chronic | ICD-10-CM

## 2024-06-16 DIAGNOSIS — I25.10 CORONARY ARTERY DISEASE INVOLVING NATIVE CORONARY ARTERY OF NATIVE HEART WITHOUT ANGINA PECTORIS: Chronic | ICD-10-CM

## 2024-06-16 DIAGNOSIS — Z98.61 S/P PTCA (PERCUTANEOUS TRANSLUMINAL CORONARY ANGIOPLASTY): Primary | ICD-10-CM

## 2024-06-16 DIAGNOSIS — Z79.4 TYPE 2 DIABETES MELLITUS WITH HYPERGLYCEMIA, WITH LONG-TERM CURRENT USE OF INSULIN: Chronic | ICD-10-CM

## 2024-06-16 DIAGNOSIS — E11.51 TYPE 2 DIABETES MELLITUS WITH DIABETIC PERIPHERAL ANGIOPATHY WITHOUT GANGRENE, WITH LONG-TERM CURRENT USE OF INSULIN: Chronic | ICD-10-CM

## 2024-06-16 DIAGNOSIS — G47.33 OSA ON CPAP: ICD-10-CM

## 2024-06-16 DIAGNOSIS — I15.2 HYPERTENSION ASSOCIATED WITH DIABETES: Chronic | ICD-10-CM

## 2024-06-16 NOTE — CONSULTS
[facility]  Response for E-Consult     Patient Name: Isaiah Harrison  MRN: [id number]  Primary Care Provider: LACI Dow MD   Requesting Provider: Danielle Jose NP  Consults    Recommendation: Pt cleared for procedure/surgery at moderate CV risk Pt can hold plavix 5-7 days before procedure.    Contingency that warrants a repeat eConsult or referral: 70 yo male with cad s/p lad stent htn hlp obesity diabetes s/p back surgery venous insufficency referred for Cv clearance, colonoscopy. Pt last seen in Cv clinic 2-24 without CV sx.Pt cleared for procedure/surgery at moderate CV risk Pt can hold plavix 5-7 days before procedure.    Total time of Consultation: 10 minute    I did not speak to the requesting provider verbally about this.     *This eConsult is based on the clinical data available to me and is furnished without benefit of a physical examination. The eConsult will need to be interpreted in light of any clinical issues or changes in patient status not available to me at the time of filing this eConsults. Significant changes in patient condition or level of acuity should result in immediate formal consultation and reevaluation. Please alert me if you have further questions.    Thank you for this eConsult referral.     Johny Lozano MD  [facility]

## 2024-06-17 ENCOUNTER — PATIENT OUTREACH (OUTPATIENT)
Dept: ADMINISTRATIVE | Facility: HOSPITAL | Age: 69
End: 2024-06-17
Payer: COMMERCIAL

## 2024-06-17 DIAGNOSIS — Z12.11 SCREEN FOR COLON CANCER: Primary | ICD-10-CM

## 2024-06-17 NOTE — PROGRESS NOTES
Replying to Campaign Questionnaire for Overdue HM: COLON SCREENING/ DM EYE EXAM     Order placed for Colonoscopy.  Patient rescheduled DM eye exam.

## 2024-06-18 ENCOUNTER — HOSPITAL ENCOUNTER (OUTPATIENT)
Dept: PREADMISSION TESTING | Facility: HOSPITAL | Age: 69
Discharge: HOME | End: 2024-06-18
Attending: FAMILY MEDICINE
Payer: COMMERCIAL

## 2024-06-18 DIAGNOSIS — Z12.11 SCREEN FOR COLON CANCER: Primary | ICD-10-CM

## 2024-06-18 RX ORDER — SODIUM, POTASSIUM,MAG SULFATES 17.5-3.13G
1 SOLUTION, RECONSTITUTED, ORAL ORAL DAILY
Qty: 1 KIT | Refills: 0 | Status: SHIPPED | OUTPATIENT
Start: 2024-06-18 | End: 2024-06-20

## 2024-06-20 DIAGNOSIS — I25.10 CORONARY ARTERY DISEASE INVOLVING NATIVE CORONARY ARTERY OF NATIVE HEART WITHOUT ANGINA PECTORIS: ICD-10-CM

## 2024-06-20 RX ORDER — ISOSORBIDE MONONITRATE 30 MG/1
TABLET, EXTENDED RELEASE ORAL
Qty: 270 TABLET | Refills: 0 | Status: SHIPPED | OUTPATIENT
Start: 2024-06-20

## 2024-06-20 NOTE — TELEPHONE ENCOUNTER
Provider Staff:  Action required for this patient    Requires labs      Please see care gap opportunities below in Care Due Message.    Thanks!  [facility]     Appointments      Date Provider   Last Visit   4/27/2023 LACI Dow MD   Next Visit   Visit date not found LACI Dow MD     Refill Decision Note   Isaiah Harrison  is requesting a refill authorization.    Brief Assessment and Rationale for Refill:  Approve       Medication Therapy Plan:  Acute care/admission documentation reviewed. No change in therapy. Ok to approve.      Extended chart review required: Yes   Comments:     Note composed:1:38 PM 06/20/2024

## 2024-06-20 NOTE — TELEPHONE ENCOUNTER
Care Due:                  Date            Visit Type   Department     Provider  --------------------------------------------------------------------------------                                MYCHART                              FOLLOWUP/OF  [facility] INTERNAL  Last Visit: 11-      [facility] VISIT   MEDICINE       John Anderson  Next Visit: None Scheduled  None         None Found                                                            Last  Test          Frequency    Reason                     Performed    Due Date  --------------------------------------------------------------------------------    HBA1C.......  6 months...  tirzepatide..............  03- 09-    [facility] Embedded Care Due Messages. Reference number: [id number].   6/20/2024 9:38:17 AM CDT

## 2024-06-21 ENCOUNTER — OFFICE VISIT (OUTPATIENT)
Dept: OPHTHALMOLOGY | Facility: CLINIC | Age: 69
End: 2024-06-21
Payer: COMMERCIAL

## 2024-06-21 ENCOUNTER — PATIENT MESSAGE (OUTPATIENT)
Dept: OPHTHALMOLOGY | Facility: CLINIC | Age: 69
End: 2024-06-21

## 2024-06-21 DIAGNOSIS — H52.7 REFRACTIVE ERRORS: ICD-10-CM

## 2024-06-21 DIAGNOSIS — E11.59 HYPERTENSION ASSOCIATED WITH DIABETES: Chronic | ICD-10-CM

## 2024-06-21 DIAGNOSIS — Z96.1 PSEUDOPHAKIA OF BOTH EYES: Primary | ICD-10-CM

## 2024-06-21 DIAGNOSIS — E11.9 DIABETES MELLITUS WITHOUT COMPLICATION: ICD-10-CM

## 2024-06-21 DIAGNOSIS — I15.2 HYPERTENSION ASSOCIATED WITH DIABETES: Chronic | ICD-10-CM

## 2024-06-21 PROCEDURE — 99999 PR PBB SHADOW E&M-EST. PATIENT-LVL III: CPT | Mod: PBBFAC,,, | Performed by: OPTOMETRIST

## 2024-06-21 RX ORDER — AMLODIPINE BESYLATE 2.5 MG/1
TABLET ORAL
COMMUNITY

## 2024-06-21 NOTE — PROGRESS NOTES
SUBJECTIVE  Isaiah Harrison is 69 y.o. male  Uncorrected distance visual acuity was 20/20 in the right eye and 20/20 in the left eye. Uncorrected near visual acuity was J2 in the right eye and J2 in the left eye.   Chief Complaint   Patient presents with    Diabetic Eye Exam     Lab Results       Component                Value               Date                       HGBA1C                   8.7 (H)             03/20/2024                    HPI     Diabetic Eye Exam     Additional comments: Lab Results       Component                Value               Date                       HGBA1C                   8.7 (H)             03/20/2024                     Comments    Patient states seeing a floater/spot with the right eye only but no   flashes of light.  No ocular pain/discomfort and not using any otc drops.  Wear otc readers +1.25 but did not bring them today.           Last edited by Yanira Lopez on 6/21/2024  9:09 AM.         Assessment /Plan :  1. Pseudophakia of both eyes   Well-centered, stable IOL OU. Monitor annually.      2. Diabetes mellitus without complication   No Background Diabetic Retinopathy  Strict BG control, f/u w/ PCP, and annual DFE  Stressed importance of DM control to preserve vision      3. Hypertension associated with diabetes   No HTN Retinopathy, monitor annually.      4. Refractive errors   Dispense Final Rx for glasses.  RTC 1 year  Discussed above and answered questions.

## 2024-06-24 NOTE — PROGRESS NOTES
Replying to Campaign Questionnaire for Overdue HM: COLON SCREENING     Called patient to ask if he will be scheduling colonoscopy soon

## 2024-06-27 ENCOUNTER — PATIENT MESSAGE (OUTPATIENT)
Dept: DIABETES | Facility: CLINIC | Age: 69
End: 2024-06-27
Payer: COMMERCIAL

## 2024-07-02 DIAGNOSIS — I21.4 NSTEMI (NON-ST ELEVATED MYOCARDIAL INFARCTION): ICD-10-CM

## 2024-07-02 DIAGNOSIS — E78.2 MIXED HYPERLIPIDEMIA: ICD-10-CM

## 2024-07-02 RX ORDER — EVOLOCUMAB 140 MG/ML
140 INJECTION, SOLUTION SUBCUTANEOUS
Qty: 2 ML | Refills: 6 | Status: ACTIVE | OUTPATIENT
Start: 2024-07-02

## 2024-07-24 ENCOUNTER — HOSPITAL ENCOUNTER (OUTPATIENT)
Facility: HOSPITAL | Age: 69
Discharge: HOME | End: 2024-07-24
Attending: COLON & RECTAL SURGERY | Admitting: COLON & RECTAL SURGERY
Payer: COMMERCIAL

## 2024-07-24 ENCOUNTER — ANESTHESIA EVENT (OUTPATIENT)
Dept: ENDOSCOPY | Facility: HOSPITAL | Age: 69
End: 2024-07-24
Payer: COMMERCIAL

## 2024-07-24 ENCOUNTER — ANESTHESIA (OUTPATIENT)
Dept: ENDOSCOPY | Facility: HOSPITAL | Age: 69
End: 2024-07-24
Payer: COMMERCIAL

## 2024-07-24 DIAGNOSIS — Z12.11 SCREENING FOR COLON CANCER: ICD-10-CM

## 2024-07-24 LAB — POCT GLUCOSE: 129 MG/DL (ref 70–110)

## 2024-07-24 PROCEDURE — 37000009 HC ANESTHESIA EA ADD 15 MINS: Performed by: COLON & RECTAL SURGERY

## 2024-07-24 PROCEDURE — 25000003 PHARM REV CODE 250

## 2024-07-24 PROCEDURE — 63600175 PHARM REV CODE 636 W HCPCS

## 2024-07-24 PROCEDURE — 37000008 HC ANESTHESIA 1ST 15 MINUTES: Performed by: COLON & RECTAL SURGERY

## 2024-07-24 PROCEDURE — 27201089 HC SNARE, DISP (ANY): Performed by: COLON & RECTAL SURGERY

## 2024-07-24 PROCEDURE — 88305 TISSUE EXAM BY PATHOLOGIST: CPT | Mod: 59 | Performed by: PATHOLOGY

## 2024-07-24 PROCEDURE — 45385 COLONOSCOPY W/LESION REMOVAL: CPT | Mod: 33,,, | Performed by: COLON & RECTAL SURGERY

## 2024-07-24 PROCEDURE — 88305 TISSUE EXAM BY PATHOLOGIST: CPT | Mod: 26,,, | Performed by: PATHOLOGY

## 2024-07-24 PROCEDURE — 27201012 HC FORCEPS, HOT/COLD, DISP: Performed by: COLON & RECTAL SURGERY

## 2024-07-24 PROCEDURE — 45380 COLONOSCOPY AND BIOPSY: CPT | Mod: 33,59,, | Performed by: COLON & RECTAL SURGERY

## 2024-07-24 PROCEDURE — 45385 COLONOSCOPY W/LESION REMOVAL: CPT | Mod: 33 | Performed by: COLON & RECTAL SURGERY

## 2024-07-24 PROCEDURE — 45380 COLONOSCOPY AND BIOPSY: CPT | Mod: 33,59 | Performed by: COLON & RECTAL SURGERY

## 2024-07-24 RX ORDER — PROPOFOL 10 MG/ML
VIAL (ML) INTRAVENOUS
Status: DISCONTINUED | OUTPATIENT
Start: 2024-07-24 | End: 2024-07-24

## 2024-07-24 RX ORDER — LIDOCAINE HYDROCHLORIDE 10 MG/ML
INJECTION, SOLUTION EPIDURAL; INFILTRATION; INTRACAUDAL; PERINEURAL
Status: DISCONTINUED | OUTPATIENT
Start: 2024-07-24 | End: 2024-07-24

## 2024-07-24 RX ORDER — SODIUM CHLORIDE, SODIUM LACTATE, POTASSIUM CHLORIDE, CALCIUM CHLORIDE 600; 310; 30; 20 MG/100ML; MG/100ML; MG/100ML; MG/100ML
INJECTION, SOLUTION INTRAVENOUS CONTINUOUS PRN
Status: DISCONTINUED | OUTPATIENT
Start: 2024-07-24 | End: 2024-07-24

## 2024-07-24 RX ADMIN — PROPOFOL 50 MG: 10 INJECTION, EMULSION INTRAVENOUS at 09:07

## 2024-07-24 RX ADMIN — PROPOFOL 100 MG: 10 INJECTION, EMULSION INTRAVENOUS at 09:07

## 2024-07-24 RX ADMIN — LIDOCAINE HYDROCHLORIDE 50 MG: 10 SOLUTION INTRAVENOUS at 09:07

## 2024-07-24 RX ADMIN — SODIUM CHLORIDE, SODIUM LACTATE, POTASSIUM CHLORIDE, AND CALCIUM CHLORIDE: .6; .31; .03; .02 INJECTION, SOLUTION INTRAVENOUS at 09:07

## 2024-07-24 RX ADMIN — PROPOFOL 40 MG: 10 INJECTION, EMULSION INTRAVENOUS at 09:07

## 2024-07-24 RX ADMIN — PROPOFOL 60 MG: 10 INJECTION, EMULSION INTRAVENOUS at 09:07

## 2024-07-24 NOTE — BRIEF OP NOTE
[facility]  Brief Operative Note     SUMMARY     Surgery Date: 7/24/2024     Surgeons and Role:     * Kwadwo Stroud MD - Primary    Assisting Surgeon: None    Pre-op Diagnosis:  Screen for colon cancer [Z12.11]    Post-op Diagnosis:  Post-Op Diagnosis Codes:     * Screen for colon cancer [Z12.11]    Procedure(s) (LRB):  COLONOSCOPY-6/16 plavix/cc econ (N/A)    Anesthesia: Choice    Description of the findings of the procedure: polyps removed    Estimated Blood Loss: * No values recorded between 7/24/2024  9:03 AM and 7/24/2024  9:28 AM *         Specimens:   Specimen (24h ago, onward)       Start     Ordered    07/24/24 0917  Specimen to Pathology, Surgery Gastrointestinal tract  Once        Comments: Pre-op Diagnosis: Screen for colon cancer [Z12.11]Procedure(s):COLONOSCOPY-6/16 plavix/cc econ 1. Hepatic flexure polypectomy2. Transverse colon polypectomy 3. Descending colon polypectomy4. Sigmoid polypectomy     References:    Click here for ordering Quick Tip   Question Answer Comment   Procedure Type: Gastrointestinal tract    Release to patient Immediate        07/24/24 0927                    Discharge Note    SUMMARY     Admit Date: 7/24/2024    Discharge Date and Time: 7/24/2024 9:30 AM    Hospital Course Patient was seen in the preoperative area by both myself and anesthesia. All consents were verified and all questions appropriately answered. All risks, benefits and alternatives explained to patient. Patient proceeded to endoscopy suite for colonoscopy and was discharged home postoperative once cleared by anesthesia.    Final Diagnosis: Post-Op Diagnosis Codes:     * Screen for colon cancer [Z12.11]    Disposition: Home or Self Care    Follow Up/Patient Instructions: See Provation report    Medications:  Reconciled Home Medications:      Medication List        CONTINUE taking these medications      amLODIPine 2.5 MG tablet  Commonly known as: NORVASC     amlodipine-valsartan   "mg per tablet  Commonly known as: EXFORGE  Take 1 tablet by mouth once daily.     aspirin 81 MG EC tablet  Commonly known as: ECOTRIN  Take 81 mg by mouth once daily.     atorvastatin 80 MG tablet  Commonly known as: LIPITOR  Take 1 tablet (80 mg total) by mouth every evening.     blood sugar diagnostic Strp  1 strip by Misc.(Non-Drug; Combo Route) route 4 (four) times daily.     clopidogreL 75 mg tablet  Commonly known as: PLAVIX  Take 1 tablet (75 mg total) by mouth once daily.     empagliflozin 25 mg tablet  Commonly known as: JARDIANCE  Take 1 tablet (25 mg total) by mouth once daily.     etodolac 400 MG tablet  Commonly known as: LODINE  Take 1 tablet (400 mg total) by mouth 2 (two) times daily.     hydroCHLOROthiazide 25 MG tablet  Commonly known as: HYDRODIURIL  Take 1 tablet (25 mg total) by mouth once daily.     hydrOXYzine HCL 10 MG Tab  Commonly known as: ATARAX  TAKE 1 TABLET(10 MG) BY MOUTH TWICE DAILY AS NEEDED. MAY CAUSE DROWSINESS. DO NOT DRIVE OR OPERATE HEAVY MACHINERY     insulin glargine (TOUJEO) 300 unit/mL (1.5 mL) Inpn pen  Commonly known as: TOUJEO SOLOSTAR U-300 INSULIN  Inject 40 Units into the skin 2 (two) times a day.     isosorbide mononitrate 30 MG 24 hr tablet  Commonly known as: IMDUR  TAKE 3 TABLETS(90 MG) BY MOUTH EVERY DAY     metoprolol tartrate 50 MG tablet  Commonly known as: LOPRESSOR  TAKE 1 TABLET BY MOUTH TWICE DAILY     niacin 500 mg tablet  Commonly known as: SLO-NIACIN  TAKE 1 TABLET(500 MG) BY MOUTH EVERY EVENING     pen needle, diabetic 31 gauge x 5/16" Ndle  Commonly known as: BD ULTRA-FINE SHORT PEN NEEDLE  USE WITH LANTUS PEN AS DIRECTED     ranolazine 1,000 mg Tb12  Commonly known as: RANEXA  Take 1 tablet (1,000 mg total) by mouth 2 (two) times daily.     REPATHA SURECLICK 140 mg/mL Pnij  Generic drug: evolocumab  Inject 1 mL (140 mg total) into the skin every 14 (fourteen) days.            ASK your doctor about these medications      ketoconazole 2 % " cream  Commonly known as: NIZORAL  Apply topically once daily.     nitroGLYCERIN 0.4 MG SL tablet  Commonly known as: NITROSTAT  Place 1 tablet (0.4 mg total) under the tongue every 5 (five) minutes as needed.     PREVNAR 20 (PF) 0.5 mL Syrg injection  Generic drug: pneumoc 20-miranda conj-dip cr(PF)  Inject 0.5 mLs into the muscle.     tirzepatide 7.5 mg/0.5 mL Pnij  Inject 7.5 mg into the skin every 7 days.            Discharge Procedure Orders   Diet general     Call MD for:  temperature >100.4     Call MD for:  persistent nausea and vomiting     Call MD for:  severe uncontrolled pain     Call MD for:  difficulty breathing, headache or visual disturbances     Call MD for:  redness, tenderness, or signs of infection (pain, swelling, redness, odor or green/yellow discharge around incision site)     Call MD for:  hives     Call MD for:  persistent dizziness or light-headedness     Call MD for:  extreme fatigue     Activity as tolerated      Follow-up Information       LACI Dow MD Follow up.    Specialty: Family Medicine  Why: As needed  Contact information:  [id number]  [place] [address] [phone]

## 2024-07-24 NOTE — ANESTHESIA POSTPROCEDURE EVALUATION
Anesthesia Post Evaluation    Patient: Isaiah Harrison    Procedure(s) Performed: Procedure(s) (LRB):  COLONOSCOPY-6/16 plavix/cc econ (N/A)    Final Anesthesia Type: MAC      Patient location during evaluation: GI PACU  Patient participation: Yes- Able to Participate  Level of consciousness: awake  Post-procedure vital signs: reviewed and stable  Pain management: adequate  Airway patency: patent    PONV status at discharge: No PONV  Anesthetic complications: no      Cardiovascular status: blood pressure returned to baseline and hemodynamically stable  Respiratory status: unassisted and spontaneous ventilation  Hydration status: euvolemic  Follow-up not needed.              Vitals Value Taken Time   BP 96/55 07/24/24 0940   Temp 36.6 °C (97.9 °F) 07/24/24 0940   Pulse 71 07/24/24 0940   Resp 20 07/24/24 0940   SpO2 96 % 07/24/24 0940         Event Time   Out of Recovery 09:53:58         Pain/Amanda Score: Amanda Score: 10 (7/24/2024  9:40 AM)

## 2024-07-24 NOTE — ANESTHESIA PREPROCEDURE EVALUATION
07/24/2024  Isaiah Harrison is a 69 y.o., male.    Patient Active Problem List   Diagnosis    Hyperlipidemia associated with type 2 diabetes mellitus    Coronary artery disease involving native coronary artery of native heart without angina pectoris    S/P PTCA (percutaneous transluminal coronary angioplasty)    Class 2 severe obesity due to excess calories with serious comorbidity and body mass index (BMI) of 39.0 to 39.9 in adult    DANTE on CPAP    Venous insufficiency    Type 2 diabetes mellitus with circulatory disorder (coronary artery disease), with long-term current use of insulin    Essential hypertension    PLMD (periodic limb movement disorder)    Rhomboid muscle strain, initial encounter    Depression    Functional urinary incontinence    Type 2 diabetes mellitus with hyperglycemia, with long-term current use of insulin    Type 2 diabetes mellitus with diabetic polyneuropathy, with long-term current use of insulin    Hypertension associated with diabetes    Chest pain    General weakness    Hip pain, acute, right     Past Medical History:   Diagnosis Date    Acute coronary syndrome     Anticoagulant long-term use     Back pain     CAD (coronary artery disease) 10/17/2013    Class 2 severe obesity due to excess calories with serious comorbidity and body mass index (BMI) of 38.0 to 38.9 in adult 01/12/2015    Coronary artery disease     Diabetes mellitus, type 2 2010    BS didn't check 04/19/2023 Insulin 2 years    Gastric polyps     Hyperlipemia     Hypertension     NSTEMI (non-ST elevated myocardial infarction) 10/23/2014    Obesity 10/21/2014    DANTE on CPAP 02/19/2015    S/P PTCA (percutaneous transluminal coronary angioplasty) 10/17/2013    Sleep apnea 01/07/2015    Type 2 diabetes mellitus with circulatory disorder (coronary artery disease), without long-term current use of insulin 07/14/2015          Pre-op Assessment    I have reviewed the Patient Summary Reports.    I have reviewed the NPO Status.   I have reviewed the Medications.     Review of Systems  Anesthesia Hx:  No problems with previous Anesthesia             Denies Family Hx of Anesthesia complications.    Denies Personal Hx of Anesthesia complications.                    Social:  Non-Smoker       Hematology/Oncology:  Hematology Normal   Oncology Normal                                   EENT/Dental:  EENT/Dental Normal           Cardiovascular:     Hypertension  Past MI CAD   CABG/stent           ECG has been reviewed.                          Pulmonary:        Sleep Apnea, CPAP                Renal/:  Renal/ Normal                 Hepatic/GI:  Hepatic/GI Normal Bowel Prep.                Musculoskeletal:  Musculoskeletal Normal                Neurological:    Neuromuscular Disease,                                   Endocrine:  Diabetes, type 2, using insulin         Obesity / BMI > 30  Dermatological:  Skin Normal    Psych:  Psychiatric History                  Physical Exam  General: Cooperative and Alert    Airway:  Mallampati: II   Mouth Opening: Normal  TM Distance: Normal  Tongue: Normal  Neck ROM: Normal ROM    Dental:  Intact      Results for orders placed or performed during the hospital encounter of 10/06/23   EKG 12-lead    Collection Time: 10/09/23  4:58 AM    Narrative    Test Reason : I21.4,    Vent. Rate : 066 BPM     Atrial Rate : 066 BPM     P-R Int : 220 ms          QRS Dur : 088 ms      QT Int : 412 ms       P-R-T Axes : 052 -37 044 degrees     QTc Int : 431 ms    Sinus rhythm with 1st degree A-V block  Left axis deviation  Inferior infarct (cited on or before 06-OCT-2023)  Anterolateral infarct (cited on or before 06-JAN-2021)  Abnormal ECG  When compared with ECG of 08-OCT-2023 04:59,  No significant change was found  Confirmed by Gabriel Lynch MD ([id number]) on 10/9/2023 6:56:11 PM    Referred By: [email or web address]   SELF            Confirmed By:Gabriel Lynch MD     Results for orders placed during the hospital encounter of 10/06/23    Echo    Interpretation Summary    Left Ventricle: The left ventricle is normal in size. Normal wall thickness. There is mild concentric hypertrophy. Normal wall motion. There is low normal systolic function with a visually estimated ejection fraction of 50 - 55%. There is normal diastolic function.    Right Ventricle: Normal right ventricular cavity size. Wall thickness is normal. Right ventricle wall motion  is normal. Systolic function is normal.    Tricuspid Valve: There is mild regurgitation.    IVC/SVC: Intermediate venous pressure at 8 mmHg.        Anesthesia Plan  Type of Anesthesia, risks & benefits discussed:    Anesthesia Type: MAC, Gen Natural Airway  Intra-op Monitoring Plan: Standard ASA Monitors  Induction:  IV  Informed Consent: Informed consent signed with the Patient and all parties understand the risks and agree with anesthesia plan.  All questions answered.   ASA Score: 3  Day of Surgery Review of History & Physical: H&P Update referred to the surgeon/provider.    Ready For Surgery From Anesthesia Perspective.     .

## 2024-07-24 NOTE — PLAN OF CARE
Pt does not want to wait to speak with MD. Pt reports he has all the information he needs and will look for results of pathology in his MyChart.

## 2024-07-24 NOTE — TRANSFER OF CARE
"Anesthesia Transfer of Care Note    Patient: Isaiah Harrison    Procedure(s) Performed: Procedure(s) (LRB):  COLONOSCOPY-6/16 plavix/cc econ (N/A)    Patient location: PACU    Anesthesia Type: MAC    Transport from OR: Transported from OR on room air with adequate spontaneous ventilation    Post pain: adequate analgesia    Post assessment: no apparent anesthetic complications    Post vital signs: stable    Level of consciousness: awake    Nausea/Vomiting: no nausea/vomiting    Complications: none    Transfer of care protocol was followed      Last vitals: Visit Vitals  BP (!) 113/56 (BP Location: Left arm, Patient Position: Lying)   Pulse 67   Temp 36.5 °C (97.7 °F) (Temporal)   Resp 16   Ht 6' 2" (1.88 m)   Wt 134.7 kg (297 lb)   SpO2 96%   BMI 38.13 kg/m²     "

## 2024-07-24 NOTE — H&P
[facility] ([facility])  Colon and Rectal Surgery  History & Physical    Patient Name: Isaiah Harrison  MRN: [id number]  Admission Date: 7/24/2024  Attending Physician: Kwadwo Stroud MD  Primary Care Provider: LACI Dow MD    Patient information was obtained from patient and medical records.    Subjective:     Chief Complaint/Reason for Admission: Here for Colonoscopy    History of Present Illness:  Patient is a 69 y.o. male presents for colonoscopy. Last cscope in 2023 with poor prep, has personal hx of polyps. No current hematochezia or melena. No fam hx of CRC.    No current facility-administered medications on file prior to encounter.     Current Outpatient Medications on File Prior to Encounter   Medication Sig    amlodipine-valsartan (EXFORGE)  mg per tablet Take 1 tablet by mouth once daily.    aspirin (ECOTRIN) 81 MG EC tablet Take 81 mg by mouth once daily.    atorvastatin (LIPITOR) 80 MG tablet Take 1 tablet (80 mg total) by mouth every evening.    blood sugar diagnostic Strp 1 strip by Misc.(Non-Drug; Combo Route) route 4 (four) times daily.    clopidogreL (PLAVIX) 75 mg tablet Take 1 tablet (75 mg total) by mouth once daily.    empagliflozin (JARDIANCE) 25 mg tablet Take 1 tablet (25 mg total) by mouth once daily.    hydroCHLOROthiazide (HYDRODIURIL) 25 MG tablet Take 1 tablet (25 mg total) by mouth once daily.    insulin glargine, TOUJEO, (TOUJEO SOLOSTAR U-300 INSULIN) 300 unit/mL (1.5 mL) InPn pen Inject 40 Units into the skin 2 (two) times a day.    metoprolol tartrate (LOPRESSOR) 50 MG tablet TAKE 1 TABLET BY MOUTH TWICE DAILY    niacin (SLO-NIACIN) 500 mg tablet TAKE 1 TABLET(500 MG) BY MOUTH EVERY EVENING    ranolazine (RANEXA) 1,000 mg Tb12 Take 1 tablet (1,000 mg total) by mouth 2 (two) times daily.    etodolac (LODINE) 400 MG tablet Take 1 tablet (400 mg total) by mouth 2 (two) times daily.    hydrOXYzine HCL (ATARAX) 10 MG Tab TAKE 1 TABLET(10 MG) BY MOUTH TWICE DAILY  AS NEEDED. MAY CAUSE DROWSINESS. DO NOT DRIVE OR OPERATE HEAVY MACHINERY    ketoconazole (NIZORAL) 2 % cream Apply topically once daily. (Patient not taking: Reported on 7/18/2024)    nitroGLYCERIN (NITROSTAT) 0.4 MG SL tablet Place 1 tablet (0.4 mg total) under the tongue every 5 (five) minutes as needed. (Patient not taking: Reported on 7/18/2024)    pneumoc 20-miranda conj-dip cr,PF, (PREVNAR-20, PF,) 0.5 mL Syrg injection Inject 0.5 mLs into the muscle. (Patient not taking: Reported on 7/18/2024)    tirzepatide 7.5 mg/0.5 mL PnIj Inject 7.5 mg into the skin every 7 days. (Patient not taking: Reported on 6/21/2024)       Review of patient's allergies indicates:   Allergen Reactions    Pcn [penicillins] Hives       Past Medical History:   Diagnosis Date    Acute coronary syndrome     Anticoagulant long-term use     Back pain     CAD (coronary artery disease) 10/17/2013    Class 2 severe obesity due to excess calories with serious comorbidity and body mass index (BMI) of 38.0 to 38.9 in adult 01/12/2015    Coronary artery disease     Diabetes mellitus, type 2 2010    BS didn't check 04/19/2023 Insulin 2 years    Gastric polyps     Hyperlipemia     Hypertension     NSTEMI (non-ST elevated myocardial infarction) 10/23/2014    Obesity 10/21/2014    DANTE on CPAP 02/19/2015    S/P PTCA (percutaneous transluminal coronary angioplasty) 10/17/2013    Sleep apnea 01/07/2015    Type 2 diabetes mellitus with circulatory disorder (coronary artery disease), without long-term current use of insulin 07/14/2015     Past Surgical History:   Procedure Laterality Date    APPENDECTOMY      BACK SURGERY      CATARACT EXTRACTION      CATARACT EXTRACTION, BILATERAL      COLONOSCOPY      COLONOSCOPY N/A 3/9/2023    Procedure: COLONOSCOPY;  Surgeon: Lisa Auguste MD;  Location: [facility];  Service: Endoscopy;  Laterality: N/A;    CORONARY ANGIOPLASTY WITH STENT PLACEMENT      ESOPHAGOGASTRODUODENOSCOPY      EYE SURGERY      FRACTIONAL FLOW  RESERVE (FFR), CORONARY  10/9/2023    Procedure: Fractional Flow [place] (FFR), Coronary;  Surgeon: Kirsty Lyles MD;  Location: [facility] CATH LAB;  Service: Cardiology;;    FRACTURE SURGERY      HERNIA REPAIR      INSTANTANEOUS WAVE-FREE RATIO (IFR) N/A 10/9/2023    Procedure: Instantaneous Wave-Free Ratio (IFR);  Surgeon: Kirsty Lyles MD;  Location: [facility] CATH LAB;  Service: Cardiology;  Laterality: N/A;    LEFT HEART CATHETERIZATION Left 10/9/2023    Procedure: Left heart cath;  Surgeon: Kirsty Lyles MD;  Location: [facility] CATH LAB;  Service: Cardiology;  Laterality: Left;    SPINE SURGERY       Family History       Problem Relation (Age of Onset)    Diabetes Mother, Sister    Heart disease Maternal Grandfather    Hypertension Mother, Father, Sister          Tobacco Use    Smoking status: Never    Smokeless tobacco: Never   Substance and Sexual Activity    Alcohol use: Yes     Alcohol/week: 0.0 standard drinks of alcohol     Comment: socially    Drug use: No    Sexual activity: Yes     Partners: Female     Review of Systems   Constitutional:  Negative for activity change, appetite change, chills, fatigue, fever and unexpected weight change.   HENT:  Negative for congestion, ear pain, sore throat and trouble swallowing.    Eyes:  Negative for pain, redness and itching.   Respiratory:  Negative for cough, shortness of breath and wheezing.    Cardiovascular:  Negative for chest pain, palpitations and leg swelling.   Gastrointestinal:  Negative for abdominal distention, abdominal pain, anal bleeding, blood in stool, constipation, diarrhea, nausea, rectal pain and vomiting.   Endocrine: Negative for cold intolerance, heat intolerance and polyuria.   Genitourinary:  Negative for dysuria, flank pain, frequency and hematuria.   Musculoskeletal:  Negative for gait problem, joint swelling and neck pain.   Skin:  Negative for color change, rash and wound.   Allergic/Immunologic: Negative for environmental allergies and  immunocompromised state.   Neurological:  Negative for dizziness, speech difficulty, weakness and numbness.   Psychiatric/Behavioral:  Negative for agitation, confusion and hallucinations.      Objective:     Vital Signs (Most Recent):  Temp: 97.7 °F (36.5 °C) (07/24/24 0740)  Pulse: 68 (07/24/24 0740)  Resp: 18 (07/24/24 0740)  BP: 118/63 (07/24/24 0740)  SpO2: (!) 94 % (07/24/24 0740) Vital Signs (24h Range):  Temp:  [97.7 °F (36.5 °C)] 97.7 °F (36.5 °C)  Pulse:  [68] 68  Resp:  [18] 18  SpO2:  [94 %] 94 %  BP: (118)/(63) 118/63     Weight: 134.7 kg (297 lb)  Body mass index is 38.13 kg/m².    Physical Exam  Constitutional:       Appearance: He is well-developed.   HENT:      Head: Normocephalic and atraumatic.   Eyes:      Conjunctiva/sclera: Conjunctivae normal.   Neck:      Thyroid: No thyromegaly.   Cardiovascular:      Rate and Rhythm: Normal rate and regular rhythm.   Pulmonary:      Effort: Pulmonary effort is normal. No respiratory distress.   Abdominal:      General: There is no distension.      Palpations: Abdomen is soft. There is no mass.      Tenderness: There is no abdominal tenderness.   Musculoskeletal:         General: No tenderness. Normal range of motion.      Cervical back: Normal range of motion.   Skin:     General: Skin is warm and dry.      Capillary Refill: Capillary refill takes less than 2 seconds.      Findings: No rash.   Neurological:      Mental Status: He is alert and oriented to person, place, and time.           Assessment/Plan:     Patient is a 69 y.o. male who presents for colonoscopy     - Ok to proceed to endoscopy suite for colonoscopy  - Consent obtained. All risks, benefits and alternatives fully explained to patient, including but not limited to bleeding, infection, perforation, and missed polyps. All questions appropriately answered to patient's satisfaction. Consent signed and placed on chart.    There are no hospital problems to display for this patient.    VTE Risk  Mitigation (From admission, onward)      None            Kwadwo Stroud MD  Colon and Rectal Surgery  [facility] ([facility])

## 2024-07-24 NOTE — PROVATION PATIENT INSTRUCTIONS
Discharge Summary/Instructions after an Endoscopic Procedure  Patient Name: Isaiah Harrison  Patient MRN: [id number]  Patient YOB: 1955  Wednesday, July 24, 2024 Kwadwo Stroud MD  Dear patient,  As a result of recent federal legislation (The Federal Cures Act), you may   receive lab or pathology results from your procedure in your [facility]   account before your physician is able to contact you. Your physician or   their representative will relay the results to you with their   recommendations at their soonest availability.  Thank you,  RESTRICTIONS:  During your procedure today, you received medications for sedation.  These   medications may affect your judgment, balance and coordination.  Therefore,   for 24 hours, you have the following restrictions:   - DO NOT drive a car, operate machinery, make legal/financial decisions,   sign important papers or drink alcohol.    ACTIVITY:  Today: no heavy lifting, straining or running due to procedural   sedation/anesthesia.  The following day: return to full activity including work.  DIET:  Eat and drink normally unless instructed otherwise.     TREATMENT FOR COMMON SIDE EFFECTS:  - Mild abdominal pain, nausea, belching, bloating or excessive gas:  rest,   eat lightly and use a heating pad.  - Sore Throat: treat with throat lozenges and/or gargle with warm salt   water.  - Because air was used during the procedure, expelling large amounts of air   from your rectum or belching is normal.  - If a bowel prep was taken, you may not have a bowel movement for 1-3 days.    This is normal.  SYMPTOMS TO WATCH FOR AND REPORT TO YOUR PHYSICIAN:  1. Abdominal pain or bloating, other than gas cramps.  2. Chest pain.  3. Back pain.  4. Signs of infection such as: chills or fever occurring within 24 hours   after the procedure.  5. Rectal bleeding, which would show as bright red, maroon, or black stools.   (A tablespoon of blood from the rectum is not serious, especially if    hemorrhoids are present.)  6. Vomiting.  7. Weakness or dizziness.  GO DIRECTLY TO THE NEAREST EMERGENCY ROOM IF YOU HAVE ANY OF THE FOLLOWING:      Difficulty breathing              Chills and/or fever over 101 F   Persistent vomiting and/or vomiting blood   Severe abdominal pain   Severe chest pain   Black, tarry stools   Bleeding- more than one tablespoon   Any other symptom or condition that you feel may need urgent attention  Your doctor recommends these additional instructions:  If any biopsies were taken, your doctors clinic will contact you in 1 to 2   weeks with any results.  - Discharge patient to home.   - High fiber diet.   - Continue present medications.   - Await pathology results.   - Repeat colonoscopy in 3 years for surveillance.   - Return to primary care physician PRN.  For questions, problems or results please call your physician Kwadwo Stroud MD at Work:  [phone]  If you have any questions about the above instructions, call the GI   department at [phone] or call the endoscopy unit at [phone]   from 7am until 3 pm.  [facility], EMERGENCY ROOM PHONE NUMBER:   [phone]  IF A COMPLICATION OR EMERGENCY SITUATION ARISES AND YOU ARE UNABLE TO REACH   YOUR PHYSICIAN - GO DIRECTLY TO THE EMERGENCY ROOM.  I have read or have had read to me these discharge instructions for my   procedure and have received a written copy.  I understand these   instructions and will follow-up with my physician if I have any questions.     __________________________________       _____________________________________  Nurse Signature                                          Patient/Designated   Responsible Party Signature  MD Kwadwo Leonard MD  7/24/2024 9:38:02 AM  This report has been verified and signed electronically.  Dear patient,  As a result of recent federal legislation (The Federal Cures Act), you may   receive lab or pathology results  from your procedure in your [facility]   account before your physician is able to contact you. Your physician or   their representative will relay the results to you with their   recommendations at their soonest availability.  Thank you,  PROVATION

## 2024-07-25 VITALS
RESPIRATION RATE: 20 BRPM | HEIGHT: 74 IN | BODY MASS INDEX: 38.12 KG/M2 | WEIGHT: 297 LBS | HEART RATE: 71 BPM | TEMPERATURE: 98 F | DIASTOLIC BLOOD PRESSURE: 55 MMHG | SYSTOLIC BLOOD PRESSURE: 96 MMHG | OXYGEN SATURATION: 96 %

## 2024-07-26 ENCOUNTER — PATIENT MESSAGE (OUTPATIENT)
Dept: ADMINISTRATIVE | Facility: OTHER | Age: 69
End: 2024-07-26
Payer: MEDICARE

## 2024-07-26 LAB
FINAL PATHOLOGIC DIAGNOSIS: NORMAL
GROSS: NORMAL
Lab: NORMAL

## 2024-07-31 ENCOUNTER — TELEPHONE (OUTPATIENT)
Dept: OTOLARYNGOLOGY | Facility: CLINIC | Age: 69
End: 2024-07-31
Payer: MEDICARE

## 2024-07-31 NOTE — TELEPHONE ENCOUNTER
Call placed to patient in regards to audiogram. I informed patient audio test has been scheduled and [facility]. Patient verbalized understanding.

## 2024-07-31 NOTE — TELEPHONE ENCOUNTER
----- Message from Marta Gold sent at 7/31/2024 12:58 PM CDT -----  Contact: Isaiah Colon is needing a call back in regards to having his audio test orders put in for his appt. Please give him a call back at [phone]

## 2024-08-07 ENCOUNTER — CLINICAL SUPPORT (OUTPATIENT)
Dept: AUDIOLOGY | Facility: CLINIC | Age: 69
End: 2024-08-07
Payer: COMMERCIAL

## 2024-08-07 DIAGNOSIS — H90.A21 SENSORINEURAL HEARING LOSS (SNHL) OF RIGHT EAR WITH RESTRICTED HEARING OF LEFT EAR: Primary | ICD-10-CM

## 2024-08-07 DIAGNOSIS — H93.11 TINNITUS, RIGHT: ICD-10-CM

## 2024-08-07 PROCEDURE — 92567 TYMPANOMETRY: CPT | Mod: S$GLB,,, | Performed by: AUDIOLOGIST-HEARING AID FITTER

## 2024-08-07 PROCEDURE — 92557 COMPREHENSIVE HEARING TEST: CPT | Mod: S$GLB,,, | Performed by: AUDIOLOGIST-HEARING AID FITTER

## 2024-08-07 PROCEDURE — 99999 PR PBB SHADOW E&M-EST. PATIENT-LVL I: CPT | Mod: PBBFAC,,, | Performed by: AUDIOLOGIST-HEARING AID FITTER

## 2024-08-07 RX ORDER — CLOPIDOGREL BISULFATE 75 MG/1
75 TABLET ORAL
Qty: 30 TABLET | Refills: 11 | Status: SHIPPED | OUTPATIENT
Start: 2024-08-07

## 2024-09-03 RX ORDER — CLOPIDOGREL BISULFATE 75 MG/1
75 TABLET ORAL
Qty: 30 TABLET | Refills: 11 | Status: SHIPPED | OUTPATIENT
Start: 2024-09-03

## 2024-09-13 ENCOUNTER — LAB VISIT (OUTPATIENT)
Dept: LAB | Facility: HOSPITAL | Age: 69
End: 2024-09-13
Attending: FAMILY MEDICINE
Payer: COMMERCIAL

## 2024-09-13 ENCOUNTER — TELEPHONE (OUTPATIENT)
Dept: CARDIOLOGY | Facility: CLINIC | Age: 69
End: 2024-09-13
Payer: COMMERCIAL

## 2024-09-13 DIAGNOSIS — Z79.4 TYPE 2 DIABETES MELLITUS WITH HYPERGLYCEMIA, WITH LONG-TERM CURRENT USE OF INSULIN: ICD-10-CM

## 2024-09-13 DIAGNOSIS — E11.65 TYPE 2 DIABETES MELLITUS WITH HYPERGLYCEMIA, WITH LONG-TERM CURRENT USE OF INSULIN: ICD-10-CM

## 2024-09-13 LAB
ESTIMATED AVG GLUCOSE: 183 MG/DL (ref 68–131)
HBA1C MFR BLD: 8 % (ref 4–5.6)

## 2024-09-13 PROCEDURE — 36415 COLL VENOUS BLD VENIPUNCTURE: CPT | Performed by: FAMILY MEDICINE

## 2024-09-13 PROCEDURE — 83036 HEMOGLOBIN GLYCOSYLATED A1C: CPT | Performed by: FAMILY MEDICINE

## 2024-09-13 NOTE — TELEPHONE ENCOUNTER
[employer] is reviewing your PA request (Repatha) and will respond within 24 hours for Medicaid or up to 72 hours for non-Medicaid plans, based on the required timeframe determined by state or federal regulations.     Pt was wheeled from the [facility] lounge and pt transported from the wheel chair to the bed and placed on gown. Xray is at bedside. Pt is on 2L NC from home.

## 2024-09-18 ENCOUNTER — OFFICE VISIT (OUTPATIENT)
Dept: DIABETES | Facility: CLINIC | Age: 69
End: 2024-09-18
Payer: COMMERCIAL

## 2024-09-18 VITALS
SYSTOLIC BLOOD PRESSURE: 100 MMHG | WEIGHT: 289.44 LBS | BODY MASS INDEX: 37.15 KG/M2 | DIASTOLIC BLOOD PRESSURE: 60 MMHG | HEIGHT: 74 IN

## 2024-09-18 DIAGNOSIS — E11.69 HYPERLIPIDEMIA ASSOCIATED WITH TYPE 2 DIABETES MELLITUS: Chronic | ICD-10-CM

## 2024-09-18 DIAGNOSIS — Z79.4 TYPE 2 DIABETES MELLITUS WITH OTHER CIRCULATORY COMPLICATION, WITH LONG-TERM CURRENT USE OF INSULIN: Primary | ICD-10-CM

## 2024-09-18 DIAGNOSIS — I15.2 HYPERTENSION ASSOCIATED WITH DIABETES: Chronic | ICD-10-CM

## 2024-09-18 DIAGNOSIS — E78.5 HYPERLIPIDEMIA ASSOCIATED WITH TYPE 2 DIABETES MELLITUS: Chronic | ICD-10-CM

## 2024-09-18 DIAGNOSIS — E11.59 HYPERTENSION ASSOCIATED WITH DIABETES: Chronic | ICD-10-CM

## 2024-09-18 DIAGNOSIS — I25.10 CORONARY ARTERY DISEASE INVOLVING NATIVE CORONARY ARTERY OF NATIVE HEART WITHOUT ANGINA PECTORIS: Chronic | ICD-10-CM

## 2024-09-18 DIAGNOSIS — E11.59 TYPE 2 DIABETES MELLITUS WITH OTHER CIRCULATORY COMPLICATION, WITH LONG-TERM CURRENT USE OF INSULIN: Primary | ICD-10-CM

## 2024-09-18 LAB — GLUCOSE SERPL-MCNC: 206 MG/DL (ref 70–110)

## 2024-09-18 PROCEDURE — 1126F AMNT PAIN NOTED NONE PRSNT: CPT | Mod: CPTII,S$GLB,, | Performed by: NURSE PRACTITIONER

## 2024-09-18 PROCEDURE — 4010F ACE/ARB THERAPY RXD/TAKEN: CPT | Mod: CPTII,S$GLB,, | Performed by: NURSE PRACTITIONER

## 2024-09-18 PROCEDURE — 3078F DIAST BP <80 MM HG: CPT | Mod: CPTII,S$GLB,, | Performed by: NURSE PRACTITIONER

## 2024-09-18 PROCEDURE — 3074F SYST BP LT 130 MM HG: CPT | Mod: CPTII,S$GLB,, | Performed by: NURSE PRACTITIONER

## 2024-09-18 PROCEDURE — 3008F BODY MASS INDEX DOCD: CPT | Mod: CPTII,S$GLB,, | Performed by: NURSE PRACTITIONER

## 2024-09-18 PROCEDURE — 3288F FALL RISK ASSESSMENT DOCD: CPT | Mod: CPTII,S$GLB,, | Performed by: NURSE PRACTITIONER

## 2024-09-18 PROCEDURE — 1101F PT FALLS ASSESS-DOCD LE1/YR: CPT | Mod: CPTII,S$GLB,, | Performed by: NURSE PRACTITIONER

## 2024-09-18 PROCEDURE — 82962 GLUCOSE BLOOD TEST: CPT | Mod: S$GLB,,, | Performed by: NURSE PRACTITIONER

## 2024-09-18 PROCEDURE — 1159F MED LIST DOCD IN RCRD: CPT | Mod: CPTII,S$GLB,, | Performed by: NURSE PRACTITIONER

## 2024-09-18 PROCEDURE — 99999 PR PBB SHADOW E&M-EST. PATIENT-LVL IV: CPT | Mod: PBBFAC,,, | Performed by: NURSE PRACTITIONER

## 2024-09-18 PROCEDURE — 99204 OFFICE O/P NEW MOD 45 MIN: CPT | Mod: S$GLB,,, | Performed by: NURSE PRACTITIONER

## 2024-09-18 PROCEDURE — G2211 COMPLEX E/M VISIT ADD ON: HCPCS | Mod: S$GLB,,, | Performed by: NURSE PRACTITIONER

## 2024-09-18 PROCEDURE — 3052F HG A1C>EQUAL 8.0%<EQUAL 9.0%: CPT | Mod: CPTII,S$GLB,, | Performed by: NURSE PRACTITIONER

## 2024-09-18 RX ORDER — BLOOD-GLUCOSE,RECEIVER,CONT
1 EACH MISCELLANEOUS DAILY
Qty: 1 EACH | Refills: 0 | Status: SHIPPED | OUTPATIENT
Start: 2024-09-18 | End: 2025-09-18

## 2024-09-18 RX ORDER — BLOOD-GLUCOSE SENSOR
1 EACH MISCELLANEOUS
Qty: 3 EACH | Refills: 11 | Status: SHIPPED | OUTPATIENT
Start: 2024-09-18 | End: 2025-09-18

## 2024-09-18 RX ORDER — TIRZEPATIDE 2.5 MG/.5ML
2.5 INJECTION, SOLUTION SUBCUTANEOUS
Qty: 2 ML | Refills: 11 | Status: SHIPPED | OUTPATIENT
Start: 2024-09-18 | End: 2025-09-18

## 2024-09-18 NOTE — PATIENT INSTRUCTIONS
PATIENT INSTRUCTIONS    Lifestyle modification with well balanced diet and at least 30 minutes of physical activity daily recommended.   Increase water intake.   Increase protein and non-starchy vegetable servings with meals.   Decrease carbohydrate servings with meals.   Take 10 minute walk after each meal.   Avoid snacking on carbohydrates, choose low carb, high protein snacks.   Incorporate resistance training with weights or resistance bands with exercise regimen at least 3 days a week.      Continue Jardiance 25 mg by mouth daily.   Continue Toujeo 40 units subcutaneously daily.   Start Mounjaro 2.5 mg subcutaneously every 7 days.     Dexcom G7 CGM ordered.   Blood Sugar Goals:       Fastin-130.       1-2 hours after a meal: Less than 180.

## 2024-09-18 NOTE — PROGRESS NOTES
Isaiah Harrison is a 69 y.o. male who  has a past medical history of Acute coronary syndrome, Anticoagulant long-term use, Back pain, CAD (coronary artery disease) (10/17/2013), Class 2 severe obesity due to excess calories with serious comorbidity and body mass index (BMI) of 38.0 to 38.9 in adult (01/12/2015), Coronary artery disease, Diabetes mellitus, type 2 (2010), Gastric polyps, Hyperlipemia, Hypertension, NSTEMI (non-ST elevated myocardial infarction) (10/23/2014), Obesity (10/21/2014), DANTE on CPAP (02/19/2015), S/P PTCA (percutaneous transluminal coronary angioplasty) (10/17/2013), Sleep apnea (01/07/2015), and Type 2 diabetes mellitus with circulatory disorder (coronary artery disease), without long-term current use of insulin (07/14/2015)., who present for an initial evaluation of Type 2 diabetes mellitus.     CHIEF COMPLAINT: Diabetes Consultation    PCP: LACI Dow MD     The patient was initially diagnosed with diabetes in 2019.     Previous failed treatments include:  Metformin - gi upset    Social Documentation:  Patient lives in [place].   Occupation:  at [employer].   Exercise: very little.   Meal Patterns: 3 meals a day. Occasional sweet tea, water.   Sleep: DANTE, has CPAP, but not using often.     Current monitoring regimen: capillary blood glucose monitoring with finger sticks.  Fasting. 180-200.     Current Diabetes related symptoms/problems include hyperglycemia and polyuria and have been unchanged.     Diabetes related complications:   cardiovascular disease.   denies Pancreatitis  denies Gastroparesis  denies DKA  denies Hx/family Hx of MEN2/MTC  denies Frequent UTIs/yeast infections    Cardiovascular Risk Factors: advanced age (>55 for men, >65 for women), dyslipidemia, hypertension, male gender, obesity (BMI >30 kg/m2), and sedentary lifestyle.    Patient currently taking insulin or sulfonylurea?  Yes. Emergency Glucagon Prescription current? yes    Any  "episodes of hypoglycemia? No.   Hypoglycemia Unawareness? No  Severe hypoglycemia requiring 3rd party? No    Last seen by Diabetes Education: 2/2024.    Diabetes Medications               empagliflozin (JARDIANCE) 25 mg tablet Take 1 tablet (25 mg total) by mouth once daily.    insulin glargine, TOUJEO, (TOUJEO SOLOSTAR U-300 INSULIN) 300 unit/mL (1.5 mL) InPn pen Inject 40 Units into the skin 2 (two) times a day. - TAKING 40 UNITS DAILY.     tirzepatide 7.5 mg/0.5 mL PnIj Inject 7.5 mg into the skin every 7 days. - HAS NOT TAKEN IN OVER 3 MONTHS.      DIABETES DISEASE MANAGEMENT STATUS  Statin: Taking  ACE/ARB: Taking  Screening or Prevention Patient's value Goal Complete/Controlled?   HgA1C Testing and Control   Lab Results   Component Value Date    HGBA1C 8.0 (H) 09/13/2024      Annually/Less than 8% No   Lipid profile : 10/06/2023 Annually Yes   LDL control Lab Results   Component Value Date    LDLCALC 103.0 10/06/2023    Annually/Less than 100 mg/dl  No   Nephropathy screening Lab Results   Component Value Date    LABMICR 18.0 10/02/2023     Lab Results   Component Value Date    PROTEINUA Negative 10/06/2023     No results found for: "UTPCR"   Annually Yes   Blood pressure BP Readings from Last 1 Encounters:   09/18/24 100/60    Less than 140/90 Yes   Dilated retinal exam : 06/21/2024 Annually Yes   Foot exam   : 11/16/2021 Annually No   Patient's medications, allergies, past medical, surgical, social and family histories were reviewed and updated as appropriate.     Review of Systems   Constitutional:  Negative for weight loss.   Eyes:  Negative for blurred vision and double vision.   Cardiovascular:  Negative for chest pain.   Gastrointestinal:  Negative for nausea and vomiting.   Genitourinary:  Negative for frequency.   Musculoskeletal:  Negative for falls.   Neurological:  Negative for dizziness and weakness.   Endo/Heme/Allergies:  Negative for polydipsia.   Psychiatric/Behavioral:  Negative for " "depression.    All other systems reviewed and are negative.       Physical Exam  Constitutional:       General: He is not in acute distress.     Appearance: Normal appearance.   Eyes:      Extraocular Movements: Extraocular movements intact.      Pupils: Pupils are equal, round, and reactive to light.   Cardiovascular:      Rate and Rhythm: Normal rate and regular rhythm.      Pulses:           Dorsalis pedis pulses are 2+ on the right side and 2+ on the left side.        Posterior tibial pulses are 2+ on the right side and 2+ on the left side.      Heart sounds: Normal heart sounds.   Pulmonary:      Effort: Pulmonary effort is normal. No respiratory distress.      Breath sounds: Normal breath sounds.   Musculoskeletal:      Cervical back: Normal range of motion and neck supple.   Feet:      Right foot:      Protective Sensation: 9 sites tested.  9 sites sensed.      Skin integrity: Dry skin present.      Toenail Condition: Right toenails are normal.      Left foot:      Protective Sensation: 9 sites tested.  9 sites sensed.      Skin integrity: Dry skin present.      Toenail Condition: Left toenails are normal.   Neurological:      Mental Status: He is alert and oriented to person, place, and time.   Psychiatric:         Mood and Affect: Mood normal.         Behavior: Behavior normal.          Blood pressure 100/60, height 6' 2" (1.88 m), weight 131.3 kg (289 lb 7.4 oz).  Wt Readings from Last 3 Encounters:   09/18/24 131.3 kg (289 lb 7.4 oz)   07/24/24 134.7 kg (297 lb)   02/02/24 133.4 kg (294 lb 1.5 oz)       LAB REVIEW  Lab Results   Component Value Date     10/09/2023    K 4.0 10/09/2023     10/09/2023    CO2 21 (L) 10/09/2023    BUN 21 10/09/2023    CREATININE 1.2 10/09/2023    CALCIUM 9.0 10/09/2023    ANIONGAP 13 10/09/2023    EGFRNORACEVR >60 10/09/2023     No results found for: "CPEPTIDE", "GLUTAMICACID", "INSLNABS"  Hemoglobin A1C   Date Value Ref Range Status   09/13/2024 8.0 (H) 4.0 - 5.6 " % Final     Comment:     ADA Screening Guidelines:  5.7-6.4%  Consistent with prediabetes  >or=6.5%  Consistent with diabetes    High levels of fetal hemoglobin interfere with the HbA1C  assay. Heterozygous hemoglobin variants (HbS, HgC, etc)do  not significantly interfere with this assay.   However, presence of multiple variants may affect accuracy.     03/20/2024 8.7 (H) 4.0 - 5.6 % Final     Comment:     ADA Screening Guidelines:  5.7-6.4%  Consistent with prediabetes  >or=6.5%  Consistent with diabetes    High levels of fetal hemoglobin interfere with the HbA1C  assay. Heterozygous hemoglobin variants (HbS, HgC, etc)do  not significantly interfere with this assay.   However, presence of multiple variants may affect accuracy.     10/02/2023 9.1 (H) 4.0 - 5.6 % Final     Comment:     ADA Screening Guidelines:  5.7-6.4%  Consistent with prediabetes  >or=6.5%  Consistent with diabetes    High levels of fetal hemoglobin interfere with the HbA1C  assay. Heterozygous hemoglobin variants (HbS, HgC, etc)do  not significantly interfere with this assay.   However, presence of multiple variants may affect accuracy.         Lab Results   Component Value Date    POCGLU 206 (A) 09/18/2024       ASSESSMENT    ICD-10-CM ICD-9-CM   1. Type 2 diabetes mellitus with other circulatory complication, with long-term current use of insulin  E11.59 250.70    Z79.4 V58.67   2. Hyperlipidemia associated with type 2 diabetes mellitus  E11.69 250.80    E78.5 272.4   3. Coronary artery disease involving native coronary artery of native heart without angina pectoris  I25.10 414.01   4. Hypertension associated with diabetes  E11.59 250.80    I15.2 401.9        EFREN Colon was seen today for diabetes mellitus.    Diagnoses and all orders for this visit:    Type 2 diabetes mellitus with other circulatory complication, with long-term current use of insulin  -     POCT Glucose, Hand-Held Device    Hyperlipidemia associated with type 2 diabetes  mellitus    Coronary artery disease involving native coronary artery of native heart without angina pectoris    Hypertension associated with diabetes      Reviewed pathophysiology of diabetes, complications related to the disease, importance of annual dilated eye exam and daily foot examination. Explained MOA, SE, dosage of medications. Written instructions given and reviewed with patient and patient verbalizes understanding. Discussed importance of A1c less than 7% to reduce risk of micro and macro complications, including nephropathy, neuropathy, retinopathy, heart attack and stroke. Reviewed the importance of controlled Blood Pressure and Cholesterol Lab Values in preventing disease.     PATIENT INSTRUCTIONS    Lifestyle modification with well balanced diet and at least 30 minutes of physical activity daily recommended.   Increase water intake.   Increase protein and non-starchy vegetable servings with meals.   Decrease carbohydrate servings with meals.   Take 10 minute walk after each meal.   Avoid snacking on carbohydrates, choose low carb, high protein snacks.   Incorporate resistance training with weights or resistance bands with exercise regimen at least 3 days a week.      Continue Jardiance 25 mg by mouth daily.   Continue Toujeo 40 units subcutaneously daily.   Start Mounjaro 2.5 mg subcutaneously every 7 days.     Dexcom G7 CGM ordered.   Blood Sugar Goals:       Fastin-130.       1-2 hours after a meal: Less than 180.     No follow-ups on file.    Portions of this note were prepared with [employer] voice recognition transcription software. Grammatical errors, including garbled syntax, mangle pronouns, and other bizarre constructions may be attributed to that software system.

## 2024-10-11 ENCOUNTER — PATIENT OUTREACH (OUTPATIENT)
Dept: ADMINISTRATIVE | Facility: HOSPITAL | Age: 69
End: 2024-10-11
Payer: MEDICARE

## 2024-10-11 NOTE — PROGRESS NOTES
[facility] OUTREACH: spoke with pt, he agreed to Annual follow up visit with Dr Dow and will do Fasting labs after that appt.

## 2024-10-16 DIAGNOSIS — Z79.4 TYPE 2 DIABETES MELLITUS WITH HYPERGLYCEMIA, WITH LONG-TERM CURRENT USE OF INSULIN: Primary | Chronic | ICD-10-CM

## 2024-10-16 DIAGNOSIS — E11.65 TYPE 2 DIABETES MELLITUS WITH HYPERGLYCEMIA, WITH LONG-TERM CURRENT USE OF INSULIN: Primary | Chronic | ICD-10-CM

## 2024-10-20 DIAGNOSIS — Z79.4 TYPE 2 DIABETES MELLITUS WITH OTHER CIRCULATORY COMPLICATION, WITH LONG-TERM CURRENT USE OF INSULIN: ICD-10-CM

## 2024-10-20 DIAGNOSIS — E11.59 TYPE 2 DIABETES MELLITUS WITH OTHER CIRCULATORY COMPLICATION, WITH LONG-TERM CURRENT USE OF INSULIN: ICD-10-CM

## 2024-10-21 RX ORDER — BLOOD-GLUCOSE SENSOR
1 EACH MISCELLANEOUS
Qty: 3 EACH | Refills: 11 | Status: SHIPPED | OUTPATIENT
Start: 2024-10-21 | End: 2025-10-21

## 2024-10-23 ENCOUNTER — OFFICE VISIT (OUTPATIENT)
Dept: CARDIOLOGY | Facility: CLINIC | Age: 69
End: 2024-10-23
Payer: COMMERCIAL

## 2024-10-23 ENCOUNTER — HOSPITAL ENCOUNTER (OUTPATIENT)
Dept: CARDIOLOGY | Facility: HOSPITAL | Age: 69
Discharge: HOME | End: 2024-10-23
Payer: COMMERCIAL

## 2024-10-23 VITALS
OXYGEN SATURATION: 97 % | HEIGHT: 74 IN | DIASTOLIC BLOOD PRESSURE: 68 MMHG | BODY MASS INDEX: 37.44 KG/M2 | HEART RATE: 73 BPM | SYSTOLIC BLOOD PRESSURE: 96 MMHG | WEIGHT: 291.75 LBS

## 2024-10-23 DIAGNOSIS — Z00.00 ROUTINE ADULT HEALTH MAINTENANCE: Primary | ICD-10-CM

## 2024-10-23 DIAGNOSIS — I20.0 UNSTABLE ANGINA: ICD-10-CM

## 2024-10-23 DIAGNOSIS — E78.5 HYPERLIPIDEMIA ASSOCIATED WITH TYPE 2 DIABETES MELLITUS: Chronic | ICD-10-CM

## 2024-10-23 DIAGNOSIS — G47.33 OSA ON CPAP: ICD-10-CM

## 2024-10-23 DIAGNOSIS — I87.2 VENOUS INSUFFICIENCY: ICD-10-CM

## 2024-10-23 DIAGNOSIS — I10 ESSENTIAL HYPERTENSION: Primary | Chronic | ICD-10-CM

## 2024-10-23 DIAGNOSIS — E11.69 HYPERLIPIDEMIA ASSOCIATED WITH TYPE 2 DIABETES MELLITUS: Chronic | ICD-10-CM

## 2024-10-23 DIAGNOSIS — Z98.61 S/P PTCA (PERCUTANEOUS TRANSLUMINAL CORONARY ANGIOPLASTY): ICD-10-CM

## 2024-10-23 DIAGNOSIS — Z00.00 ROUTINE ADULT HEALTH MAINTENANCE: ICD-10-CM

## 2024-10-23 DIAGNOSIS — I25.10 CORONARY ARTERY DISEASE INVOLVING NATIVE CORONARY ARTERY OF NATIVE HEART WITHOUT ANGINA PECTORIS: Chronic | ICD-10-CM

## 2024-10-23 LAB
OHS QRS DURATION: 156 MS
OHS QTC CALCULATION: 484 MS

## 2024-10-23 PROCEDURE — 4010F ACE/ARB THERAPY RXD/TAKEN: CPT | Mod: CPTII,S$GLB,,

## 2024-10-23 PROCEDURE — 3288F FALL RISK ASSESSMENT DOCD: CPT | Mod: CPTII,S$GLB,,

## 2024-10-23 PROCEDURE — 1159F MED LIST DOCD IN RCRD: CPT | Mod: CPTII,S$GLB,,

## 2024-10-23 PROCEDURE — 3052F HG A1C>EQUAL 8.0%<EQUAL 9.0%: CPT | Mod: CPTII,S$GLB,,

## 2024-10-23 PROCEDURE — 1101F PT FALLS ASSESS-DOCD LE1/YR: CPT | Mod: CPTII,S$GLB,,

## 2024-10-23 PROCEDURE — 93010 ELECTROCARDIOGRAM REPORT: CPT | Mod: ,,, | Performed by: INTERNAL MEDICINE

## 2024-10-23 PROCEDURE — 99999 PR PBB SHADOW E&M-EST. PATIENT-LVL IV: CPT | Mod: PBBFAC,,,

## 2024-10-23 PROCEDURE — 99214 OFFICE O/P EST MOD 30 MIN: CPT | Mod: S$GLB,,,

## 2024-10-23 PROCEDURE — 93005 ELECTROCARDIOGRAM TRACING: CPT

## 2024-10-23 PROCEDURE — 3008F BODY MASS INDEX DOCD: CPT | Mod: CPTII,S$GLB,,

## 2024-10-23 PROCEDURE — 3074F SYST BP LT 130 MM HG: CPT | Mod: CPTII,S$GLB,,

## 2024-10-23 PROCEDURE — 3078F DIAST BP <80 MM HG: CPT | Mod: CPTII,S$GLB,,

## 2024-10-23 PROCEDURE — 1126F AMNT PAIN NOTED NONE PRSNT: CPT | Mod: CPTII,S$GLB,,

## 2024-10-23 PROCEDURE — 1160F RVW MEDS BY RX/DR IN RCRD: CPT | Mod: CPTII,S$GLB,,

## 2024-10-23 RX ORDER — INSULIN GLARGINE 300 U/ML
INJECTION, SOLUTION SUBCUTANEOUS
Qty: 7.5 ML | Refills: 0 | Status: SHIPPED | OUTPATIENT
Start: 2024-10-23

## 2024-10-23 RX ORDER — RANOLAZINE 500 MG/1
500 TABLET, EXTENDED RELEASE ORAL 2 TIMES DAILY
Qty: 60 TABLET | Refills: 6 | Status: SHIPPED | OUTPATIENT
Start: 2024-10-23

## 2024-10-23 RX ORDER — HYDROCHLOROTHIAZIDE 25 MG/1
12.5 TABLET ORAL DAILY
Qty: 45 TABLET | Refills: 3 | Status: SHIPPED | OUTPATIENT
Start: 2024-10-23 | End: 2025-10-23

## 2024-10-23 NOTE — PROGRESS NOTES
Subjective:   Patient ID:  Isaiah Harrison is a 69 y.o. male who presents for evaluation of No chief complaint on file.      [id number] M whose current medical conditions include CAD, DM, Hlp, HTN, obesity, DANTE, venous insufficiency followed by Dr. Lyles in cardiology clinic here today for CV follow up. Denies any CP, SOB, palpitations or dizziness. BP labile in clinic. Denies any swelling, has history of venous insuff. Compliant with medications    EKG today NSR 1st AVB  LHC 10/9/24 2nd diagonal 90% stenosis, small vessel, EF nml    Past Medical History:   Diagnosis Date    Acute coronary syndrome     Anticoagulant long-term use     Back pain     CAD (coronary artery disease) 10/17/2013    Class 2 severe obesity due to excess calories with serious comorbidity and body mass index (BMI) of 38.0 to 38.9 in adult 01/12/2015    Coronary artery disease     Diabetes mellitus, type 2 2010    BS didn't check 04/19/2023 Insulin 2 years    Gastric polyps     Hyperlipemia     Hypertension     NSTEMI (non-ST elevated myocardial infarction) 10/23/2014    Obesity 10/21/2014    DANTE on CPAP 02/19/2015    S/P PTCA (percutaneous transluminal coronary angioplasty) 10/17/2013    Sleep apnea 01/07/2015    Type 2 diabetes mellitus with circulatory disorder (coronary artery disease), without long-term current use of insulin 07/14/2015       Past Surgical History:   Procedure Laterality Date    APPENDECTOMY      BACK SURGERY      CATARACT EXTRACTION      CATARACT EXTRACTION, BILATERAL      COLONOSCOPY      COLONOSCOPY N/A 3/9/2023    Procedure: COLONOSCOPY;  Surgeon: Lisa Auguste MD;  Location: [facility] ENDO;  Service: Endoscopy;  Laterality: N/A;    COLONOSCOPY N/A 7/24/2024    Procedure: COLONOSCOPY-6/16 plavix/cc econ;  Surgeon: Kwadwo Stroud MD;  Location: [facility] ENDO;  Service: Endoscopy;  Laterality: N/A;    CORONARY ANGIOPLASTY WITH STENT PLACEMENT      ESOPHAGOGASTRODUODENOSCOPY      EYE SURGERY      FRACTIONAL FLOW RESERVE  (FFR), CORONARY  10/9/2023    Procedure: Fractional Flow [place] (FFR), Coronary;  Surgeon: Kirsty Lyles MD;  Location: [facility] CATH LAB;  Service: Cardiology;;    FRACTURE SURGERY      HERNIA REPAIR      INSTANTANEOUS WAVE-FREE RATIO (IFR) N/A 10/9/2023    Procedure: Instantaneous Wave-Free Ratio (IFR);  Surgeon: Kristy Lyles MD;  Location: [facility] CATH LAB;  Service: Cardiology;  Laterality: N/A;    LEFT HEART CATHETERIZATION Left 10/9/2023    Procedure: Left heart cath;  Surgeon: Kirsty Lyles MD;  Location: [facility] CATH LAB;  Service: Cardiology;  Laterality: Left;    SPINE SURGERY         Social History     Tobacco Use    Smoking status: Never    Smokeless tobacco: Never   Substance Use Topics    Alcohol use: Yes     Alcohol/week: 0.0 standard drinks of alcohol     Comment: socially    Drug use: No       Family History   Problem Relation Name Age of Onset    Hypertension Mother      Diabetes Mother      Hypertension Father      Diabetes Sister      Hypertension Sister      Heart disease Maternal Grandfather         Current Outpatient Medications on File Prior to Visit   Medication Sig Dispense Refill    amlodipine-valsartan (EXFORGE)  mg per tablet Take 1 tablet by mouth once daily. 90 tablet 3    aspirin (ECOTRIN) 81 MG EC tablet Take 81 mg by mouth once daily.      atorvastatin (LIPITOR) 80 MG tablet Take 1 tablet (80 mg total) by mouth every evening. 90 tablet 0    blood sugar diagnostic Strp 1 strip by Misc.(Non-Drug; Combo Route) route 4 (four) times daily. 200 strip 11    blood-glucose meter,continuous (DEXCOM G7 ) Misc 1 Device by Misc.(Non-Drug; Combo Route) route once daily. 1 each 0    blood-glucose sensor (DEXCOM G7 SENSOR) Shira 1 Device by Misc.(Non-Drug; Combo Route) route every 10 days. 3 each 11    clopidogreL (PLAVIX) 75 mg tablet TAKE 1 TABLET(75 MG) BY MOUTH EVERY DAY 30 tablet 11    empagliflozin (JARDIANCE) 25 mg tablet Take 1 tablet (25 mg total) by mouth once daily. 90  "tablet 1    evolocumab (REPATHA SURECLICK) 140 mg/mL PnIj Inject 1 mL (140 mg total) into the skin every 14 (fourteen) days. 2 mL 6    hydroCHLOROthiazide (HYDRODIURIL) 25 MG tablet Take 1 tablet (25 mg total) by mouth once daily. 90 tablet 3    insulin glargine, TOUJEO, (TOUJEO SOLOSTAR U-300 INSULIN) 300 unit/mL (1.5 mL) InPn pen Inject 40 Units into the skin 2 (two) times a day. 27 mL 0    isosorbide mononitrate (IMDUR) 30 MG 24 hr tablet TAKE 3 TABLETS(90 MG) BY MOUTH EVERY  tablet 0    metoprolol tartrate (LOPRESSOR) 50 MG tablet TAKE 1 TABLET BY MOUTH TWICE DAILY 180 tablet 3    niacin (SLO-NIACIN) 500 mg tablet TAKE 1 TABLET(500 MG) BY MOUTH EVERY EVENING 30 tablet 6    nitroGLYCERIN (NITROSTAT) 0.4 MG SL tablet Place 1 tablet (0.4 mg total) under the tongue every 5 (five) minutes as needed. 25 tablet 4    pen needle, diabetic (BD ULTRA-FINE SHORT PEN NEEDLE) 31 gauge x 5/16" Ndle USE WITH LANTUS PEN AS DIRECTED 100 each 3    ranolazine (RANEXA) 1,000 mg Tb12 Take 1 tablet (1,000 mg total) by mouth 2 (two) times daily. 60 tablet 6    tirzepatide (MOUNJARO) 2.5 mg/0.5 mL PnIj Inject 2.5 mg into the skin every 7 days. 2 mL 11    etodolac (LODINE) 400 MG tablet Take 1 tablet (400 mg total) by mouth 2 (two) times daily. (Patient not taking: Reported on 10/23/2024) 60 tablet 0    hydrOXYzine HCL (ATARAX) 10 MG Tab TAKE 1 TABLET(10 MG) BY MOUTH TWICE DAILY AS NEEDED. MAY CAUSE DROWSINESS. DO NOT DRIVE OR OPERATE HEAVY MACHINERY (Patient not taking: Reported on 10/23/2024) 60 tablet 1    ketoconazole (NIZORAL) 2 % cream Apply topically once daily. (Patient not taking: Reported on 10/23/2024) 30 g 3    pneumoc 20-miranda conj-dip cr,PF, (PREVNAR-20, PF,) 0.5 mL Syrg injection Inject 0.5 mLs into the muscle. (Patient not taking: Reported on 10/23/2024) 0.5 mL 0     No current facility-administered medications on file prior to visit.      Wt Readings from Last 3 Encounters:   10/23/24 132.3 kg (291 lb 12.5 oz) "   09/18/24 131.3 kg (289 lb 7.4 oz)   07/24/24 134.7 kg (297 lb)     Temp Readings from Last 3 Encounters:   07/24/24 97.9 °F (36.6 °C) (Temporal)   11/30/23 97.3 °F (36.3 °C) (Tympanic)   10/12/23 (!) 95.9 °F (35.5 °C) (Tympanic)     BP Readings from Last 3 Encounters:   10/23/24 96/68   09/18/24 100/60   07/24/24 (!) 96/55     Pulse Readings from Last 3 Encounters:   10/23/24 73   07/24/24 71   02/02/24 67        Review of Systems   Constitutional: Negative.   HENT: Negative.     Eyes: Negative.    Cardiovascular: Negative.    Respiratory: Negative.     Skin: Negative.    Musculoskeletal: Negative.    Gastrointestinal: Negative.    Genitourinary: Negative.    Neurological: Negative.    Psychiatric/Behavioral: Negative.         Objective:   Physical Exam  Vitals and nursing note reviewed.   Constitutional:       Appearance: Normal appearance.   HENT:      Head: Normocephalic and atraumatic.   Eyes:      General:         Right eye: No discharge.         Left eye: No discharge.      Pupils: Pupils are equal, round, and reactive to light.   Cardiovascular:      Rate and Rhythm: Normal rate and regular rhythm.      Heart sounds: S1 normal and S2 normal. No murmur heard.     No friction rub.   Pulmonary:      Effort: Pulmonary effort is normal. No respiratory distress.      Breath sounds: Normal breath sounds. No rales.   Abdominal:      Palpations: Abdomen is soft.      Tenderness: There is no abdominal tenderness.   Musculoskeletal:      Cervical back: Neck supple.      Right lower leg: No edema.      Left lower leg: No edema.   Skin:     General: Skin is warm and dry.   Neurological:      General: No focal deficit present.      Mental Status: He is alert and oriented to person, place, and time.   Psychiatric:         Mood and Affect: Mood normal.         Behavior: Behavior normal.         Thought Content: Thought content normal.         Lab Results   Component Value Date    CHOL 156 10/06/2023    CHOL 159 04/27/2023     CHOL 174 09/13/2022     Lab Results   Component Value Date    HDL 34 (L) 10/06/2023    HDL 38 (L) 04/27/2023    HDL 40 09/13/2022     Lab Results   Component Value Date    LDLCALC 103.0 10/06/2023    LDLCALC 106.4 04/27/2023    LDLCALC 113.4 09/13/2022     Lab Results   Component Value Date    TRIG 95 10/06/2023    TRIG 73 04/27/2023    TRIG 103 09/13/2022     Lab Results   Component Value Date    CHOLHDL 21.8 10/06/2023    CHOLHDL 23.9 04/27/2023    CHOLHDL 23.0 09/13/2022       Chemistry        Component Value Date/Time     10/09/2023 0520    K 4.0 10/09/2023 0520     10/09/2023 0520    CO2 21 (L) 10/09/2023 0520    BUN 21 10/09/2023 0520    CREATININE 1.2 10/09/2023 0520     (H) 10/09/2023 0520        Component Value Date/Time    CALCIUM 9.0 10/09/2023 0520    ALKPHOS 86 10/06/2023 1332    AST 15 10/06/2023 1332    ALT 26 10/06/2023 1332    BILITOT 0.8 10/06/2023 1332    ESTGFRAFRICA >60.0 10/27/2021 0842    EGFRNONAA >60.0 10/27/2021 0842          Lab Results   Component Value Date    TSH 1.188 06/30/2015     Lab Results   Component Value Date    INR 1.0 10/07/2023    INR 1.0 10/06/2023    INR 1.0 12/16/2015     @RESUFAST(WBC,HGB,HCT,MCV,PLT)  @LABRCNTIP(BNP,BNPTRIAGEBLO)@  CrCl cannot be calculated (Patient's most recent lab result is older than the maximum 7 days allowed.).     Results for orders placed during the hospital encounter of 10/06/23    Echo    Interpretation Summary    Left Ventricle: The left ventricle is normal in size. Normal wall thickness. There is mild concentric hypertrophy. Normal wall motion. There is low normal systolic function with a visually estimated ejection fraction of 50 - 55%. There is normal diastolic function.    Right Ventricle: Normal right ventricular cavity size. Wall thickness is normal. Right ventricle wall motion  is normal. Systolic function is normal.    Tricuspid Valve: There is mild regurgitation.    IVC/SVC: Intermediate venous pressure at 8  mmHg.     No results found for this or any previous visit.     Assessment:      1. Hyperlipidemia associated with type 2 diabetes mellitus    2. Coronary artery disease involving native coronary artery of native heart without angina pectoris    3. Essential hypertension    4. S/P PTCA (percutaneous transluminal coronary angioplasty)    5. Venous insufficiency    6. DANTE on CPAP        Plan:   Hyperlipidemia associated with type 2 diabetes mellitus    Coronary artery disease involving native coronary artery of native heart without angina pectoris    Essential hypertension    S/P PTCA (percutaneous transluminal coronary angioplasty)    Venous insufficiency    DANTE on CPAP      Dec HCTZ to 12.5mg Daily  Statin, repatha, low fat diet, recheck lipid- Hlp  Exforge, Imdur, HCTZ, BB, low Na diet, profile BP- HTN  Asa, statin, plavix, BB- CAD  RF modifications  Daily exercise as tolerated    RTC 2m  Labs today    Ok to resume working with no restrictions    Courtney Guillot, FNP-C [facility], Cardiology

## 2024-10-23 NOTE — TELEPHONE ENCOUNTER
REFILL APPROVED. Will address further refills at upcoming appointment with me listed below.  #LMRX   --------------------------------  Future Appointments   Date Time Provider Department Center   11/5/2024  9:40 AM LACI Dow MD [facility]   11/13/2024  8:00 AM Dana Flores, FNP [facility]  [facility]

## 2024-10-24 ENCOUNTER — LAB VISIT (OUTPATIENT)
Dept: LAB | Facility: HOSPITAL | Age: 69
End: 2024-10-24
Payer: COMMERCIAL

## 2024-10-24 DIAGNOSIS — I25.10 CORONARY ARTERY DISEASE INVOLVING NATIVE CORONARY ARTERY OF NATIVE HEART WITHOUT ANGINA PECTORIS: Chronic | ICD-10-CM

## 2024-10-24 DIAGNOSIS — E78.5 HYPERLIPIDEMIA ASSOCIATED WITH TYPE 2 DIABETES MELLITUS: ICD-10-CM

## 2024-10-24 DIAGNOSIS — I10 ESSENTIAL HYPERTENSION: Chronic | ICD-10-CM

## 2024-10-24 DIAGNOSIS — E11.69 HYPERLIPIDEMIA ASSOCIATED WITH TYPE 2 DIABETES MELLITUS: ICD-10-CM

## 2024-10-24 LAB
ALBUMIN SERPL BCP-MCNC: 3.5 G/DL (ref 3.5–5.2)
ALP SERPL-CCNC: 71 U/L (ref 40–150)
ALT SERPL W/O P-5'-P-CCNC: 16 U/L (ref 10–44)
ANION GAP SERPL CALC-SCNC: 8 MMOL/L (ref 8–16)
AST SERPL-CCNC: 13 U/L (ref 10–40)
BILIRUB SERPL-MCNC: 0.9 MG/DL (ref 0.1–1)
BUN SERPL-MCNC: 22 MG/DL (ref 8–23)
CALCIUM SERPL-MCNC: 9.2 MG/DL (ref 8.7–10.5)
CHLORIDE SERPL-SCNC: 105 MMOL/L (ref 95–110)
CHOLEST SERPL-MCNC: 107 MG/DL (ref 120–199)
CHOLEST/HDLC SERPL: 3.1 {RATIO} (ref 2–5)
CO2 SERPL-SCNC: 26 MMOL/L (ref 23–29)
CREAT SERPL-MCNC: 1.2 MG/DL (ref 0.5–1.4)
EST. GFR  (NO RACE VARIABLE): >60 ML/MIN/1.73 M^2
GLUCOSE SERPL-MCNC: 158 MG/DL (ref 70–110)
HDLC SERPL-MCNC: 35 MG/DL (ref 40–75)
HDLC SERPL: 32.7 % (ref 20–50)
LDLC SERPL CALC-MCNC: 55.6 MG/DL (ref 63–159)
NONHDLC SERPL-MCNC: 72 MG/DL
POTASSIUM SERPL-SCNC: 4.6 MMOL/L (ref 3.5–5.1)
PROT SERPL-MCNC: 6.8 G/DL (ref 6–8.4)
SODIUM SERPL-SCNC: 139 MMOL/L (ref 136–145)
TRIGL SERPL-MCNC: 82 MG/DL (ref 30–150)

## 2024-10-24 PROCEDURE — 80061 LIPID PANEL: CPT

## 2024-10-24 PROCEDURE — 80053 COMPREHEN METABOLIC PANEL: CPT

## 2024-10-24 PROCEDURE — 36415 COLL VENOUS BLD VENIPUNCTURE: CPT

## 2024-10-25 ENCOUNTER — TELEPHONE (OUTPATIENT)
Dept: CARDIOLOGY | Facility: CLINIC | Age: 69
End: 2024-10-25
Payer: MEDICARE

## 2024-10-25 NOTE — TELEPHONE ENCOUNTER
Contacted pt and informed him lipids at goal. Pt osman w/o q/c    ----- Message from Kendal Singh NP sent at 10/24/2024 11:11 PM CDT -----  Lipids at goal

## 2024-11-07 DIAGNOSIS — I25.10 CORONARY ARTERY DISEASE INVOLVING NATIVE CORONARY ARTERY OF NATIVE HEART WITHOUT ANGINA PECTORIS: ICD-10-CM

## 2024-11-07 NOTE — TELEPHONE ENCOUNTER
Care Due:                  Date            Visit Type   Department     Provider  --------------------------------------------------------------------------------                                MYCHART                              FOLLOWUP/OF  [facility] INTERNAL  Last Visit: 11-      FICE VISIT   MEDICINE       John Anderson  Next Visit: None Scheduled  None         None Found                                                            Last  Test          Frequency    Reason                     Performed    Due Date  --------------------------------------------------------------------------------    Office Visit  12 months..  etodolac.................  11- 11-    CBC.........  12 months..  etodolac.................  10-   10-    [employer] Embedded Care Due Messages. Reference number: [id number].   11/07/2024 11:28:41 AM CST

## 2024-11-08 RX ORDER — ISOSORBIDE MONONITRATE 30 MG/1
TABLET, EXTENDED RELEASE ORAL
Qty: 270 TABLET | Refills: 0 | OUTPATIENT
Start: 2024-11-08

## 2024-11-09 ENCOUNTER — PATIENT MESSAGE (OUTPATIENT)
Dept: ADMINISTRATIVE | Facility: OTHER | Age: 69
End: 2024-11-09
Payer: COMMERCIAL

## 2024-11-10 DIAGNOSIS — I25.10 CORONARY ARTERY DISEASE INVOLVING NATIVE CORONARY ARTERY OF NATIVE HEART, ANGINA PRESENCE UNSPECIFIED: ICD-10-CM

## 2024-11-10 DIAGNOSIS — I21.4 NSTEMI (NON-ST ELEVATED MYOCARDIAL INFARCTION): ICD-10-CM

## 2024-11-11 DIAGNOSIS — I25.10 CORONARY ARTERY DISEASE INVOLVING NATIVE CORONARY ARTERY OF NATIVE HEART WITHOUT ANGINA PECTORIS: ICD-10-CM

## 2024-11-11 RX ORDER — ISOSORBIDE MONONITRATE 30 MG/1
30 TABLET, EXTENDED RELEASE ORAL DAILY
Qty: 21 TABLET | Refills: 3 | Status: SHIPPED | OUTPATIENT
Start: 2024-11-11

## 2024-11-11 RX ORDER — NITROGLYCERIN 0.4 MG/1
0.4 TABLET SUBLINGUAL EVERY 5 MIN PRN
Qty: 25 TABLET | Refills: 4 | Status: SHIPPED | OUTPATIENT
Start: 2024-11-11

## 2024-11-11 RX ORDER — ISOSORBIDE MONONITRATE 30 MG/1
TABLET, EXTENDED RELEASE ORAL
Qty: 90 TABLET | OUTPATIENT
Start: 2024-11-11

## 2024-11-11 NOTE — TELEPHONE ENCOUNTER
Labs and Appointment required for more refills.     TO MY TEAM:   Notify patient of limited refill.  Schedule soonest available appointment with Kasey.  Schedule follow-up appointment with me in February, March, or April.

## 2024-11-11 NOTE — TELEPHONE ENCOUNTER
Response to request for refill of this medicine handled separately.  Requested Prescriptions     Pending Prescriptions Disp Refills    isosorbide mononitrate (IMDUR) 30 MG 24 hr tablet [Pharmacy Med Name: ISOSORBIDE MONONITRATE 30MG ER TABS] 90 tablet      Sig: TAKE 1 TABLET(30 MG) BY MOUTH DAILY

## 2024-11-11 NOTE — TELEPHONE ENCOUNTER
No care due was identified.  Health [facility] Embedded Care Due Messages. Reference number: [id number].   11/11/2024 7:47:39 AM CST

## 2024-11-13 ENCOUNTER — OFFICE VISIT (OUTPATIENT)
Dept: DIABETES | Facility: CLINIC | Age: 69
End: 2024-11-13
Payer: COMMERCIAL

## 2024-11-13 VITALS
SYSTOLIC BLOOD PRESSURE: 118 MMHG | HEART RATE: 69 BPM | BODY MASS INDEX: 37.02 KG/M2 | DIASTOLIC BLOOD PRESSURE: 74 MMHG | WEIGHT: 288.38 LBS

## 2024-11-13 DIAGNOSIS — Z79.4 TYPE 2 DIABETES MELLITUS WITH OTHER CIRCULATORY COMPLICATION, WITH LONG-TERM CURRENT USE OF INSULIN: Primary | ICD-10-CM

## 2024-11-13 DIAGNOSIS — E11.59 TYPE 2 DIABETES MELLITUS WITH OTHER CIRCULATORY COMPLICATION, WITH LONG-TERM CURRENT USE OF INSULIN: Primary | ICD-10-CM

## 2024-11-13 LAB — GLUCOSE SERPL-MCNC: 150 MG/DL (ref 70–110)

## 2024-11-13 PROCEDURE — 4010F ACE/ARB THERAPY RXD/TAKEN: CPT | Mod: CPTII,S$GLB,, | Performed by: NURSE PRACTITIONER

## 2024-11-13 PROCEDURE — 3052F HG A1C>EQUAL 8.0%<EQUAL 9.0%: CPT | Mod: CPTII,S$GLB,, | Performed by: NURSE PRACTITIONER

## 2024-11-13 PROCEDURE — 1159F MED LIST DOCD IN RCRD: CPT | Mod: CPTII,S$GLB,, | Performed by: NURSE PRACTITIONER

## 2024-11-13 PROCEDURE — 99999 PR PBB SHADOW E&M-EST. PATIENT-LVL IV: CPT | Mod: PBBFAC,,, | Performed by: NURSE PRACTITIONER

## 2024-11-13 PROCEDURE — 3074F SYST BP LT 130 MM HG: CPT | Mod: CPTII,S$GLB,, | Performed by: NURSE PRACTITIONER

## 2024-11-13 PROCEDURE — 3008F BODY MASS INDEX DOCD: CPT | Mod: CPTII,S$GLB,, | Performed by: NURSE PRACTITIONER

## 2024-11-13 PROCEDURE — 1101F PT FALLS ASSESS-DOCD LE1/YR: CPT | Mod: CPTII,S$GLB,, | Performed by: NURSE PRACTITIONER

## 2024-11-13 PROCEDURE — 95251 CONT GLUC MNTR ANALYSIS I&R: CPT | Mod: S$GLB,,, | Performed by: NURSE PRACTITIONER

## 2024-11-13 PROCEDURE — 1126F AMNT PAIN NOTED NONE PRSNT: CPT | Mod: CPTII,S$GLB,, | Performed by: NURSE PRACTITIONER

## 2024-11-13 PROCEDURE — 99214 OFFICE O/P EST MOD 30 MIN: CPT | Mod: S$GLB,,, | Performed by: NURSE PRACTITIONER

## 2024-11-13 PROCEDURE — G2211 COMPLEX E/M VISIT ADD ON: HCPCS | Mod: S$GLB,,, | Performed by: NURSE PRACTITIONER

## 2024-11-13 PROCEDURE — 82962 GLUCOSE BLOOD TEST: CPT | Mod: S$GLB,,, | Performed by: NURSE PRACTITIONER

## 2024-11-13 PROCEDURE — 3078F DIAST BP <80 MM HG: CPT | Mod: CPTII,S$GLB,, | Performed by: NURSE PRACTITIONER

## 2024-11-13 PROCEDURE — 3288F FALL RISK ASSESSMENT DOCD: CPT | Mod: CPTII,S$GLB,, | Performed by: NURSE PRACTITIONER

## 2024-11-13 RX ORDER — TIRZEPATIDE 5 MG/.5ML
5 INJECTION, SOLUTION SUBCUTANEOUS
Qty: 2 ML | Refills: 11 | Status: SHIPPED | OUTPATIENT
Start: 2024-11-13 | End: 2025-11-13

## 2024-11-13 NOTE — PATIENT INSTRUCTIONS
PATIENT INSTRUCTIONS     Labs ordered and will be scheduled by [facility] staff.      Lifestyle modification with well balanced diet and at least 30 minutes of physical activity daily recommended.   Increase water intake.   Increase protein and non-starchy vegetable servings with meals.   Decrease carbohydrate servings with meals.   Take 10 minute walk after each meal.   Avoid snacking on carbohydrates, choose low carb, high protein snacks.   Incorporate resistance training with weights or resistance bands with exercise regimen at least 3 days a week.       Continue Jardiance 25 mg by mouth daily.   Continue Toujeo 40 units subcutaneously daily.   Increase Mounjaro to 5 mg subcutaneously every 7 days.      Dexcom G7 CGM   Blood Sugar Goals:       Fastin-130.       1-2 hours after a meal: Less than 180.

## 2024-11-13 NOTE — PROGRESS NOTES
Isaiah Harrison is a 69 y.o. male who  has a past medical history of Acute coronary syndrome, Anticoagulant long-term use, Back pain, CAD (coronary artery disease) (10/17/2013), Class 2 severe obesity due to excess calories with serious comorbidity and body mass index (BMI) of 38.0 to 38.9 in adult (01/12/2015), Coronary artery disease, Diabetes mellitus, type 2 (2010), Gastric polyps, Hyperlipemia, Hypertension, NSTEMI (non-ST elevated myocardial infarction) (10/23/2014), Obesity (10/21/2014), DANTE on CPAP (02/19/2015), S/P PTCA (percutaneous transluminal coronary angioplasty) (10/17/2013), Sleep apnea (01/07/2015), and Type 2 diabetes mellitus with circulatory disorder (coronary artery disease), without long-term current use of insulin (07/14/2015). and presents for a follow up evaluation of Type 2 diabetes mellitus.     CHIEF COMPLAINT: Diabetes Consultation    PCP: LACI Dow MD     Initial visit with me - 9/18/2024    The patient was initially diagnosed with diabetes in 2019.      Previous failed treatments include:  Metformin - gi upset     Social Documentation:  Patient lives in [place].   Occupation:  at [employer].   Exercise: very little.   Meal Patterns: 3 meals a day. Occasional sweet tea, water.   Sleep: DANTE, has CPAP, but not using often.       Diabetes related complications:   cardiovascular disease.   denies Pancreatitis  denies Gastroparesis  denies DKA  denies Hx/family Hx of MEN2/MTC  denies Frequent UTIs/yeast infections    Diabetes Medications               empagliflozin (JARDIANCE) 25 mg tablet Take 1 tablet (25 mg total) by mouth once daily.    tirzepatide (MOUNJARO) 2.5 mg/0.5 mL PnIj Inject 2.5 mg into the skin every 7 days. - TAKING EVERY WEDNESDAY, TOLERATING WELL.    TOUJEO SOLOSTAR U-300 INSULIN 300 unit/mL (1.5 mL) InPn pen ADMINISTER 40 UNITS UNDER THE SKIN TWICE DAILY     Current monitoring regimen:  Dexcom G7 CGM Sharing    The patient's Dexcom CGM was  "downloaded and reviewed. For 14 days, patient average glucose was 202 mg/dL. He was above range 68% of the time, in range 32% of the time, and below range 0% of the time. The target range for this patient was 70 - 180 mg/dL. Overall, there was a pattern of post prandial hyperglycemia.      Patient currently taking insulin or sulfonylurea?  Yes. Emergency Glucagon Prescription current? no  Recent hypoglycemic episodes: No.     Patient compliant with glucose checks and medication administration? Yes    DIABETES MANAGEMENT STATUS  Statin: Taking  ACE/ARB: Taking  Screening or Prevention Patient's value Goal Complete/Controlled?   HgA1C Testing and Control   Lab Results   Component Value Date    HGBA1C 8.0 (H) 09/13/2024      Annually/Less than 8% No   Lipid profile : 10/24/2024 Annually Yes   LDL control Lab Results   Component Value Date    LDLCALC 55.6 (L) 10/24/2024    Annually/Less than 100 mg/dl  Yes   Nephropathy screening Lab Results   Component Value Date    LABMICR 18.0 10/02/2023     Lab Results   Component Value Date    PROTEINUA Negative 10/06/2023     No results found for: "UTPCR"   Annually No   Blood pressure BP Readings from Last 1 Encounters:   11/13/24 118/74    Less than 140/90 Yes   Dilated retinal exam : 06/21/2024 Annually Yes   Foot exam   : 09/18/2024 Annually Yes   Patient's medications, allergies, surgical, social and family histories were reviewed and updated as appropriate.     Review of Systems   Constitutional:  Negative for weight loss.   Eyes:  Negative for blurred vision and double vision.   Cardiovascular:  Negative for chest pain.   Gastrointestinal:  Negative for nausea and vomiting.   Genitourinary:  Negative for frequency.   Musculoskeletal:  Negative for falls.   Neurological:  Negative for dizziness and weakness.   Endo/Heme/Allergies:  Negative for polydipsia.   Psychiatric/Behavioral:  Negative for depression.    All other systems reviewed and are negative.       Physical " "Exam  Constitutional:       General: He is not in acute distress.     Appearance: Normal appearance.   Eyes:      Extraocular Movements: Extraocular movements intact.      Pupils: Pupils are equal, round, and reactive to light.   Cardiovascular:      Rate and Rhythm: Normal rate and regular rhythm.      Heart sounds: Normal heart sounds.   Pulmonary:      Effort: Pulmonary effort is normal. No respiratory distress.      Breath sounds: Normal breath sounds.   Musculoskeletal:      Cervical back: Normal range of motion and neck supple.   Neurological:      Mental Status: He is alert and oriented to person, place, and time.   Psychiatric:         Mood and Affect: Mood normal.         Behavior: Behavior normal.        Blood pressure 118/74, pulse 69, weight 130.8 kg (288 lb 5.8 oz).  Wt Readings from Last 3 Encounters:   11/13/24 130.8 kg (288 lb 5.8 oz)   10/23/24 132.3 kg (291 lb 12.5 oz)   09/18/24 131.3 kg (289 lb 7.4 oz)       LAB REVIEW  Lab Results   Component Value Date     10/24/2024    K 4.6 10/24/2024     10/24/2024    CO2 26 10/24/2024    BUN 22 10/24/2024    CREATININE 1.2 10/24/2024    CALCIUM 9.2 10/24/2024    ANIONGAP 8 10/24/2024    EGFRNORACEVR >60.0 10/24/2024     No results found for: "CPEPTIDE", "GLUTAMICACID", "INSLNABS"  Hemoglobin A1C   Date Value Ref Range Status   09/13/2024 8.0 (H) 4.0 - 5.6 % Final     Comment:     ADA Screening Guidelines:  5.7-6.4%  Consistent with prediabetes  >or=6.5%  Consistent with diabetes    High levels of fetal hemoglobin interfere with the HbA1C  assay. Heterozygous hemoglobin variants (HbS, HgC, etc)do  not significantly interfere with this assay.   However, presence of multiple variants may affect accuracy.     03/20/2024 8.7 (H) 4.0 - 5.6 % Final     Comment:     ADA Screening Guidelines:  5.7-6.4%  Consistent with prediabetes  >or=6.5%  Consistent with diabetes    High levels of fetal hemoglobin interfere with the HbA1C  assay. Heterozygous " hemoglobin variants (HbS, HgC, etc)do  not significantly interfere with this assay.   However, presence of multiple variants may affect accuracy.     10/02/2023 9.1 (H) 4.0 - 5.6 % Final     Comment:     ADA Screening Guidelines:  5.7-6.4%  Consistent with prediabetes  >or=6.5%  Consistent with diabetes    High levels of fetal hemoglobin interfere with the HbA1C  assay. Heterozygous hemoglobin variants (HbS, HgC, etc)do  not significantly interfere with this assay.   However, presence of multiple variants may affect accuracy.         Lab Results   Component Value Date    POCGLU 150 (A) 11/13/2024       ASSESSMENT    ICD-10-CM ICD-9-CM   1. Type 2 diabetes mellitus with other circulatory complication, with long-term current use of insulin  E11.59 250.70    Z79.4 V58.67       PLAN  Diagnoses and all orders for this visit:    Type 2 diabetes mellitus with other circulatory complication, with long-term current use of insulin  -     POCT Glucose, Hand-Held Device  -     tirzepatide (MOUNJARO) 5 mg/0.5 mL PnIj; Inject 5 mg into the skin every 7 days.  -     Basic Metabolic Panel; Future  -     Hemoglobin A1C; Future        Reviewed pathophysiology of diabetes, complications related to the disease, importance of annual dilated eye exam and daily foot examination. Explained MOA, SE, dosage of medications. Written instructions given and reviewed with patient and patient verbalizes understanding.     PATIENT INSTRUCTIONS     Labs ordered and will be scheduled by [facility] staff.      Lifestyle modification with well balanced diet and at least 30 minutes of physical activity daily recommended.   Increase water intake.   Increase protein and non-starchy vegetable servings with meals.   Decrease carbohydrate servings with meals.   Take 10 minute walk after each meal.   Avoid snacking on carbohydrates, choose low carb, high protein snacks.   Incorporate resistance training with weights or resistance bands with  exercise regimen at least 3 days a week.       Continue Jardiance 25 mg by mouth daily.   Continue Toujeo 40 units subcutaneously daily.   Increase Mounjaro to 5 mg subcutaneously every 7 days.      Dexcom G7 CGM   Blood Sugar Goals:       Fastin-130.       1-2 hours after a meal: Less than 180.         Follow up in about 2 months (around 2025) for Dexcom, Schedule fasting labs.

## 2024-11-25 ENCOUNTER — PATIENT OUTREACH (OUTPATIENT)
Dept: ADMINISTRATIVE | Facility: HOSPITAL | Age: 69
End: 2024-11-25
Payer: COMMERCIAL

## 2024-11-25 DIAGNOSIS — Z79.4 TYPE 2 DIABETES MELLITUS WITH HYPERGLYCEMIA, WITH LONG-TERM CURRENT USE OF INSULIN: Primary | Chronic | ICD-10-CM

## 2024-11-25 DIAGNOSIS — E11.65 TYPE 2 DIABETES MELLITUS WITH HYPERGLYCEMIA, WITH LONG-TERM CURRENT USE OF INSULIN: Primary | Chronic | ICD-10-CM

## 2024-11-25 NOTE — Clinical Note
Good morning,   Dr Dow this pt has labs scheduled on 12.11.24, I added overdue microalbumin, would you like pt to have a PSA or any other bloodwork at that appt?  Staff please link if MD recommends.  Thank you Brittany MOONEY LPN [facility]

## 2024-11-25 NOTE — PROGRESS NOTES
[facility] OUTREACH: per chart review pt is overdue for Microalbumin, Ha1c, I linked both labs to lab appt pt does NOT qualify for [facility] outreach, closed program, will send message to Dr Dow in reference to overdue PSA.  Left message pt with wife to offer Annual appt scheduling with Dr Dow, pt wife will give him the message.

## 2024-12-01 ENCOUNTER — HOSPITAL ENCOUNTER (EMERGENCY)
Facility: HOSPITAL | Age: 69
Discharge: HOME | End: 2024-12-01
Attending: EMERGENCY MEDICINE
Payer: COMMERCIAL

## 2024-12-01 VITALS
OXYGEN SATURATION: 97 % | SYSTOLIC BLOOD PRESSURE: 144 MMHG | HEIGHT: 74 IN | RESPIRATION RATE: 18 BRPM | TEMPERATURE: 99 F | WEIGHT: 288.56 LBS | HEART RATE: 80 BPM | BODY MASS INDEX: 37.03 KG/M2 | DIASTOLIC BLOOD PRESSURE: 80 MMHG

## 2024-12-01 DIAGNOSIS — R10.31 RIGHT LOWER QUADRANT ABDOMINAL PAIN: ICD-10-CM

## 2024-12-01 DIAGNOSIS — S76.211A INGUINAL STRAIN, RIGHT, INITIAL ENCOUNTER: Primary | ICD-10-CM

## 2024-12-01 LAB
ALBUMIN SERPL BCP-MCNC: 3.5 G/DL (ref 3.5–5.2)
ALP SERPL-CCNC: 71 U/L (ref 40–150)
ALT SERPL W/O P-5'-P-CCNC: 21 U/L (ref 10–44)
ANION GAP SERPL CALC-SCNC: 6 MMOL/L (ref 8–16)
AST SERPL-CCNC: 15 U/L (ref 10–40)
BASOPHILS # BLD AUTO: 0.04 K/UL (ref 0–0.2)
BASOPHILS NFR BLD: 0.6 % (ref 0–1.9)
BILIRUB SERPL-MCNC: 0.6 MG/DL (ref 0.1–1)
BUN SERPL-MCNC: 25 MG/DL (ref 8–23)
CALCIUM SERPL-MCNC: 9.3 MG/DL (ref 8.7–10.5)
CHLORIDE SERPL-SCNC: 103 MMOL/L (ref 95–110)
CO2 SERPL-SCNC: 28 MMOL/L (ref 23–29)
CREAT SERPL-MCNC: 1.2 MG/DL (ref 0.5–1.4)
DIFFERENTIAL METHOD BLD: ABNORMAL
EOSINOPHIL # BLD AUTO: 0.1 K/UL (ref 0–0.5)
EOSINOPHIL NFR BLD: 1.9 % (ref 0–8)
ERYTHROCYTE [DISTWIDTH] IN BLOOD BY AUTOMATED COUNT: 12.2 % (ref 11.5–14.5)
EST. GFR  (NO RACE VARIABLE): >60 ML/MIN/1.73 M^2
GLUCOSE SERPL-MCNC: 130 MG/DL (ref 70–110)
HCT VFR BLD AUTO: 44.2 % (ref 40–54)
HGB BLD-MCNC: 14.6 G/DL (ref 14–18)
IMM GRANULOCYTES # BLD AUTO: 0.02 K/UL (ref 0–0.04)
IMM GRANULOCYTES NFR BLD AUTO: 0.3 % (ref 0–0.5)
LIPASE SERPL-CCNC: 47 U/L (ref 4–60)
LYMPHOCYTES # BLD AUTO: 1.9 K/UL (ref 1–4.8)
LYMPHOCYTES NFR BLD: 30.5 % (ref 18–48)
MCH RBC QN AUTO: 31.2 PG (ref 27–31)
MCHC RBC AUTO-ENTMCNC: 33 G/DL (ref 32–36)
MCV RBC AUTO: 94 FL (ref 82–98)
MONOCYTES # BLD AUTO: 0.7 K/UL (ref 0.3–1)
MONOCYTES NFR BLD: 11.3 % (ref 4–15)
NEUTROPHILS # BLD AUTO: 3.4 K/UL (ref 1.8–7.7)
NEUTROPHILS NFR BLD: 55.4 % (ref 38–73)
NRBC BLD-RTO: 0 /100 WBC
PLATELET # BLD AUTO: 212 K/UL (ref 150–450)
PMV BLD AUTO: 10 FL (ref 9.2–12.9)
POTASSIUM SERPL-SCNC: 4 MMOL/L (ref 3.5–5.1)
PROT SERPL-MCNC: 7.1 G/DL (ref 6–8.4)
RBC # BLD AUTO: 4.68 M/UL (ref 4.6–6.2)
SODIUM SERPL-SCNC: 137 MMOL/L (ref 136–145)
WBC # BLD AUTO: 6.22 K/UL (ref 3.9–12.7)

## 2024-12-01 PROCEDURE — 99285 EMERGENCY DEPT VISIT HI MDM: CPT | Mod: 25

## 2024-12-01 PROCEDURE — 25500020 PHARM REV CODE 255

## 2024-12-01 PROCEDURE — 85025 COMPLETE CBC W/AUTO DIFF WBC: CPT

## 2024-12-01 PROCEDURE — 80053 COMPREHEN METABOLIC PANEL: CPT

## 2024-12-01 PROCEDURE — 83690 ASSAY OF LIPASE: CPT

## 2024-12-01 RX ADMIN — IOHEXOL 100 ML: 350 INJECTION, SOLUTION INTRAVENOUS at 05:12

## 2024-12-01 NOTE — ED PROVIDER NOTES
"Encounter Date: 12/1/2024       History     Chief Complaint   Patient presents with    Groin Pain     R groin pain x 2 weeks exacerbated with ambulation; pt reports "puffiness to area and pain radiates across pelvic area to L side; hx of hernias to same area      69 year old male presents with complaints of intermittent RLQ abdominal pain / groin pain over the past couple of weeks   He has pain with certain positions and with standing for long periods of time   Currently he has no pain at rest   Reports that years ago, he did have a hernia repair over this area that has largely done well   Denies redness, warmth, fever, body aches, chills, urinary or bowel changes, cp, sob, n/v/d   Has not taken any medication for this     The history is provided by the patient.     Review of patient's allergies indicates:   Allergen Reactions    Pcn [penicillins] Hives     Past Medical History:   Diagnosis Date    Acute coronary syndrome     Anticoagulant long-term use     Back pain     CAD (coronary artery disease) 10/17/2013    Class 2 severe obesity due to excess calories with serious comorbidity and body mass index (BMI) of 38.0 to 38.9 in adult 01/12/2015    Coronary artery disease     Diabetes mellitus, type 2 2010    BS didn't check 04/19/2023 Insulin 2 years    Gastric polyps     Hyperlipemia     Hypertension     NSTEMI (non-ST elevated myocardial infarction) 10/23/2014    Obesity 10/21/2014    DANTE on CPAP 02/19/2015    S/P PTCA (percutaneous transluminal coronary angioplasty) 10/17/2013    Sleep apnea 01/07/2015    Type 2 diabetes mellitus with circulatory disorder (coronary artery disease), without long-term current use of insulin 07/14/2015     Past Surgical History:   Procedure Laterality Date    APPENDECTOMY      BACK SURGERY      CATARACT EXTRACTION      CATARACT EXTRACTION, BILATERAL      COLONOSCOPY      COLONOSCOPY N/A 3/9/2023    Procedure: COLONOSCOPY;  Surgeon: Lisa Auguste MD;  Location: [facility] ENDO;  " Service: Endoscopy;  Laterality: N/A;    COLONOSCOPY N/A 7/24/2024    Procedure: COLONOSCOPY-6/16 plavix/cc econ;  Surgeon: Kwadwo Stroud MD;  Location: [facility] ENDO;  Service: Endoscopy;  Laterality: N/A;    CORONARY ANGIOPLASTY WITH STENT PLACEMENT      ESOPHAGOGASTRODUODENOSCOPY      EYE SURGERY      FRACTIONAL FLOW RESERVE (FFR), CORONARY  10/9/2023    Procedure: Fractional Flow Chandler (FFR), Coronary;  Surgeon: Kirsty Lyles MD;  Location: [facility] CATH LAB;  Service: Cardiology;;    FRACTURE SURGERY      HERNIA REPAIR      INSTANTANEOUS WAVE-FREE RATIO (IFR) N/A 10/9/2023    Procedure: Instantaneous Wave-Free Ratio (IFR);  Surgeon: Kirsty Lyles MD;  Location: [facility] CATH LAB;  Service: Cardiology;  Laterality: N/A;    LEFT HEART CATHETERIZATION Left 10/9/2023    Procedure: Left heart cath;  Surgeon: Kirsty Lyles MD;  Location: [facility] CATH LAB;  Service: Cardiology;  Laterality: Left;    SPINE SURGERY       Family History   Problem Relation Name Age of Onset    Hypertension Mother      Diabetes Mother      Hypertension Father      Diabetes Sister      Hypertension Sister      Heart disease Maternal Grandfather       Social History     Tobacco Use    Smoking status: Never    Smokeless tobacco: Never   Substance Use Topics    Alcohol use: Yes     Alcohol/week: 0.0 standard drinks of alcohol     Comment: socially    Drug use: No     Review of Systems   Constitutional:  Negative for chills and fever.   HENT:  Negative for sore throat.    Respiratory:  Negative for shortness of breath.    Cardiovascular:  Negative for chest pain.   Gastrointestinal:  Positive for abdominal pain (right lower; mild). Negative for constipation, diarrhea, nausea and vomiting.   Genitourinary:  Negative for dysuria.   Musculoskeletal:  Negative for back pain and myalgias.   Skin:  Negative for rash.   Neurological:  Negative for weakness.   Hematological:  Does not bruise/bleed easily.       Physical Exam     Initial Vitals  [12/01/24 1436]   BP Pulse Resp Temp SpO2   134/73 78 17 98 °F (36.7 °C) 97 %      MAP       --         Physical Exam    Vitals reviewed.  Constitutional: He appears well-developed and well-nourished.   HENT:   Head: Normocephalic and atraumatic.   Eyes: Conjunctivae and EOM are normal. Pupils are equal, round, and reactive to light.   Neck: Neck supple.   Normal range of motion.  Cardiovascular:  Normal rate.           Pulmonary/Chest: Breath sounds normal. No respiratory distress. He has no wheezes.   Abdominal: Abdomen is soft. Bowel sounds are normal. He exhibits no distension and no mass. There is abdominal tenderness in the right lower quadrant. No hernia.   No right CVA tenderness.  No left CVA tenderness. There is negative Barr's sign. negative Rovsing's sign  Musculoskeletal:         General: Normal range of motion.      Cervical back: Normal range of motion and neck supple.     Neurological: He is alert and oriented to person, place, and time. No cranial nerve deficit or sensory deficit.   Skin: Capillary refill takes less than 2 seconds.   Psychiatric: He has a normal mood and affect. Thought content normal.         ED Course   Procedures  Labs Reviewed   CBC W/ AUTO DIFFERENTIAL - Abnormal       Result Value    WBC 6.22      RBC 4.68      Hemoglobin 14.6      Hematocrit 44.2      MCV 94      MCH 31.2 (*)     MCHC 33.0      RDW 12.2      Platelets 212      MPV 10.0      Immature Granulocytes 0.3      Gran # (ANC) 3.4      Immature Grans (Abs) 0.02      Lymph # 1.9      Mono # 0.7      Eos # 0.1      Baso # 0.04      nRBC 0      Gran % 55.4      Lymph % 30.5      Mono % 11.3      Eosinophil % 1.9      Basophil % 0.6      Differential Method Automated     COMPREHENSIVE METABOLIC PANEL - Abnormal    Sodium 137      Potassium 4.0      Chloride 103      CO2 28      Glucose 130 (*)     BUN 25 (*)     Creatinine 1.2      Calcium 9.3      Total Protein 7.1      Albumin 3.5      Total Bilirubin 0.6      Alkaline  Phosphatase 71      AST 15      ALT 21      eGFR >60      Anion Gap 6 (*)    LIPASE    Lipase 47            Imaging Results               CT Abdomen Pelvis With IV Contrast NO Oral Contrast (Final result)  Result time 12/01/24 17:29:34      Final result by Sandoval Dey MD (12/01/24 17:29:34)                   Impression:      No definite acute abdominal abnormality.    Clustered micro nodules in the right lung base favoring small airways disease.  Based on size and appearance six-month follow-up would be recommended to assess for resolution or progression.    Details as above.    This report was flagged in Epic as containing an incidental finding.    All CT scans at this facility are performed  using dose modulation techniques as appropriate to performed exam including the following:  automated exposure control; adjustment of mA and/or kV according to the patients size (this includes techniques or standardized protocols for targeted exams where dose is matched to indication/reason for exam: i.e. extremities or head);  iterative reconstruction technique.      Electronically signed by: Sandoval Dey  Date:    12/01/2024  Time:    17:29               Narrative:    EXAMINATION:  CT ABDOMEN PELVIS WITH IV CONTRAST    CLINICAL HISTORY:  RLQ abdominal pain (Age >= 14y);    TECHNIQUE:  Low dose axial images, sagittal and coronal reformations were obtained from the lung bases to the pubic symphysis.  Contrast was administered.  Images acquired after the administration 100 mL Omnipaque 350 IV contrast    COMPARISON:  None    FINDINGS:  Heart: Normal in size. No pericardial effusion.    Lung Bases: Clustered micro nodules in the right lung base favoring small airways disease.  Based on size six-month follow-up would be recommended to assess for resolution or progression.    Liver: Normal in size and attenuation, with no focal hepatic lesions.    Gallbladder: Surgically absent    Bile Ducts: No evidence of dilated  ducts.    Pancreas: No mass or peripancreatic fat stranding.    Spleen: Unremarkable.    Adrenals: Unremarkable.    Kidneys/ Ureters: Renal hypoenhancing lesions favoring simple cysts but too small for accurate characterization.    Bladder: Borderline bladder wall thickening.  Correlation with urinalysis to exclude infection although this is favored related to incomplete distension.    Reproductive organs: Unremarkable.    GI Tract/Mesentery: No evidence of bowel obstruction or inflammation.  Diverticulosis without diverticulitis.  The appendix is not well visualized.  No secondary signs of appendicitis.    Peritoneal Space: No ascites. No free air.    Retroperitoneum: No significant adenopathy.    Abdominal wall: Tiny ventral umbilical hernia.    Vasculature: Moderate aortoiliac atherosclerosis.  No aneurysm.    Bones: No acute fracture.  Multifocal osseous degenerative changes.                                       Medications   iohexoL (OMNIPAQUE 350) injection 100 mL (100 mLs Intravenous Given 12/1/24 1710)     Medical Decision Making  Discussed results of CT with patient. Discussed to follow up and discuss repeat scan within 6M for incidental finding in right lung.   ER precautions discussed.     Amount and/or Complexity of Data Reviewed  Labs: ordered.  Radiology: ordered.    Risk  Prescription drug management.                                      Clinical Impression:  Final diagnoses:  [S76.211A] Inguinal strain, right, initial encounter (Primary)  [R10.31] Right lower quadrant abdominal pain          ED Disposition Condition    Discharge Stable          ED Prescriptions    None       Follow-up Information       Follow up With Specialties Details Why Contact Info    LACI Dow MD Family Medicine Schedule an appointment as soon as possible for a visit   [address]  [place] [address]  [phone]      [facility] - Emergency Dept. Emergency Medicine  If symptoms worsen [id number] [facility]  [facility] [id number]  [phone]             Jarred Chaves PA-C  12/01/24 6155

## 2024-12-02 ENCOUNTER — TELEPHONE (OUTPATIENT)
Dept: ADMINISTRATIVE | Facility: CLINIC | Age: 69
End: 2024-12-02
Payer: COMMERCIAL

## 2024-12-04 DIAGNOSIS — I20.0 UNSTABLE ANGINA: ICD-10-CM

## 2024-12-04 RX ORDER — RANOLAZINE 500 MG/1
500 TABLET, EXTENDED RELEASE ORAL 2 TIMES DAILY
Qty: 60 TABLET | Refills: 6 | Status: SHIPPED | OUTPATIENT
Start: 2024-12-04

## 2024-12-06 ENCOUNTER — OFFICE VISIT (OUTPATIENT)
Dept: INTERNAL MEDICINE | Facility: CLINIC | Age: 69
End: 2024-12-06
Payer: COMMERCIAL

## 2024-12-06 ENCOUNTER — HOSPITAL ENCOUNTER (OUTPATIENT)
Dept: RADIOLOGY | Facility: HOSPITAL | Age: 69
Discharge: HOME | End: 2024-12-06
Attending: NURSE PRACTITIONER
Payer: COMMERCIAL

## 2024-12-06 VITALS
HEIGHT: 74 IN | DIASTOLIC BLOOD PRESSURE: 78 MMHG | SYSTOLIC BLOOD PRESSURE: 126 MMHG | OXYGEN SATURATION: 99 % | TEMPERATURE: 97 F | HEART RATE: 88 BPM | WEIGHT: 289.25 LBS | BODY MASS INDEX: 37.12 KG/M2

## 2024-12-06 DIAGNOSIS — R10.31 RIGHT GROIN PAIN: ICD-10-CM

## 2024-12-06 DIAGNOSIS — E11.59 TYPE 2 DIABETES MELLITUS WITH OTHER CIRCULATORY COMPLICATION, WITHOUT LONG-TERM CURRENT USE OF INSULIN: Chronic | ICD-10-CM

## 2024-12-06 DIAGNOSIS — R10.31 INGUINAL PAIN, RIGHT: Primary | ICD-10-CM

## 2024-12-06 DIAGNOSIS — R93.89 ABNORMAL CHEST CT: ICD-10-CM

## 2024-12-06 DIAGNOSIS — Z12.5 SCREENING FOR MALIGNANT NEOPLASM OF PROSTATE: ICD-10-CM

## 2024-12-06 DIAGNOSIS — Z79.4 TYPE 2 DIABETES MELLITUS WITH OTHER CIRCULATORY COMPLICATION, WITH LONG-TERM CURRENT USE OF INSULIN: ICD-10-CM

## 2024-12-06 DIAGNOSIS — R10.31 INGUINAL PAIN, RIGHT: ICD-10-CM

## 2024-12-06 DIAGNOSIS — E11.59 TYPE 2 DIABETES MELLITUS WITH OTHER CIRCULATORY COMPLICATION, WITH LONG-TERM CURRENT USE OF INSULIN: ICD-10-CM

## 2024-12-06 DIAGNOSIS — R97.20 ELEVATED PSA: ICD-10-CM

## 2024-12-06 LAB
ALBUMIN/CREAT UR: 59.1 UG/MG (ref 0–30)
COMPLEXED PSA SERPL-MCNC: 4.2 NG/ML (ref 0–4)
CREAT UR-MCNC: 66 MG/DL (ref 23–375)
ESTIMATED AVG GLUCOSE: 166 MG/DL (ref 68–131)
HBA1C MFR BLD: 7.4 % (ref 4–5.6)
MICROALBUMIN UR DL<=1MG/L-MCNC: 39 UG/ML

## 2024-12-06 PROCEDURE — 99999 PR PBB SHADOW E&M-EST. PATIENT-LVL V: CPT | Mod: PBBFAC,,, | Performed by: NURSE PRACTITIONER

## 2024-12-06 PROCEDURE — 76882 US LMTD JT/FCL EVL NVASC XTR: CPT | Mod: TC,RT

## 2024-12-06 PROCEDURE — 84153 ASSAY OF PSA TOTAL: CPT | Performed by: NURSE PRACTITIONER

## 2024-12-06 PROCEDURE — 82570 ASSAY OF URINE CREATININE: CPT | Performed by: FAMILY MEDICINE

## 2024-12-06 PROCEDURE — 76882 US LMTD JT/FCL EVL NVASC XTR: CPT | Mod: 26,RT,, | Performed by: RADIOLOGY

## 2024-12-06 PROCEDURE — 83036 HEMOGLOBIN GLYCOSYLATED A1C: CPT | Performed by: FAMILY MEDICINE

## 2024-12-06 NOTE — PROGRESS NOTES
"  Subjective:      Patient ID: Isaiah Harrison is a 69 y.o. male.    Chief Complaint: Follow-up    History of Present Illness    CHIEF COMPLAINT:  Isaiah presents today for right groin pain.    RIGHT GROIN PAIN:  He reports a burning irritation in the right lower side for approximately two weeks. The area is tender to touch and causes discomfort when walking, often requiring him to stop and lift his right leg for relief. He notes the right side feels puffier compared to the left. The pain occasionally travels to the left side. Symptoms persist at work, describing it as "bearing through pain." He sometimes presses on his right side to alleviate the irritation. A recent CT at the hospital reportedly did not show evidence of a hernia, and the condition was diagnosed as an inguinal strain. He has been taking Tylenol for pain management.    MEDICAL HISTORY:  His history includes bilateral hernia repairs, back surgery, and a cardiac event. The back surgery limits his ability to lift heavy objects, with a restriction of not lifting anything over 40 lbs. He has been on Clopidogrel (Plavix) for approximately 11 to 13 years, following a cardiac event that required intervention, though the specific nature of the event (heart attack or stent placement) is unclear. He served in [employer] for 22 years.    MEDICATIONS:  He takes Tylenol for pain management and Plavix as part of his cardiac medication regimen, which was recommended by a cardiologist. He has been on Plavix since approximately 2011 or 2013.    DIABETES MANAGEMENT:  He uses a Dexcom continuous glucose monitor for diabetes management. His last reported A1C was around 8. He is diligent about monitoring his glucose numbers using the Dexcom system.    OCCUPATIONAL IMPACT:  His job involves lifting items up to 20 lbs, but he avoids lifting anything heavier due to his history of back surgery. He denies bending over at work when lifting. The groin pain significantly " impacts his work, sometimes making him feel he cannot bear it anymore and wants to go home, but he manages to get through the workday.      ROS:  General: -fever, -chills, -fatigue, -weight gain, -weight loss  Eyes: -vision changes, -redness, -discharge  ENT: -ear pain, -nasal congestion, -sore throat  Cardiovascular: -chest pain, -palpitations, -lower extremity edema  Respiratory: -cough, -shortness of breath  Gastrointestinal: -abdominal pain, -nausea, -vomiting, -diarrhea, -constipation, -blood in stool  Genitourinary: -dysuria, -hematuria, -frequency  Musculoskeletal: -joint pain, +muscle pain  Skin: -rash, -lesion  Neurological: -headache, -dizziness, -numbness, -tingling  Psychiatric: -anxiety, -depression, -sleep difficulty          Patient Active Problem List   Diagnosis    Hyperlipidemia associated with type 2 diabetes mellitus    Coronary artery disease involving native coronary artery of native heart without angina pectoris    S/P PTCA (percutaneous transluminal coronary angioplasty)    Class 2 severe obesity due to excess calories with serious comorbidity and body mass index (BMI) of 39.0 to 39.9 in adult    DANTE on CPAP    Venous insufficiency    Type 2 diabetes mellitus with circulatory disorder (coronary artery disease), with long-term current use of insulin    Essential hypertension    PLMD (periodic limb movement disorder)    Rhomboid muscle strain, initial encounter    Depression    Functional urinary incontinence    Type 2 diabetes mellitus with hyperglycemia, with long-term current use of insulin    Type 2 diabetes mellitus with diabetic polyneuropathy, with long-term current use of insulin    Hypertension associated with diabetes    Chest pain    General weakness    Hip pain, acute, right         Current Outpatient Medications:     amlodipine-valsartan (EXFORGE)  mg per tablet, Take 1 tablet by mouth once daily., Disp: 90 tablet, Rfl: 3    aspirin (ECOTRIN) 81 MG EC tablet, Take 81 mg by  mouth once daily., Disp: , Rfl:     atorvastatin (LIPITOR) 80 MG tablet, Take 1 tablet (80 mg total) by mouth every evening., Disp: 90 tablet, Rfl: 0    blood sugar diagnostic Strp, 1 strip by Misc.(Non-Drug; Combo Route) route 4 (four) times daily., Disp: 200 strip, Rfl: 11    blood-glucose meter,continuous (DEXCOM G7 ) Misc, 1 Device by Misc.(Non-Drug; Combo Route) route once daily., Disp: 1 each, Rfl: 0    blood-glucose sensor (DEXCOM G7 SENSOR) Shira, 1 Device by Misc.(Non-Drug; Combo Route) route every 10 days., Disp: 3 each, Rfl: 11    clopidogreL (PLAVIX) 75 mg tablet, TAKE 1 TABLET(75 MG) BY MOUTH EVERY DAY, Disp: 30 tablet, Rfl: 11    empagliflozin (JARDIANCE) 25 mg tablet, Take 1 tablet (25 mg total) by mouth once daily., Disp: 90 tablet, Rfl: 1    etodolac (LODINE) 400 MG tablet, Take 1 tablet (400 mg total) by mouth 2 (two) times daily., Disp: 60 tablet, Rfl: 0    evolocumab (REPATHA SURECLICK) 140 mg/mL PnIj, Inject 1 mL (140 mg total) into the skin every 14 (fourteen) days., Disp: 2 mL, Rfl: 6    hydroCHLOROthiazide (HYDRODIURIL) 25 MG tablet, Take 0.5 tablets (12.5 mg total) by mouth once daily., Disp: 45 tablet, Rfl: 3    hydrOXYzine HCL (ATARAX) 10 MG Tab, TAKE 1 TABLET(10 MG) BY MOUTH TWICE DAILY AS NEEDED. MAY CAUSE DROWSINESS. DO NOT DRIVE OR OPERATE HEAVY MACHINERY, Disp: 60 tablet, Rfl: 1    isosorbide mononitrate (IMDUR) 30 MG 24 hr tablet, Take 1 tablet (30 mg total) by mouth once daily. Schedule appointment with PCP for refills., Disp: 21 tablet, Rfl: 3    ketoconazole (NIZORAL) 2 % cream, Apply topically once daily., Disp: 30 g, Rfl: 3    metoprolol tartrate (LOPRESSOR) 50 MG tablet, TAKE 1 TABLET BY MOUTH TWICE DAILY, Disp: 180 tablet, Rfl: 3    niacin (SLO-NIACIN) 500 mg tablet, TAKE 1 TABLET(500 MG) BY MOUTH EVERY EVENING, Disp: 30 tablet, Rfl: 6    nitroGLYCERIN (NITROSTAT) 0.4 MG SL tablet, Place 1 tablet (0.4 mg total) under the tongue every 5 (five) minutes as needed., Disp:  "25 tablet, Rfl: 4    pen needle, diabetic (BD ULTRA-FINE SHORT PEN NEEDLE) 31 gauge x 5/16" Ndle, USE WITH LANTUS PEN AS DIRECTED, Disp: 100 each, Rfl: 3    pneumoc 20-miranda conj-dip cr,PF, (PREVNAR-20, PF,) 0.5 mL Syrg injection, Inject 0.5 mLs into the muscle., Disp: 0.5 mL, Rfl: 0    ranolazine (RANEXA) 500 MG Tb12, Take 1 tablet (500 mg total) by mouth 2 (two) times daily., Disp: 60 tablet, Rfl: 6    tirzepatide (MOUNJARO) 5 mg/0.5 mL PnIj, Inject 5 mg into the skin every 7 days., Disp: 2 mL, Rfl: 11    TOUJEO SOLOSTAR U-300 INSULIN 300 unit/mL (1.5 mL) InPn pen, ADMINISTER 40 UNITS UNDER THE SKIN TWICE DAILY, Disp: 7.5 mL, Rfl: 0      Objective:   /78 (BP Location: Left arm, Patient Position: Sitting)   Pulse 88   Temp 96.5 °F (35.8 °C) (Tympanic)   Ht 6' 2" (1.88 m)   Wt 131.2 kg (289 lb 3.9 oz)   SpO2 99%   BMI 37.14 kg/m²     Physical Exam             Physical Exam  Vitals and nursing note reviewed. Exam conducted with a chaperone present (Rebecca Hawkins MA).   Constitutional:       General: He is awake. He is not in acute distress.     Appearance: Normal appearance. He is well-developed and well-groomed. He is not ill-appearing, toxic-appearing or diaphoretic.   HENT:      Head: Normocephalic and atraumatic.   Cardiovascular:      Rate and Rhythm: Normal rate and regular rhythm.      Heart sounds: Normal heart sounds. No murmur heard.  Pulmonary:      Effort: Pulmonary effort is normal. No tachypnea, bradypnea, accessory muscle usage, prolonged expiration, respiratory distress or retractions.      Breath sounds: No stridor, decreased air movement or transmitted upper airway sounds. No decreased breath sounds, wheezing, rhonchi or rales.   Abdominal:      Hernia: A hernia is present. Hernia is present in the right inguinal area.          Comments: Silky feeling; mobile   Musculoskeletal:      Cervical back: Normal range of motion.   Skin:     General: Skin is warm and dry.   Neurological:      General: " No focal deficit present.      Mental Status: He is alert and oriented to person, place, and time.      Gait: Gait normal.   Psychiatric:         Attention and Perception: Attention and perception normal.         Mood and Affect: Mood and affect normal.         Speech: Speech normal.         Behavior: Behavior normal. Behavior is cooperative.         Cognition and Memory: Cognition normal.          Assessment/Plan :   1. Inguinal pain, right  -     US Soft Tissue, Groin Right; Future; Expected date: 12/06/2024    2. Right groin pain    3. Elevated PSA  -     Ambulatory referral/consult to Urology; Future; Expected date: 12/13/2024    4. Type 2 diabetes mellitus with other circulatory complication, with long-term current use of insulin    5. Type 2 diabetes mellitus with other circulatory complication, without long-term current use of insulin  -     Microalbumin/creatinine urine ratio  -     Hemoglobin A1c    6. Screening for malignant neoplasm of prostate  -     PSA, SCREENING; Future; Expected date: 12/06/2024    7. Abnormal chest CT  -     Ambulatory referral/consult to Pulmonology; Future; Expected date: 12/13/2024         Assessment & Plan    Assessed patient's right groin pain, considering history of appendectomy and hernia repair  Performed physical exam, noting tenderness and a palpable nodule in the right groin area  Ordered ultrasound to rule out recurrent hernia  Considered diagnosis of inguinal strain if ultrasound is negative  Reviewed patient's current medications, including Plavix    INGUINAL HERNIA:  - Ultrasound of right groin ordered.  - Isaiah to apply ice to affected area for 20 minutes, 3 times daily, if ultrasound is negative.    LOW BACK PAIN:  - Isaiah to use proper lifting techniques: bend knees and lift with legs, not back.  - Started Tylenol 1000 mg (two 500 mg pills) every 6 hours as needed for pain.    CORONARY ARTERY DISEASE AND ANTIPLATELET THERAPY:  - Explained importance of taking Plavix  with food to prevent potential GI bleeding.  - Continued Plavix - take with evening meal.    DIABETES SCREENING:  - Blood draw for labs ordered, including PSA and A1C.    PROSTATE CANCER SCREENING:  - Blood draw for labs ordered, including PSA and A1C.    URINARY TRACT ASSESSMENT:  - Urine test ordered.    FOLLOW-UP:  - Will send message through [employer] with ultrasound results and further instructions.  - Follow up with Pulmonology regarding cluster of nodules for further evaluation.  - Follow up with Urology for further evaluation of right groin pain/strain.   - Follow up at scheduled 1 pm appointment with digital doctor (Juan) today.        Differential dx:  Testicular torsion, epididymitis, muscle strain.    No follow-ups on file.    This note was generated with the assistance of ambient listening technology. Verbal consent was obtained by the patient and accompanying visitor(s) for the recording of patient appointment to facilitate this note. I attest to having reviewed and edited the generated note for accuracy, though some syntax or spelling errors may persist. Please contact the author of this note for any clarification.

## 2024-12-13 ENCOUNTER — OFFICE VISIT (OUTPATIENT)
Dept: UROLOGY | Facility: CLINIC | Age: 69
End: 2024-12-13
Payer: COMMERCIAL

## 2024-12-13 ENCOUNTER — PATIENT MESSAGE (OUTPATIENT)
Dept: PULMONOLOGY | Facility: CLINIC | Age: 69
End: 2024-12-13
Payer: COMMERCIAL

## 2024-12-13 VITALS
BODY MASS INDEX: 37.15 KG/M2 | HEART RATE: 68 BPM | WEIGHT: 289.44 LBS | DIASTOLIC BLOOD PRESSURE: 64 MMHG | SYSTOLIC BLOOD PRESSURE: 107 MMHG | HEIGHT: 74 IN

## 2024-12-13 DIAGNOSIS — R10.31 RIGHT INGUINAL PAIN: ICD-10-CM

## 2024-12-13 DIAGNOSIS — R97.20 ELEVATED PSA: Primary | ICD-10-CM

## 2024-12-13 DIAGNOSIS — N40.0 BENIGN PROSTATIC HYPERPLASIA WITHOUT LOWER URINARY TRACT SYMPTOMS: ICD-10-CM

## 2024-12-13 LAB
BILIRUB UR QL STRIP: NEGATIVE
GLUCOSE UR QL STRIP: POSITIVE
KETONES UR QL STRIP: NEGATIVE
LEUKOCYTE ESTERASE UR QL STRIP: NEGATIVE
PH, POC UA: 6
POC BLOOD, URINE: NEGATIVE
POC NITRATES, URINE: NEGATIVE
POC RESIDUAL URINE VOLUME: 31 ML (ref 0–100)
PROT UR QL STRIP: NEGATIVE
SP GR UR STRIP: 1.03 (ref 1–1.03)
UROBILINOGEN UR STRIP-ACNC: 0.2 (ref 0.3–2.2)

## 2024-12-13 PROCEDURE — 99999 PR PBB SHADOW E&M-EST. PATIENT-LVL V: CPT | Mod: PBBFAC,,, | Performed by: UROLOGY

## 2024-12-13 RX ORDER — DOXYCYCLINE HYCLATE 100 MG
100 TABLET ORAL 2 TIMES DAILY
Qty: 60 TABLET | Refills: 0 | Status: SHIPPED | OUTPATIENT
Start: 2024-12-13

## 2024-12-13 NOTE — PROGRESS NOTES
Chief Complaint:   Encounter Diagnoses   Name Primary?    Elevated PSA Yes    Benign prostatic hyperplasia without lower urinary tract symptoms     Right inguinal pain        HPI:  HPI Isaiah Harrison aleksandr 69 y.o. male who presents For evaluation of elevated PSA.  He recently underwent a PSA screen which was significantly higher than 2 years ago.  He has been having right inguinal pain which is worse when he walks.  This was acute about 2 weeks ago on you was seen in the ER.  He has had no improvement from this.  He states it is a burning-type pain.  He has had inguinal hernia repairs bilaterally.  He underwent a CT scan that showed no inguinal hernia.  He has been diagnosed with a strain.  He has been taking ibuprofen and Tylenol.  He has minimal lower urinary tract symptoms.  He feels his stream is good.  He states he did notice some slight dysuria occasionally in the last couple of weeks.    History:  Social History     Tobacco Use    Smoking status: Never    Smokeless tobacco: Never   Substance Use Topics    Alcohol use: Yes     Alcohol/week: 0.0 standard drinks of alcohol     Comment: socially    Drug use: No     Past Medical History:   Diagnosis Date    Acute coronary syndrome     Anticoagulant long-term use     Back pain     CAD (coronary artery disease) 10/17/2013    Class 2 severe obesity due to excess calories with serious comorbidity and body mass index (BMI) of 38.0 to 38.9 in adult 01/12/2015    Coronary artery disease     Diabetes mellitus, type 2 2010    BS didn't check 04/19/2023 Insulin 2 years    Gastric polyps     Hyperlipemia     Hypertension     NSTEMI (non-ST elevated myocardial infarction) 10/23/2014    Obesity 10/21/2014    DANTE on CPAP 02/19/2015    S/P PTCA (percutaneous transluminal coronary angioplasty) 10/17/2013    Sleep apnea 01/07/2015    Type 2 diabetes mellitus with circulatory disorder (coronary artery disease), without long-term current use of insulin 07/14/2015     Past  Surgical History:   Procedure Laterality Date    APPENDECTOMY      BACK SURGERY      CATARACT EXTRACTION      CATARACT EXTRACTION, BILATERAL      COLONOSCOPY      COLONOSCOPY N/A 3/9/2023    Procedure: COLONOSCOPY;  Surgeon: Lisa Auguste MD;  Location: [facility] ENDO;  Service: Endoscopy;  Laterality: N/A;    COLONOSCOPY N/A 7/24/2024    Procedure: COLONOSCOPY-6/16 plavix/cc econ;  Surgeon: Kwadwo Stroud MD;  Location: [facility] ENDO;  Service: Endoscopy;  Laterality: N/A;    CORONARY ANGIOPLASTY WITH STENT PLACEMENT      ESOPHAGOGASTRODUODENOSCOPY      EYE SURGERY      FRACTIONAL FLOW RESERVE (FFR), CORONARY  10/9/2023    Procedure: Fractional Flow [place] (FFR), Coronary;  Surgeon: Kirsty Lyles MD;  Location: [facility] CATH LAB;  Service: Cardiology;;    FRACTURE SURGERY      HERNIA REPAIR      INSTANTANEOUS WAVE-FREE RATIO (IFR) N/A 10/9/2023    Procedure: Instantaneous Wave-Free Ratio (IFR);  Surgeon: Kirsty Lyles MD;  Location: [facility] CATH LAB;  Service: Cardiology;  Laterality: N/A;    LEFT HEART CATHETERIZATION Left 10/9/2023    Procedure: Left heart cath;  Surgeon: Kirsty Lyles MD;  Location: [facility] CATH LAB;  Service: Cardiology;  Laterality: Left;    SPINE SURGERY       Family History   Problem Relation Name Age of Onset    Hypertension Mother      Diabetes Mother      Hypertension Father      Diabetes Sister      Hypertension Sister      Heart disease Maternal Grandfather         Current Outpatient Medications on File Prior to Visit   Medication Sig Dispense Refill    amlodipine-valsartan (EXFORGE)  mg per tablet Take 1 tablet by mouth once daily. 90 tablet 3    aspirin (ECOTRIN) 81 MG EC tablet Take 81 mg by mouth once daily.      atorvastatin (LIPITOR) 80 MG tablet Take 1 tablet (80 mg total) by mouth every evening. 90 tablet 0    blood sugar diagnostic Strp 1 strip by Misc.(Non-Drug; Combo Route) route 4 (four) times daily. 200 strip 11    blood-glucose meter,continuous (DEXCOM G7 )  "Misc 1 Device by Misc.(Non-Drug; Combo Route) route once daily. 1 each 0    blood-glucose sensor (DEXCOM G7 SENSOR) Shira 1 Device by Misc.(Non-Drug; Combo Route) route every 10 days. 3 each 11    clopidogreL (PLAVIX) 75 mg tablet TAKE 1 TABLET(75 MG) BY MOUTH EVERY DAY 30 tablet 11    empagliflozin (JARDIANCE) 25 mg tablet Take 1 tablet (25 mg total) by mouth once daily. 90 tablet 1    etodolac (LODINE) 400 MG tablet Take 1 tablet (400 mg total) by mouth 2 (two) times daily. 60 tablet 0    evolocumab (REPATHA SURECLICK) 140 mg/mL PnIj Inject 1 mL (140 mg total) into the skin every 14 (fourteen) days. 2 mL 6    hydroCHLOROthiazide (HYDRODIURIL) 25 MG tablet Take 0.5 tablets (12.5 mg total) by mouth once daily. 45 tablet 3    hydrOXYzine HCL (ATARAX) 10 MG Tab TAKE 1 TABLET(10 MG) BY MOUTH TWICE DAILY AS NEEDED. MAY CAUSE DROWSINESS. DO NOT DRIVE OR OPERATE HEAVY MACHINERY 60 tablet 1    isosorbide mononitrate (IMDUR) 30 MG 24 hr tablet Take 1 tablet (30 mg total) by mouth once daily. Schedule appointment with PCP for refills. 21 tablet 3    ketoconazole (NIZORAL) 2 % cream Apply topically once daily. 30 g 3    metoprolol tartrate (LOPRESSOR) 50 MG tablet TAKE 1 TABLET BY MOUTH TWICE DAILY 180 tablet 3    niacin (SLO-NIACIN) 500 mg tablet TAKE 1 TABLET(500 MG) BY MOUTH EVERY EVENING 30 tablet 6    nitroGLYCERIN (NITROSTAT) 0.4 MG SL tablet Place 1 tablet (0.4 mg total) under the tongue every 5 (five) minutes as needed. 25 tablet 4    pen needle, diabetic (BD ULTRA-FINE SHORT PEN NEEDLE) 31 gauge x 5/16" Ndle USE WITH LANTUS PEN AS DIRECTED 100 each 3    pneumoc 20-miranda conj-dip cr,PF, (PREVNAR-20, PF,) 0.5 mL Syrg injection Inject 0.5 mLs into the muscle. 0.5 mL 0    ranolazine (RANEXA) 500 MG Tb12 Take 1 tablet (500 mg total) by mouth 2 (two) times daily. 60 tablet 6    tirzepatide (MOUNJARO) 5 mg/0.5 mL PnIj Inject 5 mg into the skin every 7 days. 2 mL 11    TOUJEO SOLOSTAR U-300 INSULIN 300 unit/mL (1.5 mL) InPn pen " "ADMINISTER 40 UNITS UNDER THE SKIN TWICE DAILY 7.5 mL 0     No current facility-administered medications on file prior to visit.        Objective:     Vitals:    12/13/24 1109   BP: 107/64   BP Location: Left arm   Patient Position: Sitting   Pulse: 68   Weight: 131.3 kg (289 lb 7.4 oz)   Height: 6' 2" (1.88 m)      BMI Readings from Last 1 Encounters:   12/13/24 37.16 kg/m²          Physical Exam  No acute distress alert and oriented   Abdomen is obese   Bilateral inguinal canals examined and no evidence of hernia   Bilateral testicles palpably normal   Digital rectal exam reveals a 40 g smooth prostate  Lab Results   Component Value Date    PSA 4.2 (H) 12/06/2024    PSA 2.2 10/27/2022    PSA 2.0 10/27/2021    PSA 1.3 10/20/2020    PSA 0.93 08/18/2016    PSA 1.1 07/15/2015        Lab Results   Component Value Date    CREATININE 1.2 12/01/2024      Assessment:       1. Elevated PSA    2. Benign prostatic hyperplasia without lower urinary tract symptoms    3. Right inguinal pain        Plan:     1. Elevated PSA    2. Benign prostatic hyperplasia without lower urinary tract symptoms    3. Right inguinal pain       Orders Placed This Encounter    Prostate Specific Antigen, Diagnostic    POCT Urinalysis, Dipstick, Automated, W/O Scope    POCT Bladder Scan    doxycycline (VIBRA-TABS) 100 MG tablet      Elevated PSA.  Recommend repeat PSA in 5-6 weeks.  If it is persistently elevated discussed proceeding with MRI.  I will go ahead and treat him with doxycycline for 4 weeks.  This is an atypical presentation for prostatitis however we will need to rule this out as he is having pelvic pain along with a rise in his PSA.  I will see him back in about 5 weeks with another PSA and determine next course of action.    "

## 2024-12-16 ENCOUNTER — PATIENT MESSAGE (OUTPATIENT)
Dept: ADMINISTRATIVE | Facility: OTHER | Age: 69
End: 2024-12-16
Payer: COMMERCIAL

## 2024-12-16 ENCOUNTER — PATIENT MESSAGE (OUTPATIENT)
Dept: UROLOGY | Facility: CLINIC | Age: 69
End: 2024-12-16
Payer: COMMERCIAL

## 2024-12-17 ENCOUNTER — TELEPHONE (OUTPATIENT)
Dept: INTERNAL MEDICINE | Facility: CLINIC | Age: 69
End: 2024-12-17
Payer: COMMERCIAL

## 2024-12-17 NOTE — TELEPHONE ENCOUNTER
----- Message from Corinne sent at 12/17/2024  9:44 AM CST -----  Contact: Self [phone]  Would like to receive medical advice.    Would they like a call back or a response via [facility]:  call back    Additional information:  Pt is requesting a same day appt for an ER follow up for abdominal pain.

## 2024-12-18 ENCOUNTER — OFFICE VISIT (OUTPATIENT)
Dept: INTERNAL MEDICINE | Facility: CLINIC | Age: 69
End: 2024-12-18
Payer: COMMERCIAL

## 2024-12-18 ENCOUNTER — HOSPITAL ENCOUNTER (OUTPATIENT)
Dept: RADIOLOGY | Facility: HOSPITAL | Age: 69
Discharge: HOME | End: 2024-12-18
Attending: NURSE PRACTITIONER
Payer: COMMERCIAL

## 2024-12-18 VITALS
HEART RATE: 85 BPM | DIASTOLIC BLOOD PRESSURE: 80 MMHG | BODY MASS INDEX: 36.81 KG/M2 | OXYGEN SATURATION: 98 % | HEIGHT: 74 IN | WEIGHT: 286.81 LBS | TEMPERATURE: 96 F | SYSTOLIC BLOOD PRESSURE: 110 MMHG

## 2024-12-18 DIAGNOSIS — R10.31 RIGHT GROIN PAIN: ICD-10-CM

## 2024-12-18 DIAGNOSIS — M48.07 SPINAL STENOSIS OF LUMBOSACRAL REGION: Primary | ICD-10-CM

## 2024-12-18 PROCEDURE — 3066F NEPHROPATHY DOC TX: CPT | Mod: CPTII,S$GLB,, | Performed by: NURSE PRACTITIONER

## 2024-12-18 PROCEDURE — 4010F ACE/ARB THERAPY RXD/TAKEN: CPT | Mod: CPTII,S$GLB,, | Performed by: NURSE PRACTITIONER

## 2024-12-18 PROCEDURE — 1101F PT FALLS ASSESS-DOCD LE1/YR: CPT | Mod: CPTII,S$GLB,, | Performed by: NURSE PRACTITIONER

## 2024-12-18 PROCEDURE — 72110 X-RAY EXAM L-2 SPINE 4/>VWS: CPT | Mod: 26,,, | Performed by: RADIOLOGY

## 2024-12-18 PROCEDURE — 3051F HG A1C>EQUAL 7.0%<8.0%: CPT | Mod: CPTII,S$GLB,, | Performed by: NURSE PRACTITIONER

## 2024-12-18 PROCEDURE — 3008F BODY MASS INDEX DOCD: CPT | Mod: CPTII,S$GLB,, | Performed by: NURSE PRACTITIONER

## 2024-12-18 PROCEDURE — 1125F AMNT PAIN NOTED PAIN PRSNT: CPT | Mod: CPTII,S$GLB,, | Performed by: NURSE PRACTITIONER

## 2024-12-18 PROCEDURE — 99999 PR PBB SHADOW E&M-EST. PATIENT-LVL V: CPT | Mod: PBBFAC,,, | Performed by: NURSE PRACTITIONER

## 2024-12-18 PROCEDURE — 3079F DIAST BP 80-89 MM HG: CPT | Mod: CPTII,S$GLB,, | Performed by: NURSE PRACTITIONER

## 2024-12-18 PROCEDURE — 99214 OFFICE O/P EST MOD 30 MIN: CPT | Mod: S$GLB,,, | Performed by: NURSE PRACTITIONER

## 2024-12-18 PROCEDURE — 3060F POS MICROALBUMINURIA REV: CPT | Mod: CPTII,S$GLB,, | Performed by: NURSE PRACTITIONER

## 2024-12-18 PROCEDURE — 1159F MED LIST DOCD IN RCRD: CPT | Mod: CPTII,S$GLB,, | Performed by: NURSE PRACTITIONER

## 2024-12-18 PROCEDURE — G2211 COMPLEX E/M VISIT ADD ON: HCPCS | Mod: S$GLB,,, | Performed by: NURSE PRACTITIONER

## 2024-12-18 PROCEDURE — 1160F RVW MEDS BY RX/DR IN RCRD: CPT | Mod: CPTII,S$GLB,, | Performed by: NURSE PRACTITIONER

## 2024-12-18 PROCEDURE — 3288F FALL RISK ASSESSMENT DOCD: CPT | Mod: CPTII,S$GLB,, | Performed by: NURSE PRACTITIONER

## 2024-12-18 PROCEDURE — 72110 X-RAY EXAM L-2 SPINE 4/>VWS: CPT | Mod: TC

## 2024-12-18 PROCEDURE — 3074F SYST BP LT 130 MM HG: CPT | Mod: CPTII,S$GLB,, | Performed by: NURSE PRACTITIONER

## 2024-12-18 RX ORDER — METHOCARBAMOL 500 MG/1
500 TABLET, FILM COATED ORAL 4 TIMES DAILY
Qty: 28 TABLET | Refills: 0 | Status: SHIPPED | OUTPATIENT
Start: 2024-12-18 | End: 2024-12-25

## 2024-12-19 DIAGNOSIS — R10.31 INGUINAL PAIN, RIGHT: Primary | ICD-10-CM

## 2024-12-19 DIAGNOSIS — M48.07 SPINAL STENOSIS OF LUMBOSACRAL REGION: ICD-10-CM

## 2024-12-19 DIAGNOSIS — M25.551 HIP PAIN, ACUTE, RIGHT: ICD-10-CM

## 2024-12-23 DIAGNOSIS — M48.07 SPINAL STENOSIS OF LUMBOSACRAL REGION: Primary | ICD-10-CM

## 2024-12-23 NOTE — PROGRESS NOTES
Subjective:      Patient ID: Isaiah Harrison is a 69 y.o. male.    Chief Complaint: Follow-up    History of Present Illness    CHIEF COMPLAINT:  Isaiah presents today for persistent pain in lower right side.    HISTORY OF PRESENT ILLNESS:  He reports right-sided pain that worsens with standing and walking. Current pain level is 2-3/10, increasing to 10/10 with prolonged walking. The pain is described as burning at times. Relief is achieved by raising the right leg and applying pressure. Previous evaluation by urologist Dr. Waters ruled out hernia, confirmed by MRI and CT. Prior treatments with Tylenol and antibiotics prescribed by Dr. Waters were ineffective.    MEDICAL HISTORY:  He has a history of major back surgery.    MEDICATIONS:  He is currently taking Plavix.      ROS:  General: -fever, -chills, -fatigue, -weight gain, -weight loss  Eyes: -vision changes, -redness, -discharge  ENT: -ear pain, -nasal congestion, -sore throat  Cardiovascular: -chest pain, -palpitations, -lower extremity edema  Respiratory: -cough, -shortness of breath  Gastrointestinal: -abdominal pain, -nausea, -vomiting, -diarrhea, -constipation, -blood in stool  Genitourinary: -dysuria, -hematuria, -frequency  Musculoskeletal: +joint pain, +muscle pain  Skin: -rash, -lesion  Neurological: -headache, -dizziness, -numbness, -tingling  Psychiatric: -anxiety, -depression, -sleep difficulty          Patient Active Problem List   Diagnosis    Hyperlipidemia associated with type 2 diabetes mellitus    Coronary artery disease involving native coronary artery of native heart without angina pectoris    S/P PTCA (percutaneous transluminal coronary angioplasty)    Class 2 severe obesity due to excess calories with serious comorbidity and body mass index (BMI) of 39.0 to 39.9 in adult    DANTE on CPAP    Venous insufficiency    Type 2 diabetes mellitus with circulatory disorder (coronary artery disease), with long-term current use of insulin    Essential  hypertension    PLMD (periodic limb movement disorder)    Rhomboid muscle strain, initial encounter    Depression    Functional urinary incontinence    Type 2 diabetes mellitus with hyperglycemia, with long-term current use of insulin    Type 2 diabetes mellitus with diabetic polyneuropathy, with long-term current use of insulin    Hypertension associated with diabetes    Chest pain    General weakness    Hip pain, acute, right         Current Outpatient Medications:     amlodipine-valsartan (EXFORGE)  mg per tablet, Take 1 tablet by mouth once daily., Disp: 90 tablet, Rfl: 3    aspirin (ECOTRIN) 81 MG EC tablet, Take 81 mg by mouth once daily., Disp: , Rfl:     atorvastatin (LIPITOR) 80 MG tablet, Take 1 tablet (80 mg total) by mouth every evening., Disp: 90 tablet, Rfl: 0    clopidogreL (PLAVIX) 75 mg tablet, TAKE 1 TABLET(75 MG) BY MOUTH EVERY DAY, Disp: 30 tablet, Rfl: 11    doxycycline (VIBRA-TABS) 100 MG tablet, Take 1 tablet (100 mg total) by mouth 2 (two) times daily., Disp: 60 tablet, Rfl: 0    empagliflozin (JARDIANCE) 25 mg tablet, Take 1 tablet (25 mg total) by mouth once daily., Disp: 90 tablet, Rfl: 1    etodolac (LODINE) 400 MG tablet, Take 1 tablet (400 mg total) by mouth 2 (two) times daily., Disp: 60 tablet, Rfl: 0    evolocumab (REPATHA SURECLICK) 140 mg/mL PnIj, Inject 1 mL (140 mg total) into the skin every 14 (fourteen) days., Disp: 2 mL, Rfl: 6    hydroCHLOROthiazide (HYDRODIURIL) 25 MG tablet, Take 0.5 tablets (12.5 mg total) by mouth once daily., Disp: 45 tablet, Rfl: 3    hydrOXYzine HCL (ATARAX) 10 MG Tab, TAKE 1 TABLET(10 MG) BY MOUTH TWICE DAILY AS NEEDED. MAY CAUSE DROWSINESS. DO NOT DRIVE OR OPERATE HEAVY MACHINERY, Disp: 60 tablet, Rfl: 1    isosorbide mononitrate (IMDUR) 30 MG 24 hr tablet, Take 1 tablet (30 mg total) by mouth once daily. Schedule appointment with PCP for refills., Disp: 21 tablet, Rfl: 3    metoprolol tartrate (LOPRESSOR) 50 MG tablet, TAKE 1 TABLET BY MOUTH  "TWICE DAILY, Disp: 180 tablet, Rfl: 3    niacin (SLO-NIACIN) 500 mg tablet, TAKE 1 TABLET(500 MG) BY MOUTH EVERY EVENING, Disp: 30 tablet, Rfl: 6    nitroGLYCERIN (NITROSTAT) 0.4 MG SL tablet, Place 1 tablet (0.4 mg total) under the tongue every 5 (five) minutes as needed., Disp: 25 tablet, Rfl: 4    pen needle, diabetic (BD ULTRA-FINE SHORT PEN NEEDLE) 31 gauge x 5/16" Ndle, USE WITH LANTUS PEN AS DIRECTED, Disp: 100 each, Rfl: 3    ranolazine (RANEXA) 500 MG Tb12, Take 1 tablet (500 mg total) by mouth 2 (two) times daily., Disp: 60 tablet, Rfl: 6    tirzepatide (MOUNJARO) 5 mg/0.5 mL PnIj, Inject 5 mg into the skin every 7 days., Disp: 2 mL, Rfl: 11    TOUJEO SOLOSTAR U-300 INSULIN 300 unit/mL (1.5 mL) InPn pen, ADMINISTER 40 UNITS UNDER THE SKIN TWICE DAILY, Disp: 7.5 mL, Rfl: 0    blood sugar diagnostic Strp, 1 strip by Misc.(Non-Drug; Combo Route) route 4 (four) times daily., Disp: 200 strip, Rfl: 11    blood-glucose meter,continuous (DEXCOM G7 ) Misc, 1 Device by Misc.(Non-Drug; Combo Route) route once daily., Disp: 1 each, Rfl: 0    blood-glucose sensor (DEXCOM G7 SENSOR) Shira, 1 Device by Misc.(Non-Drug; Combo Route) route every 10 days., Disp: 3 each, Rfl: 11    ketoconazole (NIZORAL) 2 % cream, Apply topically once daily. (Patient not taking: Reported on 12/18/2024), Disp: 30 g, Rfl: 3    methocarbamoL (ROBAXIN) 500 MG Tab, Take 1 tablet (500 mg total) by mouth 4 (four) times daily. for 7 days, Disp: 28 tablet, Rfl: 0    pneumoc 20-miranda conj-dip cr,PF, (PREVNAR-20, PF,) 0.5 mL Syrg injection, Inject 0.5 mLs into the muscle., Disp: 0.5 mL, Rfl: 0      Objective:   /80 (BP Location: Right arm, Patient Position: Sitting)   Pulse 85   Temp 96.1 °F (35.6 °C) (Tympanic)   Ht 6' 2" (1.88 m)   Wt 130.1 kg (286 lb 13.1 oz)   SpO2 98%   BMI 36.83 kg/m²     Physical Exam             Physical Exam  Vitals and nursing note reviewed.   Constitutional:       General: He is awake. He is not in acute " distress.     Appearance: Normal appearance. He is well-developed and well-groomed. He is not ill-appearing, toxic-appearing or diaphoretic.   HENT:      Head: Normocephalic.   Cardiovascular:      Rate and Rhythm: Normal rate and regular rhythm.      Heart sounds: Normal heart sounds. No murmur heard.  Pulmonary:      Effort: Pulmonary effort is normal. No tachypnea, bradypnea, accessory muscle usage, prolonged expiration, respiratory distress or retractions.      Breath sounds: Normal breath sounds. No stridor, decreased air movement or transmitted upper airway sounds. No decreased breath sounds, wheezing, rhonchi or rales.   Abdominal:      Tenderness: There is no right CVA tenderness or left CVA tenderness.   Musculoskeletal:      Cervical back: Normal range of motion.   Skin:     General: Skin is warm and dry.   Neurological:      Mental Status: He is alert.      Gait: Gait abnormal.   Psychiatric:         Attention and Perception: Attention and perception normal.         Mood and Affect: Mood and affect normal.         Speech: Speech normal.         Behavior: Behavior normal. Behavior is cooperative.         Cognition and Memory: Cognition normal.          Assessment/Plan :   1. Spinal stenosis of lumbosacral region  -     Ambulatory referral/consult to Pain Clinic; Future; Expected date: 12/30/2024  -     Ambulatory referral/consult to Spine Surgery; Future; Expected date: 12/30/2024    2. Right groin pain  -     X-Ray Lumbar Spine 5 View; Future; Expected date: 12/18/2024  -     methocarbamoL (ROBAXIN) 500 MG Tab; Take 1 tablet (500 mg total) by mouth 4 (four) times daily. for 7 days  Dispense: 28 tablet; Refill: 0  -     US Abdomen Limited_Hernia; Future; Expected date: 12/18/2024  -     Ambulatory referral/consult to Pain Clinic; Future; Expected date: 12/30/2024  -     Ambulatory referral/consult to Spine Surgery; Future; Expected date: 12/30/2024         Assessment & Plan    Considered possibility of  muscle strain given negative hernia and imaging results  Opted for conservative management with muscle relaxants and pain relief  Ordered L-spine x-ray to further evaluate persistent lower back pain  Requested ultrasound of superficial epigastric area to investigate tender mass    LOW BACK PAIN:  - Isaiah to avoid heavy lifting (nothing greater than a milk jug).  - Started Robaxin (muscle relaxant) daily for 1 week with or without food.  - Started Tylenol 2 times daily, 6 hours apart, for 1 week.  - L-spine x-ray ordered.    LOWER ABDOMINAL PAIN:  - Ultrasound of superficial epigastric area ordered.    FOLLOW-UP:  - Follow up in 1 week if symptoms persist after treatment and normal imaging results.  - Contact the office or send a message if symptoms persist after 1 week of treatment and normal imaging results.  - Follow up can be conducted remotely if needed, given patient's wife's medical situation.          No follow-ups on file.    This note was generated with the assistance of ambient listening technology. Verbal consent was obtained by the patient and accompanying visitor(s) for the recording of patient appointment to facilitate this note. I attest to having reviewed and edited the generated note for accuracy, though some syntax or spelling errors may persist. Please contact the author of this note for any clarification.

## 2024-12-24 ENCOUNTER — HOSPITAL ENCOUNTER (OUTPATIENT)
Dept: RADIOLOGY | Facility: HOSPITAL | Age: 69
Discharge: HOME | End: 2024-12-24
Attending: NURSE PRACTITIONER
Payer: COMMERCIAL

## 2024-12-24 ENCOUNTER — TELEPHONE (OUTPATIENT)
Dept: INTERNAL MEDICINE | Facility: CLINIC | Age: 69
End: 2024-12-24
Payer: COMMERCIAL

## 2024-12-24 DIAGNOSIS — R10.31 RIGHT GROIN PAIN: ICD-10-CM

## 2024-12-24 PROCEDURE — 76882 US LMTD JT/FCL EVL NVASC XTR: CPT | Mod: 26,RT,, | Performed by: RADIOLOGY

## 2024-12-24 PROCEDURE — 76882 US LMTD JT/FCL EVL NVASC XTR: CPT | Mod: TC,RT

## 2024-12-24 NOTE — TELEPHONE ENCOUNTER
Per Ms. Clark scheduled next available in person appointment with Dr. Dow which is Monday 12/30/24 at 11:40.

## 2024-12-27 ENCOUNTER — TELEPHONE (OUTPATIENT)
Dept: PAIN MEDICINE | Facility: CLINIC | Age: 69
End: 2024-12-27
Payer: COMMERCIAL

## 2025-01-03 ENCOUNTER — OFFICE VISIT (OUTPATIENT)
Dept: CARDIOLOGY | Facility: CLINIC | Age: 70
End: 2025-01-03
Payer: COMMERCIAL

## 2025-01-03 VITALS
BODY MASS INDEX: 36.37 KG/M2 | HEART RATE: 95 BPM | SYSTOLIC BLOOD PRESSURE: 114 MMHG | DIASTOLIC BLOOD PRESSURE: 80 MMHG | HEIGHT: 74 IN | WEIGHT: 283.38 LBS

## 2025-01-03 DIAGNOSIS — I87.2 VENOUS INSUFFICIENCY: ICD-10-CM

## 2025-01-03 DIAGNOSIS — I25.10 CORONARY ARTERY DISEASE, UNSPECIFIED VESSEL OR LESION TYPE, UNSPECIFIED WHETHER ANGINA PRESENT, UNSPECIFIED WHETHER NATIVE OR TRANSPLANTED HEART: ICD-10-CM

## 2025-01-03 DIAGNOSIS — E11.69 HYPERLIPIDEMIA ASSOCIATED WITH TYPE 2 DIABETES MELLITUS: Chronic | ICD-10-CM

## 2025-01-03 DIAGNOSIS — I10 ESSENTIAL HYPERTENSION: Chronic | ICD-10-CM

## 2025-01-03 DIAGNOSIS — G47.33 OSA ON CPAP: ICD-10-CM

## 2025-01-03 DIAGNOSIS — E78.5 HYPERLIPIDEMIA ASSOCIATED WITH TYPE 2 DIABETES MELLITUS: Chronic | ICD-10-CM

## 2025-01-03 DIAGNOSIS — E66.812 CLASS 2 OBESITY WITH BODY MASS INDEX (BMI) OF 36.0 TO 36.9 IN ADULT, UNSPECIFIED OBESITY TYPE, UNSPECIFIED WHETHER SERIOUS COMORBIDITY PRESENT: ICD-10-CM

## 2025-01-03 DIAGNOSIS — I25.10 CORONARY ARTERY DISEASE INVOLVING NATIVE CORONARY ARTERY OF NATIVE HEART WITHOUT ANGINA PECTORIS: Primary | Chronic | ICD-10-CM

## 2025-01-03 DIAGNOSIS — Z98.61 S/P PTCA (PERCUTANEOUS TRANSLUMINAL CORONARY ANGIOPLASTY): ICD-10-CM

## 2025-01-03 DIAGNOSIS — R06.09 DOE (DYSPNEA ON EXERTION): ICD-10-CM

## 2025-01-03 PROCEDURE — 99999 PR PBB SHADOW E&M-EST. PATIENT-LVL IV: CPT | Mod: PBBFAC,,,

## 2025-01-03 NOTE — PROGRESS NOTES
Subjective:   Patient ID:  Isaiah Harrison is a 69 y.o. male who presents for evaluation of No chief complaint on file.      HPI 68y/o who current medical conditions include CAD, DM, Hlp, HTN, obesity, DANTE, venous insufficiency followed by Dr. Lyles in cardiology clinic here today for CV follow up. Denies any CP, SOB, palpitations or dizziness. BP labile in clinic. Denies any swelling, has history of venous insuff. Compliant with medications     EKG today NSR 1st AVB  LHC 10/9/23 2nd diagonal 90% stenosis, small vessel, EF nml    1/3/25  Here today for CV follow up, he does have some ADAMS/SOB with certain activity, doesn't do much at home. BP stable denies any dizziness, near syncope. His wife just underwent CABG and is getting out of rehab next week. Compliant with medications, weight down 8lb since last visit in October      Echo 10/23' with low nml EF, nml per cath report    Past Medical History:   Diagnosis Date    Acute coronary syndrome     Anticoagulant long-term use     Back pain     CAD (coronary artery disease) 10/17/2013    Class 2 severe obesity due to excess calories with serious comorbidity and body mass index (BMI) of 38.0 to 38.9 in adult 01/12/2015    Coronary artery disease     Diabetes mellitus, type 2 2010    BS didn't check 04/19/2023 Insulin 2 years    Gastric polyps     Hyperlipemia     Hypertension     NSTEMI (non-ST elevated myocardial infarction) 10/23/2014    Obesity 10/21/2014    DANTE on CPAP 02/19/2015    S/P PTCA (percutaneous transluminal coronary angioplasty) 10/17/2013    Sleep apnea 01/07/2015    Type 2 diabetes mellitus with circulatory disorder (coronary artery disease), without long-term current use of insulin 07/14/2015       Past Surgical History:   Procedure Laterality Date    APPENDECTOMY      BACK SURGERY      CATARACT EXTRACTION      CATARACT EXTRACTION, BILATERAL      COLONOSCOPY      COLONOSCOPY N/A 3/9/2023    Procedure: COLONOSCOPY;  Surgeon: Lisa Auguste MD;   Location: [facility] ENDO;  Service: Endoscopy;  Laterality: N/A;    COLONOSCOPY N/A 7/24/2024    Procedure: COLONOSCOPY-6/16 plavix/cc econ;  Surgeon: Kwadwo Stroud MD;  Location: [facility] ENDO;  Service: Endoscopy;  Laterality: N/A;    CORONARY ANGIOPLASTY WITH STENT PLACEMENT      ESOPHAGOGASTRODUODENOSCOPY      EYE SURGERY      FRACTIONAL FLOW RESERVE (FFR), CORONARY  10/9/2023    Procedure: Fractional Flow [place] (FFR), Coronary;  Surgeon: Kirsty Lyles MD;  Location: [facility] CATH LAB;  Service: Cardiology;;    FRACTURE SURGERY      HERNIA REPAIR      INSTANTANEOUS WAVE-FREE RATIO (IFR) N/A 10/9/2023    Procedure: Instantaneous Wave-Free Ratio (IFR);  Surgeon: Kirsty Lyles MD;  Location: [facility] CATH LAB;  Service: Cardiology;  Laterality: N/A;    LEFT HEART CATHETERIZATION Left 10/9/2023    Procedure: Left heart cath;  Surgeon: Kirsty Lyles MD;  Location: [facility] CATH LAB;  Service: Cardiology;  Laterality: Left;    SPINE SURGERY         Social History     Tobacco Use    Smoking status: Never    Smokeless tobacco: Never   Substance Use Topics    Alcohol use: Yes     Alcohol/week: 0.0 standard drinks of alcohol     Comment: socially    Drug use: No       Family History   Problem Relation Name Age of Onset    Hypertension Mother      Diabetes Mother      Hypertension Father      Diabetes Sister      Hypertension Sister      Heart disease Maternal Grandfather         Current Outpatient Medications on File Prior to Visit   Medication Sig Dispense Refill    amlodipine-valsartan (EXFORGE)  mg per tablet Take 1 tablet by mouth once daily. 90 tablet 3    aspirin (ECOTRIN) 81 MG EC tablet Take 81 mg by mouth once daily.      atorvastatin (LIPITOR) 80 MG tablet Take 1 tablet (80 mg total) by mouth every evening. 90 tablet 0    blood-glucose meter,continuous (DEXCOM G7 ) Misc 1 Device by Misc.(Non-Drug; Combo Route) route once daily. 1 each 0    blood-glucose sensor (DEXCOM G7 SENSOR) Shira 1 Device by  Misc.(Non-Drug; Combo Route) route every 10 days. 3 each 11    clopidogreL (PLAVIX) 75 mg tablet TAKE 1 TABLET(75 MG) BY MOUTH EVERY DAY 30 tablet 11    doxycycline (VIBRA-TABS) 100 MG tablet Take 1 tablet (100 mg total) by mouth 2 (two) times daily. 60 tablet 0    empagliflozin (JARDIANCE) 25 mg tablet Take 1 tablet (25 mg total) by mouth once daily. 90 tablet 1    evolocumab (REPATHA SURECLICK) 140 mg/mL PnIj Inject 1 mL (140 mg total) into the skin every 14 (fourteen) days. 2 mL 6    hydroCHLOROthiazide (HYDRODIURIL) 25 MG tablet Take 0.5 tablets (12.5 mg total) by mouth once daily. 45 tablet 3    hydrOXYzine HCL (ATARAX) 10 MG Tab TAKE 1 TABLET(10 MG) BY MOUTH TWICE DAILY AS NEEDED. MAY CAUSE DROWSINESS. DO NOT DRIVE OR OPERATE HEAVY MACHINERY 60 tablet 1    isosorbide mononitrate (IMDUR) 30 MG 24 hr tablet Take 1 tablet (30 mg total) by mouth once daily. Schedule appointment with PCP for refills. 21 tablet 3    metoprolol tartrate (LOPRESSOR) 50 MG tablet TAKE 1 TABLET BY MOUTH TWICE DAILY 180 tablet 3    niacin (SLO-NIACIN) 500 mg tablet TAKE 1 TABLET(500 MG) BY MOUTH EVERY EVENING 30 tablet 6    nitroGLYCERIN (NITROSTAT) 0.4 MG SL tablet Place 1 tablet (0.4 mg total) under the tongue every 5 (five) minutes as needed. 25 tablet 4    ranolazine (RANEXA) 500 MG Tb12 Take 1 tablet (500 mg total) by mouth 2 (two) times daily. 60 tablet 6    tirzepatide (MOUNJARO) 5 mg/0.5 mL PnIj Inject 5 mg into the skin every 7 days. 2 mL 11    TOUJEO SOLOSTAR U-300 INSULIN 300 unit/mL (1.5 mL) InPn pen ADMINISTER 40 UNITS UNDER THE SKIN TWICE DAILY 7.5 mL 0    blood sugar diagnostic Strp 1 strip by Misc.(Non-Drug; Combo Route) route 4 (four) times daily. (Patient not taking: Reported on 1/3/2025) 200 strip 11    etodolac (LODINE) 400 MG tablet Take 1 tablet (400 mg total) by mouth 2 (two) times daily. (Patient not taking: Reported on 1/3/2025) 60 tablet 0    ketoconazole (NIZORAL) 2 % cream Apply topically once daily. (Patient  "not taking: Reported on 12/18/2024) 30 g 3    pen needle, diabetic (BD ULTRA-FINE SHORT PEN NEEDLE) 31 gauge x 5/16" Ndle USE WITH LANTUS PEN AS DIRECTED (Patient not taking: Reported on 1/3/2025) 100 each 3    pneumoc 20-miranda conj-dip cr,PF, (PREVNAR-20, PF,) 0.5 mL Syrg injection Inject 0.5 mLs into the muscle. 0.5 mL 0     No current facility-administered medications on file prior to visit.      Wt Readings from Last 3 Encounters:   01/03/25 128.5 kg (283 lb 6.4 oz)   12/18/24 130.1 kg (286 lb 13.1 oz)   12/13/24 131.3 kg (289 lb 7.4 oz)     Temp Readings from Last 3 Encounters:   12/18/24 96.1 °F (35.6 °C) (Tympanic)   12/06/24 96.5 °F (35.8 °C) (Tympanic)   12/01/24 98.7 °F (37.1 °C) (Oral)     BP Readings from Last 3 Encounters:   01/03/25 114/80   12/18/24 110/80   12/13/24 107/64     Pulse Readings from Last 3 Encounters:   01/03/25 95   12/18/24 85   12/13/24 68        Review of Systems   Constitutional: Negative.   HENT: Negative.     Eyes: Negative.    Cardiovascular:  Positive for dyspnea on exertion.   Respiratory:  Positive for shortness of breath.    Skin: Negative.    Musculoskeletal: Negative.    Gastrointestinal: Negative.    Genitourinary: Negative.    Neurological: Negative.    Psychiatric/Behavioral: Negative.         Objective:   Physical Exam  Vitals and nursing note reviewed.   Constitutional:       Appearance: He is obese.   HENT:      Head: Normocephalic and atraumatic.   Eyes:      General:         Right eye: No discharge.         Left eye: No discharge.      Pupils: Pupils are equal, round, and reactive to light.   Cardiovascular:      Rate and Rhythm: Normal rate and regular rhythm.      Heart sounds: S1 normal and S2 normal. No murmur heard.     No friction rub.   Pulmonary:      Effort: Pulmonary effort is normal. No respiratory distress.      Breath sounds: Normal breath sounds. No rales.   Abdominal:      General: There is distension.      Palpations: Abdomen is soft.      Tenderness: " There is no abdominal tenderness.   Musculoskeletal:      Cervical back: Neck supple.      Right lower leg: No edema.      Left lower leg: No edema.   Skin:     General: Skin is warm and dry.   Neurological:      General: No focal deficit present.      Mental Status: He is alert and oriented to person, place, and time.   Psychiatric:         Mood and Affect: Mood normal.         Behavior: Behavior normal.         Thought Content: Thought content normal.         Lab Results   Component Value Date    CHOL 107 (L) 10/24/2024    CHOL 156 10/06/2023    CHOL 159 04/27/2023     Lab Results   Component Value Date    HDL 35 (L) 10/24/2024    HDL 34 (L) 10/06/2023    HDL 38 (L) 04/27/2023     Lab Results   Component Value Date    LDLCALC 55.6 (L) 10/24/2024    LDLCALC 103.0 10/06/2023    LDLCALC 106.4 04/27/2023     Lab Results   Component Value Date    TRIG 82 10/24/2024    TRIG 95 10/06/2023    TRIG 73 04/27/2023     Lab Results   Component Value Date    CHOLHDL 32.7 10/24/2024    CHOLHDL 21.8 10/06/2023    CHOLHDL 23.9 04/27/2023       Chemistry        Component Value Date/Time     12/01/2024 1617    K 4.0 12/01/2024 1617     12/01/2024 1617    CO2 28 12/01/2024 1617    BUN 25 (H) 12/01/2024 1617    CREATININE 1.2 12/01/2024 1617     (H) 12/01/2024 1617        Component Value Date/Time    CALCIUM 9.3 12/01/2024 1617    ALKPHOS 71 12/01/2024 1617    AST 15 12/01/2024 1617    ALT 21 12/01/2024 1617    BILITOT 0.6 12/01/2024 1617    ESTGFRAFRICA >60.0 10/27/2021 0842    EGFRNONAA >60.0 10/27/2021 0842          Lab Results   Component Value Date    TSH 1.188 06/30/2015     Lab Results   Component Value Date    INR 1.0 10/07/2023    INR 1.0 10/06/2023    INR 1.0 12/16/2015     @RESUFAST(WBC,HGB,HCT,MCV,PLT)  @LABRCNTIP(BNP,BNPTRIAGEBLO)@  CrCl cannot be calculated (Patient's most recent lab result is older than the maximum 7 days allowed.).     Results for orders placed during the hospital encounter of  10/06/23    Echo    Interpretation Summary    Left Ventricle: The left ventricle is normal in size. Normal wall thickness. There is mild concentric hypertrophy. Normal wall motion. There is low normal systolic function with a visually estimated ejection fraction of 50 - 55%. There is normal diastolic function.    Right Ventricle: Normal right ventricular cavity size. Wall thickness is normal. Right ventricle wall motion  is normal. Systolic function is normal.    Tricuspid Valve: There is mild regurgitation.    IVC/SVC: Intermediate venous pressure at 8 mmHg.     No results found for this or any previous visit.     Assessment:      1. Hyperlipidemia associated with type 2 diabetes mellitus    2. Coronary artery disease involving native coronary artery of native heart without angina pectoris    3. Essential hypertension    4. Venous insufficiency    5. S/P PTCA (percutaneous transluminal coronary angioplasty)    6. DANTE on CPAP        Plan:   Hyperlipidemia associated with type 2 diabetes mellitus    Coronary artery disease involving native coronary artery of native heart without angina pectoris    Essential hypertension    Venous insufficiency    S/P PTCA (percutaneous transluminal coronary angioplasty)    DANTE on CPAP        Statin, repatha, low fat diet, recheck lipid- Hlp  Exforge, Imdur, HCTZ, BB, low Na diet, profile BP- HTN  Asa, statin, plavix, BB- CAD  RF modifications  Daily exercise as tolerated  Check exercise stress and Echo    RTC 6m or sooner if needed    Courtney Guillot, FNP-C [facility], Cardiology

## 2025-01-08 ENCOUNTER — TELEPHONE (OUTPATIENT)
Dept: CARDIOLOGY | Facility: CLINIC | Age: 70
End: 2025-01-08

## 2025-01-08 ENCOUNTER — HOSPITAL ENCOUNTER (OUTPATIENT)
Dept: CARDIOLOGY | Facility: HOSPITAL | Age: 70
Discharge: HOME | End: 2025-01-08
Payer: COMMERCIAL

## 2025-01-08 VITALS
HEIGHT: 74 IN | WEIGHT: 283 LBS | SYSTOLIC BLOOD PRESSURE: 114 MMHG | BODY MASS INDEX: 36.32 KG/M2 | DIASTOLIC BLOOD PRESSURE: 80 MMHG

## 2025-01-08 DIAGNOSIS — I25.10 CORONARY ARTERY DISEASE INVOLVING NATIVE CORONARY ARTERY OF NATIVE HEART WITHOUT ANGINA PECTORIS: Chronic | ICD-10-CM

## 2025-01-08 DIAGNOSIS — I25.10 CORONARY ARTERY DISEASE, UNSPECIFIED VESSEL OR LESION TYPE, UNSPECIFIED WHETHER ANGINA PRESENT, UNSPECIFIED WHETHER NATIVE OR TRANSPLANTED HEART: ICD-10-CM

## 2025-01-08 DIAGNOSIS — R06.09 DOE (DYSPNEA ON EXERTION): ICD-10-CM

## 2025-01-08 LAB
AORTIC ROOT ANNULUS: 3.9 CM
AV INDEX (PROSTH): 0.84
AV MEAN GRADIENT: 2.3 MMHG
AV PEAK GRADIENT: 4 MMHG
AV VALVE AREA BY VELOCITY RATIO: 3.7 CM²
AV VALVE AREA: 3.5 CM²
AV VELOCITY RATIO: 0.9
BSA FOR ECHO PROCEDURE: 2.59 M2
CV ECHO LV RWT: 0.53 CM
DOP CALC AO PEAK VEL: 1 M/S
DOP CALC AO VTI: 22.6 CM
DOP CALC LVOT AREA: 4.2 CM2
DOP CALC LVOT DIAMETER: 2.3 CM
DOP CALC LVOT PEAK VEL: 0.9 M/S
DOP CALC LVOT STROKE VOLUME: 78.9 CM3
DOP CALC RVOT PEAK VEL: 0.72 M/S
DOP CALC RVOT VTI: 17.6 CM
DOP CALCLVOT PEAK VEL VTI: 19 CM
E WAVE DECELERATION TIME: 178.78 MSEC
E/A RATIO: 0.82
E/E' RATIO: 8.38 M/S
ECHO LV POSTERIOR WALL: 1.2 CM (ref 0.6–1.1)
EJECTION FRACTION: 60 %
FRACTIONAL SHORTENING: 31.1 % (ref 28–44)
INTERVENTRICULAR SEPTUM: 1.2 CM (ref 0.6–1.1)
IVC DIAMETER: 1.63 CM
IVRT: 91.34 MSEC
LA MAJOR: 5.9 CM
LA MINOR: 5.23 CM
LA WIDTH: 3.9 CM
LEFT ATRIUM AREA SYSTOLIC (APICAL 2 CHAMBER): 17.58 CM2
LEFT ATRIUM AREA SYSTOLIC (APICAL 4 CHAMBER): 19.21 CM2
LEFT ATRIUM SIZE: 4.24 CM
LEFT ATRIUM VOLUME INDEX MOD: 19 ML/M2
LEFT ATRIUM VOLUME INDEX: 30.9 ML/M2
LEFT ATRIUM VOLUME MOD: 47.8 ML
LEFT ATRIUM VOLUME: 77.94 CM3
LEFT INTERNAL DIMENSION IN SYSTOLE: 3.1 CM (ref 2.1–4)
LEFT VENTRICLE DIASTOLIC VOLUME INDEX: 37.26 ML/M2
LEFT VENTRICLE DIASTOLIC VOLUME: 93.9 ML
LEFT VENTRICLE END SYSTOLIC VOLUME APICAL 2 CHAMBER: 44.28 ML
LEFT VENTRICLE END SYSTOLIC VOLUME APICAL 4 CHAMBER: 47.91 ML
LEFT VENTRICLE MASS INDEX: 78.6 G/M2
LEFT VENTRICLE SYSTOLIC VOLUME INDEX: 14.8 ML/M2
LEFT VENTRICLE SYSTOLIC VOLUME: 37.19 ML
LEFT VENTRICULAR INTERNAL DIMENSION IN DIASTOLE: 4.5 CM (ref 3.5–6)
LEFT VENTRICULAR MASS: 198.1 G
LV LATERAL E/E' RATIO: 7.44 M/S
LV SEPTAL E/E' RATIO: 9.57 M/S
LVED V (TEICH): 93.9 ML
LVES V (TEICH): 37.19 ML
LVOT MG: 1.89 MMHG
LVOT MV: 0.65 CM/S
MV PEAK A VEL: 0.82 M/S
MV PEAK E VEL: 0.67 M/S
MV STENOSIS PRESSURE HALF TIME: 51.85 MS
MV VALVE AREA P 1/2 METHOD: 4.24 CM2
OHS CV RV/LV RATIO: 1 CM
PV MEAN GRADIENT: 1 MMHG
PV MV: 0.64 M/S
PV PEAK GRADIENT: 4 MMHG
PV PEAK VELOCITY: 1.02 M/S
RA MAJOR: 4.96 CM
RA PRESSURE ESTIMATED: 3 MMHG
RA WIDTH: 4.49 CM
RIGHT VENTRICLE DIASTOLIC BASEL DIMENSION: 4.5 CM
RIGHT VENTRICULAR END-DIASTOLIC DIMENSION: 4.51 CM
SINUS: 3.77 CM
STJ: 3.78 CM
TDI LATERAL: 0.09 M/S
TDI SEPTAL: 0.07 M/S
TDI: 0.08 M/S
TRICUSPID ANNULAR PLANE SYSTOLIC EXCURSION: 2.38 CM
Z-SCORE OF LEFT VENTRICULAR DIMENSION IN END DIASTOLE: -11.83
Z-SCORE OF LEFT VENTRICULAR DIMENSION IN END SYSTOLE: -8.15

## 2025-01-08 PROCEDURE — 93306 TTE W/DOPPLER COMPLETE: CPT | Mod: 26,,, | Performed by: INTERNAL MEDICINE

## 2025-01-08 PROCEDURE — 93306 TTE W/DOPPLER COMPLETE: CPT

## 2025-01-08 NOTE — TELEPHONE ENCOUNTER
Contacted pt and informed him Heart ultrasound with normal function. Pt vu w/o q/c    ----- Message from Kendal Singh NP sent at 1/8/2025 11:49 AM CST -----  Heart ultrasound with normal function

## 2025-01-13 ENCOUNTER — TELEPHONE (OUTPATIENT)
Dept: CARDIOLOGY | Facility: CLINIC | Age: 70
End: 2025-01-13
Payer: COMMERCIAL

## 2025-01-13 NOTE — TELEPHONE ENCOUNTER
scheduled          ----- Message from Gloria sent at 1/13/2025 12:14 PM CST -----  Regarding: Call Back  Contact: huy  Type:  Patient  Call Back     Who Called:Isaiah   Who Left Message for Patient:Isaiah   Does the patient know what this is regarding?: Stress Test   Would the patient rather a call back or a response via [facility]?  Call back   Best Call Back Number: [phone] (home)     Additional Information:     WILDA Miramontes

## 2025-01-17 ENCOUNTER — LAB VISIT (OUTPATIENT)
Dept: LAB | Facility: HOSPITAL | Age: 70
End: 2025-01-17
Attending: UROLOGY
Payer: COMMERCIAL

## 2025-01-17 DIAGNOSIS — R97.20 ELEVATED PSA: ICD-10-CM

## 2025-01-17 LAB — COMPLEXED PSA SERPL-MCNC: 2.7 NG/ML (ref 0–4)

## 2025-01-17 PROCEDURE — 84153 ASSAY OF PSA TOTAL: CPT | Performed by: UROLOGY

## 2025-01-17 PROCEDURE — 36415 COLL VENOUS BLD VENIPUNCTURE: CPT | Performed by: UROLOGY

## 2025-01-24 ENCOUNTER — PATIENT MESSAGE (OUTPATIENT)
Dept: CARDIOLOGY | Facility: HOSPITAL | Age: 70
End: 2025-01-24
Payer: COMMERCIAL

## 2025-01-27 ENCOUNTER — HOSPITAL ENCOUNTER (OUTPATIENT)
Dept: CARDIOLOGY | Facility: HOSPITAL | Age: 70
Discharge: HOME | End: 2025-01-27
Payer: COMMERCIAL

## 2025-01-27 DIAGNOSIS — I25.10 CORONARY ARTERY DISEASE INVOLVING NATIVE CORONARY ARTERY OF NATIVE HEART WITHOUT ANGINA PECTORIS: ICD-10-CM

## 2025-01-27 NOTE — TELEPHONE ENCOUNTER
No care due was identified.  [facility] Embedded Care Due Messages. Reference number: [id number].   1/27/2025 1:13:55 PM CST

## 2025-01-28 DIAGNOSIS — I25.10 CORONARY ARTERY DISEASE INVOLVING NATIVE CORONARY ARTERY OF NATIVE HEART WITHOUT ANGINA PECTORIS: ICD-10-CM

## 2025-01-28 RX ORDER — ISOSORBIDE MONONITRATE 30 MG/1
30 TABLET, EXTENDED RELEASE ORAL DAILY
Qty: 21 TABLET | Refills: 3 | Status: SHIPPED | OUTPATIENT
Start: 2025-01-28 | End: 2025-02-01 | Stop reason: SDUPTHER

## 2025-01-28 NOTE — TELEPHONE ENCOUNTER
----- Message from Gloria sent at 2025  1:41 PM CST -----  Regarding: Refill  Contact: patient  Type:  RX Refill Request    Who Called: Isaiah   Refill or New Rx: new refill   RX Name and Strength: isosorbide mononitrate (IMDUR) 30 MG 24 hr tablet  How is the patient currently taking it? (ex. 1XDay):daily   Is this a 30 day or 90 day RX: 90  Preferred Pharmacy with phone number:[id number] - [place] -  [address] [facility]   [address] [place] [address]  Phone: [phone] Fax: [phone]      Local or Mail Order:local   Ordering Provider:Dr Lyles   Would the patient rather a call back or a response via [facility]?  Call back   Best Call Back Number: [phone] (home)     Additional Information: The prescription has     Thanks,  WILDA

## 2025-01-30 DIAGNOSIS — E78.2 MIXED HYPERLIPIDEMIA: ICD-10-CM

## 2025-01-30 DIAGNOSIS — I21.4 NSTEMI (NON-ST ELEVATED MYOCARDIAL INFARCTION): ICD-10-CM

## 2025-01-30 RX ORDER — EVOLOCUMAB 140 MG/ML
140 INJECTION, SOLUTION SUBCUTANEOUS
Qty: 2 ML | Refills: 6 | Status: ACTIVE | OUTPATIENT
Start: 2025-01-30

## 2025-02-01 RX ORDER — ISOSORBIDE MONONITRATE 30 MG/1
30 TABLET, EXTENDED RELEASE ORAL DAILY
Qty: 90 TABLET | Refills: 1 | Status: SHIPPED | OUTPATIENT
Start: 2025-02-01

## 2025-02-01 NOTE — TELEPHONE ENCOUNTER
LAST APPOINTMENT WITH ME:  4/27/2023   LAST APPOINTMENT WITH DEPT: 12/18/2024  Future Appointments   Date Time Provider Department Center   2/11/2025  9:15 AM Ulysses Waters MD ON UROLOGY [facility]   5/1/2025  8:20 AM Kirsty Lyles MD ON CARDIO [facility]   7/10/2025  1:40 PM Kirsty Lyles MD ON CARDIO [facility]     TO MY TEAM:  I have to see him at least once every 2 years. His last appointment with me was 4/27/2023.  Please schedule him appointment me (VV or OV) at his earliest convenience. If he wants to do OV on same day as 5/1/25 appointment with Dr. Lyles, that's OK with me.

## 2025-02-08 ENCOUNTER — PATIENT MESSAGE (OUTPATIENT)
Dept: CARDIOLOGY | Facility: HOSPITAL | Age: 70
End: 2025-02-08
Payer: COMMERCIAL

## 2025-02-14 ENCOUNTER — HOSPITAL ENCOUNTER (OUTPATIENT)
Dept: CARDIOLOGY | Facility: HOSPITAL | Age: 70
Discharge: HOME | End: 2025-02-14
Payer: COMMERCIAL

## 2025-02-14 DIAGNOSIS — R06.09 DOE (DYSPNEA ON EXERTION): ICD-10-CM

## 2025-02-14 DIAGNOSIS — I25.10 CORONARY ARTERY DISEASE INVOLVING NATIVE CORONARY ARTERY OF NATIVE HEART WITHOUT ANGINA PECTORIS: Chronic | ICD-10-CM

## 2025-02-14 LAB
CV STRESS BASE HR: 88 BPM
DIASTOLIC BLOOD PRESSURE: 55 MMHG
OHS CV CPX 1 MINUTE RECOVERY HEART RATE: 82 BPM
OHS CV CPX 85 PERCENT MAX PREDICTED HEART RATE MALE: 130
OHS CV CPX ESTIMATED METS: 7
OHS CV CPX MAX PREDICTED HEART RATE: 153
OHS CV CPX PATIENT IS FEMALE: 0
OHS CV CPX PATIENT IS MALE: 1
OHS CV CPX PEAK DIASTOLIC BLOOD PRESSURE: 60 MMHG
OHS CV CPX PEAK HEAR RATE: 91 BPM
OHS CV CPX PEAK RATE PRESSURE PRODUCT: NORMAL
OHS CV CPX PEAK SYSTOLIC BLOOD PRESSURE: 115 MMHG
OHS CV CPX PERCENT MAX PREDICTED HEART RATE ACHIEVED: 59
OHS CV CPX RATE PRESSURE PRODUCT PRESENTING: 7920
STRESS ECHO POST EXERCISE DUR MIN: 4 MINUTES
STRESS ECHO POST EXERCISE DUR SEC: 49 SECONDS
SYSTOLIC BLOOD PRESSURE: 90 MMHG

## 2025-02-14 PROCEDURE — 93018 CV STRESS TEST I&R ONLY: CPT | Mod: ,,, | Performed by: INTERNAL MEDICINE

## 2025-02-14 PROCEDURE — 93016 CV STRESS TEST SUPVJ ONLY: CPT | Mod: ,,, | Performed by: INTERNAL MEDICINE

## 2025-02-14 PROCEDURE — 93017 CV STRESS TEST TRACING ONLY: CPT

## 2025-02-17 ENCOUNTER — RESULTS FOLLOW-UP (OUTPATIENT)
Dept: CARDIOLOGY | Facility: CLINIC | Age: 70
End: 2025-02-17

## 2025-02-18 NOTE — TELEPHONE ENCOUNTER
Contacted pt and informed him stress test is normal. He vu w/o q/c    ----- Message from Kendal Singh NP sent at 2/17/2025 10:19 PM CST -----  Stress test nml  ----- Message -----  From: Johny Lozano MD  Sent: 2/14/2025  10:25 PM CST  To: Kendal Singh NP

## 2025-02-20 DIAGNOSIS — I25.10 CORONARY ARTERY DISEASE INVOLVING NATIVE CORONARY ARTERY WITHOUT ANGINA PECTORIS, UNSPECIFIED WHETHER NATIVE OR TRANSPLANTED HEART: ICD-10-CM

## 2025-02-20 DIAGNOSIS — I10 ESSENTIAL HYPERTENSION: ICD-10-CM

## 2025-02-20 RX ORDER — METOPROLOL TARTRATE 50 MG/1
50 TABLET ORAL 2 TIMES DAILY
Qty: 180 TABLET | Refills: 3 | Status: SHIPPED | OUTPATIENT
Start: 2025-02-20

## 2025-04-22 DIAGNOSIS — E11.69 HYPERLIPIDEMIA ASSOCIATED WITH TYPE 2 DIABETES MELLITUS: ICD-10-CM

## 2025-04-22 DIAGNOSIS — E78.5 HYPERLIPIDEMIA ASSOCIATED WITH TYPE 2 DIABETES MELLITUS: ICD-10-CM

## 2025-04-24 NOTE — TELEPHONE ENCOUNTER
Refill Routing Note   Medication(s) are not appropriate for processing by [facility] for the following reason(s):        Patient not seen by provider within 15 months  ED/Hospital Visit since last OV with provider    [facility] action(s):  Defer             Appointments  past 12m or future 3m with PCP    Date Provider   Last Visit   4/27/2023 LACI Dow MD   Next Visit   Visit date not found LACI Dow MD   ED visits in past 90 days: 0        Note composed:9:12 PM 04/23/2025

## 2025-04-28 RX ORDER — ATORVASTATIN CALCIUM 80 MG/1
80 TABLET, FILM COATED ORAL NIGHTLY
Qty: 90 TABLET | Refills: 0 | Status: SHIPPED | OUTPATIENT
Start: 2025-04-28

## 2025-04-28 NOTE — TELEPHONE ENCOUNTER
Limited refill approved.  Appointment (virtual or in-office) required for more refills.    TO MY TEAM: Please help Isaiah schedule appointment before they will run out of their medicine.

## 2025-05-04 DIAGNOSIS — Z79.4 TYPE 2 DIABETES MELLITUS WITH OTHER CIRCULATORY COMPLICATION, WITH LONG-TERM CURRENT USE OF INSULIN: ICD-10-CM

## 2025-05-04 DIAGNOSIS — E11.59 TYPE 2 DIABETES MELLITUS WITH OTHER CIRCULATORY COMPLICATION, WITH LONG-TERM CURRENT USE OF INSULIN: ICD-10-CM

## 2025-05-05 RX ORDER — BLOOD-GLUCOSE SENSOR
1 EACH MISCELLANEOUS
Qty: 3 EACH | Refills: 11 | Status: SHIPPED | OUTPATIENT
Start: 2025-05-05 | End: 2026-05-05

## 2025-05-08 DIAGNOSIS — I21.4 NSTEMI (NON-ST ELEVATED MYOCARDIAL INFARCTION): ICD-10-CM

## 2025-05-08 DIAGNOSIS — E78.2 MIXED HYPERLIPIDEMIA: ICD-10-CM

## 2025-05-08 RX ORDER — EVOLOCUMAB 140 MG/ML
140 INJECTION, SOLUTION SUBCUTANEOUS
Qty: 2 ML | Refills: 6 | Status: ACTIVE | OUTPATIENT
Start: 2025-05-08

## 2025-05-16 ENCOUNTER — TELEPHONE (OUTPATIENT)
Dept: DIABETES | Facility: CLINIC | Age: 70
End: 2025-05-16
Payer: MEDICARE

## 2025-05-16 NOTE — TELEPHONE ENCOUNTER
PA Outcome: DEXCOM G7 SENSOR     Denied: Due to patient has not filled an insulin prescription within the past 180 days.

## 2025-05-18 DIAGNOSIS — I25.10 CORONARY ARTERY DISEASE INVOLVING NATIVE CORONARY ARTERY WITHOUT ANGINA PECTORIS, UNSPECIFIED WHETHER NATIVE OR TRANSPLANTED HEART: ICD-10-CM

## 2025-05-20 RX ORDER — NIACIN 500 MG/1
TABLET, EXTENDED RELEASE ORAL
Qty: 30 TABLET | Refills: 6 | Status: SHIPPED | OUTPATIENT
Start: 2025-05-20

## 2025-07-08 ENCOUNTER — TELEPHONE (OUTPATIENT)
Dept: DIABETES | Facility: CLINIC | Age: 70
End: 2025-07-08
Payer: MEDICARE

## 2025-07-08 DIAGNOSIS — I25.10 CORONARY ARTERY DISEASE INVOLVING NATIVE CORONARY ARTERY WITHOUT ANGINA PECTORIS, UNSPECIFIED WHETHER NATIVE OR TRANSPLANTED HEART: ICD-10-CM

## 2025-07-08 RX ORDER — NIACIN 500 MG/1
TABLET, EXTENDED RELEASE ORAL
Qty: 30 TABLET | Refills: 6 | Status: SHIPPED | OUTPATIENT
Start: 2025-07-08

## 2025-07-25 ENCOUNTER — LAB VISIT (OUTPATIENT)
Dept: LAB | Facility: HOSPITAL | Age: 70
End: 2025-07-25
Attending: NURSE PRACTITIONER
Payer: MEDICARE

## 2025-07-25 DIAGNOSIS — E11.59 TYPE 2 DIABETES MELLITUS WITH OTHER CIRCULATORY COMPLICATION, WITH LONG-TERM CURRENT USE OF INSULIN: ICD-10-CM

## 2025-07-25 DIAGNOSIS — Z79.4 TYPE 2 DIABETES MELLITUS WITH OTHER CIRCULATORY COMPLICATION, WITH LONG-TERM CURRENT USE OF INSULIN: ICD-10-CM

## 2025-07-25 LAB
ANION GAP (OHS): 8 MMOL/L (ref 8–16)
BUN SERPL-MCNC: 20 MG/DL (ref 8–23)
CALCIUM SERPL-MCNC: 9.2 MG/DL (ref 8.7–10.5)
CHLORIDE SERPL-SCNC: 100 MMOL/L (ref 95–110)
CO2 SERPL-SCNC: 24 MMOL/L (ref 23–29)
CREAT SERPL-MCNC: 0.9 MG/DL (ref 0.5–1.4)
EAG (OHS): 280 MG/DL (ref 68–131)
GFR SERPLBLD CREATININE-BSD FMLA CKD-EPI: >60 ML/MIN/1.73/M2
GLUCOSE SERPL-MCNC: 323 MG/DL (ref 70–110)
HBA1C MFR BLD: 11.4 % (ref 4–5.6)
POTASSIUM SERPL-SCNC: 4.3 MMOL/L (ref 3.5–5.1)
SODIUM SERPL-SCNC: 132 MMOL/L (ref 136–145)

## 2025-07-25 PROCEDURE — 80048 BASIC METABOLIC PNL TOTAL CA: CPT

## 2025-07-25 PROCEDURE — 83036 HEMOGLOBIN GLYCOSYLATED A1C: CPT

## 2025-07-25 PROCEDURE — 36415 COLL VENOUS BLD VENIPUNCTURE: CPT

## 2025-08-05 ENCOUNTER — OFFICE VISIT (OUTPATIENT)
Dept: DIABETES | Facility: CLINIC | Age: 70
End: 2025-08-05
Payer: MEDICARE

## 2025-08-05 ENCOUNTER — TELEPHONE (OUTPATIENT)
Dept: DIABETES | Facility: CLINIC | Age: 70
End: 2025-08-05

## 2025-08-05 VITALS
HEART RATE: 76 BPM | BODY MASS INDEX: 35.69 KG/M2 | WEIGHT: 278 LBS | SYSTOLIC BLOOD PRESSURE: 122 MMHG | DIASTOLIC BLOOD PRESSURE: 80 MMHG

## 2025-08-05 DIAGNOSIS — Z79.4 TYPE 2 DIABETES MELLITUS WITH OTHER CIRCULATORY COMPLICATION, WITH LONG-TERM CURRENT USE OF INSULIN: Primary | ICD-10-CM

## 2025-08-05 DIAGNOSIS — E11.59 TYPE 2 DIABETES MELLITUS WITH OTHER CIRCULATORY COMPLICATION, WITH LONG-TERM CURRENT USE OF INSULIN: Primary | ICD-10-CM

## 2025-08-05 DIAGNOSIS — I10 ESSENTIAL HYPERTENSION: Chronic | ICD-10-CM

## 2025-08-05 DIAGNOSIS — I25.10 CORONARY ARTERY DISEASE INVOLVING NATIVE CORONARY ARTERY OF NATIVE HEART WITHOUT ANGINA PECTORIS: Chronic | ICD-10-CM

## 2025-08-05 DIAGNOSIS — E11.69 HYPERLIPIDEMIA ASSOCIATED WITH TYPE 2 DIABETES MELLITUS: Chronic | ICD-10-CM

## 2025-08-05 DIAGNOSIS — E78.5 HYPERLIPIDEMIA ASSOCIATED WITH TYPE 2 DIABETES MELLITUS: Chronic | ICD-10-CM

## 2025-08-05 LAB — GLUCOSE SERPL-MCNC: 456 MG/DL (ref 70–110)

## 2025-08-05 PROCEDURE — G2211 COMPLEX E/M VISIT ADD ON: HCPCS | Mod: ,,, | Performed by: NURSE PRACTITIONER

## 2025-08-05 PROCEDURE — 99999 PR PBB SHADOW E&M-EST. PATIENT-LVL III: CPT | Mod: PBBFAC,,, | Performed by: NURSE PRACTITIONER

## 2025-08-05 PROCEDURE — 99214 OFFICE O/P EST MOD 30 MIN: CPT | Mod: S$PBB,,, | Performed by: NURSE PRACTITIONER

## 2025-08-05 PROCEDURE — 82962 GLUCOSE BLOOD TEST: CPT | Mod: PBBFAC | Performed by: NURSE PRACTITIONER

## 2025-08-05 PROCEDURE — 99999PBSHW POCT GLUCOSE, HAND-HELD DEVICE: Mod: PBBFAC,,,

## 2025-08-05 PROCEDURE — 99213 OFFICE O/P EST LOW 20 MIN: CPT | Mod: PBBFAC | Performed by: NURSE PRACTITIONER

## 2025-08-05 RX ORDER — INSULIN GLARGINE 300 U/ML
48 INJECTION, SOLUTION SUBCUTANEOUS DAILY
Qty: 14.4 ML | Refills: 3 | Status: SHIPPED | OUTPATIENT
Start: 2025-08-05 | End: 2026-08-05

## 2025-08-05 RX ORDER — TIRZEPATIDE 2.5 MG/.5ML
2.5 INJECTION, SOLUTION SUBCUTANEOUS
Qty: 2 ML | Refills: 11 | Status: SHIPPED | OUTPATIENT
Start: 2025-08-05 | End: 2026-08-05

## 2025-08-05 RX ORDER — INSULIN ASPART 100 [IU]/ML
8 INJECTION, SOLUTION INTRAVENOUS; SUBCUTANEOUS
Qty: 7.2 ML | Refills: 11 | Status: SHIPPED | OUTPATIENT
Start: 2025-08-05 | End: 2026-08-05

## 2025-08-05 RX ORDER — INSULIN GLARGINE 300 U/ML
48 INJECTION, SOLUTION SUBCUTANEOUS DAILY
Qty: 14.4 ML | Refills: 3 | Status: SHIPPED | OUTPATIENT
Start: 2025-08-05 | End: 2025-08-05

## 2025-08-05 RX ORDER — TIRZEPATIDE 2.5 MG/.5ML
2.5 INJECTION, SOLUTION SUBCUTANEOUS
Qty: 2 ML | Refills: 11 | Status: SHIPPED | OUTPATIENT
Start: 2025-08-05 | End: 2025-08-05

## 2025-08-05 RX ORDER — INSULIN ASPART 100 [IU]/ML
8 INJECTION, SOLUTION INTRAVENOUS; SUBCUTANEOUS
Qty: 7.2 ML | Refills: 11 | Status: SHIPPED | OUTPATIENT
Start: 2025-08-05 | End: 2025-08-05

## 2025-08-05 NOTE — PROGRESS NOTES
Isaiah Harrison is a 70 y.o. male who  has a past medical history of Acute coronary syndrome, Anticoagulant long-term use, Back pain, CAD (coronary artery disease) (10/17/2013), Class 2 severe obesity due to excess calories with serious comorbidity and body mass index (BMI) of 38.0 to 38.9 in adult (01/12/2015), Coronary artery disease, Diabetes mellitus, type 2 (2010), Gastric polyps, Hyperlipemia, Hypertension, NSTEMI (non-ST elevated myocardial infarction) (10/23/2014), Obesity (10/21/2014), DANTE on CPAP (02/19/2015), S/P PTCA (percutaneous transluminal coronary angioplasty) (10/17/2013), Sleep apnea (01/07/2015), and Type 2 diabetes mellitus with circulatory disorder (coronary artery disease), without long-term current use of insulin (07/14/2015). and presents for a follow up evaluation of Type 2 diabetes mellitus.     CHIEF COMPLAINT: Diabetes Consultation    PCP: LACI Dow MD     Initial visit with me - 9/18/2024    The patient was initially diagnosed with diabetes in 2019.      Previous failed treatments include:  Metformin - gi upset     Social Documentation:  Patient lives in [place].   Occupation:  at [employer].   Exercise: very little.   Meal Patterns: 3 meals a day. Occasional sweet tea, water.   Sleep: DANTE, has CPAP, but not using often.       Diabetes related complications:   cardiovascular disease.   denies Pancreatitis  denies Gastroparesis  denies DKA  denies Hx/family Hx of MEN2/MTC  denies Frequent UTIs/yeast infections    Diabetes Medications              empagliflozin (JARDIANCE) 25 mg tablet Take 1 tablet (25 mg total) by mouth once daily.    TOUJEO SOLOSTAR U-300 INSULIN 300 unit/mL (1.5 mL) InPn pen ADMINISTER 40 UNITS UNDER THE SKIN DAILY    tirzepatide (MOUNJARO) 5 mg/0.5 mL PnIj Inject 5 mg into the skin every 7 days. - RAN OUT IN JUNE.      Current monitoring regimen: Dexcom G7 CGM [facility] - NOT USING.       Patient currently taking insulin or  "sulfonylurea?  Yes. Emergency Glucagon Prescription current? no  Recent hypoglycemic episodes: No.     Patient compliant with glucose checks and medication administration? Yes    DIABETES MANAGEMENT STATUS  Statin: Taking  ACE/ARB: Taking  Screening or Prevention Patient's value Goal Complete/Controlled?   HgA1C Testing and Control   Lab Results   Component Value Date    HGBA1C 11.4 (H) 07/25/2025      Annually/Less than 8% No   Lipid profile : 10/24/2024 Annually Yes   LDL control Lab Results   Component Value Date    LDLCALC 55.6 (L) 10/24/2024    Annually/Less than 100 mg/dl  Yes   Nephropathy screening Lab Results   Component Value Date    LABMICR 39.0 12/06/2024     Lab Results   Component Value Date    PROTEINUA Negative 10/06/2023     No results found for: "UTPCR"   Annually No   Blood pressure BP Readings from Last 1 Encounters:   08/05/25 122/80    Less than 140/90 Yes   Dilated retinal exam : 06/21/2024 Annually Yes   Foot exam   : 09/18/2024 Annually Yes   Patient's medications, allergies, surgical, social and family histories were reviewed and updated as appropriate.     Review of Systems   Constitutional:  Negative for weight loss.   Eyes:  Negative for blurred vision and double vision.   Cardiovascular:  Negative for chest pain.   Gastrointestinal:  Negative for nausea and vomiting.   Genitourinary:  Negative for frequency.   Musculoskeletal:  Negative for falls.   Neurological:  Negative for dizziness and weakness.   Endo/Heme/Allergies:  Negative for polydipsia.   Psychiatric/Behavioral:  Negative for depression.    All other systems reviewed and are negative.       Physical Exam  Constitutional:       General: He is not in acute distress.     Appearance: Normal appearance.   Eyes:      Extraocular Movements: Extraocular movements intact.      Pupils: Pupils are equal, round, and reactive to light.   Cardiovascular:      Rate and Rhythm: Normal rate and regular rhythm.      Heart sounds: Normal heart " "sounds.   Pulmonary:      Effort: Pulmonary effort is normal. No respiratory distress.      Breath sounds: Normal breath sounds.   Musculoskeletal:      Cervical back: Normal range of motion and neck supple.   Neurological:      Mental Status: He is alert and oriented to person, place, and time.   Psychiatric:         Mood and Affect: Mood normal.         Behavior: Behavior normal.        Blood pressure 122/80, pulse 76, weight 126.1 kg (278 lb).  Wt Readings from Last 3 Encounters:   08/05/25 126.1 kg (278 lb)   01/08/25 128.4 kg (283 lb)   01/03/25 128.5 kg (283 lb 6.4 oz)       LAB REVIEW  Lab Results   Component Value Date     (L) 07/25/2025    K 4.3 07/25/2025     07/25/2025    CO2 24 07/25/2025    BUN 20 07/25/2025    CREATININE 0.9 07/25/2025    CALCIUM 9.2 07/25/2025    ANIONGAP 8 07/25/2025    EGFRNORACEVR >60 07/25/2025     No results found for: "CPEPTIDE", "GLUTAMICACID", "INSLNABS"  Hemoglobin A1C   Date Value Ref Range Status   12/06/2024 7.4 (H) 4.0 - 5.6 % Final     Comment:     ADA Screening Guidelines:  5.7-6.4%  Consistent with prediabetes  >or=6.5%  Consistent with diabetes    High levels of fetal hemoglobin interfere with the HbA1C  assay. Heterozygous hemoglobin variants (HbS, HgC, etc)do  not significantly interfere with this assay.   However, presence of multiple variants may affect accuracy.     09/13/2024 8.0 (H) 4.0 - 5.6 % Final     Comment:     ADA Screening Guidelines:  5.7-6.4%  Consistent with prediabetes  >or=6.5%  Consistent with diabetes    High levels of fetal hemoglobin interfere with the HbA1C  assay. Heterozygous hemoglobin variants (HbS, HgC, etc)do  not significantly interfere with this assay.   However, presence of multiple variants may affect accuracy.     03/20/2024 8.7 (H) 4.0 - 5.6 % Final     Comment:     ADA Screening Guidelines:  5.7-6.4%  Consistent with prediabetes  >or=6.5%  Consistent with diabetes    High levels of fetal hemoglobin interfere with the " HbA1C  assay. Heterozygous hemoglobin variants (HbS, HgC, etc)do  not significantly interfere with this assay.   However, presence of multiple variants may affect accuracy.       Hemoglobin A1c   Date Value Ref Range Status   07/25/2025 11.4 (H) 4.0 - 5.6 % Final     Comment:     ADA Screening Guidelines:  5.7-6.4%  Consistent with prediabetes  >=6.5%  Consistent with diabetes    High levels of fetal hemoglobin interfere with the HbA1C  assay. Heterozygous hemoglobin variants (HbS, HgC, etc)do  not significantly interfere with this assay.   However, presence of multiple variants may affect accuracy.       Lab Results   Component Value Date    POCGLU 456 (A) 08/05/2025       ASSESSMENT    ICD-10-CM ICD-9-CM   1. Type 2 diabetes mellitus with other circulatory complication, with long-term current use of insulin  E11.59 250.70    Z79.4 V58.67   2. Hyperlipidemia associated with type 2 diabetes mellitus  E11.69 250.80    E78.5 272.4   3. Coronary artery disease involving native coronary artery of native heart without angina pectoris  I25.10 414.01   4. Essential hypertension  I10 401.9       PLAN  Diagnoses and all orders for this visit:    Type 2 diabetes mellitus with other circulatory complication, with long-term current use of insulin  -     POCT Glucose, Hand-Held Device  -     insulin aspart U-100 (NOVOLOG FLEXPEN U-100 INSULIN) 100 unit/mL (3 mL) InPn pen; Inject 8 Units into the skin 3 (three) times daily with meals.  -     TOUJEO SOLOSTAR U-300 INSULIN 300 unit/mL (1.5 mL) InPn pen; Inject 48 Units into the skin once daily.  -     MOUNJARO 2.5 mg/0.5 mL PnIj; Inject 2.5 mg into the skin every 7 days.    Hyperlipidemia associated with type 2 diabetes mellitus    Coronary artery disease involving native coronary artery of native heart without angina pectoris    Essential hypertension        Reviewed pathophysiology of diabetes, complications related to the disease, importance of annual dilated eye exam and daily  foot examination. Explained SE LESLIE, dosage of medications. Written instructions given and reviewed with patient and patient verbalizes understanding.     PATIENT INSTRUCTIONS     Lifestyle modification with well balanced diet and at least 30 minutes of physical activity daily recommended.   Increase water intake.   Increase protein and non-starchy vegetable servings with meals.   Decrease carbohydrate servings with meals.   Take 10 minute walk after each meal.   Avoid snacking on carbohydrates, choose low carb, high protein snacks.   Incorporate resistance training with weights or resistance bands with exercise regimen at least 3 days a week.       Continue Jardiance 25 mg by mouth daily.   Increase Toujeo to 48 units subcutaneously daily.   Restart Mounjaro at 2.5 mg subcutaneously every 7 days.   Start Novolog 8 units subcutaneously three times daily before meals.   Take 5-10 minutes before your meal.      Dexcom G7 CGM - will order through [facility].   Blood Sugar Goals:       Fastin-130.       1-2 hours after a meal: Less than 180.         Follow up in about 3 weeks (around 2025) for Virtual, Dexcom, [place] order for CGM.

## 2025-08-05 NOTE — TELEPHONE ENCOUNTER
----- Message from KRISTA Arambula sent at 8/5/2025  9:16 AM CDT -----  Let patient know I sent rx's to [facility] due to our pharmacy not being contracted with .

## 2025-08-05 NOTE — PATIENT INSTRUCTIONS
PATIENT INSTRUCTIONS     Lifestyle modification with well balanced diet and at least 30 minutes of physical activity daily recommended.   Increase water intake.   Increase protein and non-starchy vegetable servings with meals.   Decrease carbohydrate servings with meals.   Take 10 minute walk after each meal.   Avoid snacking on carbohydrates, choose low carb, high protein snacks.   Incorporate resistance training with weights or resistance bands with exercise regimen at least 3 days a week.       Continue Jardiance 25 mg by mouth daily.   Increase Toujeo to 48 units subcutaneously daily.   Restart Mounjaro at 2.5 mg subcutaneously every 7 days.   Start Novolog 8 units subcutaneously three times daily before meals.   Take 5-10 minutes before your meal.      Dexcom G7 CGM - will order through [facility].   Blood Sugar Goals:       Fastin-130.       1-2 hours after a meal: Less than 180.

## 2025-08-05 NOTE — Clinical Note
Let patient know [facility] due to our pharmacy not being contracted with [facility].

## 2025-08-26 ENCOUNTER — PATIENT MESSAGE (OUTPATIENT)
Dept: DIABETES | Facility: CLINIC | Age: 70
End: 2025-08-26
Payer: MEDICARE

## 2025-09-02 DIAGNOSIS — E78.5 HYPERLIPIDEMIA ASSOCIATED WITH TYPE 2 DIABETES MELLITUS: ICD-10-CM

## 2025-09-02 DIAGNOSIS — E11.69 HYPERLIPIDEMIA ASSOCIATED WITH TYPE 2 DIABETES MELLITUS: ICD-10-CM

## 2025-09-04 RX ORDER — ATORVASTATIN CALCIUM 80 MG/1
80 TABLET, FILM COATED ORAL NIGHTLY
Qty: 14 TABLET | Refills: 1 | Status: SHIPPED | OUTPATIENT
Start: 2025-09-04

## (undated) DEVICE — CATH JR4 5FR

## (undated) DEVICE — GUIDEWIRE EMERALD .035IN 260CM

## (undated) DEVICE — PACK HEART CATH BR

## (undated) DEVICE — CATH INFINITI MULTIPAK JR4 5FR

## (undated) DEVICE — CATH JL4 5FR

## (undated) DEVICE — BAND TR COMP DEVICE REG 24CM

## (undated) DEVICE — WIRE GUIDE TEFLON 3CM .035 145

## (undated) DEVICE — GUIDE LAUNCHER 6FR EBU 3.5

## (undated) DEVICE — GUIDEWIRE OMNI J TIP 185CM

## (undated) DEVICE — OMNIPAQUE 300MG 150ML VIAL

## (undated) DEVICE — ANGIOTOUCH KIT

## (undated) DEVICE — KIT SYR REUSABLE

## (undated) DEVICE — KIT GLIDESHEATH SLEND 6FR 10CM

## (undated) DEVICE — KIT MANIFOLD LOW PRESS TUBING

## (undated) DEVICE — CATH PIG145 INFINITI 5X110CM